# Patient Record
Sex: MALE | Race: WHITE | NOT HISPANIC OR LATINO | Employment: OTHER | URBAN - NONMETROPOLITAN AREA
[De-identification: names, ages, dates, MRNs, and addresses within clinical notes are randomized per-mention and may not be internally consistent; named-entity substitution may affect disease eponyms.]

---

## 2017-01-24 ENCOUNTER — OFFICE VISIT (OUTPATIENT)
Dept: FAMILY MEDICINE CLINIC | Facility: CLINIC | Age: 57
End: 2017-01-24

## 2017-01-24 VITALS
WEIGHT: 252 LBS | DIASTOLIC BLOOD PRESSURE: 88 MMHG | SYSTOLIC BLOOD PRESSURE: 122 MMHG | OXYGEN SATURATION: 97 % | BODY MASS INDEX: 35.28 KG/M2 | HEIGHT: 71 IN | HEART RATE: 74 BPM

## 2017-01-24 DIAGNOSIS — I10 ESSENTIAL HYPERTENSION: ICD-10-CM

## 2017-01-24 DIAGNOSIS — G89.4 CHRONIC PAIN SYNDROME: Primary | ICD-10-CM

## 2017-01-24 DIAGNOSIS — Z79.899 HIGH RISK MEDICATIONS (NOT ANTICOAGULANTS) LONG-TERM USE: ICD-10-CM

## 2017-01-24 DIAGNOSIS — J01.00 ACUTE NON-RECURRENT MAXILLARY SINUSITIS: ICD-10-CM

## 2017-01-24 PROCEDURE — 96372 THER/PROPH/DIAG INJ SC/IM: CPT | Performed by: FAMILY MEDICINE

## 2017-01-24 PROCEDURE — 99214 OFFICE O/P EST MOD 30 MIN: CPT | Performed by: FAMILY MEDICINE

## 2017-01-24 RX ORDER — CEFTRIAXONE 1 G/1
1 INJECTION, POWDER, FOR SOLUTION INTRAMUSCULAR; INTRAVENOUS ONCE
Status: COMPLETED | OUTPATIENT
Start: 2017-01-24 | End: 2017-01-24

## 2017-01-24 RX ORDER — FLUTICASONE PROPIONATE 50 MCG
SPRAY, SUSPENSION (ML) NASAL
Qty: 1 EACH | Refills: 0 | Status: SHIPPED | OUTPATIENT
Start: 2017-01-24 | End: 2017-03-28 | Stop reason: SDUPTHER

## 2017-01-24 RX ORDER — METHADONE HYDROCHLORIDE 5 MG/1
TABLET ORAL
Qty: 120 TABLET | Refills: 0 | Status: SHIPPED | OUTPATIENT
Start: 2017-01-24 | End: 2017-03-28 | Stop reason: SDUPTHER

## 2017-01-24 RX ORDER — METHYLPREDNISOLONE ACETATE 80 MG/ML
120 INJECTION, SUSPENSION INTRA-ARTICULAR; INTRALESIONAL; INTRAMUSCULAR; SOFT TISSUE ONCE
Status: COMPLETED | OUTPATIENT
Start: 2017-01-24 | End: 2017-01-24

## 2017-01-24 RX ADMIN — METHYLPREDNISOLONE ACETATE 120 MG: 80 INJECTION, SUSPENSION INTRA-ARTICULAR; INTRALESIONAL; INTRAMUSCULAR; SOFT TISSUE at 14:30

## 2017-01-24 RX ADMIN — CEFTRIAXONE 1 G: 1 INJECTION, POWDER, FOR SOLUTION INTRAMUSCULAR; INTRAVENOUS at 14:29

## 2017-01-24 NOTE — MR AVS SNAPSHOT
Sterling Whaley   1/24/2017 9:00 AM   Office Visit    Dept Phone:  [phone]   Encounter #:  [id number]    Provider:  Annel Armstrong MD   Department:  [facility]                Your Full Care Plan              Today's Medication Changes          These changes are accurate as of: 1/24/17  9:53 AM.  If you have any questions, ask your nurse or doctor.               New Medication(s)Ordered:     fluticasone 50 MCG/ACT nasal spray   Commonly known as:  FLONASE   2 sprays each nostril once daily   Started by:  Annel Armstrong MD         Medication(s)that have changed:     methadone 5 MG tablet   Commonly known as:  DOLOPHINE   1 tablet po up to qid.   What changed:  additional instructions   Changed by:  Annel Armstrong MD            Where to Get Your Medications      These medications were sent to [address] [phone]  - [phone] [address] [address]     Phone:  [phone]     fluticasone 50 MCG/ACT nasal spray         You can get these medications from any pharmacy     Bring a paper prescription for each of these medications     methadone 5 MG tablet                  Your Updated Medication List          This list is accurate as of: 1/24/17  9:53 AM.  Always use your most recent med list.                fluticasone 50 MCG/ACT nasal spray   Commonly known as:  FLONASE   2 sprays each nostril once daily       hydrOXYzine 50 MG capsule   Commonly known as:  VISTARIL   Take 1 capsule by mouth 3 (Three) Times a Day As Needed for itching or anxiety.       lisinopril 10 MG tablet   Commonly known as:  PRINIVIL,ZESTRIL   Take 1 tablet by mouth Daily.       methadone 5 MG tablet   Commonly known as:  DOLOPHINE   1 tablet po up to qid.       metoprolol tartrate 25 MG tablet   Commonly known as:  LOPRESSOR   Take 1 tablet by mouth 2 (Two) Times a Day.       omeprazole 40 MG capsule    "  Commonly known as:  priLOSEC   Take 1 capsule by mouth Daily.               You Were Diagnosed With        Codes Comments    Chronic pain syndrome    -  Primary ICD-10-CM: G89.4  ICD-9-CM: 338.4     Essential hypertension     ICD-10-CM: I10  ICD-9-CM: 401.9     Acute non-recurrent maxillary sinusitis     ICD-10-CM: J01.00  ICD-9-CM: 461.0     High risk medications (not anticoagulants) long-term use     ICD-10-CM: Z79.899  ICD-9-CM: V58.69       Instructions     None    Patient Instructions History      Upcoming Appointments     Visit Type Date Time Department    FOLLOW UP 1/24/2017  9:00 AM [facility]      Can'tWait Signup     Our records indicate that you have declined [facility] signup. If you would like to sign up for Can'tWait, please email [email or web address] or call [phone] to obtain an activation code.             Other Info from Your Visit           Allergies     Meperidine        Reason for Visit     Earache left ear pain, ears feel full.    Sinusitis sinus pressure, yellow drainage, dizziness,     Med Refill           Vital Signs     Blood Pressure Pulse Height Weight Oxygen Saturation Body Mass Index    122/88 74 71\" (180.3 cm) 252 lb (114 kg) 97% 35.15 kg/m2    Smoking Status                   Never Smoker           Problems and Diagnoses Noted     Chronic pain syndrome    High risk medications (not anticoagulants) long-term use    High blood pressure    Acute non-recurrent maxillary sinusitis            "

## 2017-01-24 NOTE — PROGRESS NOTES
Subjective   Sterling Whaley is a 56 y.o. male.     Sinus Problem   This is a new problem. The current episode started 1 to 4 weeks ago (over 1 week). The problem has been waxing and waning since onset. There has been no fever. The pain is moderate. Associated symptoms include congestion, coughing (dry), ear pain (left), headaches, a hoarse voice, neck pain and sinus pressure (with purulent nasal d/c). Pertinent negatives include no chills, shortness of breath, sneezing, sore throat or swollen glands. Past treatments include acetaminophen and spray decongestants. The treatment provided mild relief.     Chronic Pain (Brief): The patient is being seen for a routine clinic follow-up of chronic pain. Symptoms: headache, neck pain and back pain. Burning, aching, throbbing at times. Radiates down left leg to foot. Radiates from neck into upper back/shoulders/occipital area. Associated symptoms: anxiety, irritability, difficulty concentrating, sleep disturbance, decreased appetite and decreased libido, but no depression. c/o numbness/tingling/weakness left leg, arms. Current treatment includes activity modification, prescribed exercises, nonsteroidal anti-inflammatory drugs and opioid analgesics. By report, there is good compliance with treatment, good tolerance of treatment and good symptom control. The patient is currently able to do activities of daily living without limitations, unable to work, able to do housework with limitations and unable to participate in sports. Previous presentation included back pain, neck pain, headache, irritability, sleep disturbance and left sciatica. Past evaluation has included cervical and lumbar MRI, neurology evaluation, orthopedic evaluation, pain medicine evaluation and neurosurgery evaluation. Past treatment has included physical therapy, prescribed exercises, acetaminophen, nonsteroidal anti-inflammatory drugs, opioid analgesics, muscle relaxants, anticonvulsants,  "corticosteroids, epidural injection, nerve block and TENS unit.   He has been dx'd with diffuse severe spinal DDD/DJD. Feels spinal injections (unsure if MARCUS or facet) lasts approx 1-2 weeks and nerve ablation provides relief for approx 2 months. Has declined receiving further injections for that reason. In the past Lortab caused itching. Was only able to tolerate low dose Fentanyl patch which helped \"some\" but higher dose patches caused significant side effects. Previously under care of Dr. Duggan here in [place] and tx'd with Methadone for over year at total of 15 mg per day. He also often uses ice applications, Aleve for his neck pain which he feels is very helpful. He takes 1-2 methadone 5 mg tablets early in day. Generally takes 1 or 2, 5 mg tablet later in day. If taken too late in evening medication seems to be too activating and can impair sleep. He was considered a nonsurgical candidate by Dr. Gonzáles per pt report. Denies h/o heart disease, dysrhythmia. Has had w/u's for CP in past which were felt to be due to anxiety. He has a h/o of poor tolerance to medications. Is taking Methadone 2-4 times per day. Prescription generally lasting 45-60 days. PADT reviewed and on chart. He reports at least 60% of pain relieved over past week.      Hypertension: Patient here for follow-up of elevated blood pressure. He is not exercising other than household chores, minor yardwork and is fairly adherent to low salt diet. Blood pressure is well controlled at home. Cardiac symptoms fatigue. Patient denies chest pain, claudication, cough, dyspnea, irregular heart beat, near-syncope, orthopnea, palpitations, syncope, tachypnea and weight gain. Cardiovascular risk factors: advanced age (older than 55 for men, 65 for women), dyslipidemia, hypertension and obesity (BMI >= 30 kg/m2). Use of agents associated with hypertension: NSAIDS. History of target organ damage: none. Taking meds as rx'd.    Since his last visit his wife has " passed away from cancer. He is struggling with grief, poor sleep, eating more. Denies suicidal ideation. Does not wish to be referred to counseling.     The following portions of the patient's history were reviewed and updated as appropriate: allergies, current medications, past family history, past medical history, past social history, past surgical history and problem list.    Review of Systems   Constitutional: Positive for fatigue. Negative for chills.   HENT: Positive for congestion, ear pain (left), hoarse voice and sinus pressure (with purulent nasal d/c). Negative for sneezing and sore throat.    Eyes: Negative for pain, discharge and redness.   Respiratory: Positive for cough (dry). Negative for shortness of breath and wheezing.    Cardiovascular: Negative for chest pain, palpitations and leg swelling.   Gastrointestinal: Positive for nausea. Negative for diarrhea and vomiting.   Endocrine: Positive for heat intolerance.   Genitourinary: Negative for dysuria and hematuria.   Musculoskeletal: Positive for arthralgias, back pain, myalgias and neck pain.   Skin: Negative for rash.   Neurological: Positive for headaches. Negative for dizziness, syncope and weakness.   Hematological: Negative for adenopathy. Does not bruise/bleed easily.   Psychiatric/Behavioral: Positive for dysphoric mood and sleep disturbance. Negative for confusion, hallucinations and suicidal ideas. The patient is nervous/anxious.      Objective    Vitals:    01/24/17 0917   BP: 122/88   Pulse: 74   SpO2: 97%     Body mass index is 35.15 kg/(m^2).  Last 2 weights    01/24/17 0917   Weight: 252 lb (114 kg)     Physical Exam   Constitutional: He is oriented to person, place, and time. He appears well-developed and well-nourished. He is cooperative. He appears ill. No distress.   HENT:   Head: Normocephalic and atraumatic.   Right Ear: Tympanic membrane, external ear and ear canal normal.   Left Ear: External ear and ear canal normal.  Tympanic membrane is erythematous and retracted. A middle ear effusion (serous) is present.   Nose: Mucosal edema and rhinorrhea (thick, mucoid) present. Right sinus exhibits maxillary sinus tenderness. Left sinus exhibits maxillary sinus tenderness.   Mouth/Throat: Uvula is midline, oropharynx is clear and moist and mucous membranes are normal.   Eyes: Conjunctivae, EOM and lids are normal.   Neck: Normal carotid pulses present. No thyroid mass and no thyromegaly present.   Cardiovascular: Normal rate, regular rhythm, normal heart sounds and intact distal pulses.    Pulmonary/Chest: Effort normal and breath sounds normal.   Musculoskeletal:        Right shoulder: He exhibits tenderness (posterior soft tissues, subacromial area) and spasm. He exhibits normal range of motion, no bony tenderness, no swelling, no crepitus and no deformity.        Cervical back: He exhibits decreased range of motion (mildly limited all planes), tenderness (paraspinals), bony tenderness (C4-C7, mild) and deformity (increased lordosis). He exhibits no spasm.        Thoracic back: He exhibits deformity (kyphotic posture).       Vascular Status -  His exam exhibits no right foot edema. His exam exhibits no left foot edema.  Lymphadenopathy:     He has no cervical adenopathy.   Neurological: He is alert and oriented to person, place, and time. He has normal strength. He displays no tremor. No cranial nerve deficit or sensory deficit. Gait normal.   Skin: Skin is warm and dry. No bruising and no rash noted.   Psychiatric: His speech is normal and behavior is normal. Thought content normal. Cognition and memory are normal. He exhibits a depressed mood. He expresses no suicidal ideation.   Nursing note and vitals reviewed.    Assessment/Plan   Sterling was seen today for earache, sinusitis and med refill.    Diagnoses and all orders for this visit:    Chronic pain syndrome  -     methadone (DOLOPHINE) 5 MG tablet; 1 tablet po up to qid.  -      methylPREDNISolone acetate (DEPO-medrol) injection 120 mg; Inject 1.5 mL into the shoulder, thigh, or buttocks 1 (One) Time.    Essential hypertension    Acute non-recurrent maxillary sinusitis  -     fluticasone (FLONASE) 50 MCG/ACT nasal spray; 2 sprays each nostril once daily  -     methylPREDNISolone acetate (DEPO-medrol) injection 120 mg; Inject 1.5 mL into the shoulder, thigh, or buttocks 1 (One) Time.  -     cefTRIAXone (ROCEPHIN) injection 1 g; Inject 1 g into the shoulder, thigh, or buttocks 1 (One) Time.    High risk medications (not anticoagulants) long-term use    symptomatic tx of sinusitis reviewed.  Complicated grief. Pt declines referral for counseling.  BP at goal.  Stable chronic pain syndrome.  Good tolerance and efficacy of current meds; good use of adjunctive modalities, going to chiropractor as well. No aberrant behavior noted.  CLAUDIO report reviewed and scanned into chart. Last CLAUDIO date 10/28/16.  As part of patient's treatment plan I am prescribing a controlled substance. The patient has been made aware of the appropriate use of such medications, including potential risk of somnolence, limited ability to drive and/or work safely, and potential for dependence and/or overdose. It has also been made clear that these medications are for use by this patient only, without concomitant use of alcohol or other substances, unless prescribed.  History and physical exam exhibit continued safe and appropriate use of controlled substance.  F/U in 3 months, sooner as needed.  Patient was encouraged to keep me informed of any acute changes, lack of improvement, or any new concerning symptoms.  Patient voiced understanding of all instructions and denied further questions.

## 2017-01-28 PROBLEM — F32.A DEPRESSIVE DISORDER: Status: ACTIVE | Noted: 2017-01-28

## 2017-01-28 PROBLEM — F41.1 GAD (GENERALIZED ANXIETY DISORDER): Status: ACTIVE | Noted: 2017-01-28

## 2017-02-06 ENCOUNTER — TELEPHONE (OUTPATIENT)
Dept: FAMILY MEDICINE CLINIC | Facility: CLINIC | Age: 57
End: 2017-02-06

## 2017-02-06 RX ORDER — AMOXICILLIN AND CLAVULANATE POTASSIUM 500; 125 MG/1; MG/1
1 TABLET, FILM COATED ORAL 3 TIMES DAILY
Qty: 30 TABLET | Refills: 0 | Status: SHIPPED | OUTPATIENT
Start: 2017-02-06 | End: 2017-03-02

## 2017-02-17 ENCOUNTER — TELEPHONE (OUTPATIENT)
Dept: FAMILY MEDICINE CLINIC | Facility: CLINIC | Age: 57
End: 2017-02-17

## 2017-02-17 DIAGNOSIS — J06.9 UPPER RESPIRATORY TRACT INFECTION, UNSPECIFIED TYPE: Primary | ICD-10-CM

## 2017-02-17 RX ORDER — AZITHROMYCIN 250 MG/1
TABLET, FILM COATED ORAL
Qty: 6 TABLET | Refills: 0 | Status: SHIPPED | OUTPATIENT
Start: 2017-02-17 | End: 2017-03-02

## 2017-03-02 ENCOUNTER — TELEPHONE (OUTPATIENT)
Dept: FAMILY MEDICINE CLINIC | Facility: CLINIC | Age: 57
End: 2017-03-02

## 2017-03-02 RX ORDER — TEMAZEPAM 7.5 MG/1
CAPSULE ORAL
Qty: 30 CAPSULE | Refills: 0 | OUTPATIENT
Start: 2017-03-02 | End: 2017-04-21

## 2017-03-28 ENCOUNTER — TELEPHONE (OUTPATIENT)
Dept: FAMILY MEDICINE CLINIC | Facility: CLINIC | Age: 57
End: 2017-03-28

## 2017-03-28 DIAGNOSIS — G89.4 CHRONIC PAIN SYNDROME: ICD-10-CM

## 2017-03-28 DIAGNOSIS — J01.00 ACUTE NON-RECURRENT MAXILLARY SINUSITIS: ICD-10-CM

## 2017-03-28 RX ORDER — LISINOPRIL 10 MG/1
10 TABLET ORAL DAILY
Qty: 90 TABLET | Refills: 1 | Status: SHIPPED | OUTPATIENT
Start: 2017-03-28 | End: 2017-08-15 | Stop reason: SDUPTHER

## 2017-03-28 RX ORDER — HYDROXYZINE PAMOATE 50 MG/1
50 CAPSULE ORAL 3 TIMES DAILY PRN
Qty: 270 CAPSULE | Refills: 0 | Status: SHIPPED | OUTPATIENT
Start: 2017-03-28 | End: 2017-09-14 | Stop reason: SDUPTHER

## 2017-03-28 RX ORDER — FLUTICASONE PROPIONATE 50 MCG
SPRAY, SUSPENSION (ML) NASAL
Qty: 3 EACH | Refills: 0 | Status: SHIPPED | OUTPATIENT
Start: 2017-03-28 | End: 2018-02-20 | Stop reason: SDUPTHER

## 2017-03-28 RX ORDER — OMEPRAZOLE 40 MG/1
40 CAPSULE, DELAYED RELEASE ORAL DAILY
Qty: 90 CAPSULE | Refills: 1 | Status: SHIPPED | OUTPATIENT
Start: 2017-03-28 | End: 2017-08-15 | Stop reason: SDUPTHER

## 2017-03-28 RX ORDER — METHADONE HYDROCHLORIDE 5 MG/1
TABLET ORAL
Qty: 120 TABLET | Refills: 0 | Status: SHIPPED | OUTPATIENT
Start: 2017-03-28 | End: 2017-04-21 | Stop reason: SDUPTHER

## 2017-03-28 NOTE — TELEPHONE ENCOUNTER
CLAUDIO reviewed.  Rx refill authorized.  Pt to keep routine f/u apt.  UDS to be done at time of

## 2017-04-21 ENCOUNTER — OFFICE VISIT (OUTPATIENT)
Dept: FAMILY MEDICINE CLINIC | Facility: CLINIC | Age: 57
End: 2017-04-21

## 2017-04-21 ENCOUNTER — TELEPHONE (OUTPATIENT)
Dept: FAMILY MEDICINE CLINIC | Facility: CLINIC | Age: 57
End: 2017-04-21

## 2017-04-21 VITALS
BODY MASS INDEX: 34.72 KG/M2 | WEIGHT: 248 LBS | OXYGEN SATURATION: 97 % | SYSTOLIC BLOOD PRESSURE: 132 MMHG | HEART RATE: 76 BPM | HEIGHT: 71 IN | DIASTOLIC BLOOD PRESSURE: 86 MMHG

## 2017-04-21 DIAGNOSIS — M79.10 MYALGIA: ICD-10-CM

## 2017-04-21 DIAGNOSIS — R25.2 CRAMP OF BOTH LOWER EXTREMITIES: ICD-10-CM

## 2017-04-21 DIAGNOSIS — G89.4 CHRONIC PAIN SYNDROME: Primary | ICD-10-CM

## 2017-04-21 DIAGNOSIS — I10 ESSENTIAL HYPERTENSION: ICD-10-CM

## 2017-04-21 DIAGNOSIS — F41.1 GAD (GENERALIZED ANXIETY DISORDER): ICD-10-CM

## 2017-04-21 DIAGNOSIS — D69.6 THROMBOCYTOPENIA (HCC): ICD-10-CM

## 2017-04-21 LAB
ALBUMIN SERPL-MCNC: 4.3 G/DL (ref 3.5–5)
ALBUMIN/GLOB SERPL: 1.2 G/DL (ref 1–2)
ALP SERPL-CCNC: 95 U/L (ref 38–126)
ALT SERPL-CCNC: 50 U/L (ref 13–69)
AST SERPL-CCNC: 46 U/L (ref 15–46)
BILIRUB SERPL-MCNC: 2.5 MG/DL (ref 0.2–1.3)
BUN SERPL-MCNC: 13 MG/DL (ref 7–20)
BUN/CREAT SERPL: 14.4 (ref 6.3–21.9)
CALCIUM SERPL-MCNC: 9.5 MG/DL (ref 8.4–10.2)
CHLORIDE SERPL-SCNC: 107 MMOL/L (ref 98–107)
CK SERPL-CCNC: 122 U/L (ref 30–170)
CO2 SERPL-SCNC: 25 MMOL/L (ref 26–30)
CREAT SERPL-MCNC: 0.9 MG/DL (ref 0.6–1.3)
ERYTHROCYTE [DISTWIDTH] IN BLOOD BY AUTOMATED COUNT: 13 % (ref 11.5–14.5)
GLOBULIN SER CALC-MCNC: 3.7 GM/DL
GLUCOSE SERPL-MCNC: 125 MG/DL (ref 74–98)
HCT VFR BLD AUTO: 42.1 % (ref 42–52)
HGB BLD-MCNC: 14.6 G/DL (ref 14–18)
MCH RBC QN AUTO: 34 PG (ref 27–31)
MCHC RBC AUTO-ENTMCNC: 34.7 G/DL (ref 30–37)
MCV RBC AUTO: 97.9 FL (ref 80–94)
PLATELET # BLD AUTO: 80 10*3/MM3 (ref 130–400)
POTASSIUM SERPL-SCNC: 4.2 MMOL/L (ref 3.5–5.1)
PROT SERPL-MCNC: 8 G/DL (ref 6.3–8.2)
RBC # BLD AUTO: 4.3 10*6/MM3 (ref 4.7–6.1)
SODIUM SERPL-SCNC: 144 MMOL/L (ref 137–145)
TSH SERPL DL<=0.005 MIU/L-ACNC: 2.25 MIU/ML (ref 0.47–4.68)
WBC # BLD AUTO: 4.37 10*3/MM3 (ref 4.8–10.8)

## 2017-04-21 PROCEDURE — 99214 OFFICE O/P EST MOD 30 MIN: CPT | Performed by: FAMILY MEDICINE

## 2017-04-21 RX ORDER — METHADONE HYDROCHLORIDE 5 MG/1
TABLET ORAL
Qty: 120 TABLET | Refills: 0 | Status: SHIPPED | OUTPATIENT
Start: 2017-04-21 | End: 2017-07-05 | Stop reason: SDUPTHER

## 2017-04-21 RX ORDER — BUSPIRONE HYDROCHLORIDE 7.5 MG/1
TABLET ORAL
Qty: 120 TABLET | Refills: 2 | Status: SHIPPED | OUTPATIENT
Start: 2017-04-21 | End: 2017-07-21

## 2017-04-21 NOTE — TELEPHONE ENCOUNTER
----- Message from Danielle Jamison sent at 4/21/2017 10:47 AM EDT -----  Appt scheduled at pt OV today.  ----- Message -----     From: Jocelyn Thomas MA     Sent: 4/7/2017  10:50 AM       To: Danielle Jamison    Can you please schedule pt for medicare physical. I dont see that he has had one or has one scheduled

## 2017-04-21 NOTE — PROGRESS NOTES
"Subjective   Sterling Andrea Whaley is a 56 y.o. male.     History of Present Illness   Chronic Pain (Brief): The patient is being seen for a routine clinic follow-up of chronic pain. Symptoms: headache, neck pain and back pain. Burning, aching, throbbing at times. Radiates down left leg to foot. Radiates from neck into upper back/shoulders/occipital area. Associated symptoms: anxiety, irritability, difficulty concentrating, sleep disturbance, decreased appetite and decreased libido, but no depression. c/o numbness/tingling/weakness left leg, arms. Current treatment includes activity modification, prescribed exercises, nonsteroidal anti-inflammatory drugs and opioid analgesics. By report, there is good compliance with treatment, good tolerance of treatment and good symptom control. The patient is currently able to do activities of daily living without limitations, unable to work, able to do housework with limitations and unable to participate in sports. Previous presentation included back pain, neck pain, headache, irritability, sleep disturbance and left sciatica. Past evaluation has included cervical and lumbar MRI, neurology evaluation, orthopedic evaluation, pain medicine evaluation and neurosurgery evaluation. Past treatment has included physical therapy, prescribed exercises, acetaminophen, nonsteroidal anti-inflammatory drugs, opioid analgesics, muscle relaxants, anticonvulsants, corticosteroids, epidural injection, nerve block and TENS unit. He has been dx'd with diffuse severe spinal DDD/DJD. Feels spinal injections (unsure if MARCUS or facet) lasted approx 1-2 weeks and nerve ablation provided relief for approx 2 months. Has declined receiving further injections for that reason. In the past Lortab caused itching. Was only able to tolerate low dose Fentanyl patch which helped \"some\" but higher dose patches caused significant side effects. Previously under care of Dr. Duggan here in [place] and tx'd with Methadone " "for over year at total of 15 mg per day. He also often uses ice applications, Aleve for his neck pain which he feels is very helpful. He takes 1-2 methadone 5 mg tablets early in day. Generally takes 1 or 2, 5 mg tablet later in day. If taken too late in evening medication seems to be too activating and can impair sleep. He was considered a nonsurgical candidate by Dr. Gonzáles per pt report. Denies h/o heart disease, dysrhythmia. Has had w/u's for CP in past which were felt to be due to anxiety. He has a h/o of poor tolerance to medications. Prescription generally lasting 45-60 days.     He does notice increase muscle pain in lower legs, cramping, worst at night but occur during day as well. Assoc'd with mild swelling if he is \"up on them much\".    Hypertension: Patient here for follow-up of elevated blood pressure. He is not exercising other than household chores, minor yardwork and is fairly adherent to low salt diet. Blood pressure is well controlled at home. Cardiac symptoms fatigue. Patient denies chest pain, claudication, cough, dyspnea, irregular heart beat, near-syncope, orthopnea, palpitations, syncope, tachypnea and weight gain. Cardiovascular risk factors: advanced age (older than 55 for men, 65 for women), dyslipidemia, hypertension and obesity (BMI >= 30 kg/m2). Use of agents associated with hypertension: NSAIDS. History of target organ damage: none. Taking meds as rx'd.     Anxiety: Patient complains of anxiety disorder, panic attacks and sleep disturbance.  He has the following symptoms: difficulty concentrating, fatigue, feelings of losing control, insomnia, irritable, palpitations, paresthesias, psychomotor agitation, racing thoughts, shortness of breath. Onset of symptoms was approximately several years ago, gradually worsening since that time. Much worse after death of his wife in past year. He denies current suicidal and homicidal ideation. He has had difficulty tolerating multiple medications in " past. He is currently using vistaril for diffuse itching assoc'd with anxiety. He was rx'd restoril at last visit but did not get due to cost. He did not request replacement.     Has had persistently low platelets but no bleeding tendencies. No assoc'd anemia.    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    The following portions of the patient's history were reviewed and updated as appropriate: allergies, current medications, past family history, past medical history, past social history, past surgical history and problem list.    Review of Systems   Constitutional: Positive for fatigue. Negative for chills.   HENT: Positive for congestion, postnasal drip and sinus pressure (chronic). Negative for mouth sores, nosebleeds, rhinorrhea, sneezing and sore throat.    Eyes: Negative for pain, discharge, redness and visual disturbance.   Respiratory: Negative for cough, shortness of breath and wheezing.    Cardiovascular: Negative for chest pain, palpitations and leg swelling.   Gastrointestinal: Negative for abdominal pain, blood in stool, diarrhea, nausea and vomiting.   Endocrine: Positive for heat intolerance.   Genitourinary: Negative for dysuria and hematuria.   Musculoskeletal: Positive for arthralgias, back pain, myalgias, neck pain and neck stiffness.   Skin: Negative for color change and rash.   Neurological: Positive for headaches. Negative for dizziness, syncope and weakness.   Hematological: Negative for adenopathy. Does not bruise/bleed easily.   Psychiatric/Behavioral: Positive for dysphoric mood and sleep disturbance. Negative for confusion, hallucinations and suicidal ideas. The patient is nervous/anxious.        Objective    Vitals:    04/21/17 1046   BP: 132/86   Pulse: 76   SpO2: 97%     Body mass index is 34.59 kg/(m^2).  Last 2 weights    04/21/17  1046   Weight: 248 lb (112 kg)       Physical Exam   Constitutional: He is oriented to person,  place, and time. He appears well-developed and well-nourished. He is cooperative. He does not appear ill. No distress.   HENT:   Head: Normocephalic and atraumatic.   Mouth/Throat: Mucous membranes are normal. Mucous membranes are not dry.   Eyes: Conjunctivae, EOM and lids are normal.   Neck: Normal carotid pulses present. Carotid bruit is not present. No thyroid mass and no thyromegaly present.   Cardiovascular: Normal rate, regular rhythm, normal heart sounds and intact distal pulses.    Pulmonary/Chest: Effort normal and breath sounds normal.   Musculoskeletal:        Cervical back: He exhibits decreased range of motion, tenderness (diffuse soft tissue), bony tenderness (C4-C7), deformity (loss of normal lordosis) and spasm.        Thoracic back: He exhibits tenderness, bony tenderness, deformity (increase kyphosis) and spasm.        Lumbar back: He exhibits decreased range of motion, tenderness (diffuse soft tissue), bony tenderness (L3-S1), deformity (loss of normal lordosis) and spasm.       Vascular Status -  His exam exhibits no right foot edema. His exam exhibits no left foot edema.  Lymphadenopathy:     He has no cervical adenopathy.   Neurological: He is alert and oriented to person, place, and time. He has normal strength. He displays no tremor. No cranial nerve deficit or sensory deficit. Gait normal.   Negative SLR bilaterally   Skin: Skin is warm and dry. No bruising and no rash noted.   Psychiatric: His speech is normal and behavior is normal. Judgment and thought content normal. His mood appears anxious. Cognition and memory are normal.   Nursing note and vitals reviewed.      Assessment/Plan   Sterling was seen today for anxiety and pain.    Diagnoses and all orders for this visit:    Chronic pain syndrome  -     methadone (DOLOPHINE) 5 MG tablet; 1 tablet po up to qid.  -     Comprehensive Metabolic Panel    TYSON (generalized anxiety disorder)  -     busPIRone (BUSPAR) 7.5 MG tablet; 1 po bid x 1  week, then can increase to 2 po bid as needed and tolerated  -     TSH    Myalgia  -     CBC (No Diff)  -     Comprehensive Metabolic Panel  -     TSH  -     CK    Cramp of both lower extremities  -     CBC (No Diff)  -     Comprehensive Metabolic Panel    Essential hypertension    Thrombocytopenia    Worsening anxiety, irritability, mild depressive symptoms. He is very nervous about trying new medications. I have reviewed risks/benefits and potential side effects of various treatment options for anxiety. Pt voices understanding and wishes to proceed with trial of buspar.  BP at goal.  Pt advised to eat a heart healthy diet and get regular aerobic exercise as he is able.  Stable chronic pain syndrome.  Good tolerance and efficacy of current meds; good use of adjunctive modalities, going to chiropractor as well. No aberrant behavior noted.  CLAUDIO report reviewed and scanned into chart. Last CLAUDIO date 4/20/17.  As part of patient's treatment plan I am prescribing a controlled substance. The patient has been made aware of the appropriate use of such medications, including potential risk of somnolence, limited ability to drive and/or work safely, and potential for dependence and/or overdose. It has also been made clear that these medications are for use by this patient only, without concomitant use of alcohol or other substances, unless prescribed.  History and physical exam exhibit continued safe and appropriate use of controlled substance.  I will contact patient regarding test results and provide instructions regarding any necessary changes in plan of care.  F/U in 3 months, sooner as needed.  He is also encouraged to schedule a medicare wellness visit.  Patient was encouraged to keep me informed of any acute changes, lack of improvement, or any new concerning symptoms.  Patient voiced understanding of all instructions and denied further questions.

## 2017-04-22 DIAGNOSIS — D69.6 THROMBOCYTOPENIA (HCC): Primary | ICD-10-CM

## 2017-04-25 ENCOUNTER — TELEPHONE (OUTPATIENT)
Dept: FAMILY MEDICINE CLINIC | Facility: CLINIC | Age: 57
End: 2017-04-25

## 2017-05-09 ENCOUNTER — TELEPHONE (OUTPATIENT)
Dept: FAMILY MEDICINE CLINIC | Facility: CLINIC | Age: 57
End: 2017-05-09

## 2017-05-26 ENCOUNTER — TELEPHONE (OUTPATIENT)
Dept: ONCOLOGY | Facility: CLINIC | Age: 57
End: 2017-05-26

## 2017-06-23 ENCOUNTER — LAB (OUTPATIENT)
Dept: LAB | Facility: HOSPITAL | Age: 57
End: 2017-06-23

## 2017-06-23 ENCOUNTER — CONSULT (OUTPATIENT)
Dept: ONCOLOGY | Facility: CLINIC | Age: 57
End: 2017-06-23

## 2017-06-23 ENCOUNTER — APPOINTMENT (OUTPATIENT)
Dept: LAB | Facility: HOSPITAL | Age: 57
End: 2017-06-23

## 2017-06-23 VITALS
TEMPERATURE: 97.8 F | SYSTOLIC BLOOD PRESSURE: 151 MMHG | DIASTOLIC BLOOD PRESSURE: 88 MMHG | HEIGHT: 71 IN | WEIGHT: 243 LBS | RESPIRATION RATE: 15 BRPM | BODY MASS INDEX: 34.02 KG/M2 | HEART RATE: 62 BPM

## 2017-06-23 DIAGNOSIS — D69.6 THROMBOCYTOPENIA (HCC): ICD-10-CM

## 2017-06-23 DIAGNOSIS — D69.6 THROMBOCYTOPENIA (HCC): Primary | ICD-10-CM

## 2017-06-23 LAB
BASOPHILS # BLD AUTO: 0.03 10*3/MM3 (ref 0–0.2)
BASOPHILS NFR BLD AUTO: 0.3 % (ref 0–2.5)
DEPRECATED RDW RBC AUTO: 43.9 FL (ref 37–54)
EOSINOPHIL # BLD AUTO: 0.12 10*3/MM3 (ref 0–0.7)
EOSINOPHIL NFR BLD AUTO: 1.3 % (ref 0–7)
ERYTHROCYTE [DISTWIDTH] IN BLOOD BY AUTOMATED COUNT: 12.4 % (ref 11.5–14.5)
FOLATE SERPL-MCNC: 10.7 NG/ML
HCT VFR BLD AUTO: 41.6 % (ref 42–52)
HGB BLD-MCNC: 14.5 G/DL (ref 14–18)
IMM GRANULOCYTES # BLD: 0.03 10*3/MM3 (ref 0–0.06)
IMM GRANULOCYTES NFR BLD: 0.3 % (ref 0–0.6)
LYMPHOCYTES # BLD AUTO: 2.32 10*3/MM3 (ref 0.6–3.4)
LYMPHOCYTES NFR BLD AUTO: 25.7 % (ref 10–50)
MCH RBC QN AUTO: 33.6 PG (ref 27–31)
MCHC RBC AUTO-ENTMCNC: 34.9 G/DL (ref 30–37)
MCV RBC AUTO: 96.3 FL (ref 80–94)
MONOCYTES # BLD AUTO: 0.81 10*3/MM3 (ref 0–0.9)
MONOCYTES NFR BLD AUTO: 9 % (ref 0–12)
NEUTROPHILS # BLD AUTO: 5.72 10*3/MM3 (ref 2–6.9)
NEUTROPHILS NFR BLD AUTO: 63.4 % (ref 37–80)
NRBC BLD MANUAL-RTO: 0 /100 WBC (ref 0–0)
PLATELET # BLD AUTO: 114 10*3/MM3 (ref 130–400)
PMV BLD AUTO: 12.3 FL (ref 6–12)
RBC # BLD AUTO: 4.32 10*6/MM3 (ref 4.7–6.1)
VIT B12 BLD-MCNC: 285 PG/ML (ref 239–931)
WBC NRBC COR # BLD: 9.03 10*3/MM3 (ref 4.8–10.8)

## 2017-06-23 PROCEDURE — 86431 RHEUMATOID FACTOR QUANT: CPT | Performed by: NURSE PRACTITIONER

## 2017-06-23 PROCEDURE — 82746 ASSAY OF FOLIC ACID SERUM: CPT | Performed by: NURSE PRACTITIONER

## 2017-06-23 PROCEDURE — 36415 COLL VENOUS BLD VENIPUNCTURE: CPT | Performed by: INTERNAL MEDICINE

## 2017-06-23 PROCEDURE — 86038 ANTINUCLEAR ANTIBODIES: CPT | Performed by: NURSE PRACTITIONER

## 2017-06-23 PROCEDURE — 82607 VITAMIN B-12: CPT | Performed by: NURSE PRACTITIONER

## 2017-06-23 PROCEDURE — 85060 BLOOD SMEAR INTERPRETATION: CPT | Performed by: NURSE PRACTITIONER

## 2017-06-23 PROCEDURE — 85025 COMPLETE CBC W/AUTO DIFF WBC: CPT | Performed by: NURSE PRACTITIONER

## 2017-06-23 PROCEDURE — 99204 OFFICE O/P NEW MOD 45 MIN: CPT | Performed by: INTERNAL MEDICINE

## 2017-06-23 NOTE — PROGRESS NOTES
Subjective     PROBLEM LIST:  1. Thrombocytopenia  2. Fatty liver  3. Arthritis  4. Anxiety  5. Chronic pain  6. hypertension  7. GERD  8. hyperlipidemia  9. Gilbert's     CHIEF COMPLAINT: thrombocytopenia      HISTORY OF PRESENT ILLNESS:  The patient is a 57 y.o. year old male, referred for evaluation of a low platelet count.  He believes his platelets have been low for at least a few years.  He does not have any bruising or bleeding.  Denies night sweats, fevers, or weight loss.  He is in his usual state of health.  He suffers from severe arthritis.  He reports being diagnosed with fatty liver about 20 years ago.  He denies alcohol use and does not smoke.      REVIEW OF SYSTEMS:  A 14 point review of systems was performed and is negative except as noted above.    Past Medical History:   Diagnosis Date   • Arthritis    • Bilateral carpal tunnel syndrome    • Depression    • Esophageal reflux    • Gastritis    • H/O pulmonary function tests 09/13/2012    normal   • Hiatal hernia    • History of depression    • History of meniscal tear    • History of renal calculi    • Hyperbilirubinemia     Mineral syndrome   • Hypertension    • Obstructive sleep apnea    • Plantar fascia rupture    • Renal calculi    • RLS (restless legs syndrome)    • Schatzki's ring    • TMJ pain dysfunction syndrome          Current Outpatient Prescriptions on File Prior to Visit   Medication Sig Dispense Refill   • busPIRone (BUSPAR) 7.5 MG tablet 1 po bid x 1 week, then can increase to 2 po bid as needed and tolerated 120 tablet 2   • fluticasone (FLONASE) 50 MCG/ACT nasal spray 2 sprays each nostril once daily  Indications: Allergic Rhinitis 3 each 0   • hydrOXYzine (VISTARIL) 50 MG capsule Take 1 capsule by mouth 3 (Three) Times a Day As Needed for Itching or Anxiety. 270 capsule 0   • lisinopril (PRINIVIL,ZESTRIL) 10 MG tablet Take 1 tablet by mouth Daily. 90 tablet 1   • methadone (DOLOPHINE) 5 MG tablet 1 tablet po up to qid. 120  "tablet 0   • metoprolol tartrate (LOPRESSOR) 25 MG tablet Take 1 tablet by mouth 2 (Two) Times a Day. 180 tablet 1   • omeprazole (priLOSEC) 40 MG capsule Take 1 capsule by mouth Daily. 90 capsule 1     No current facility-administered medications on file prior to visit.        Allergies   Allergen Reactions   • Meperidine        Past Surgical History:   Procedure Laterality Date   • ESOPHAGOSCOPY / EGD      [place]   • KNEE ARTHROSCOPY      medial meniscus repair   • MEDIAL COLLATERAL LIGAMENT REPAIR, KNEE Left        Social History     Social History   • Marital status:      Spouse name: N/A   • Number of children: N/A   • Years of education: N/A     Social History Main Topics   • Smoking status: Never Smoker   • Smokeless tobacco: None   • Alcohol use No   • Drug use: No   • Sexual activity: Not Asked     Other Topics Concern   • None     Social History Narrative   , retired.  His wife  several months ago from cancer.     Family History   Problem Relation Age of Onset   • Arthritis Mother        Objective     /88  Pulse 62  Temp 97.8 °F (36.6 °C) (Temporal Artery )   Resp 15  Ht 71\" (180.3 cm)  Wt 243 lb (110 kg)  BMI 33.89 kg/m2  Performance Status: 0  General: well appearing male in no acute distress  Neuro: alert and oriented  HEENT: sclera anicteric, oropharynx clear  Lymphatics: no cervical, supraclavicular, or axillary adenopathy  Cardiovascular: regular rate and rhythm, no murmurs  Lungs: clear to auscultation bilaterally  Abdomen: soft, nontender, nondistended.  No palpable organomegaly  Extremeties: no lower extremity edema  Skin: no rashes, lesions, bruising, or petechiae  Psych: mood and affect appropriate    Labs: On 2017, bilirubin is 2.5, white count 4.37, hemoglobin 14.6, platelets 80.        Assessment/Plan     Sterling Whaley is a 57 y.o. year old male referred for evaluation of low platelets.   He has had low platelet for at least a few years, ranging " between 80-120K.       I reviewed with the patient the possible reasons for low platelet count including decreased bone marrow production, platelet destruction, or platelet sequestration.  Possible reasons for bone marrow decreased production include B12 or folic acid deficiency, chronic inflammatory conditions or infection, myelodysplastic syndrome, lymphoma or leukemia, or other metastatic malignancy involving the bone marrow.  Platelet destruction can be caused by ITP, medications, or damage to blood vessels.  Platelet sequestration can occur in the setting of an enlarged spleen which can be related to many things, most commonly chronic liver disease.    We will repeat labs today, check LFTs and liver/spleen ultrasound, B12/folic acid levels, and CESAR/RF.  We will plan to see him back in a few weeks to review the results of these tests.           Jayla Barker MD    6/23/2017

## 2017-06-26 LAB
ANA SER QL IA: NEGATIVE
RHEUMATOID FACT SERPL-ACNC: NEGATIVE [IU]/ML

## 2017-06-29 LAB
CYTOLOGIST CVX/VAG CYTO: NORMAL
PATH INTERP BLD-IMP: NORMAL

## 2017-06-30 ENCOUNTER — HOSPITAL ENCOUNTER (OUTPATIENT)
Dept: ULTRASOUND IMAGING | Facility: HOSPITAL | Age: 57
Discharge: HOME | End: 2017-06-30

## 2017-06-30 ENCOUNTER — HOSPITAL ENCOUNTER (OUTPATIENT)
Dept: ULTRASOUND IMAGING | Facility: HOSPITAL | Age: 57
Discharge: HOME | End: 2017-06-30
Admitting: NURSE PRACTITIONER

## 2017-06-30 DIAGNOSIS — D69.6 THROMBOCYTOPENIA (HCC): ICD-10-CM

## 2017-06-30 PROCEDURE — 76705 ECHO EXAM OF ABDOMEN: CPT

## 2017-07-03 ENCOUNTER — TELEPHONE (OUTPATIENT)
Dept: FAMILY MEDICINE CLINIC | Facility: CLINIC | Age: 57
End: 2017-07-03

## 2017-07-03 DIAGNOSIS — G89.4 CHRONIC PAIN SYNDROME: ICD-10-CM

## 2017-07-03 NOTE — TELEPHONE ENCOUNTER
Patient came by office today to get refill on his pain med,  He had a script, but it .  He dropped off that old script for disposal.

## 2017-07-05 RX ORDER — METHADONE HYDROCHLORIDE 5 MG/1
TABLET ORAL
Qty: 120 TABLET | Refills: 0 | Status: SHIPPED | OUTPATIENT
Start: 2017-07-05 | End: 2017-07-21 | Stop reason: SDUPTHER

## 2017-07-07 ENCOUNTER — OFFICE VISIT (OUTPATIENT)
Dept: ONCOLOGY | Facility: CLINIC | Age: 57
End: 2017-07-07

## 2017-07-07 VITALS
SYSTOLIC BLOOD PRESSURE: 179 MMHG | WEIGHT: 245 LBS | HEART RATE: 58 BPM | RESPIRATION RATE: 17 BRPM | TEMPERATURE: 97.5 F | DIASTOLIC BLOOD PRESSURE: 93 MMHG | BODY MASS INDEX: 34.17 KG/M2

## 2017-07-07 DIAGNOSIS — D69.6 THROMBOCYTOPENIA (HCC): Primary | ICD-10-CM

## 2017-07-07 PROCEDURE — 99214 OFFICE O/P EST MOD 30 MIN: CPT | Performed by: INTERNAL MEDICINE

## 2017-07-07 NOTE — PROGRESS NOTES
PROBLEM LIST:  1. Thrombocytopenia  A) initial evaluation showed normal b12/folate, fatty liver, splenomegaly at 14.5 cm  2. Fatty liver  3. Arthritis  4. Anxiety  5. Chronic pain  6. hypertension  7. GERD  8. hyperlipidemia  9. Gilbert's     Subjective     HISTORY OF PRESENT ILLNESS:   Sterlnig Whaley returns for follow-up.   He is here to review his lab results.  He has been feeling a little anxious but otherwise ok.    Past Medical History, Past Surgical History, Social History, Family History have been reviewed and are without significant changes except as mentioned.    Review of Systems   A comprehensive 14 point review of systems was performed and was negative except as mentioned.    Medications:  The current medication list was reviewed in the EMR    ALLERGIES:    Allergies   Allergen Reactions   • Meperidine        Objective      /93  Pulse 58  Temp 97.5 °F (36.4 °C) (Temporal Artery )   Resp 17  Wt 245 lb (111 kg)  BMI 34.17 kg/m2     Performance Status: 0    General: well appearing male in no acute distress  Neuro: alert and oriented    Skin: no rashes, lesions, bruising, or petechiae  Psych: mood and affect appropriate      RECENT LABS:  Blood work was reviewed and is notable for wbc 9.03, hgb 14.5, mcv 96, plt 114, folate 10.7, b12 285, CESAR negative, RF negative    US liver/spleen - fatty liver, splenomegaly with spleen measuring 14.5 cm.        Assessment/Plan   Sterling Whaley is a 57 y.o. year old male with mild chronic thrombocytopenia.  His evaluation shows no evidence of autoimmune disease.  He has a low-normal B12 and normal folic acid level.  His platelets when rechecked were 114.  His ultrasound shows fatty liver and splenomegaly.  I think it is likely that his splenomegaly is related to fatty liver disease, and is also a reason for his mild chronic thrombocytopenia.  I would not recommend any further evaluation or treatment at this point given the stability of his  platelet count.  We did discuss that fatty liver disease can be managed with weight loss, and dietary changes, and regular exercise.  He prefers to follow-up with his primary care doctor.  I would be happy to see him back if he has any significant change in his lab work.                  Visit time was 25 minutes, greater than 50% spent in counseling      Jayla Barker MD  [facility]    7/7/2017          CC:

## 2017-07-21 ENCOUNTER — OFFICE VISIT (OUTPATIENT)
Dept: FAMILY MEDICINE CLINIC | Facility: CLINIC | Age: 57
End: 2017-07-21

## 2017-07-21 VITALS
HEART RATE: 70 BPM | SYSTOLIC BLOOD PRESSURE: 122 MMHG | WEIGHT: 241 LBS | OXYGEN SATURATION: 98 % | DIASTOLIC BLOOD PRESSURE: 88 MMHG | HEIGHT: 71 IN | BODY MASS INDEX: 33.74 KG/M2

## 2017-07-21 DIAGNOSIS — E78.2 MIXED HYPERLIPIDEMIA: ICD-10-CM

## 2017-07-21 DIAGNOSIS — G89.4 CHRONIC PAIN SYNDROME: Primary | ICD-10-CM

## 2017-07-21 DIAGNOSIS — K76.0 FATTY LIVER: ICD-10-CM

## 2017-07-21 DIAGNOSIS — E53.8 VITAMIN B 12 DEFICIENCY: ICD-10-CM

## 2017-07-21 DIAGNOSIS — I10 ESSENTIAL HYPERTENSION: ICD-10-CM

## 2017-07-21 PROCEDURE — 96372 THER/PROPH/DIAG INJ SC/IM: CPT | Performed by: FAMILY MEDICINE

## 2017-07-21 PROCEDURE — 99214 OFFICE O/P EST MOD 30 MIN: CPT | Performed by: FAMILY MEDICINE

## 2017-07-21 RX ORDER — METHADONE HYDROCHLORIDE 5 MG/1
TABLET ORAL
Qty: 120 TABLET | Refills: 0 | Status: SHIPPED | OUTPATIENT
Start: 2017-07-21 | End: 2017-10-24 | Stop reason: SDUPTHER

## 2017-07-21 RX ORDER — CYANOCOBALAMIN 1000 UG/ML
1000 INJECTION, SOLUTION INTRAMUSCULAR; SUBCUTANEOUS
Status: DISCONTINUED | OUTPATIENT
Start: 2017-07-21 | End: 2021-07-13

## 2017-07-21 RX ADMIN — CYANOCOBALAMIN 1000 MCG: 1000 INJECTION, SOLUTION INTRAMUSCULAR; SUBCUTANEOUS at 11:37

## 2017-07-21 NOTE — PROGRESS NOTES
"Subjective   Sterling Andrea Whaley is a 57 y.o. male.     History of Present Illness   Mr. Whaley presents today for routine f/u on several chronic issues.  Chronic Pain (Brief): The patient is being seen for a routine clinic follow-up of chronic pain. Denies new problems. Symptoms: headache, neck pain and back pain. Burning, aching, throbbing at times. Radiates down left leg to foot. Radiates from neck into upper back/shoulders/occipital area. Associated symptoms: anxiety, irritability, difficulty concentrating, sleep disturbance, decreased appetite and decreased libido, but no depression. c/o numbness/tingling/weakness left leg, arms. Current treatment includes activity modification, prescribed exercises, nonsteroidal anti-inflammatory drugs and opioid analgesics. By report, there is good compliance with treatment, good tolerance of treatment and good symptom control. The patient is currently able to do activities of daily living without limitations, unable to work, able to do housework with limitations and unable to participate in sports. Previous presentation included back pain, neck pain, headache, irritability, sleep disturbance and left sciatica. Past evaluation has included cervical and lumbar MRI, neurology evaluation, orthopedic evaluation, pain medicine evaluation and neurosurgery evaluation. Past treatment has included physical therapy, prescribed exercises, acetaminophen, nonsteroidal anti-inflammatory drugs, opioid analgesics, muscle relaxants, anticonvulsants, corticosteroids, epidural injection, nerve block and TENS unit. He has been dx'd with diffuse severe spinal DDD/DJD. Feels spinal injections (unsure if MARCUS or facet) lasted approx 1-2 weeks and nerve ablation provided relief for approx 2 months. Has declined receiving further injections for that reason. In the past Lortab caused itching. Was only able to tolerate low dose Fentanyl patch which helped \"some\" but higher dose patches caused " significant side effects. Previously under care of Dr. Duggan here in [place] and tx'd with Methadone for over year at total of 15 mg per day. He also often uses ice applications, Aleve for his neck pain which he feels is very helpful. He takes 1-2 methadone 5 mg tablets early in day. Generally takes 1 or 2, 5 mg tablet later in day. If taken too late in evening medication seems to be too activating and can impair sleep. He was considered a nonsurgical candidate by Dr. Gonzáles per pt report. Denies h/o heart disease, dysrhythmia. Has had w/u's for CP in past which were felt to be due to anxiety. He has a h/o of poor tolerance to medications. Prescription generally lasting 45-60 days.      Hypertension: Patient here for follow-up of elevated blood pressure. He is not exercising other than household chores, minor yardwork and is fairly adherent to low salt diet. Blood pressure is well controlled at home. Cardiac symptoms fatigue. Patient denies chest pain, claudication, cough, dyspnea, irregular heart beat, near-syncope, orthopnea, palpitations, syncope, tachypnea and weight gain. Cardiovascular risk factors: advanced age (older than 55 for men, 65 for women), dyslipidemia, hypertension and obesity (BMI >= 30 kg/m2). Use of agents associated with hypertension: NSAIDS. History of target organ damage: none. Taking meds as rx'd.      Anxiety: Patient complains of anxiety disorder, panic attacks and sleep disturbance.  He has the following symptoms: difficulty concentrating, fatigue, feelings of losing control, insomnia, irritable, palpitations, paresthesias, psychomotor agitation, racing thoughts, shortness of breath. Onset of symptoms was approximately several years ago, waxing and waning since that time. He denies current suicidal and homicidal ideation. He has had difficulty tolerating multiple medications in past. He is currently using vistaril for diffuse itching assoc'd with anxiety.      Has had persistently low  platelets but no bleeding tendencies. No assoc'd anemia. He has had eval per hematology. Consults reviewed.     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 No abd pain, no jaundice.    The following portions of the patient's history were reviewed and updated as appropriate: allergies, current medications, past family history, past medical history, past social history, past surgical history and problem list.    Review of Systems   Constitutional: Positive for fatigue. Negative for chills.   HENT: Positive for congestion, postnasal drip and sinus pressure (chronic). Negative for mouth sores, nosebleeds, rhinorrhea, sneezing and sore throat.    Eyes: Negative for pain, discharge, redness and visual disturbance.   Respiratory: Negative for cough, shortness of breath and wheezing.    Cardiovascular: Negative for chest pain, palpitations and leg swelling.   Gastrointestinal: Negative for abdominal pain, blood in stool, diarrhea, nausea and vomiting.   Endocrine: Positive for heat intolerance.   Genitourinary: Negative for dysuria and hematuria.   Musculoskeletal: Positive for arthralgias, back pain, myalgias, neck pain and neck stiffness.   Skin: Negative for color change and rash.   Neurological: Positive for headaches. Negative for dizziness, syncope and weakness.   Hematological: Negative for adenopathy. Does not bruise/bleed easily.   Psychiatric/Behavioral: Positive for dysphoric mood and sleep disturbance. Negative for confusion, hallucinations and suicidal ideas. The patient is nervous/anxious.        Objective    Vitals:    07/21/17 1053   BP: 122/88   Pulse: 70   SpO2: 98%     Body mass index is 33.61 kg/(m^2).  Last 2 weights    07/21/17  1053   Weight: 241 lb (109 kg)       Physical Exam   Constitutional: He is oriented to person, place, and time. He appears well-developed and well-nourished. He is cooperative. He does not appear ill. No distress.    HENT:   Head: Normocephalic and atraumatic.   Mouth/Throat: Oropharynx is clear and moist and mucous membranes are normal. Mucous membranes are not dry.   Eyes: Conjunctivae, EOM and lids are normal.   Neck: Normal carotid pulses present. Carotid bruit is not present. No thyroid mass and no thyromegaly present.   Cardiovascular: Normal rate, regular rhythm, normal heart sounds and intact distal pulses.    Pulmonary/Chest: Effort normal and breath sounds normal.   Abdominal: Soft. He exhibits no distension and no mass. Bowel sounds are decreased. There is no hepatosplenomegaly. There is no tenderness.   Musculoskeletal:        Cervical back: He exhibits decreased range of motion, tenderness (diffuse soft tissue), bony tenderness (C4-C7), deformity (loss of normal lordosis) and spasm.        Thoracic back: He exhibits tenderness, bony tenderness, deformity (increase kyphosis) and spasm.        Lumbar back: He exhibits decreased range of motion, tenderness (diffuse soft tissue), bony tenderness (L3-S1), deformity (loss of normal lordosis) and spasm.       Vascular Status -  His exam exhibits no right foot edema. His exam exhibits no left foot edema.  Lymphadenopathy:     He has no cervical adenopathy.        Right: No supraclavicular adenopathy present.        Left: No supraclavicular adenopathy present.   Neurological: He is alert and oriented to person, place, and time. He has normal strength. He displays no tremor. No cranial nerve deficit or sensory deficit. Gait normal.   Negative SLR bilaterally   Skin: Skin is warm and dry. No bruising and no rash noted.   Psychiatric: He has a normal mood and affect. His behavior is normal. Cognition and memory are normal.   Nursing note and vitals reviewed.    Recent Results (from the past 2016 hour(s))   Vitamin B12    Collection Time: 06/23/17  2:56 PM   Result Value Ref Range    Vitamin B-12 285 239 - 931 pg/mL   Folate    Collection Time: 06/23/17  2:56 PM   Result Value  Ref Range    Folate 10.70 >2.76 ng/mL   Nuclear Antigen Antibody, IFA    Collection Time: 06/23/17  2:56 PM   Result Value Ref Range    CESAR Negative    Rheumatoid Factor    Collection Time: 06/23/17  2:56 PM   Result Value Ref Range    Rheumatoid Factor Qualitative Negative Negative   Peripheral Blood Smear    Collection Time: 06/23/17  2:56 PM   Result Value Ref Range    Performed by: Dr. Palacios     Pathologist Interpretation See Scanned Report      CBC Auto Differential    Collection Time: 06/23/17  2:56 PM   Result Value Ref Range    WBC 9.03 4.80 - 10.80 10*3/mm3    RBC 4.32 (L) 4.70 - 6.10 10*6/mm3    Hemoglobin 14.5 14.0 - 18.0 g/dL    Hematocrit 41.6 (L) 42.0 - 52.0 %    MCV 96.3 (H) 80.0 - 94.0 fL    MCH 33.6 (H) 27.0 - 31.0 pg    MCHC 34.9 30.0 - 37.0 g/dL    RDW 12.4 11.5 - 14.5 %    RDW-SD 43.9 37.0 - 54.0 fl    MPV 12.3 (H) 6.0 - 12.0 fL    Platelets 114 (L) 130 - 400 10*3/mm3    Neutrophil % 63.4 37.0 - 80.0 %    Lymphocyte % 25.7 10.0 - 50.0 %    Monocyte % 9.0 0.0 - 12.0 %    Eosinophil % 1.3 0.0 - 7.0 %    Basophil % 0.3 0.0 - 2.5 %    Immature Grans % 0.3 0.0 - 0.6 %    Neutrophils, Absolute 5.72 2.00 - 6.90 10*3/mm3    Lymphocytes, Absolute 2.32 0.60 - 3.40 10*3/mm3    Monocytes, Absolute 0.81 0.00 - 0.90 10*3/mm3    Eosinophils, Absolute 0.12 0.00 - 0.70 10*3/mm3    Basophils, Absolute 0.03 0.00 - 0.20 10*3/mm3    Immature Grans, Absolute 0.03 0.00 - 0.06 10*3/mm3    nRBC 0.0 0.0 - 0.0 /100 WBC     Assessment/Plan   Sterling was seen today for pain, anxiety, hypertension and hyperlipidemia.    Diagnoses and all orders for this visit:    Chronic pain syndrome  -     methadone (DOLOPHINE) 5 MG tablet; 1 tablet po up to qid.    Mixed hyperlipidemia  -     Lipid Panel; Future  -     Comprehensive Metabolic Panel; Future    Essential hypertension  -     Comprehensive Metabolic Panel; Future    Fatty liver  -     Comprehensive Metabolic Panel; Future    Vitamin B 12 deficiency  -     cyanocobalamin  injection 1,000 mcg; Inject 1 mL into the shoulder, thigh, or buttocks Every 28 (Twenty-Eight) Days.    Stable anxiety.  BP at goal.  HLP of unknown status. He will return fasting.  Pt advised to eat a heart healthy diet and get regular aerobic exercise as he is able.  Stable chronic pain syndrome.  Good tolerance and efficacy of current meds; good use of adjunctive modalities, going to chiropractor as well. No aberrant behavior noted.  CLAUDIO report reviewed and scanned into chart. Last CLAUDIO date 47/21/17.  As part of patient's treatment plan I am prescribing a controlled substance. The patient has been made aware of the appropriate use of such medications, including potential risk of somnolence, limited ability to drive and/or work safely, and potential for dependence and/or overdose. It has also been made clear that these medications are for use by this patient only, without concomitant use of alcohol or other substances, unless prescribed.  History and physical exam exhibit continued safe and appropriate use of controlled substance.  I will contact patient regarding test results and provide instructions regarding any necessary changes in plan of care.  F/U in 3 months, sooner as needed.  He is also encouraged to schedule a medicare wellness visit.  Patient was encouraged to keep me informed of any acute changes, or any new concerning symptoms.  Patient voiced understanding of all instructions and denied further questions.

## 2017-07-26 ENCOUNTER — RESULTS ENCOUNTER (OUTPATIENT)
Dept: FAMILY MEDICINE CLINIC | Facility: CLINIC | Age: 57
End: 2017-07-26

## 2017-07-26 DIAGNOSIS — K76.0 FATTY LIVER: ICD-10-CM

## 2017-07-26 DIAGNOSIS — E78.2 MIXED HYPERLIPIDEMIA: ICD-10-CM

## 2017-07-26 DIAGNOSIS — I10 ESSENTIAL HYPERTENSION: ICD-10-CM

## 2017-08-15 RX ORDER — OMEPRAZOLE 40 MG/1
CAPSULE, DELAYED RELEASE ORAL
Qty: 90 CAPSULE | Refills: 1 | Status: SHIPPED | OUTPATIENT
Start: 2017-08-15 | End: 2018-01-13 | Stop reason: SDUPTHER

## 2017-08-15 RX ORDER — LISINOPRIL 10 MG/1
TABLET ORAL
Qty: 90 TABLET | Refills: 1 | Status: SHIPPED | OUTPATIENT
Start: 2017-08-15 | End: 2018-01-13 | Stop reason: SDUPTHER

## 2017-09-14 RX ORDER — HYDROXYZINE PAMOATE 50 MG/1
CAPSULE ORAL
Qty: 270 CAPSULE | Refills: 0 | Status: SHIPPED | OUTPATIENT
Start: 2017-09-14 | End: 2017-12-01 | Stop reason: SDUPTHER

## 2017-10-24 ENCOUNTER — OFFICE VISIT (OUTPATIENT)
Dept: FAMILY MEDICINE CLINIC | Facility: CLINIC | Age: 57
End: 2017-10-24

## 2017-10-24 VITALS
HEART RATE: 60 BPM | SYSTOLIC BLOOD PRESSURE: 158 MMHG | DIASTOLIC BLOOD PRESSURE: 96 MMHG | OXYGEN SATURATION: 98 % | WEIGHT: 242 LBS | BODY MASS INDEX: 33.88 KG/M2 | HEIGHT: 71 IN

## 2017-10-24 DIAGNOSIS — R73.01 IFG (IMPAIRED FASTING GLUCOSE): ICD-10-CM

## 2017-10-24 DIAGNOSIS — M48.02 SPINAL STENOSIS OF CERVICAL REGION: ICD-10-CM

## 2017-10-24 DIAGNOSIS — M47.812 OSTEOARTHRITIS OF CERVICAL SPINE, UNSPECIFIED SPINAL OSTEOARTHRITIS COMPLICATION STATUS: ICD-10-CM

## 2017-10-24 DIAGNOSIS — I10 ESSENTIAL HYPERTENSION: ICD-10-CM

## 2017-10-24 DIAGNOSIS — H69.83 DYSFUNCTION OF BOTH EUSTACHIAN TUBES: ICD-10-CM

## 2017-10-24 DIAGNOSIS — K76.0 FATTY LIVER: ICD-10-CM

## 2017-10-24 DIAGNOSIS — Z79.899 HIGH RISK MEDICATIONS (NOT ANTICOAGULANTS) LONG-TERM USE: ICD-10-CM

## 2017-10-24 DIAGNOSIS — E78.2 MIXED HYPERLIPIDEMIA: ICD-10-CM

## 2017-10-24 DIAGNOSIS — Z28.21 REFUSED INFLUENZA VACCINE: ICD-10-CM

## 2017-10-24 DIAGNOSIS — E66.09 CLASS 1 OBESITY DUE TO EXCESS CALORIES WITH SERIOUS COMORBIDITY AND BODY MASS INDEX (BMI) OF 33.0 TO 33.9 IN ADULT: ICD-10-CM

## 2017-10-24 DIAGNOSIS — G89.4 CHRONIC PAIN SYNDROME: Primary | ICD-10-CM

## 2017-10-24 DIAGNOSIS — I49.9 IRREGULAR HEART RHYTHM: ICD-10-CM

## 2017-10-24 PROCEDURE — 99214 OFFICE O/P EST MOD 30 MIN: CPT | Performed by: FAMILY MEDICINE

## 2017-10-24 PROCEDURE — 96372 THER/PROPH/DIAG INJ SC/IM: CPT | Performed by: FAMILY MEDICINE

## 2017-10-24 PROCEDURE — 93000 ELECTROCARDIOGRAM COMPLETE: CPT | Performed by: FAMILY MEDICINE

## 2017-10-24 RX ORDER — METHYLPREDNISOLONE ACETATE 80 MG/ML
120 INJECTION, SUSPENSION INTRA-ARTICULAR; INTRALESIONAL; INTRAMUSCULAR; SOFT TISSUE ONCE
Status: COMPLETED | OUTPATIENT
Start: 2017-10-24 | End: 2017-10-24

## 2017-10-24 RX ORDER — METHADONE HYDROCHLORIDE 5 MG/1
TABLET ORAL
Qty: 120 TABLET | Refills: 0 | Status: SHIPPED | OUTPATIENT
Start: 2017-10-24 | End: 2017-11-20 | Stop reason: SDUPTHER

## 2017-10-24 RX ORDER — METOPROLOL SUCCINATE 50 MG/1
50 TABLET, EXTENDED RELEASE ORAL DAILY
Qty: 90 TABLET | Refills: 3 | Status: SHIPPED | OUTPATIENT
Start: 2017-10-24 | End: 2018-10-01 | Stop reason: SDUPTHER

## 2017-10-24 RX ADMIN — METHYLPREDNISOLONE ACETATE 120 MG: 80 INJECTION, SUSPENSION INTRA-ARTICULAR; INTRALESIONAL; INTRAMUSCULAR; SOFT TISSUE at 14:39

## 2017-10-24 NOTE — PROGRESS NOTES
"Subjective   Sterling Andrea Whaley is a 57 y.o. male.     History of Present Illness   Mr. Whaley presents today for routine f/u on several chronic issues.  Chronic Pain (Brief): The patient is being seen for a routine clinic follow-up of chronic pain. Denies new problems. Symptoms: headache, neck pain and back pain. Burning, aching, throbbing at times. Radiates down left leg to foot. Radiates from neck into upper back/shoulders/occipital area. Associated symptoms: anxiety, irritability, difficulty concentrating, sleep disturbance, decreased appetite and decreased libido, but no depression. c/o numbness/tingling/weakness left leg, arms. Current treatment includes activity modification, prescribed exercises, nonsteroidal anti-inflammatory drugs and opioid analgesics. By report, there is good compliance with treatment, good tolerance of treatment and good symptom control. The patient is currently able to do activities of daily living without limitations, unable to work, able to do housework with limitations and unable to participate in sports. Previous presentation included back pain, neck pain, headache, irritability, sleep disturbance and left sciatica. Past evaluation has included cervical and lumbar MRI, neurology evaluation, orthopedic evaluation, pain medicine evaluation and neurosurgery evaluation. Past treatment has included physical therapy, prescribed exercises, acetaminophen, nonsteroidal anti-inflammatory drugs, opioid analgesics, muscle relaxants, anticonvulsants, corticosteroids, epidural injection, nerve block and TENS unit. He has been dx'd with diffuse severe spinal DDD/DJD. Feels spinal injections (unsure if MARCUS or facet) lasted approx 1-2 weeks and nerve ablation provided relief for approx 2 months. Has declined receiving further injections for that reason. In the past Lortab caused itching. Was only able to tolerate low dose Fentanyl patch which helped \"some\" but higher dose patches caused " significant side effects. Previously under care of Dr. Duggan here in [place] and tx'd with Methadone for over year at total of 15 mg per day. He also often uses ice applications, Aleve for his neck pain which he feels is very helpful. He takes 1-2 methadone 5 mg tablets early in day. Generally takes 1 or 2, 5 mg tablet later in day. If taken too late in evening medication seems to be too activating and can impair sleep. He was considered a nonsurgical candidate by Dr. Gonzáles per pt report. Denies h/o heart disease, dysrhythmia. Has had w/u's for CP in past which were felt to be due to anxiety. He has a h/o of poor tolerance to medications. Prescription generally lasting 45-60 days. PADT reviewed and on chart. He does feel neck pain in particular is worsening.       Hypertension: Patient here for follow-up of elevated blood pressure. He is not exercising other than household chores, minor yardwork and is fairly adherent to low salt diet. Blood pressure not checked at home. Cardiac symptoms fatigue. Patient denies chest pain, claudication, cough, dyspnea, irregular heart beat, near-syncope, orthopnea, palpitations, syncope, tachypnea and weight gain. Cardiovascular risk factors: advanced age (older than 55 for men, 65 for women), dyslipidemia, hypertension and obesity (BMI >= 30 kg/m2). Use of agents associated with hypertension: NSAIDS. History of target organ damage: none. States he often forgets to take his BP med qhs. Has comorbid HLP. Not currently on statin as he has had previous intolerance with them as well as with fibrate.      Anxiety: Patient complains of anxiety disorder, panic attacks and sleep disturbance.  He has the following symptoms: difficulty concentrating, fatigue, feelings of losing control, insomnia, irritable, palpitations, paresthesias, psychomotor agitation, racing thoughts, shortness of breath. Onset of symptoms was approximately several years ago, waxing and waning since that time. He denies  current suicidal and homicidal ideation. He has had difficulty tolerating multiple medications in past. He is currently using vistaril for diffuse itching assoc'd with anxiety.       Has had persistently low platelets but no bleeding tendencies. No assoc'd anemia. He has had eval per hematology who rec'd close monitoring only for now.       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 No abd pain, no jaundice.    Today he also c/o increased bilatearl ear fullness and congestion. Tends to happen each fall. OTC allergy meds not helping as much as previously.    The following portions of the patient's history were reviewed and updated as appropriate: allergies, current medications, past family history, past medical history, past social history, past surgical history and problem list.    Review of Systems   Constitutional: Positive for fatigue. Negative for chills.   HENT: Positive for congestion, postnasal drip and sinus pressure (chronic). Negative for mouth sores, nosebleeds, rhinorrhea, sneezing and sore throat.    Eyes: Negative for pain, discharge, redness and visual disturbance.   Respiratory: Negative for cough, shortness of breath and wheezing.    Cardiovascular: Negative for chest pain, palpitations and leg swelling.   Gastrointestinal: Negative for abdominal pain, blood in stool, diarrhea, nausea and vomiting.   Endocrine: Positive for heat intolerance.   Genitourinary: Negative for dysuria and hematuria.   Musculoskeletal: Positive for arthralgias, back pain, myalgias, neck pain and neck stiffness.   Skin: Negative for color change and rash.   Neurological: Positive for headaches. Negative for dizziness, syncope and weakness.   Hematological: Negative for adenopathy. Does not bruise/bleed easily.   Psychiatric/Behavioral: Positive for dysphoric mood and sleep disturbance. Negative for confusion, hallucinations and suicidal ideas. The  patient is nervous/anxious.        Objective    Vitals:    10/24/17 1130   BP: 158/96   Pulse:    SpO2:      Body mass index is 33.75 kg/(m^2).  Last 2 weights    10/24/17  1101   Weight: 242 lb (110 kg)       Physical Exam   Constitutional: He is oriented to person, place, and time. He appears well-developed and well-nourished. He is cooperative. He does not appear ill. No distress.   HENT:   Head: Normocephalic and atraumatic.   Right Ear: External ear and ear canal normal. Tympanic membrane is retracted. A middle ear effusion (mild serous) is present.   Left Ear: External ear and ear canal normal. Tympanic membrane is retracted. A middle ear effusion (mild serous) is present.   Nose: No rhinorrhea.   Mouth/Throat: Oropharynx is clear and moist and mucous membranes are normal. Mucous membranes are not dry. No oral lesions.   Eyes: Conjunctivae, EOM and lids are normal.   Neck: Phonation normal. Neck supple. Normal carotid pulses present. Carotid bruit is not present. No thyroid mass and no thyromegaly present.   Cardiovascular: Normal rate, normal heart sounds and intact distal pulses.  A regularly irregular rhythm present.   Pulmonary/Chest: Effort normal and breath sounds normal.   Musculoskeletal:        Cervical back: He exhibits decreased range of motion, tenderness (diffuse soft tissue), bony tenderness (C4-C7), deformity (loss of normal lordosis) and spasm.        Thoracic back: He exhibits tenderness, bony tenderness, deformity (increase kyphosis) and spasm.        Lumbar back: He exhibits decreased range of motion, tenderness (diffuse soft tissue), bony tenderness (L3-S1), deformity (loss of normal lordosis) and spasm.       Vascular Status -  His exam exhibits no right foot edema. His exam exhibits no left foot edema.  Lymphadenopathy:     He has no cervical adenopathy.        Right: No supraclavicular adenopathy present.        Left: No supraclavicular adenopathy present.   Neurological: He is alert and  oriented to person, place, and time. He has normal strength. He displays no tremor. Gait normal.   Negative SLR bilaterally   Skin: Skin is warm and dry. No bruising and no rash noted.   Psychiatric: He has a normal mood and affect. His behavior is normal. Cognition and memory are normal.   Nursing note and vitals reviewed.        ECG 12 Lead  Date/Time: 10/24/2017 11:28 AM  Performed by: MITCHEL IVEY  Authorized by: MITCHEL IVEY   Comparison: compared with previous ECG from 1/4/2016  Similar to previous ECG  Comparison to previous ECG: Previous QTc = [id number]  Rhythm: sinus rhythm  Ectopy comments: none  Rate: normal  BPM: 61  Conduction: conduction normal  ST Segments: ST segments normal  T Waves: T waves normal  T flattening: III and V1  QRS axis: normal  Other findings: LVH and prolonged QTc interval  Q waves: I and aVL (non-diagnostic)  Clinical impression: non-specific ECG  Comments: ID int: 197 ms  QRS dur: 90 ms  QTc: 437 ms          Assessment/Plan   Sterling was seen today for follow-up, pain and hypertension.    Diagnoses and all orders for this visit:    Chronic pain syndrome  -     methadone (DOLOPHINE) 5 MG tablet; 1 tablet po up to qid.  -     methylPREDNISolone acetate (DEPO-medrol) injection 120 mg; Inject 1.5 mL into the shoulder, thigh, or buttocks 1 (One) Time.    Mixed hyperlipidemia    Essential hypertension  -     metoprolol succinate XL (TOPROL-XL) 50 MG 24 hr tablet; Take 1 tablet by mouth Daily.  -     ECG 12 Lead    Fatty liver    Class 1 obesity due to excess calories with serious comorbidity and body mass index (BMI) of 33.0 to 33.9 in adult    IFG (impaired fasting glucose)    High risk medications (not anticoagulants) long-term use  -     ECG 12 Lead    Irregular heart rhythm  -     ECG 12 Lead    Dysfunction of both eustachian tubes  -     methylPREDNISolone acetate (DEPO-medrol) injection 120 mg; Inject 1.5 mL into the shoulder, thigh, or buttocks 1 (One) Time.    Osteoarthritis  of cervical spine, unspecified spinal osteoarthritis complication status    Spinal stenosis of cervical region    Refused influenza vaccine    Stable anxiety.  BP not at goal. Will switch to once daily formulation of beta blocker as he is having trouble remembering qhs dose. Low salt diet advised.   HLP of unknown status. FLP in future when fasting.  Pt advised to eat a heart healthy diet and get regular aerobic exercise as he is able.  Stable chronic pain syndrome.  Good tolerance and efficacy of current meds; good use of adjunctive modalities, going to chiropractor as well. No aberrant behavior noted.  CLAUDIO report reviewed and scanned into chart. Last CLAUDIO date 10/24/17.  As part of patient's treatment plan I am prescribing a controlled substance. The patient has been made aware of the appropriate use of such medications, including potential risk of somnolence, limited ability to drive and/or work safely, and potential for dependence and/or overdose. It has also been made clear that these medications are for use by this patient only, without concomitant use of alcohol or other substances, unless prescribed.  History and physical exam exhibit continued safe and appropriate use of controlled substance.  I will contact patient regarding test results and provide instructions regarding any necessary changes in plan of care.  F/U in 1 month, sooner as needed.  He is also encouraged to schedule a medicare wellness visit.  Patient was encouraged to keep me informed of any acute changes, or any new concerning symptoms.  Patient voiced understanding of all instructions and denied further questions.

## 2017-11-20 ENCOUNTER — OFFICE VISIT (OUTPATIENT)
Dept: FAMILY MEDICINE CLINIC | Facility: CLINIC | Age: 57
End: 2017-11-20

## 2017-11-20 VITALS
DIASTOLIC BLOOD PRESSURE: 86 MMHG | HEART RATE: 60 BPM | BODY MASS INDEX: 33.32 KG/M2 | SYSTOLIC BLOOD PRESSURE: 128 MMHG | OXYGEN SATURATION: 98 % | HEIGHT: 71 IN | WEIGHT: 238 LBS

## 2017-11-20 DIAGNOSIS — E78.2 MIXED HYPERLIPIDEMIA: ICD-10-CM

## 2017-11-20 DIAGNOSIS — H69.83 ETD (EUSTACHIAN TUBE DYSFUNCTION), BILATERAL: ICD-10-CM

## 2017-11-20 DIAGNOSIS — D69.6 THROMBOCYTOPENIA (HCC): ICD-10-CM

## 2017-11-20 DIAGNOSIS — E80.6 HYPERBILIRUBINEMIA: ICD-10-CM

## 2017-11-20 DIAGNOSIS — Z28.21 INFLUENZA VACCINATION DECLINED: ICD-10-CM

## 2017-11-20 DIAGNOSIS — E53.8 VITAMIN B 12 DEFICIENCY: ICD-10-CM

## 2017-11-20 DIAGNOSIS — G89.4 CHRONIC PAIN SYNDROME: ICD-10-CM

## 2017-11-20 DIAGNOSIS — I10 ESSENTIAL HYPERTENSION: Primary | ICD-10-CM

## 2017-11-20 PROBLEM — H69.93 ETD (EUSTACHIAN TUBE DYSFUNCTION), BILATERAL: Status: ACTIVE | Noted: 2017-11-20

## 2017-11-20 PROCEDURE — 96372 THER/PROPH/DIAG INJ SC/IM: CPT | Performed by: FAMILY MEDICINE

## 2017-11-20 PROCEDURE — 99214 OFFICE O/P EST MOD 30 MIN: CPT | Performed by: FAMILY MEDICINE

## 2017-11-20 RX ORDER — CYANOCOBALAMIN 1000 UG/ML
1000 INJECTION, SOLUTION INTRAMUSCULAR; SUBCUTANEOUS
Status: DISCONTINUED | OUTPATIENT
Start: 2017-11-20 | End: 2021-07-13

## 2017-11-20 RX ORDER — METHADONE HYDROCHLORIDE 5 MG/1
TABLET ORAL
Qty: 120 TABLET | Refills: 0 | Status: SHIPPED | OUTPATIENT
Start: 2017-11-20 | End: 2018-02-20 | Stop reason: SDUPTHER

## 2017-11-20 RX ADMIN — CYANOCOBALAMIN 1000 MCG: 1000 INJECTION, SOLUTION INTRAMUSCULAR; SUBCUTANEOUS at 12:16

## 2017-11-20 NOTE — PROGRESS NOTES
"Subjective   Sterling Andrea Whaley is a 57 y.o. male.     History of Present Illness  Mr. Whaley presents today for recheck of his BP. He was seen for routine f/u, pain mgnt last month. BP noted to be uncontrolled. He was switched to once daily beta blocker to improve compliance. He is doing well with taking medication; denies side effects. BP has improved. Denies CP, cough, palpitations, SOA.    He is due for routine refill of methadone for chronic pain mgnt. No changes since routine visit last month. No injuries/falls.     He has h/o B12 def. Is getting monthly to q 3 month injections. Not currently on oral replacement.    He has h/o HLP. Not fasting today for recheck. Currently not on medication. Historically elevated TGs. He does not follow a heart healthy diet. Minimal regular exercise.    He c/o perssitent, chronic \"head congestion\". Bilateral ears feel congested, \"Full of pressure\". No rhinorrhea, some post nasal drip. No fever or facial pain.    He has h/o elevated liver enzymes due to fatty liver as well as increased BR due to suspected Perry sx. Due for recheck. Denies abd pain or jaundice.    The following portions of the patient's history were reviewed and updated as appropriate: allergies, current medications, past family history, past medical history, past social history, past surgical history and problem list.    Review of Systems   Constitutional: Positive for fatigue. Negative for appetite change and unexpected weight change.   HENT: Positive for congestion, postnasal drip and sinus pressure (chronic). Negative for mouth sores, nosebleeds, rhinorrhea, sneezing, sore throat and trouble swallowing.    Eyes: Negative for pain, discharge, redness and visual disturbance.   Respiratory: Negative for cough, shortness of breath and wheezing.    Cardiovascular: Negative for chest pain, palpitations and leg swelling.   Gastrointestinal: Negative for abdominal pain, blood in stool, diarrhea, nausea and " vomiting.   Endocrine: Positive for heat intolerance.   Genitourinary: Negative for dysuria and hematuria.   Musculoskeletal: Positive for arthralgias, back pain, myalgias, neck pain and neck stiffness.   Skin: Negative for color change and rash.   Neurological: Positive for headaches. Negative for dizziness, syncope and weakness.   Hematological: Negative for adenopathy. Does not bruise/bleed easily.   Psychiatric/Behavioral: Positive for dysphoric mood (mild, stable) and sleep disturbance. Negative for confusion, hallucinations and suicidal ideas. The patient is nervous/anxious.        Objective    Vitals:    11/20/17 0958   BP: 128/86   Pulse: 60   SpO2: 98%     Body mass index is 33.19 kg/(m^2).  Last 2 weights    11/20/17 0958   Weight: 238 lb (108 kg)       Physical Exam   Constitutional: He is oriented to person, place, and time. He appears well-developed and well-nourished. He is cooperative. He does not appear ill. No distress.   HENT:   Head: Normocephalic and atraumatic.   Right Ear: External ear and ear canal normal. Tympanic membrane is retracted. A middle ear effusion (mild serous) is present.   Left Ear: External ear and ear canal normal. Tympanic membrane is retracted. A middle ear effusion (mild serous) is present.   Nose: Mucosal edema present. No rhinorrhea.   Mouth/Throat: Oropharynx is clear and moist and mucous membranes are normal. Mucous membranes are not dry. No oral lesions.   Eyes: Conjunctivae and lids are normal.   Neck: Phonation normal. Neck supple. Normal carotid pulses present. No thyroid mass and no thyromegaly present.   Cardiovascular: Normal rate, normal heart sounds and intact distal pulses.  A regularly irregular rhythm present.   Pulmonary/Chest: Effort normal and breath sounds normal.       Vascular Status -  His exam exhibits no right foot edema. His exam exhibits no left foot edema.  Lymphadenopathy:     He has no cervical adenopathy.        Right: No supraclavicular  adenopathy present.        Left: No supraclavicular adenopathy present.   Neurological: He is alert and oriented to person, place, and time. He has normal strength. He displays no tremor. Gait normal.   Negative SLR bilaterally   Skin: Skin is warm and dry. No bruising and no rash noted.   Psychiatric: He has a normal mood and affect. His behavior is normal. Cognition and memory are normal.   Nursing note and vitals reviewed.    Assessment/Plan   Sterling was seen today for hypertension, hyperlipidemia and pain.    Diagnoses and all orders for this visit:    Essential hypertension  -     CBC (No Diff); Future  -     Comprehensive Metabolic Panel; Future    Mixed hyperlipidemia  -     Comprehensive Metabolic Panel; Future  -     Lipid Panel; Future    Vitamin B 12 deficiency  -     cyanocobalamin injection 1,000 mcg; Inject 1 mL into the shoulder, thigh, or buttocks Every 30 (Thirty) Days.  -     CBC (No Diff); Future  -     Vitamin B12; Future    Chronic pain syndrome  -     methadone (DOLOPHINE) 5 MG tablet; 1 tablet po up to qid.    ETD (Eustachian tube dysfunction), bilateral    Thrombocytopenia  -     CBC (No Diff); Future    Hyperbilirubinemia  -     Comprehensive Metabolic Panel; Future  -     Bilirubin, Direct; Future    Influenza vaccination declined    HTN improved; continue current regimen. Pt advised to eat a heart healthy diet and get regular aerobic exercise.    HLP of unknown status. Return fasting for FLP. Tx as indicated.    Stable chronic pain. Routine refill provided as due.  Good tolerance and efficacy of current meds; good use of adjunctive modalities, going to chiropractor as well. No aberrant behavior noted.  CLAUDIO report reviewed and scanned into chart. Last CLAUDIO date 10/24/17.  Last UDS appropriate.  No aberrant behavior.  As part of patient's treatment plan I am prescribing a controlled substance. The patient has been made aware of the appropriate use of such medications, including  potential risk of somnolence, limited ability to drive and/or work safely, and potential for dependence and/or overdose. It has also been made clear that these medications are for use by this patient only, without concomitant use of alcohol or other substances, unless prescribed.  History and physical exam exhibit continued safe and appropriate use of controlled substance.    ETD, chronic. He is encouraged to restart his flonase, take regularly, add mucinex.    I will contact patient regarding test results and provide instructions regarding any necessary changes in plan of care.  Patient was encouraged to keep me informed of any acute changes, lack of improvement, or any new concerning symptoms.  Pt is aware of reasons to seek emergent care.  Routine f/u in 3 months, sooner as needed/instructed.  Patient voiced understanding of all instructions and denied further questions.

## 2017-11-25 ENCOUNTER — RESULTS ENCOUNTER (OUTPATIENT)
Dept: FAMILY MEDICINE CLINIC | Facility: CLINIC | Age: 57
End: 2017-11-25

## 2017-11-25 DIAGNOSIS — E53.8 VITAMIN B 12 DEFICIENCY: ICD-10-CM

## 2017-11-25 DIAGNOSIS — I10 ESSENTIAL HYPERTENSION: ICD-10-CM

## 2017-11-25 DIAGNOSIS — E80.6 HYPERBILIRUBINEMIA: ICD-10-CM

## 2017-11-25 DIAGNOSIS — E78.2 MIXED HYPERLIPIDEMIA: ICD-10-CM

## 2017-11-25 DIAGNOSIS — D69.6 THROMBOCYTOPENIA (HCC): ICD-10-CM

## 2017-11-27 LAB
ALBUMIN SERPL-MCNC: 4.4 G/DL (ref 3.5–5)
ALBUMIN/GLOB SERPL: 1.3 G/DL (ref 1–2)
ALP SERPL-CCNC: 101 U/L (ref 38–126)
ALT SERPL-CCNC: 57 U/L (ref 13–69)
AST SERPL-CCNC: 42 U/L (ref 15–46)
BILIRUB DIRECT SERPL-MCNC: 0.6 MG/DL (ref 0–0.4)
BILIRUB SERPL-MCNC: 2.4 MG/DL (ref 0.2–1.3)
BUN SERPL-MCNC: 16 MG/DL (ref 7–20)
BUN/CREAT SERPL: 14.5 (ref 6.3–21.9)
CALCIUM SERPL-MCNC: 9.7 MG/DL (ref 8.4–10.2)
CHLORIDE SERPL-SCNC: 102 MMOL/L (ref 98–107)
CHOLEST SERPL-MCNC: 188 MG/DL (ref 0–199)
CO2 SERPL-SCNC: 29 MMOL/L (ref 26–30)
CREAT SERPL-MCNC: 1.1 MG/DL (ref 0.6–1.3)
ERYTHROCYTE [DISTWIDTH] IN BLOOD BY AUTOMATED COUNT: 12.9 % (ref 11.5–14.5)
GFR SERPLBLD CREATININE-BSD FMLA CKD-EPI: 69 ML/MIN/1.73
GFR SERPLBLD CREATININE-BSD FMLA CKD-EPI: 84 ML/MIN/1.73
GLOBULIN SER CALC-MCNC: 3.3 GM/DL
GLUCOSE SERPL-MCNC: 99 MG/DL (ref 74–98)
HCT VFR BLD AUTO: 41.2 % (ref 42–52)
HDLC SERPL-MCNC: 46 MG/DL (ref 40–60)
HGB BLD-MCNC: 14 G/DL (ref 14–18)
LDLC SERPL CALC-MCNC: 86 MG/DL (ref 0–99)
MCH RBC QN AUTO: 33.5 PG (ref 27–31)
MCHC RBC AUTO-ENTMCNC: 34 G/DL (ref 30–37)
MCV RBC AUTO: 98.6 FL (ref 80–94)
PLATELET # BLD AUTO: 74 10*3/MM3 (ref 130–400)
POTASSIUM SERPL-SCNC: 4.5 MMOL/L (ref 3.5–5.1)
PROT SERPL-MCNC: 7.7 G/DL (ref 6.3–8.2)
RBC # BLD AUTO: 4.18 10*6/MM3 (ref 4.7–6.1)
SODIUM SERPL-SCNC: 142 MMOL/L (ref 137–145)
TRIGL SERPL-MCNC: 280 MG/DL
VIT B12 SERPL-MCNC: 473 PG/ML (ref 239–931)
VLDLC SERPL CALC-MCNC: 56 MG/DL
WBC # BLD AUTO: 4.27 10*3/MM3 (ref 4.8–10.8)

## 2017-12-01 ENCOUNTER — TELEPHONE (OUTPATIENT)
Dept: FAMILY MEDICINE CLINIC | Facility: CLINIC | Age: 57
End: 2017-12-01

## 2017-12-01 RX ORDER — HYDROXYZINE PAMOATE 50 MG/1
CAPSULE ORAL
Qty: 270 CAPSULE | Refills: 0 | Status: SHIPPED | OUTPATIENT
Start: 2017-12-01 | End: 2018-02-05 | Stop reason: SDUPTHER

## 2017-12-01 NOTE — TELEPHONE ENCOUNTER
----- Message from Annel Armstrong MD sent at 11/28/2017  7:48 AM EST -----  Please inform pt his recent labs show:  1) lowest platelet count yet; report any bleeding problems. We will reassess at next visit.  2) liver and kidney function stable  3) lipid panel looks good, triglycerides improved  4) B12 has improved; he can continue oral B12 daily

## 2018-01-15 RX ORDER — LISINOPRIL 10 MG/1
TABLET ORAL
Qty: 90 TABLET | Refills: 1 | Status: SHIPPED | OUTPATIENT
Start: 2018-01-15 | End: 2018-06-15 | Stop reason: SDUPTHER

## 2018-01-15 RX ORDER — OMEPRAZOLE 40 MG/1
CAPSULE, DELAYED RELEASE ORAL
Qty: 90 CAPSULE | Refills: 1 | Status: SHIPPED | OUTPATIENT
Start: 2018-01-15 | End: 2018-06-15 | Stop reason: SDUPTHER

## 2018-02-06 RX ORDER — HYDROXYZINE PAMOATE 50 MG/1
CAPSULE ORAL
Qty: 270 CAPSULE | Refills: 0 | Status: SHIPPED | OUTPATIENT
Start: 2018-02-06 | End: 2018-04-12 | Stop reason: SDUPTHER

## 2018-02-20 ENCOUNTER — OFFICE VISIT (OUTPATIENT)
Dept: FAMILY MEDICINE CLINIC | Facility: CLINIC | Age: 58
End: 2018-02-20

## 2018-02-20 VITALS
HEIGHT: 71 IN | DIASTOLIC BLOOD PRESSURE: 84 MMHG | SYSTOLIC BLOOD PRESSURE: 148 MMHG | TEMPERATURE: 98 F | HEART RATE: 72 BPM | OXYGEN SATURATION: 98 % | WEIGHT: 252 LBS | BODY MASS INDEX: 35.28 KG/M2

## 2018-02-20 DIAGNOSIS — F41.1 GAD (GENERALIZED ANXIETY DISORDER): ICD-10-CM

## 2018-02-20 DIAGNOSIS — E78.2 MIXED HYPERLIPIDEMIA: ICD-10-CM

## 2018-02-20 DIAGNOSIS — I10 ESSENTIAL HYPERTENSION: Primary | ICD-10-CM

## 2018-02-20 DIAGNOSIS — Z79.899 HIGH RISK MEDICATIONS (NOT ANTICOAGULANTS) LONG-TERM USE: ICD-10-CM

## 2018-02-20 DIAGNOSIS — G89.4 CHRONIC PAIN SYNDROME: ICD-10-CM

## 2018-02-20 DIAGNOSIS — D69.6 THROMBOCYTOPENIA (HCC): ICD-10-CM

## 2018-02-20 PROCEDURE — 99214 OFFICE O/P EST MOD 30 MIN: CPT | Performed by: FAMILY MEDICINE

## 2018-02-20 RX ORDER — METHADONE HYDROCHLORIDE 5 MG/1
TABLET ORAL
Qty: 120 TABLET | Refills: 0 | Status: SHIPPED | OUTPATIENT
Start: 2018-02-20 | End: 2018-04-24 | Stop reason: SDUPTHER

## 2018-02-20 RX ORDER — PREDNISONE 20 MG/1
TABLET ORAL
Qty: 30 TABLET | Refills: 0 | Status: SHIPPED | OUTPATIENT
Start: 2018-02-20 | End: 2018-05-21

## 2018-02-20 RX ORDER — GABAPENTIN 300 MG/1
CAPSULE ORAL
Qty: 90 CAPSULE | Refills: 0 | Status: SHIPPED | OUTPATIENT
Start: 2018-02-20 | End: 2018-08-30 | Stop reason: SDUPTHER

## 2018-02-20 RX ORDER — FLUTICASONE PROPIONATE 50 MCG
SPRAY, SUSPENSION (ML) NASAL
Qty: 3 BOTTLE | Refills: 1 | Status: SHIPPED | OUTPATIENT
Start: 2018-02-20 | End: 2018-08-02 | Stop reason: SDUPTHER

## 2018-02-20 NOTE — PROGRESS NOTES
"Subjective   Sterling Andrea Whaley is a 57 y.o. male.     History of Present Illness   Mr. Whaley presents today for routine f/u on several chronic issues.  Chronic Pain (Brief): The patient is being seen for a routine clinic follow-up of chronic pain. Denies new problems. Symptoms: headache, neck pain and back pain. Burning, aching, throbbing at times. Radiates down left leg to foot. Radiates from neck into upper back/shoulders/occipital area. Associated symptoms: anxiety, irritability, difficulty concentrating, sleep disturbance, decreased appetite and decreased libido, but no depression. c/o numbness/tingling/weakness left leg, arms. Current treatment includes activity modification, prescribed exercises, nonsteroidal anti-inflammatory drugs and opioid analgesics. By report, there is good compliance with treatment, good tolerance of treatment and good symptom control. The patient is currently able to do activities of daily living without limitations, unable to work, able to do housework with limitations and unable to participate in sports. Previous presentation included back pain, neck pain, headache, irritability, sleep disturbance and left sciatica. Past evaluation has included cervical and lumbar MRI, neurology evaluation, orthopedic evaluation, pain medicine evaluation and neurosurgery evaluation. Past treatment has included physical therapy, prescribed exercises, acetaminophen, nonsteroidal anti-inflammatory drugs, opioid analgesics, muscle relaxants, anticonvulsants, corticosteroids, epidural injection, nerve block and TENS unit. He has been dx'd with diffuse severe spinal DDD/DJD. Feels spinal injections (unsure if MARCUS or facet) lasted approx 1-2 weeks and nerve ablation provided relief for approx 2 months. Has declined receiving further injections for that reason. In the past Lortab caused itching. Was only able to tolerate low dose Fentanyl patch which helped \"some\" but higher dose patches caused " significant side effects. Previously under care of Dr. Duggan here in [place] and tx'd with Methadone for over year at total of 15 mg per day. He also often uses ice applications, Aleve for his neck pain which he feels is very helpful. He takes 1-2 methadone 5 mg tablets early in day. Generally takes 1 or 2, 5 mg tablet later in day. If taken too late in evening medication seems to be too activating and can impair sleep. He was considered a nonsurgical candidate by Dr. Gonzáles per pt report. Denies h/o heart disease, dysrhythmia. Has had w/u's for CP in past which were felt to be due to anxiety. He has a h/o of poor tolerance to medications. Prescription generally lasting 45-60 days. PADT reviewed and on chart. He does feel neck pain in particular is worsening.       Hypertension: Patient here for follow-up of elevated blood pressure. He is not exercising other than household chores, minor yardwork and is fairly adherent to low salt diet. Blood pressure checked at home; averaging 130s/80s.  Cardiac symptoms fatigue. Patient denies chest pain, claudication, cough, dyspnea, irregular heart beat, near-syncope, orthopnea, palpitations, syncope, tachypnea. Having some weight gain. Cardiovascular risk factors: advanced age (older than 55 for men, 65 for women), dyslipidemia, hypertension and obesity (BMI >= 30 kg/m2). Use of agents associated with hypertension: NSAIDS. History of target organ damage: none. States he often forgets to take his BP med qhs. Has comorbid HLP. Not currently on statin as he has had previous intolerance with them as well as with fibrate.      Anxiety: Patient complains of anxiety disorder, panic attacks and sleep disturbance.  He has the following symptoms: difficulty concentrating, fatigue, feelings of losing control, insomnia, irritable, palpitations, paresthesias, psychomotor agitation, racing thoughts, shortness of breath. Onset of symptoms was approximately several years ago, waxing and waning  since that time. He denies current suicidal and homicidal ideation. He has had difficulty tolerating multiple medications in past. He is currently using vistaril for diffuse itching assoc'd with anxiety.       Has had persistently low platelets but no bleeding tendencies. No assoc'd anemia. He has had eval per hematology who rec'd close monitoring only.       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 No abd pain, no jaundice.    The following portions of the patient's history were reviewed and updated as appropriate: allergies, current medications, past family history, past medical history, past social history, past surgical history and problem list.    Review of Systems   Constitutional: Positive for fatigue. Negative for appetite change and unexpected weight change.   HENT: Positive for congestion, postnasal drip and sinus pressure (chronic). Negative for mouth sores, nosebleeds, rhinorrhea, sneezing, sore throat and trouble swallowing.    Eyes: Negative for pain, discharge, redness and visual disturbance.   Respiratory: Negative for cough, shortness of breath and wheezing.    Cardiovascular: Negative for chest pain, palpitations and leg swelling.   Gastrointestinal: Negative for abdominal pain, blood in stool, diarrhea, nausea and vomiting.   Endocrine: Positive for heat intolerance.   Genitourinary: Negative for dysuria and hematuria.   Musculoskeletal: Positive for arthralgias, back pain, myalgias, neck pain and neck stiffness.   Skin: Negative for rash and wound.   Neurological: Positive for headaches. Negative for dizziness, syncope and weakness.   Hematological: Negative for adenopathy. Does not bruise/bleed easily.   Psychiatric/Behavioral: Positive for dysphoric mood (mild, stable) and sleep disturbance. Negative for confusion, hallucinations and suicidal ideas. The patient is nervous/anxious.        Objective    Vitals:     02/20/18 1036   BP: 148/84   Pulse: 72   Temp: 98 °F (36.7 °C)   SpO2: 98%     Body mass index is 35.15 kg/(m^2).  Last 2 weights    02/20/18  1036   Weight: 114 kg (252 lb)       Physical Exam   Constitutional: He is oriented to person, place, and time. He appears well-developed and well-nourished. He is cooperative. He does not appear ill. No distress.   obese   HENT:   Head: Normocephalic and atraumatic.   Right Ear: External ear and ear canal normal. Tympanic membrane is retracted.   Left Ear: External ear and ear canal normal. Tympanic membrane is retracted.   Nose: Mucosal edema present. No rhinorrhea.   Mouth/Throat: Oropharynx is clear and moist and mucous membranes are normal. Mucous membranes are not dry. No oral lesions.   Eyes: Conjunctivae and lids are normal.   Neck: Phonation normal. Neck supple. Normal carotid pulses present. Carotid bruit is not present. No thyroid mass and no thyromegaly present.   Cardiovascular: Normal rate, regular rhythm, normal heart sounds and intact distal pulses.  Exam reveals no gallop.    No murmur heard.  Pulmonary/Chest: Effort normal and breath sounds normal.   Musculoskeletal:        Cervical back: He exhibits decreased range of motion (in all planes), tenderness (bilateral soft tissues), bony tenderness (C5-C7), deformity (loss of normal lordosis) and spasm.        Thoracic back: He exhibits tenderness (diffuse soft tissue), deformity (increased kyphosis) and spasm. He exhibits no bony tenderness.        Lumbar back: He exhibits decreased range of motion (mildly ), tenderness (diffuse soft tissue), bony tenderness (L3-S1), deformity (loss of normal lordosis) and spasm.       Vascular Status -  His exam exhibits no right foot edema. His exam exhibits no left foot edema.  Lymphadenopathy:     He has no cervical adenopathy.   Neurological: He is alert and oriented to person, place, and time. He has normal strength. He displays no tremor. Gait normal.   Negative SLR  "bilaterally   Skin: Skin is warm and dry. No bruising and no rash noted.   Psychiatric: He has a normal mood and affect. His speech is normal and behavior is normal. Judgment and thought content normal. Cognition and memory are normal.   Nursing note and vitals reviewed.      Assessment/Plan   Sterling was seen today for follow-up and hypertension.    Diagnoses and all orders for this visit:    Essential hypertension    Chronic pain syndrome  -     methadone (DOLOPHINE) 5 MG tablet; 1 tablet po up to qid.    Mixed hyperlipidemia    Thrombocytopenia    High risk medications (not anticoagulants) long-term use    TYSON (generalized anxiety disorder)    Other orders  -     fluticasone (FLONASE) 50 MCG/ACT nasal spray; 2 sprays each nostril once daily  Indications: Allergic Rhinitis  -     gabapentin (NEURONTIN) 300 MG capsule; 1 po up to tid as needed for pain  -     predniSONE (DELTASONE) 20 MG tablet; 1 po qd x 3 days as needed for joint pain flare ups    Stable anxiety.  BP at goal based on home readings. Anxious in doctor's office.    HLP currently on statin. FLP in future when fasting.  Pt advised to eat a heart healthy diet and get regular aerobic exercise as he is able.  Stable chronic pain syndrome although slow progression particularly of neck pain. Good tolerance and efficacy of current meds; good use of adjunctive modalities, going to chiropractor as well. No aberrant behavior noted. He will continue methadone up to 10 mg earlier in day (he avoids at night as it tends to keep him awake\". Will provide prednisone trial for acute flare of pain. Also trial of gabapentin as needed in evening.  CLAUDIO report reviewed and scanned into chart. Last CLAUDIO date 2/20/18.  As part of patient's treatment plan I am prescribing a controlled substance. The patient has been made aware of the appropriate use of such medications, including potential risk of somnolence, limited ability to drive and/or work safely, and potential " for dependence and/or overdose. It has also been made clear that these medications are for use by this patient only, without concomitant use of alcohol or other substances, unless prescribed.  History and physical exam exhibit continued safe and appropriate use of controlled substance.  F/U in 3 months, sooner as needed. Labs at that time.  Patient was encouraged to keep me informed of any acute changes, lack of improvement of pain flares, or any new concerning symptoms.  Patient voiced understanding of all instructions and denied further questions.

## 2018-04-13 RX ORDER — HYDROXYZINE PAMOATE 50 MG/1
CAPSULE ORAL
Qty: 270 CAPSULE | Refills: 0 | Status: SHIPPED | OUTPATIENT
Start: 2018-04-13 | End: 2018-06-19 | Stop reason: SDUPTHER

## 2018-04-23 ENCOUNTER — TELEPHONE (OUTPATIENT)
Dept: FAMILY MEDICINE CLINIC | Facility: CLINIC | Age: 58
End: 2018-04-23

## 2018-04-23 DIAGNOSIS — G89.4 CHRONIC PAIN SYNDROME: ICD-10-CM

## 2018-04-24 RX ORDER — METHADONE HYDROCHLORIDE 5 MG/1
TABLET ORAL
Qty: 120 TABLET | Refills: 0 | Status: SHIPPED | OUTPATIENT
Start: 2018-04-24 | End: 2018-05-21 | Stop reason: SDUPTHER

## 2018-04-24 NOTE — TELEPHONE ENCOUNTER
CLAUDIO reviewed.  Rx refill authorized.  Pt to keep routine f/u apt.    UDS to be done at time of .

## 2018-04-25 NOTE — TELEPHONE ENCOUNTER
Pt called checking status, informed pt it was ready. Informed pt he had to be the one to  this rx.

## 2018-05-02 ENCOUNTER — TELEPHONE (OUTPATIENT)
Dept: FAMILY MEDICINE CLINIC | Facility: CLINIC | Age: 58
End: 2018-05-02

## 2018-05-02 NOTE — TELEPHONE ENCOUNTER
Patient called stating he is having trouble with his elbows again and wants to know if you could give him shots or dose he need to be seen.

## 2018-05-02 NOTE — TELEPHONE ENCOUNTER
If he wanting specific elbow injections he will need to see ortho. If she is wanting a short course of steroids or a Depo Medrol injection we can do that. Let me know.

## 2018-05-07 NOTE — TELEPHONE ENCOUNTER
Patient would like to get a steroid shot this week to see if it will help and discuss further at his next visit if it doesnt help.

## 2018-05-14 ENCOUNTER — CLINICAL SUPPORT (OUTPATIENT)
Dept: FAMILY MEDICINE CLINIC | Facility: CLINIC | Age: 58
End: 2018-05-14

## 2018-05-14 DIAGNOSIS — M25.522 BILATERAL ELBOW JOINT PAIN: Primary | ICD-10-CM

## 2018-05-14 DIAGNOSIS — M25.521 BILATERAL ELBOW JOINT PAIN: Primary | ICD-10-CM

## 2018-05-14 PROCEDURE — 96372 THER/PROPH/DIAG INJ SC/IM: CPT | Performed by: FAMILY MEDICINE

## 2018-05-14 RX ORDER — METHYLPREDNISOLONE ACETATE 80 MG/ML
120 INJECTION, SUSPENSION INTRA-ARTICULAR; INTRALESIONAL; INTRAMUSCULAR; SOFT TISSUE ONCE
Status: COMPLETED | OUTPATIENT
Start: 2018-05-14 | End: 2018-05-14

## 2018-05-14 RX ADMIN — METHYLPREDNISOLONE ACETATE 120 MG: 80 INJECTION, SUSPENSION INTRA-ARTICULAR; INTRALESIONAL; INTRAMUSCULAR; SOFT TISSUE at 09:45

## 2018-05-21 ENCOUNTER — OFFICE VISIT (OUTPATIENT)
Dept: FAMILY MEDICINE CLINIC | Facility: CLINIC | Age: 58
End: 2018-05-21

## 2018-05-21 VITALS
HEART RATE: 70 BPM | HEIGHT: 71 IN | DIASTOLIC BLOOD PRESSURE: 84 MMHG | SYSTOLIC BLOOD PRESSURE: 128 MMHG | WEIGHT: 249 LBS | OXYGEN SATURATION: 100 % | BODY MASS INDEX: 34.86 KG/M2

## 2018-05-21 DIAGNOSIS — M48.02 SPINAL STENOSIS OF CERVICAL REGION: ICD-10-CM

## 2018-05-21 DIAGNOSIS — Z51.81 MEDICATION MONITORING ENCOUNTER: ICD-10-CM

## 2018-05-21 DIAGNOSIS — Z79.899 HIGH RISK MEDICATIONS (NOT ANTICOAGULANTS) LONG-TERM USE: ICD-10-CM

## 2018-05-21 DIAGNOSIS — E53.8 VITAMIN B 12 DEFICIENCY: ICD-10-CM

## 2018-05-21 DIAGNOSIS — I10 ESSENTIAL HYPERTENSION: ICD-10-CM

## 2018-05-21 DIAGNOSIS — E80.6 HYPERBILIRUBINEMIA: ICD-10-CM

## 2018-05-21 DIAGNOSIS — M77.11 LATERAL EPICONDYLITIS OF BOTH ELBOWS: ICD-10-CM

## 2018-05-21 DIAGNOSIS — M48.061 SPINAL STENOSIS OF LUMBAR REGION, UNSPECIFIED WHETHER NEUROGENIC CLAUDICATION PRESENT: ICD-10-CM

## 2018-05-21 DIAGNOSIS — M77.12 LATERAL EPICONDYLITIS OF BOTH ELBOWS: ICD-10-CM

## 2018-05-21 DIAGNOSIS — E78.2 MIXED HYPERLIPIDEMIA: ICD-10-CM

## 2018-05-21 DIAGNOSIS — F41.1 GAD (GENERALIZED ANXIETY DISORDER): ICD-10-CM

## 2018-05-21 DIAGNOSIS — R73.01 IFG (IMPAIRED FASTING GLUCOSE): ICD-10-CM

## 2018-05-21 DIAGNOSIS — G89.4 CHRONIC PAIN SYNDROME: Primary | ICD-10-CM

## 2018-05-21 DIAGNOSIS — D69.6 THROMBOCYTOPENIA (HCC): ICD-10-CM

## 2018-05-21 DIAGNOSIS — K76.0 FATTY LIVER: ICD-10-CM

## 2018-05-21 PROCEDURE — 99214 OFFICE O/P EST MOD 30 MIN: CPT | Performed by: FAMILY MEDICINE

## 2018-05-21 RX ORDER — METHADONE HYDROCHLORIDE 5 MG/1
TABLET ORAL
Qty: 120 TABLET | Refills: 0 | Status: SHIPPED | OUTPATIENT
Start: 2018-05-21 | End: 2018-08-30 | Stop reason: SDUPTHER

## 2018-05-21 NOTE — PROGRESS NOTES
"Subjective   Sterling Andrea Whaley is a 58 y.o. male.     History of Present Illness  Mr. Whaley presents today for routine f/u on several chronic issues.  Chronic Pain (Brief): The patient is being seen for a routine clinic follow-up of chronic pain. Denies new problems. Symptoms: headache, neck pain and back pain. Burning, aching, throbbing at times. Radiates down left leg to foot. Radiates from neck into upper back/shoulders/occipital area. Associated symptoms: anxiety, irritability, difficulty concentrating, sleep disturbance, decreased appetite and decreased libido, but no depression. c/o numbness/tingling/weakness left leg, arms. Current treatment includes activity modification, prescribed exercises, nonsteroidal anti-inflammatory drugs and opioid analgesics. By report, there is good compliance with treatment, good tolerance of treatment and good symptom control. The patient is currently able to do activities of daily living without limitations, unable to work, able to do housework with limitations and unable to participate in sports. Previous presentation included back pain, neck pain, headache, irritability, sleep disturbance and left sciatica. Past evaluation has included cervical and lumbar MRI, neurology evaluation, orthopedic evaluation, pain medicine evaluation and neurosurgery evaluation. Past treatment has included physical therapy, prescribed exercises, acetaminophen, nonsteroidal anti-inflammatory drugs, opioid analgesics, muscle relaxants, anticonvulsants, corticosteroids, epidural injection, nerve block and TENS unit. He has been dx'd with diffuse severe spinal DDD/DJD. Feels spinal injections (unsure if MARCUS or facet) lasted approx 1-2 weeks and nerve ablation provided relief for approx 2 months. Has declined receiving further injections for that reason. In the past Lortab caused itching. Was only able to tolerate low dose Fentanyl patch which helped \"some\" but higher dose patches caused " significant side effects. Previously under care of Dr. Duggan here in [place] and tx'd with Methadone for over year at total of 15 mg per day. He also often uses ice applications, Aleve for his neck pain which he feels is very helpful. He takes 1-2 methadone 5 mg tablets early in day. Generally takes 1 or 2, 5 mg tablet later in day. If taken too late in evening medication seems to be too activating and can impair sleep. He was considered a nonsurgical candidate by Dr. Gonzáles per pt report. Denies h/o heart disease, dysrhythmia. Has had w/u's for CP in past which were felt to be due to anxiety. He has a h/o of poor tolerance to medications. Prescription generally lasting 45-60 days. PADT reviewed and on chart.    He does report worsening bilateral elbow pain assoc'd with painful paresthesias in arms/hands worst on right. Using tennis elbow straps have helped some. Corticosteroids have also helped.       Hypertension: Patient here for follow-up of elevated blood pressure. He is not exercising other than household chores, minor yardwork and is fairly adherent to low salt diet. Blood pressure checked at home; averaging 130s/80s.  Cardiac symptoms fatigue. Patient denies chest pain, claudication, cough, dyspnea, irregular heart beat, near-syncope, orthopnea, palpitations, syncope, tachypnea. Having some weight gain. Cardiovascular risk factors: advanced age (older than 55 for men, 65 for women), dyslipidemia, hypertension and obesity (BMI >= 30 kg/m2). Use of agents associated with hypertension: NSAIDS. History of target organ damage: none. States he often forgets to take his BP med qhs. Has comorbid HLP. Not currently on statin as he has had previous intolerance with them as well as with fibrate.      Anxiety: Patient complains of anxiety disorder, panic attacks and sleep disturbance.  He has the following symptoms: difficulty concentrating, fatigue, feelings of losing control, insomnia, irritable, palpitations,  paresthesias, psychomotor agitation, racing thoughts, shortness of breath. Onset of symptoms was approximately several years ago, waxing and waning since that time. He denies current suicidal and homicidal ideation. He has had difficulty tolerating multiple medications in past. He is currently using vistaril for diffuse itching assoc'd with anxiety.       Has had persistently low platelets but no bleeding tendencies. No assoc'd anemia. He has had eval per hematology who rec'd close monitoring only.       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 Also with fatty liver. No abd pain, no jaundice. Due for LFTs.    Has IFG; due for f/u A1c. Not following diabetic appropriate diet.    Has vit D and B12 def's. Not currently on oral replacement.    The following portions of the patient's history were reviewed and updated as appropriate: allergies, current medications, past family history, past medical history, past social history, past surgical history and problem list.    Review of Systems   Constitutional: Positive for fatigue. Negative for appetite change and unexpected weight change.   HENT: Positive for congestion, postnasal drip and sinus pressure (chronic). Negative for mouth sores, nosebleeds, rhinorrhea, sneezing, sore throat and trouble swallowing.    Eyes: Negative for pain, discharge, redness and visual disturbance.   Respiratory: Negative for cough, shortness of breath and wheezing.    Cardiovascular: Negative for chest pain, palpitations and leg swelling.   Gastrointestinal: Negative for abdominal pain, blood in stool, diarrhea, nausea and vomiting.   Endocrine: Positive for heat intolerance.   Genitourinary: Negative for dysuria and hematuria.   Musculoskeletal: Positive for arthralgias, back pain, myalgias, neck pain and neck stiffness.   Skin: Negative for rash and wound.   Neurological: Positive for headaches. Negative for dizziness, syncope and  weakness.   Hematological: Negative for adenopathy. Does not bruise/bleed easily.   Psychiatric/Behavioral: Positive for dysphoric mood (mild, stable) and sleep disturbance. Negative for confusion, hallucinations and suicidal ideas. The patient is nervous/anxious.        Objective    Vitals:    05/21/18 1046   BP: 128/84   Pulse: 70   SpO2: 100%     Body mass index is 34.73 kg/m².  1    05/21/18  1046   Weight: 113 kg (249 lb)       Physical Exam   Constitutional: He is oriented to person, place, and time. He appears well-developed and well-nourished. He is cooperative. He does not appear ill. No distress.   obese   HENT:   Head: Normocephalic and atraumatic.   Mouth/Throat: Oropharynx is clear and moist and mucous membranes are normal. Mucous membranes are not dry. No oral lesions.   Eyes: Conjunctivae and lids are normal.   Neck: Phonation normal. Neck supple. Normal carotid pulses present. Carotid bruit is not present. No thyroid mass and no thyromegaly present.   Cardiovascular: Normal rate, regular rhythm, normal heart sounds and intact distal pulses.  Exam reveals no gallop.    No murmur heard.  Pulmonary/Chest: Effort normal and breath sounds normal.   Musculoskeletal:        Right elbow: He exhibits normal range of motion. Tenderness found. Lateral epicondyle tenderness noted.        Left elbow: He exhibits normal range of motion. Tenderness found. Lateral epicondyle tenderness noted.        Cervical back: He exhibits decreased range of motion (in all planes), tenderness (bilateral soft tissues), bony tenderness (C5-C7), deformity (loss of normal lordosis) and spasm.        Thoracic back: He exhibits tenderness (diffuse soft tissue), deformity (increased kyphosis) and spasm. He exhibits no bony tenderness.        Lumbar back: He exhibits decreased range of motion (mildly ), tenderness (diffuse soft tissue), bony tenderness (L3-S1), deformity (loss of normal lordosis) and spasm.     Vascular Status -  His  right foot exhibits no edema. His left foot exhibits no edema.  Lymphadenopathy:     He has no cervical adenopathy.   Neurological: He is alert and oriented to person, place, and time. He has normal strength. He displays no tremor. Gait normal.   Reflex Scores:       Bicep reflexes are 2+ on the right side and 2+ on the left side.       Brachioradialis reflexes are 2+ on the right side and 2+ on the left side.       Patellar reflexes are 1+ on the right side and 1+ on the left side.  Negative SLR bilaterally   Skin: Skin is warm and dry. No bruising and no rash noted.   Psychiatric: His speech is normal and behavior is normal. Judgment and thought content normal. His affect is blunt. Cognition and memory are normal.   Nursing note and vitals reviewed.    Lab Results   Component Value Date    WBC 4.27 (L) 11/27/2017    HGB 14.0 11/27/2017    HCT 41.2 (L) 11/27/2017    MCV 98.6 (H) 11/27/2017    PLT 74 (L) 11/27/2017     Lab Results   Component Value Date    GLUCOSE 133 (H) 09/14/2016    BUN 16 11/27/2017    CREATININE 1.10 11/27/2017    EGFRIFNONA 69 11/27/2017    EGFRIFAFRI 84 11/27/2017    BCR 14.5 11/27/2017    K 4.5 11/27/2017    CO2 29.0 11/27/2017    CALCIUM 9.7 11/27/2017    PROTENTOTREF 7.7 11/27/2017    ALBUMIN 4.40 11/27/2017    LABIL2 1.3 11/27/2017    AST 42 11/27/2017    ALT 57 11/27/2017     Lab Results   Component Value Date    CHLPL 188 11/27/2017    TRIG 280 (H) 11/27/2017    HDL 46 11/27/2017    LDL 86 11/27/2017     No results found for: HGBA1C  Lab Results   Component Value Date    TSH 2.250 04/21/2017     Last UDS reviewed and appropriate.    Assessment/Plan   Sterling was seen today for hyperlipidemia, hypertension and pain.    Diagnoses and all orders for this visit:    Chronic pain syndrome  -     methadone (DOLOPHINE) 5 MG tablet; 1 tablet po up to qid.  -     CBC & Differential  -     Comprehensive Metabolic Panel    Mixed hyperlipidemia    Essential hypertension  -     CBC & Differential  -  "    TSH Rfx On Abnormal To Free T4  -     Comprehensive Metabolic Panel    Obstructive sleep apnea syndrome  -     CBC & Differential    Fatty liver  -     Comprehensive Metabolic Panel    IFG (impaired fasting glucose)  -     Hemoglobin A1c    Lateral epicondylitis of both elbows  -     CBC & Differential  -     Vitamin B12  -     Ambulatory Referral to Orthopedic Surgery    Hyperbilirubinemia  -     Bilirubin, Direct    Medication monitoring encounter  -     CBC & Differential  -     Comprehensive Metabolic Panel    Vitamin B 12 deficiency  -     Vitamin B12    Spinal stenosis of cervical region    High risk medications (not anticoagulants) long-term use    TYSON (generalized anxiety disorder)    Spinal stenosis of lumbar region, unspecified whether neurogenic claudication present    Thrombocytopenia    Stable anxiety.  HTN controlled.  HLP with statin intolerance. No fasting today.  Assess liver function as above.  Assess status of plts as above. Denies bleeding problems.  Check status of pre-DM/IFG.  Assess status of vit/min deficiencies and replace as indicated.  Worsening bilateral elbow pain most likely due to bilateral epicondylitis and/or ulnar entrapment. He also has h/o C-DDD/stenosis which could be playing a role as well. Refer to ortho for further eval.  Pt advised to eat a heart healthy diet and get regular aerobic exercise as he is able.  Stable chronic pain syndrome although slow progression particularly of neck pain. Good tolerance and efficacy of current meds; good use of adjunctive modalities, going to chiropractor as well. No aberrant behavior noted. He will continue methadone up to 10 mg earlier in day (he avoids at night as it tends to keep him awake\".   CLAUDIO report reviewed and scanned into chart. Last CLAUDIO date 521/18.  As part of patient's treatment plan I am prescribing a controlled substance. The patient has been made aware of the appropriate use of such medications, including potential " risk of somnolence, limited ability to drive and/or work safely, and potential for dependence and/or overdose. It has also been made clear that these medications are for use by this patient only, without concomitant use of alcohol or other substances, unless prescribed.  History and physical exam exhibit continued safe and appropriate use of controlled substance.  F/U in 3 months, sooner as needed/instructed.  He is encouraged to schedule medicare wellness visit at his earliest convenience.  I will contact patient regarding test results and provide instructions regarding any necessary changes in plan of care.  Patient was encouraged to keep me informed of any acute changes, lack of improvement of pain flares, or any new concerning symptoms.  Patient voiced understanding of all instructions and denied further questions.

## 2018-05-22 LAB
ALBUMIN SERPL-MCNC: 4.4 G/DL (ref 3.5–5.5)
ALBUMIN/GLOB SERPL: 1.5 {RATIO} (ref 1.2–2.2)
ALP SERPL-CCNC: 108 IU/L (ref 39–117)
ALT SERPL-CCNC: 33 IU/L (ref 0–44)
AST SERPL-CCNC: 28 IU/L (ref 0–40)
BASOPHILS # BLD AUTO: 0 X10E3/UL (ref 0–0.2)
BASOPHILS NFR BLD AUTO: 0 %
BILIRUB DIRECT SERPL-MCNC: 0.44 MG/DL (ref 0–0.4)
BILIRUB SERPL-MCNC: 3 MG/DL (ref 0–1.2)
BUN SERPL-MCNC: 18 MG/DL (ref 6–24)
BUN/CREAT SERPL: 18 (ref 9–20)
CALCIUM SERPL-MCNC: 9.4 MG/DL (ref 8.7–10.2)
CHLORIDE SERPL-SCNC: 103 MMOL/L (ref 96–106)
CO2 SERPL-SCNC: 26 MMOL/L (ref 18–29)
CREAT SERPL-MCNC: 0.99 MG/DL (ref 0.76–1.27)
EOSINOPHIL # BLD AUTO: 0.1 X10E3/UL (ref 0–0.4)
EOSINOPHIL NFR BLD AUTO: 2 %
ERYTHROCYTE [DISTWIDTH] IN BLOOD BY AUTOMATED COUNT: 13.8 % (ref 12.3–15.4)
GFR SERPLBLD CREATININE-BSD FMLA CKD-EPI: 84 ML/MIN/1.73
GFR SERPLBLD CREATININE-BSD FMLA CKD-EPI: 97 ML/MIN/1.73
GLOBULIN SER CALC-MCNC: 3 G/DL (ref 1.5–4.5)
GLUCOSE SERPL-MCNC: 143 MG/DL (ref 65–99)
HBA1C MFR BLD: 5.7 % (ref 4.8–5.6)
HCT VFR BLD AUTO: 42.5 % (ref 37.5–51)
HGB BLD-MCNC: 14.7 G/DL (ref 13–17.7)
IMM GRANULOCYTES # BLD: 0 X10E3/UL (ref 0–0.1)
IMM GRANULOCYTES NFR BLD: 0 %
LYMPHOCYTES # BLD AUTO: 1.7 X10E3/UL (ref 0.7–3.1)
LYMPHOCYTES NFR BLD AUTO: 32 %
MCH RBC QN AUTO: 33.7 PG (ref 26.6–33)
MCHC RBC AUTO-ENTMCNC: 34.6 G/DL (ref 31.5–35.7)
MCV RBC AUTO: 98 FL (ref 79–97)
MONOCYTES # BLD AUTO: 0.2 X10E3/UL (ref 0.1–0.9)
MONOCYTES NFR BLD AUTO: 4 %
MORPHOLOGY BLD-IMP: (no result)
NEUTROPHILS # BLD AUTO: 3.3 X10E3/UL (ref 1.4–7)
NEUTROPHILS NFR BLD AUTO: 62 %
PLATELET # BLD AUTO: 86 X10E3/UL (ref 150–379)
POTASSIUM SERPL-SCNC: 4.1 MMOL/L (ref 3.5–5.2)
PROT SERPL-MCNC: 7.4 G/DL (ref 6–8.5)
RBC # BLD AUTO: 4.36 X10E6/UL (ref 4.14–5.8)
SODIUM SERPL-SCNC: 143 MMOL/L (ref 134–144)
TSH SERPL DL<=0.005 MIU/L-ACNC: 1.15 UIU/ML (ref 0.45–4.5)
VIT B12 SERPL-MCNC: 312 PG/ML (ref 232–1245)
WBC # BLD AUTO: 5.3 X10E3/UL (ref 3.4–10.8)

## 2018-06-08 ENCOUNTER — TELEPHONE (OUTPATIENT)
Dept: FAMILY MEDICINE CLINIC | Facility: CLINIC | Age: 58
End: 2018-06-08

## 2018-06-08 NOTE — TELEPHONE ENCOUNTER
Please inform patient his disability forms completed and ready for pickup.  Form placed in front office.

## 2018-06-18 RX ORDER — LISINOPRIL 10 MG/1
TABLET ORAL
Qty: 90 TABLET | Refills: 1 | Status: SHIPPED | OUTPATIENT
Start: 2018-06-18 | End: 2018-11-05 | Stop reason: SDUPTHER

## 2018-06-18 RX ORDER — OMEPRAZOLE 40 MG/1
CAPSULE, DELAYED RELEASE ORAL
Qty: 90 CAPSULE | Refills: 1 | Status: SHIPPED | OUTPATIENT
Start: 2018-06-18 | End: 2018-11-05 | Stop reason: SDUPTHER

## 2018-06-19 RX ORDER — HYDROXYZINE PAMOATE 50 MG/1
CAPSULE ORAL
Qty: 270 CAPSULE | Refills: 0 | Status: SHIPPED | OUTPATIENT
Start: 2018-06-19 | End: 2020-09-16 | Stop reason: SDUPTHER

## 2018-06-21 ENCOUNTER — OFFICE VISIT (OUTPATIENT)
Dept: ORTHOPEDIC SURGERY | Facility: CLINIC | Age: 58
End: 2018-06-21

## 2018-06-21 VITALS — HEIGHT: 71 IN | RESPIRATION RATE: 18 BRPM | BODY MASS INDEX: 34.44 KG/M2 | WEIGHT: 246 LBS

## 2018-06-21 DIAGNOSIS — M77.11 LATERAL EPICONDYLITIS OF BOTH ELBOWS: Primary | ICD-10-CM

## 2018-06-21 DIAGNOSIS — M77.12 LATERAL EPICONDYLITIS OF BOTH ELBOWS: Primary | ICD-10-CM

## 2018-06-21 PROCEDURE — 99203 OFFICE O/P NEW LOW 30 MIN: CPT | Performed by: ORTHOPAEDIC SURGERY

## 2018-06-21 PROCEDURE — 20550 NJX 1 TENDON SHEATH/LIGAMENT: CPT | Performed by: ORTHOPAEDIC SURGERY

## 2018-06-21 RX ORDER — LIDOCAINE HYDROCHLORIDE 10 MG/ML
1 INJECTION, SOLUTION INFILTRATION; PERINEURAL
Status: COMPLETED | OUTPATIENT
Start: 2018-06-21 | End: 2018-06-21

## 2018-06-21 RX ORDER — TRIAMCINOLONE ACETONIDE 40 MG/ML
40 INJECTION, SUSPENSION INTRA-ARTICULAR; INTRAMUSCULAR
Status: COMPLETED | OUTPATIENT
Start: 2018-06-21 | End: 2018-06-21

## 2018-06-21 RX ADMIN — LIDOCAINE HYDROCHLORIDE 1 ML: 10 INJECTION, SOLUTION INFILTRATION; PERINEURAL at 09:26

## 2018-06-21 RX ADMIN — TRIAMCINOLONE ACETONIDE 40 MG: 40 INJECTION, SUSPENSION INTRA-ARTICULAR; INTRAMUSCULAR at 09:26

## 2018-06-21 NOTE — PROGRESS NOTES
Subjective   Patient ID: Sterling Whaley is a 58 y.o. male  Pain of the Left Elbow (Patient states he was told he had tennis elbow from lifting weights when he was younger. He says Dr. Armstrong gave him a steroid shot about 2 months ago and it seemed to help but the right one still hurts more. He states he does have tennis elbow bands butthey don't seem to help.) and Pain of the Right Elbow             History of Present Illness  Right-hand-dominant male with exercise-related lateral right elbow pain more severe than the left, no fall or injury, had injection intramuscular buttock area but Dr. Armstrong 2 months ago which helped both elbows now presents with increasing pain denies paresthesias loss of motion or significant shoulder neck pain.      Review of Systems   Constitutional: Negative for fever.   HENT: Negative for voice change.    Eyes: Negative for visual disturbance.   Respiratory: Negative for shortness of breath.    Cardiovascular: Negative for chest pain.   Gastrointestinal: Negative for abdominal distention and abdominal pain.   Genitourinary: Negative for dysuria.   Musculoskeletal: Positive for arthralgias. Negative for gait problem and joint swelling.   Skin: Negative for rash.   Neurological: Negative for speech difficulty.   Hematological: Does not bruise/bleed easily.   Psychiatric/Behavioral: Negative for confusion.       Past Medical History:   Diagnosis Date   • Arthritis    • Bilateral carpal tunnel syndrome    • Depression    • Esophageal reflux    • Gastritis    • H/O pulmonary function tests 09/13/2012    normal   • Hiatal hernia    • History of depression    • History of meniscal tear    • History of renal calculi    • Hyperbilirubinemia     Conover syndrome   • Hypertension    • Obstructive sleep apnea    • Plantar fascia rupture    • Renal calculi    • RLS (restless legs syndrome)    • Schatzki's ring    • TMJ pain dysfunction syndrome         Past Surgical History:   Procedure  Laterality Date   • ESOPHAGOSCOPY / EGD  2003    [place]   • KNEE ARTHROSCOPY      medial meniscus repair   • MEDIAL COLLATERAL LIGAMENT REPAIR, KNEE Left        Family History   Problem Relation Age of Onset   • Arthritis Mother        Social History     Social History   • Marital status:      Spouse name: N/A   • Number of children: N/A   • Years of education: N/A     Occupational History   • Not on file.     Social History Main Topics   • Smoking status: Never Smoker   • Smokeless tobacco: Never Used   • Alcohol use No   • Drug use: No   • Sexual activity: Defer     Other Topics Concern   • Not on file     Social History Narrative   • No narrative on file       I have reviewed all of the above social hx, family hx, surgical hx, medications, allergies & ROS and confirm that it is accurate.    Allergies   Allergen Reactions   • Meperidine          Current Outpatient Prescriptions:   •  fluticasone (FLONASE) 50 MCG/ACT nasal spray, 2 sprays each nostril once daily  Indications: Allergic Rhinitis, Disp: 3 bottle, Rfl: 1  •  gabapentin (NEURONTIN) 300 MG capsule, 1 po up to tid as needed for pain, Disp: 90 capsule, Rfl: 0  •  hydrOXYzine (VISTARIL) 50 MG capsule, TAKE 1 CAPSULE THREE TIMES DAILY AS NEEDED FOR ANXIETY  OR  ITCHING, Disp: 270 capsule, Rfl: 0  •  lisinopril (PRINIVIL,ZESTRIL) 10 MG tablet, TAKE 1 TABLET EVERY DAY, Disp: 90 tablet, Rfl: 1  •  methadone (DOLOPHINE) 5 MG tablet, 1 tablet po up to qid., Disp: 120 tablet, Rfl: 0  •  metoprolol succinate XL (TOPROL-XL) 50 MG 24 hr tablet, Take 1 tablet by mouth Daily., Disp: 90 tablet, Rfl: 3  •  omeprazole (priLOSEC) 40 MG capsule, TAKE 1 CAPSULE EVERY DAY, Disp: 90 capsule, Rfl: 1    Current Facility-Administered Medications:   •  cyanocobalamin injection 1,000 mcg, 1,000 mcg, Intramuscular, Q28 Days, Annel Armstrong MD, 1,000 mcg at 07/21/17 1137  •  cyanocobalamin injection 1,000 mcg, 1,000 mcg, Intramuscular, Q30 Days, Annel Armstrong MD, 1,000 mcg  "at 11/20/17 1216    Objective   Resp 18   Ht 180.3 cm (71\")   Wt 112 kg (246 lb)   BMI 34.31 kg/m²    Physical Exam  Constitutional: Patient is oriented to person, place, and time. Patient appears well-developed and well-nourished.   HENT:Head: Normocephalic and atraumatic.   Eyes: EOM are normal. Pupils are equal, round, and reactive to light.   Neck: Normal range of motion. Neck supple.   Cardiovascular: Normal rate.    Pulmonary/Chest: Effort normal and breath sounds normal.   Abdominal: Soft.   Neurological: Patient is alert and oriented to person, place, and time.   Skin: Skin is warm and dry.   Psychiatric: Patient has a normal mood and affect.   Nursing note and vitals reviewed.       Ortho Exam   Right elbow: Tenderness lateral epicondyleregion, pain reproduced with resisted wrist dorsiflexion, no supinator tenderness atrophy full range of motion no instability neurovascularly intact  Left elbow: no Tenderness lateral epicondyle, slight loss of extension some crepitus at the olecranon but no pain, left arm neurovascularly intact no atrophy no instability    Assessment/Plan   Review of Radiographic Studies:    Radiographic images today of affected area I personally viewed and showed no sign of acute fracture or dislocation.      Injection Tendon or Ligament- Right lateral epicondyle  Date/Time: 6/21/2018 9:26 AM  Performed by: ELIZA BARR  Authorized by: ELIZA BARR   Consent: Verbal consent obtained. Written consent obtained.  Risks and benefits: risks, benefits and alternatives were discussed  Consent given by: patient  Patient understanding: patient states understanding of the procedure being performed  Patient consent: the patient's understanding of the procedure matches consent given  Procedure consent: procedure consent matches procedure scheduled  Relevant documents: relevant documents present and verified  Test results: test results available and properly labeled  Site marked: the operative " "site was marked  Imaging studies: imaging studies available  Patient identity confirmed: verbally with patient  Time out: Immediately prior to procedure a \"time out\" was called to verify the correct patient, procedure, equipment, support staff and site/side marked as required.  Preparation: Patient was prepped and draped in the usual sterile fashion.  Patient tolerance: Patient tolerated the procedure well with no immediate complications  Medications administered: 1 mL lidocaine 1 %; 40 mg triamcinolone acetonide 40 MG/ML           Sterling was seen today for pain and pain.    Diagnoses and all orders for this visit:    Lateral epicondylitis of both elbows  -     XR Elbow 3+ View Right       Orthopedic activities reviewed and patient expressed appreciation, Risk, benefits, and merits of treatment alternatives reviewed with the patient and questions answered and Physical therapy referral given      Recommendations/Plan:   Work/Activity Status: May perform usual activities as tolerated    Patient agreeable to call or return sooner for any concerns.             Impression:  Lateral epicondylitis right worse than left  Plan:  Therapy referral recheck 2 months if not improved repeat injection  "

## 2018-08-03 RX ORDER — FLUTICASONE PROPIONATE 50 MCG
SPRAY, SUSPENSION (ML) NASAL
Qty: 48 G | Refills: 1 | Status: SHIPPED | OUTPATIENT
Start: 2018-08-03 | End: 2018-12-26 | Stop reason: SDUPTHER

## 2018-08-30 ENCOUNTER — OFFICE VISIT (OUTPATIENT)
Dept: FAMILY MEDICINE CLINIC | Facility: CLINIC | Age: 58
End: 2018-08-30

## 2018-08-30 VITALS
BODY MASS INDEX: 33.88 KG/M2 | WEIGHT: 242 LBS | HEIGHT: 71 IN | DIASTOLIC BLOOD PRESSURE: 88 MMHG | SYSTOLIC BLOOD PRESSURE: 124 MMHG | HEART RATE: 57 BPM | OXYGEN SATURATION: 97 %

## 2018-08-30 DIAGNOSIS — R73.01 IFG (IMPAIRED FASTING GLUCOSE): ICD-10-CM

## 2018-08-30 DIAGNOSIS — I10 ESSENTIAL HYPERTENSION: ICD-10-CM

## 2018-08-30 DIAGNOSIS — Z79.899 HIGH RISK MEDICATIONS (NOT ANTICOAGULANTS) LONG-TERM USE: ICD-10-CM

## 2018-08-30 DIAGNOSIS — G89.4 CHRONIC PAIN SYNDROME: Primary | ICD-10-CM

## 2018-08-30 DIAGNOSIS — E78.2 MIXED HYPERLIPIDEMIA: ICD-10-CM

## 2018-08-30 DIAGNOSIS — E53.8 VITAMIN B 12 DEFICIENCY: ICD-10-CM

## 2018-08-30 PROCEDURE — 99214 OFFICE O/P EST MOD 30 MIN: CPT | Performed by: FAMILY MEDICINE

## 2018-08-30 PROCEDURE — 96372 THER/PROPH/DIAG INJ SC/IM: CPT | Performed by: FAMILY MEDICINE

## 2018-08-30 PROCEDURE — 83036 HEMOGLOBIN GLYCOSYLATED A1C: CPT | Performed by: FAMILY MEDICINE

## 2018-08-30 RX ORDER — METHADONE HYDROCHLORIDE 5 MG/1
TABLET ORAL
Qty: 120 TABLET | Refills: 0 | Status: SHIPPED | OUTPATIENT
Start: 2018-08-30 | End: 2018-11-19 | Stop reason: SDUPTHER

## 2018-08-30 RX ORDER — GABAPENTIN 300 MG/1
CAPSULE ORAL
Qty: 90 CAPSULE | Refills: 2 | Status: SHIPPED | OUTPATIENT
Start: 2018-08-30 | End: 2019-09-05 | Stop reason: SDDI

## 2018-08-30 RX ADMIN — CYANOCOBALAMIN 1000 MCG: 1000 INJECTION, SOLUTION INTRAMUSCULAR; SUBCUTANEOUS at 10:14

## 2018-10-01 DIAGNOSIS — I10 ESSENTIAL HYPERTENSION: ICD-10-CM

## 2018-10-01 RX ORDER — METOPROLOL SUCCINATE 50 MG/1
TABLET, EXTENDED RELEASE ORAL
Qty: 90 TABLET | Refills: 3 | Status: SHIPPED | OUTPATIENT
Start: 2018-10-01 | End: 2019-07-04 | Stop reason: SDUPTHER

## 2018-10-19 ENCOUNTER — OFFICE VISIT (OUTPATIENT)
Dept: FAMILY MEDICINE CLINIC | Facility: CLINIC | Age: 58
End: 2018-10-19

## 2018-10-19 VITALS
OXYGEN SATURATION: 96 % | WEIGHT: 240 LBS | BODY MASS INDEX: 33.6 KG/M2 | SYSTOLIC BLOOD PRESSURE: 128 MMHG | DIASTOLIC BLOOD PRESSURE: 84 MMHG | TEMPERATURE: 97.9 F | HEART RATE: 60 BPM | HEIGHT: 71 IN | RESPIRATION RATE: 14 BRPM

## 2018-10-19 DIAGNOSIS — J20.9 ACUTE BRONCHITIS, UNSPECIFIED ORGANISM: Primary | ICD-10-CM

## 2018-10-19 DIAGNOSIS — J01.00 ACUTE NON-RECURRENT MAXILLARY SINUSITIS: ICD-10-CM

## 2018-10-19 DIAGNOSIS — J98.8 WHEEZING-ASSOCIATED RESPIRATORY INFECTION: ICD-10-CM

## 2018-10-19 DIAGNOSIS — H65.03 BILATERAL ACUTE SEROUS OTITIS MEDIA, RECURRENCE NOT SPECIFIED: ICD-10-CM

## 2018-10-19 PROCEDURE — 99214 OFFICE O/P EST MOD 30 MIN: CPT | Performed by: FAMILY MEDICINE

## 2018-10-19 PROCEDURE — 96372 THER/PROPH/DIAG INJ SC/IM: CPT | Performed by: FAMILY MEDICINE

## 2018-10-19 RX ORDER — DEXAMETHASONE 0.5 MG/1
TABLET ORAL
Qty: 21 TABLET | Refills: 0 | Status: SHIPPED | OUTPATIENT
Start: 2018-10-19 | End: 2018-12-03

## 2018-10-19 RX ORDER — CEFDINIR 300 MG/1
300 CAPSULE ORAL DAILY
Qty: 10 CAPSULE | Refills: 0 | Status: SHIPPED | OUTPATIENT
Start: 2018-10-19 | End: 2018-12-03

## 2018-10-19 RX ORDER — METHYLPREDNISOLONE ACETATE 80 MG/ML
80 INJECTION, SUSPENSION INTRA-ARTICULAR; INTRALESIONAL; INTRAMUSCULAR; SOFT TISSUE ONCE
Status: COMPLETED | OUTPATIENT
Start: 2018-10-19 | End: 2018-10-19

## 2018-10-19 RX ADMIN — METHYLPREDNISOLONE ACETATE 80 MG: 80 INJECTION, SUSPENSION INTRA-ARTICULAR; INTRALESIONAL; INTRAMUSCULAR; SOFT TISSUE at 10:24

## 2018-10-19 NOTE — PROGRESS NOTES
Established Patient        Chief Complaint:   Chief Complaint   Patient presents with   • Cough     cough with chest congestion        Sterling Whaley is a 58 y.o. male    History of Present Illness:     Patient is a 58-year-old male who presents with complaints of cough and chest congestion for approximately 7 days duration.  He states he does routinely get the same symptoms and exacerbation the same time yearly.  He denies any known sick contacts.  He describes frontal facial pressure with intense nasal congestion and heavy nasal discharge.  Denies any epistaxis.  He has coughing episodes, worse at night, associated with wheezing at times.  Denies any hemoptysis.  Denies any chest pain or any palpitations.  No dysphagia or history of reflux that has resulted in aspiration.  Describes frontal face headache at times well.    Subjective     The following portions of the patient's history were reviewed and updated as appropriate: allergies, current medications, past family history, past medical history, past social history, past surgical history and problem list.    Allergies   Allergen Reactions   • Meperidine        Review of Systems  1. Constitutional: Negative for fever. Negative for chills, diaphoresis, fatigue and unexpected weight change.   2. HENT: No dysphagia; no changes to vision/hearing/smell/taste; no epistaxis.  Heavy nasal congestion with green/yellow discharge.  3. Eyes: Negative for redness and visual disturbance.   4. Respiratory: Cough without hemoptysis, associated wheezing additionally.   5. Cardiovascular: Negative for chest pain and palpitations.  Denies orthopnea.  6. Gastrointestinal: Negative for abdominal distention, abdominal pain and blood in stool.   7. Endocrine: Negative for cold intolerance and heat intolerance.   8. Genitourinary: Negative for difficulty urinating, dysuria and frequency.   9. Musculoskeletal: Negative for arthralgias, back pain and myalgias.   10. Skin:  "Negative for color change, rash and wound.   11. Neurological: Negative for syncope, weakness.  Frontal headache, pressure continuously.   12. Hematological: Negative for adenopathy. Does not bruise/bleed easily.   13. Psychiatric/Behavioral: Negative for confusion. The patient is not nervous/anxious.    Objective     Physical Exam   Vital Signs: /84   Pulse 60   Temp 97.9 °F (36.6 °C)   Resp 14   Ht 180.3 cm (71\")   Wt 109 kg (240 lb)   SpO2 96%   BMI 33.47 kg/m²     General Appearance: alert, oriented x 3, no acute distress.  Skin: warm and dry.   HEENT: Atraumatic.  pupils round and reactive to light and accommodation, oral mucosa pink and moist.  Nares patent without epistaxis.  External auditory canals are patent tympanic membranes intact.  Serous effusion noted to bilateral tympanic membranes, decreased mobility on Valsalva and insufflation.  Boggy/inflamed nasal turbinates bilaterally, with mucus at entrance to bilateral maxillary sinuses.  Tenderness on palpation of bilateral maxillary sinuses additionally.  Neck: supple, no JVD, trachea midline.  No thyromegaly  Lungs: Rhonchus referred congestive changes throughout all air fields, audible air exchange noted all lung fields.  Trace end expiratory wheezes bilaterally.  Heart: RRR, normal S1 and S2, no S3, no rub.  Abdomen: soft, non-tender, no palpable bladder, present bowel sounds to auscultation ×4.  No guarding or rigidity.  Extremities: no clubbing, cyanosis or edema.  Good range of motion actively and passively.  Symmetric muscle strength and development  Neuro: normal speech and mental status.  Cranial nerves II through XII intact.  No anosmia. DTR 2+; proprioception intact.  No focal motor/sensory deficits.    Assessment and Plan      Assessment:   Sterling was seen today for cough.    Diagnoses and all orders for this visit:    Acute bronchitis, unspecified organism  -     methylPREDNISolone acetate (DEPO-medrol) injection 80 mg; Inject " 1 mL into the appropriate muscle as directed by prescriber 1 (One) Time.  -     dexamethasone (DECADRON) 0.5 MG tablet; 6 po x1d; then 5 po x 1d; then 4 po x 1d; then 3 po x 1d; then 2 po x 1d; then 1 po x1d; then STOP  -     cefdinir (OMNICEF) 300 MG capsule; Take 1 capsule by mouth Daily.    Bilateral acute serous otitis media, recurrence not specified  -     dexamethasone (DECADRON) 0.5 MG tablet; 6 po x1d; then 5 po x 1d; then 4 po x 1d; then 3 po x 1d; then 2 po x 1d; then 1 po x1d; then STOP    Acute non-recurrent maxillary sinusitis  -     methylPREDNISolone acetate (DEPO-medrol) injection 80 mg; Inject 1 mL into the appropriate muscle as directed by prescriber 1 (One) Time.  -     dexamethasone (DECADRON) 0.5 MG tablet; 6 po x1d; then 5 po x 1d; then 4 po x 1d; then 3 po x 1d; then 2 po x 1d; then 1 po x1d; then STOP  -     cefdinir (OMNICEF) 300 MG capsule; Take 1 capsule by mouth Daily.    Wheezing-associated respiratory infection  -     methylPREDNISolone acetate (DEPO-medrol) injection 80 mg; Inject 1 mL into the appropriate muscle as directed by prescriber 1 (One) Time.  -     dexamethasone (DECADRON) 0.5 MG tablet; 6 po x1d; then 5 po x 1d; then 4 po x 1d; then 3 po x 1d; then 2 po x 1d; then 1 po x1d; then STOP  -     cefdinir (OMNICEF) 300 MG capsule; Take 1 capsule by mouth Daily.        Plan:  I've stressed the importance of finishing course of both steroid and antibiotic therapy.  I've recommended hypertonic nasal saline spray several times daily to aid with nasal congestion.  I've recommended Valsalva maneuver numerous times throughout the day to aid in eustachian dysfunction as well.  I've recommended a cool mist humidifier at home additionally.  I've asked the patient maintain a good hydration status additionally.  Discussion Summary:    Discussed plan of care in detail with pt today; pt verb understanding and agrees; counseled for approx 15 min of total 25 min exam time.  Follow up:  Return if  symptoms worsen or fail to improve.     Patient Instructions   Sinusitis, Adult  Sinusitis is soreness and inflammation of your sinuses. Sinuses are hollow spaces in the bones around your face. They are located:  · Around your eyes.  · In the middle of your forehead.  · Behind your nose.  · In your cheekbones.    Your sinuses and nasal passages are lined with a stringy fluid (mucus). Mucus normally drains out of your sinuses. When your nasal tissues get inflamed or swollen, the mucus can get trapped or blocked so air cannot flow through your sinuses. This lets bacteria, viruses, and funguses grow, and that leads to infection.  Follow these instructions at home:  Medicines  · Take, use, or apply over-the-counter and prescription medicines only as told by your doctor. These may include nasal sprays.  · If you were prescribed an antibiotic medicine, take it as told by your doctor. Do not stop taking the antibiotic even if you start to feel better.  Hydrate and Humidify  · Drink enough water to keep your pee (urine) clear or pale yellow.  · Use a cool mist humidifier to keep the humidity level in your home above 50%.  · Breathe in steam for 10-15 minutes, 3-4 times a day or as told by your doctor. You can do this in the bathroom while a hot shower is running.  · Try not to spend time in cool or dry air.  Rest  · Rest as much as possible.  · Sleep with your head raised (elevated).  · Make sure to get enough sleep each night.  General instructions  · Put a warm, moist washcloth on your face 3-4 times a day or as told by your doctor. This will help with discomfort.  · Wash your hands often with soap and water. If there is no soap and water, use hand .  · Do not smoke. Avoid being around people who are smoking (secondhand smoke).  · Keep all follow-up visits as told by your doctor. This is important.  Contact a doctor if:  · You have a fever.  · Your symptoms get worse.  · Your symptoms do not get better within 10  days.  Get help right away if:  · You have a very bad headache.  · You cannot stop throwing up (vomiting).  · You have pain or swelling around your face or eyes.  · You have trouble seeing.  · You feel confused.  · Your neck is stiff.  · You have trouble breathing.  This information is not intended to replace advice given to you by your health care provider. Make sure you discuss any questions you have with your health care provider.  Document Released: 06/05/2009 Document Revised: 08/13/2017 Document Reviewed: 10/12/2016  [employer] Interactive Patient Education © 2018 [employer]  Acute Bronchitis, Adult  Acute bronchitis is sudden (acute) swelling of the air tubes (bronchi) in the lungs. Acute bronchitis causes these tubes to fill with mucus, which can make it hard to breathe. It can also cause coughing or wheezing.  In adults, acute bronchitis usually goes away within 2 weeks. A cough caused by bronchitis may last up to 3 weeks. Smoking, allergies, and asthma can make the condition worse. Repeated episodes of bronchitis may cause further lung problems, such as chronic obstructive pulmonary disease (COPD).  What are the causes?  This condition can be caused by germs and by substances that irritate the lungs, including:  · Cold and flu viruses. This condition is most often caused by the same virus that causes a cold.  · Bacteria.  · Exposure to tobacco smoke, dust, fumes, and air pollution.    What increases the risk?  This condition is more likely to develop in people who:  · Have close contact with someone with acute bronchitis.  · Are exposed to lung irritants, such as tobacco smoke, dust, fumes, and vapors.  · Have a weak immune system.  · Have a respiratory condition such as asthma.    What are the signs or symptoms?  Symptoms of this condition include:  · A cough.  · Coughing up clear, yellow, or green mucus.  · Wheezing.  · Chest congestion.  · Shortness of breath.  · A fever.  · Body aches.  · Chills.  · A  sore throat.    How is this diagnosed?  This condition is usually diagnosed with a physical exam. During the exam, your health care provider may order tests, such as chest X-rays, to rule out other conditions. He or she may also:  · Test a sample of your mucus for bacterial infection.  · Check the level of oxygen in your blood. This is done to check for pneumonia.  · Do a chest X-ray or lung function testing to rule out pneumonia and other conditions.  · Perform blood tests.    Your health care provider will also ask about your symptoms and medical history.  How is this treated?  Most cases of acute bronchitis clear up over time without treatment. Your health care provider may recommend:  · Drinking more fluids. Drinking more makes your mucus thinner, which may make it easier to breathe.  · Taking a medicine for a fever or cough.  · Taking an antibiotic medicine.  · Using an inhaler to help improve shortness of breath and to control a cough.  · Using a cool mist vaporizer or humidifier to make it easier to breathe.    Follow these instructions at home:  Medicines  · Take over-the-counter and prescription medicines only as told by your health care provider.  · If you were prescribed an antibiotic, take it as told by your health care provider. Do not stop taking the antibiotic even if you start to feel better.  General instructions  · Get plenty of rest.  · Drink enough fluids to keep your urine clear or pale yellow.  · Avoid smoking and secondhand smoke. Exposure to cigarette smoke or irritating chemicals will make bronchitis worse. If you smoke and you need help quitting, ask your health care provider. Quitting smoking will help your lungs heal faster.  · Use an inhaler, cool mist vaporizer, or humidifier as told by your health care provider.  · Keep all follow-up visits as told by your health care provider. This is important.  How is this prevented?  To lower your risk of getting this condition again:  · Wash your  hands often with soap and water. If soap and water are not available, use hand .  · Avoid contact with people who have cold symptoms.  · Try not to touch your hands to your mouth, nose, or eyes.  · Make sure to get the flu shot every year.    Contact a health care provider if:  · Your symptoms do not improve in 2 weeks of treatment.  Get help right away if:  · You cough up blood.  · You have chest pain.  · You have severe shortness of breath.  · You become dehydrated.  · You faint or keep feeling like you are going to faint.  · You keep vomiting.  · You have a severe headache.  · Your fever or chills gets worse.  This information is not intended to replace advice given to you by your health care provider. Make sure you discuss any questions you have with your health care provider.  Document Released: 01/25/2006 Document Revised: 07/12/2017 Document Reviewed: 06/07/2017  [employer] Interactive Patient Education © 2018 [employer]        Edison Coyne DO  10/25/18  1:53 PM    Please note that portions of this note may have been completed with a voice recognition program. Efforts were made to edit the dictations, but occasionally words are mistranscribed.

## 2018-10-25 NOTE — PATIENT INSTRUCTIONS
Sinusitis, Adult  Sinusitis is soreness and inflammation of your sinuses. Sinuses are hollow spaces in the bones around your face. They are located:  · Around your eyes.  · In the middle of your forehead.  · Behind your nose.  · In your cheekbones.    Your sinuses and nasal passages are lined with a stringy fluid (mucus). Mucus normally drains out of your sinuses. When your nasal tissues get inflamed or swollen, the mucus can get trapped or blocked so air cannot flow through your sinuses. This lets bacteria, viruses, and funguses grow, and that leads to infection.  Follow these instructions at home:  Medicines  · Take, use, or apply over-the-counter and prescription medicines only as told by your doctor. These may include nasal sprays.  · If you were prescribed an antibiotic medicine, take it as told by your doctor. Do not stop taking the antibiotic even if you start to feel better.  Hydrate and Humidify  · Drink enough water to keep your pee (urine) clear or pale yellow.  · Use a cool mist humidifier to keep the humidity level in your home above 50%.  · Breathe in steam for 10-15 minutes, 3-4 times a day or as told by your doctor. You can do this in the bathroom while a hot shower is running.  · Try not to spend time in cool or dry air.  Rest  · Rest as much as possible.  · Sleep with your head raised (elevated).  · Make sure to get enough sleep each night.  General instructions  · Put a warm, moist washcloth on your face 3-4 times a day or as told by your doctor. This will help with discomfort.  · Wash your hands often with soap and water. If there is no soap and water, use hand .  · Do not smoke. Avoid being around people who are smoking (secondhand smoke).  · Keep all follow-up visits as told by your doctor. This is important.  Contact a doctor if:  · You have a fever.  · Your symptoms get worse.  · Your symptoms do not get better within 10 days.  Get help right away if:  · You have a very bad  headache.  · You cannot stop throwing up (vomiting).  · You have pain or swelling around your face or eyes.  · You have trouble seeing.  · You feel confused.  · Your neck is stiff.  · You have trouble breathing.  This information is not intended to replace advice given to you by your health care provider. Make sure you discuss any questions you have with your health care provider.  Document Released: 06/05/2009 Document Revised: 08/13/2017 Document Reviewed: 10/12/2016  [employer] Interactive Patient Education © 2018 [employer]  Acute Bronchitis, Adult  Acute bronchitis is sudden (acute) swelling of the air tubes (bronchi) in the lungs. Acute bronchitis causes these tubes to fill with mucus, which can make it hard to breathe. It can also cause coughing or wheezing.  In adults, acute bronchitis usually goes away within 2 weeks. A cough caused by bronchitis may last up to 3 weeks. Smoking, allergies, and asthma can make the condition worse. Repeated episodes of bronchitis may cause further lung problems, such as chronic obstructive pulmonary disease (COPD).  What are the causes?  This condition can be caused by germs and by substances that irritate the lungs, including:  · Cold and flu viruses. This condition is most often caused by the same virus that causes a cold.  · Bacteria.  · Exposure to tobacco smoke, dust, fumes, and air pollution.    What increases the risk?  This condition is more likely to develop in people who:  · Have close contact with someone with acute bronchitis.  · Are exposed to lung irritants, such as tobacco smoke, dust, fumes, and vapors.  · Have a weak immune system.  · Have a respiratory condition such as asthma.    What are the signs or symptoms?  Symptoms of this condition include:  · A cough.  · Coughing up clear, yellow, or green mucus.  · Wheezing.  · Chest congestion.  · Shortness of breath.  · A fever.  · Body aches.  · Chills.  · A sore throat.    How is this diagnosed?  This  condition is usually diagnosed with a physical exam. During the exam, your health care provider may order tests, such as chest X-rays, to rule out other conditions. He or she may also:  · Test a sample of your mucus for bacterial infection.  · Check the level of oxygen in your blood. This is done to check for pneumonia.  · Do a chest X-ray or lung function testing to rule out pneumonia and other conditions.  · Perform blood tests.    Your health care provider will also ask about your symptoms and medical history.  How is this treated?  Most cases of acute bronchitis clear up over time without treatment. Your health care provider may recommend:  · Drinking more fluids. Drinking more makes your mucus thinner, which may make it easier to breathe.  · Taking a medicine for a fever or cough.  · Taking an antibiotic medicine.  · Using an inhaler to help improve shortness of breath and to control a cough.  · Using a cool mist vaporizer or humidifier to make it easier to breathe.    Follow these instructions at home:  Medicines  · Take over-the-counter and prescription medicines only as told by your health care provider.  · If you were prescribed an antibiotic, take it as told by your health care provider. Do not stop taking the antibiotic even if you start to feel better.  General instructions  · Get plenty of rest.  · Drink enough fluids to keep your urine clear or pale yellow.  · Avoid smoking and secondhand smoke. Exposure to cigarette smoke or irritating chemicals will make bronchitis worse. If you smoke and you need help quitting, ask your health care provider. Quitting smoking will help your lungs heal faster.  · Use an inhaler, cool mist vaporizer, or humidifier as told by your health care provider.  · Keep all follow-up visits as told by your health care provider. This is important.  How is this prevented?  To lower your risk of getting this condition again:  · Wash your hands often with soap and water. If soap and  water are not available, use hand .  · Avoid contact with people who have cold symptoms.  · Try not to touch your hands to your mouth, nose, or eyes.  · Make sure to get the flu shot every year.    Contact a health care provider if:  · Your symptoms do not improve in 2 weeks of treatment.  Get help right away if:  · You cough up blood.  · You have chest pain.  · You have severe shortness of breath.  · You become dehydrated.  · You faint or keep feeling like you are going to faint.  · You keep vomiting.  · You have a severe headache.  · Your fever or chills gets worse.  This information is not intended to replace advice given to you by your health care provider. Make sure you discuss any questions you have with your health care provider.  Document Released: 01/25/2006 Document Revised: 07/12/2017 Document Reviewed: 06/07/2017  [employer] Interactive Patient Education © 2018 [employer]

## 2018-11-06 RX ORDER — LISINOPRIL 10 MG/1
TABLET ORAL
Qty: 90 TABLET | Refills: 1 | Status: SHIPPED | OUTPATIENT
Start: 2018-11-06 | End: 2019-01-25 | Stop reason: ALTCHOICE

## 2018-11-06 RX ORDER — OMEPRAZOLE 40 MG/1
CAPSULE, DELAYED RELEASE ORAL
Qty: 90 CAPSULE | Refills: 1 | Status: SHIPPED | OUTPATIENT
Start: 2018-11-06 | End: 2019-03-28 | Stop reason: SDUPTHER

## 2018-11-19 ENCOUNTER — TELEPHONE (OUTPATIENT)
Dept: FAMILY MEDICINE CLINIC | Facility: CLINIC | Age: 58
End: 2018-11-19

## 2018-11-19 DIAGNOSIS — G89.4 CHRONIC PAIN SYNDROME: ICD-10-CM

## 2018-11-20 RX ORDER — METHADONE HYDROCHLORIDE 5 MG/1
TABLET ORAL
Qty: 120 TABLET | Refills: 0 | Status: SHIPPED | OUTPATIENT
Start: 2018-11-20 | End: 2018-12-03 | Stop reason: SDUPTHER

## 2018-11-20 NOTE — TELEPHONE ENCOUNTER
CLAUDIO reviewed. Refill approved. Medication E rx'd to pharmacy as requested. Pt to keep f/u apt as scheduled.

## 2018-12-03 ENCOUNTER — OFFICE VISIT (OUTPATIENT)
Dept: FAMILY MEDICINE CLINIC | Facility: CLINIC | Age: 58
End: 2018-12-03

## 2018-12-03 VITALS
HEART RATE: 74 BPM | BODY MASS INDEX: 32.62 KG/M2 | WEIGHT: 233 LBS | OXYGEN SATURATION: 98 % | SYSTOLIC BLOOD PRESSURE: 110 MMHG | DIASTOLIC BLOOD PRESSURE: 82 MMHG | HEIGHT: 71 IN

## 2018-12-03 DIAGNOSIS — K76.0 FATTY LIVER: ICD-10-CM

## 2018-12-03 DIAGNOSIS — H69.83 ETD (EUSTACHIAN TUBE DYSFUNCTION), BILATERAL: Primary | ICD-10-CM

## 2018-12-03 DIAGNOSIS — R17 ELEVATED BILIRUBIN: ICD-10-CM

## 2018-12-03 DIAGNOSIS — G89.4 CHRONIC PAIN SYNDROME: ICD-10-CM

## 2018-12-03 DIAGNOSIS — E53.8 VITAMIN B 12 DEFICIENCY: ICD-10-CM

## 2018-12-03 DIAGNOSIS — F41.1 GAD (GENERALIZED ANXIETY DISORDER): ICD-10-CM

## 2018-12-03 DIAGNOSIS — I10 ESSENTIAL HYPERTENSION: ICD-10-CM

## 2018-12-03 DIAGNOSIS — Z11.59 NEED FOR HEPATITIS C SCREENING TEST: ICD-10-CM

## 2018-12-03 DIAGNOSIS — E78.2 MIXED HYPERLIPIDEMIA: ICD-10-CM

## 2018-12-03 DIAGNOSIS — D69.6 THROMBOCYTOPENIA (HCC): ICD-10-CM

## 2018-12-03 DIAGNOSIS — R73.01 IFG (IMPAIRED FASTING GLUCOSE): ICD-10-CM

## 2018-12-03 DIAGNOSIS — E55.9 VITAMIN D DEFICIENCY: ICD-10-CM

## 2018-12-03 PROCEDURE — 99214 OFFICE O/P EST MOD 30 MIN: CPT | Performed by: FAMILY MEDICINE

## 2018-12-03 RX ORDER — METHADONE HYDROCHLORIDE 5 MG/1
TABLET ORAL
Qty: 120 TABLET | Refills: 0 | Status: SHIPPED | OUTPATIENT
Start: 2018-12-03 | End: 2019-03-05 | Stop reason: SDUPTHER

## 2018-12-03 NOTE — PROGRESS NOTES
"Subjective   Sterling Andrea Whaley is a 58 y.o. male.     History of Present Illness  Mr. Whaley presents today for routine f/u on several chronic issues.  Chronic Pain (Brief): The patient is being seen for a routine clinic follow-up of chronic pain. Denies new problems. Symptoms: headache, neck pain and back pain. Burning, aching, throbbing at times. Radiates down left leg to foot. Radiates from neck into upper back/shoulders/occipital area. Associated symptoms: anxiety, irritability, difficulty concentrating, sleep disturbance, decreased appetite and decreased libido, but no depression. c/o numbness/tingling/weakness left leg, arms. Current treatment includes activity modification, prescribed exercises, nonsteroidal anti-inflammatory drugs and opioid analgesics. By report, there is good compliance with treatment, good tolerance of treatment and good symptom control. The patient is currently able to do activities of daily living without limitations, unable to work, able to do housework with limitations and unable to participate in sports. Previous presentation included back pain, neck pain, headache, irritability, sleep disturbance and left sciatica. Past evaluation has included cervical and lumbar MRI, neurology evaluation, orthopedic evaluation, pain medicine evaluation and neurosurgery evaluation. Past treatment has included physical therapy, prescribed exercises, acetaminophen, nonsteroidal anti-inflammatory drugs, opioid analgesics, muscle relaxants, anticonvulsants, corticosteroids, epidural injection, nerve block and TENS unit. He has been dx'd with diffuse severe spinal DDD/DJD. Feels spinal injections (unsure if MARCUS or facet) lasted approx 1-2 weeks and nerve ablation provided relief for approx 2 months. Has declined receiving further injections for that reason. In the past Lortab caused itching. Was only able to tolerate low dose Fentanyl patch which helped \"some\" but higher dose patches caused " significant side effects. Previously under care of Dr. Duggan here in [place] and tx'd with Methadone for over year at total of 15 mg per day. He also often uses ice applications, Aleve for his neck pain which he feels is very helpful. He takes 1-2 methadone 5 mg tablets early in day. Generally takes 1 or 2, 5 mg tablet later in day. If taken too late in evening medication seems to be too activating and can impair sleep. On few days per week he takes only 1 pill per day. He was considered a nonsurgical candidate by Dr. Gonzáles per pt report. Denies h/o heart disease, dysrhythmia. Has had w/u's for CP in past which were felt to be due to anxiety. He has a h/o of generally poor tolerance to medications. Prescription generally lasting 45-60 days. PADT reviewed and on chart. No aberrant behavior.     He also c/o chronic bilateral elbow pain assoc'd with painful paresthesias in arms/hands worst on right. Using tennis elbow straps have helped some. Corticosteroids have also helped in past.      Hypertension: Patient here for follow-up of elevated blood pressure. He is not exercising other than household chores, minor yardwork and is fairly adherent to low salt diet. Blood pressure checked at home; averaging 130s/80s.  Cardiac symptoms fatigue. Patient denies chest pain, claudication, cough, dyspnea, irregular heart beat, near-syncope, orthopnea, palpitations, syncope, tachypnea. Having some weight gain. Cardiovascular risk factors: advanced age (older than 55 for men, 65 for women), dyslipidemia, hypertension and obesity (BMI >= 30 kg/m2). Use of agents associated with hypertension: NSAIDS. History of target organ damage: none. States he often forgets to take his BP med qhs. Has comorbid HLP. Not currently on statin as he has had previous intolerance with them as well as with fibrate.      Has had persistently low platelets but no bleeding tendencies. No assoc'd anemia. He has had eval per hematology who rec'd close monitoring  only. He once again denies epistaxis, bleeding gums, hematuria, easy bruising, melena or hematochezia.    Has Wolsey syndrome. LFTs stable. Bilirubin levels previously stable. No abd pain, or jaundice.       Has IFG. Not following diabetic appropriate diet.     Has vit D and B12 def's. Taking oral replacement. Does get occ IM injection.    Seen by  Dr. Coyne approx 1 month ago for acute otitis right ear, chronic ETD. Doing better after CS and abx but now at baseline ear congestion, pressure. Performing self insuflation which helps.    Has chronic anxiety with intermittent mild dysphoric mood. No recent changes. Using vistaril as needed.     The following portions of the patient's history were reviewed and updated as appropriate: allergies, current medications, past family history, past medical history, past social history, past surgical history and problem list.    Review of Systems   Constitutional: Positive for fatigue. Negative for appetite change and unexpected weight change.   HENT: Positive for congestion, postnasal drip and sinus pressure (chronic). Negative for mouth sores, nosebleeds, rhinorrhea, sneezing, sore throat and trouble swallowing.    Eyes: Negative for pain, discharge, redness and visual disturbance.   Respiratory: Negative for cough, shortness of breath and wheezing.    Cardiovascular: Negative for chest pain, palpitations and leg swelling.   Gastrointestinal: Negative for abdominal pain, blood in stool, diarrhea, nausea and vomiting.   Endocrine: Positive for heat intolerance.   Genitourinary: Negative for dysuria and hematuria.   Musculoskeletal: Positive for arthralgias, back pain, myalgias, neck pain and neck stiffness.   Skin: Negative for rash and wound.   Neurological: Positive for headaches. Negative for dizziness, syncope and weakness.   Hematological: Negative for adenopathy. Does not bruise/bleed easily.   Psychiatric/Behavioral: Positive for dysphoric mood (mild, stable) and sleep  disturbance. Negative for confusion, hallucinations and suicidal ideas. The patient is nervous/anxious.        Objective    Vitals:    12/03/18 0811   BP: 110/82   Pulse: 74   SpO2: 98%     Body mass index is 32.5 kg/m².      12/03/18 0811   Weight: 106 kg (233 lb)       Physical Exam   Constitutional: He is oriented to person, place, and time. He appears well-developed and well-nourished. He is cooperative. He does not appear ill. No distress.   obese   HENT:   Head: Normocephalic and atraumatic.   Right Ear: External ear and ear canal normal. Tympanic membrane is retracted. Tympanic membrane is not erythematous. A middle ear effusion (mild serous effusion) is present.   Left Ear: External ear and ear canal normal. Tympanic membrane is retracted. Tympanic membrane is not erythematous.   Nose: Mucosal edema present. No rhinorrhea.   Mouth/Throat: Oropharynx is clear and moist and mucous membranes are normal. Mucous membranes are not dry. No oral lesions.   Eyes: Conjunctivae and lids are normal.   Neck: Phonation normal. Neck supple. Normal carotid pulses present. Carotid bruit is not present. No thyroid mass and no thyromegaly present.   Cardiovascular: Normal rate, regular rhythm, normal heart sounds and intact distal pulses. Exam reveals no gallop.   No murmur heard.  Pulmonary/Chest: Effort normal and breath sounds normal.   Abdominal: Soft. Bowel sounds are normal. He exhibits no distension and no mass. There is no hepatosplenomegaly. There is no tenderness.   Musculoskeletal:        Right elbow: He exhibits normal range of motion. Tenderness found. Lateral epicondyle tenderness noted.        Left elbow: He exhibits normal range of motion. Tenderness found. Lateral epicondyle tenderness noted.        Cervical back: He exhibits decreased range of motion (in all planes), tenderness (bilateral soft tissues), bony tenderness (C5-C7), deformity (loss of normal lordosis) and spasm.        Thoracic back: He exhibits  tenderness (diffuse soft tissue), deformity (increased kyphosis) and spasm. He exhibits no bony tenderness.        Lumbar back: He exhibits decreased range of motion (mildly ), tenderness (diffuse soft tissue), bony tenderness (L3-S1), deformity (loss of normal lordosis) and spasm.     Vascular Status -  His right foot exhibits no edema. His left foot exhibits no edema.  Lymphadenopathy:     He has no cervical adenopathy.   Neurological: He is alert and oriented to person, place, and time. He has normal strength. He displays no tremor. Gait normal.   Skin: Skin is warm and dry. No bruising and no rash noted.   Psychiatric: He has a normal mood and affect. His speech is normal and behavior is normal. Judgment and thought content normal. Cognition and memory are normal.   Good eye contact. Answers questions appropriately. Good personal hygiene and grooming.   Nursing note and vitals reviewed.      Assessment/Plan   Sterling was seen today for depression, hyperlipidemia and hypertension.    Diagnoses and all orders for this visit:    ETD (Eustachian tube dysfunction), bilateral    Mixed hyperlipidemia  -     Comprehensive Metabolic Panel; Future  -     Lipid Panel; Future    Essential hypertension  -     CBC (No Diff); Future  -     Comprehensive Metabolic Panel; Future    Obstructive sleep apnea syndrome    TYSON (generalized anxiety disorder)    Thrombocytopenia (CMS/HCC)  -     CBC (No Diff); Future    Chronic pain syndrome  -     methadone (DOLOPHINE) 5 MG tablet; 1 tablet po up to qid.    IFG (impaired fasting glucose)  -     Comprehensive Metabolic Panel; Future  -     Hemoglobin A1c; Future    Vitamin D deficiency    Vitamin B 12 deficiency  -     Vitamin B12; Future    Fatty liver    Elevated bilirubin  -     Bilirubin, Direct; Future    Need for hepatitis C screening test  -     Hepatitis C Antibody; Future       HTN controlled.  HLP with statin intolerance.  Pre-DM/IFG stable at last eval.  Moods  stable.  Preventive measures for mngt of ETD reviewed.  Encouraged continued vitamin D and vitamin B12 replacement.  Pt advised to eat a heart healthy diet and get regular aerobic exercise as he is able.  Stable chronic pain syndrome although slow progression particularly of neck pain. Good tolerance and efficacy of current meds; good use of adjunctive modalities, going to chiropractor as well. No aberrant behavior noted. He will continue methadone up to 10 mg earlier in day.  CLAUDIO report reviewed and scanned into chart. Last CLAUDIO date 12/3/18.  As part of patient's treatment plan I am prescribing a controlled substance. The patient has been made aware of the appropriate use of such medications, including potential risk of somnolence, limited ability to drive and/or work safely, and potential for dependence and/or overdose. It has also been made clear that these medications are for use by this patient only, without concomitant use of alcohol or other substances, unless prescribed.  History and physical exam exhibit continued safe and appropriate use of controlled substance.  F/U in 3 months, sooner as needed/instructed.  I will contact patient regarding test results and provide instructions regarding any necessary changes in plan of care.  Patient was encouraged to keep me informed of any acute changes, lack of improvement of pain flares, or any new concerning symptoms.  Patient voiced understanding of all instructions and denied further questions.

## 2018-12-04 ENCOUNTER — RESULTS ENCOUNTER (OUTPATIENT)
Dept: FAMILY MEDICINE CLINIC | Facility: CLINIC | Age: 58
End: 2018-12-04

## 2018-12-04 DIAGNOSIS — I10 ESSENTIAL HYPERTENSION: ICD-10-CM

## 2018-12-04 DIAGNOSIS — Z11.59 NEED FOR HEPATITIS C SCREENING TEST: ICD-10-CM

## 2018-12-04 DIAGNOSIS — E53.8 VITAMIN B 12 DEFICIENCY: ICD-10-CM

## 2018-12-04 DIAGNOSIS — D69.6 THROMBOCYTOPENIA (HCC): ICD-10-CM

## 2018-12-04 DIAGNOSIS — R17 ELEVATED BILIRUBIN: ICD-10-CM

## 2018-12-04 DIAGNOSIS — E78.2 MIXED HYPERLIPIDEMIA: ICD-10-CM

## 2018-12-04 DIAGNOSIS — R73.01 IFG (IMPAIRED FASTING GLUCOSE): ICD-10-CM

## 2018-12-27 RX ORDER — FLUTICASONE PROPIONATE 50 MCG
SPRAY, SUSPENSION (ML) NASAL
Qty: 48 G | Refills: 1 | Status: SHIPPED | OUTPATIENT
Start: 2018-12-27 | End: 2019-05-21 | Stop reason: SDUPTHER

## 2019-01-25 ENCOUNTER — OFFICE VISIT (OUTPATIENT)
Dept: FAMILY MEDICINE CLINIC | Facility: CLINIC | Age: 59
End: 2019-01-25

## 2019-01-25 VITALS
OXYGEN SATURATION: 96 % | HEIGHT: 71 IN | RESPIRATION RATE: 14 BRPM | HEART RATE: 69 BPM | SYSTOLIC BLOOD PRESSURE: 128 MMHG | BODY MASS INDEX: 33.6 KG/M2 | DIASTOLIC BLOOD PRESSURE: 84 MMHG | WEIGHT: 240 LBS

## 2019-01-25 DIAGNOSIS — H91.91 HEARING LOSS ASSOCIATED WITH SYNDROME OF RIGHT EAR: ICD-10-CM

## 2019-01-25 DIAGNOSIS — N23: ICD-10-CM

## 2019-01-25 DIAGNOSIS — R10.9 ACUTE ABDOMINAL PAIN IN LEFT FLANK: Primary | ICD-10-CM

## 2019-01-25 DIAGNOSIS — H65.21 CHRONIC SEROUS OTITIS MEDIA, RIGHT EAR: ICD-10-CM

## 2019-01-25 DIAGNOSIS — N20.0 RECURRENT KIDNEY STONES: ICD-10-CM

## 2019-01-25 LAB
BILIRUB BLD-MCNC: ABNORMAL MG/DL
CLARITY, POC: CLEAR
COLOR UR: YELLOW
GLUCOSE UR STRIP-MCNC: NEGATIVE MG/DL
KETONES UR QL: NEGATIVE
LEUKOCYTE EST, POC: NEGATIVE
NITRITE UR-MCNC: NEGATIVE MG/ML
PH UR: 5 [PH] (ref 5–8)
PROT UR STRIP-MCNC: NEGATIVE MG/DL
RBC # UR STRIP: ABNORMAL /UL
SP GR UR: 1.02 (ref 1–1.03)
UROBILINOGEN UR QL: ABNORMAL

## 2019-01-25 PROCEDURE — 96372 THER/PROPH/DIAG INJ SC/IM: CPT | Performed by: FAMILY MEDICINE

## 2019-01-25 PROCEDURE — 99214 OFFICE O/P EST MOD 30 MIN: CPT | Performed by: FAMILY MEDICINE

## 2019-01-25 PROCEDURE — 81003 URINALYSIS AUTO W/O SCOPE: CPT | Performed by: FAMILY MEDICINE

## 2019-01-25 RX ORDER — KETOROLAC TROMETHAMINE 30 MG/ML
30 INJECTION, SOLUTION INTRAMUSCULAR; INTRAVENOUS EVERY 6 HOURS PRN
Status: SHIPPED | OUTPATIENT
Start: 2019-01-25 | End: 2019-01-30

## 2019-01-25 RX ORDER — LISINOPRIL AND HYDROCHLOROTHIAZIDE 12.5; 1 MG/1; MG/1
1 TABLET ORAL DAILY
Qty: 90 TABLET | Refills: 3 | Status: SHIPPED | OUTPATIENT
Start: 2019-01-25 | End: 2019-03-29 | Stop reason: SDUPTHER

## 2019-01-25 RX ADMIN — KETOROLAC TROMETHAMINE 30 MG: 30 INJECTION, SOLUTION INTRAMUSCULAR; INTRAVENOUS at 10:45

## 2019-03-05 ENCOUNTER — OFFICE VISIT (OUTPATIENT)
Dept: FAMILY MEDICINE CLINIC | Facility: CLINIC | Age: 59
End: 2019-03-05

## 2019-03-05 VITALS
HEIGHT: 71 IN | OXYGEN SATURATION: 97 % | BODY MASS INDEX: 33.32 KG/M2 | HEART RATE: 64 BPM | SYSTOLIC BLOOD PRESSURE: 142 MMHG | WEIGHT: 238 LBS | DIASTOLIC BLOOD PRESSURE: 90 MMHG

## 2019-03-05 DIAGNOSIS — R73.01 IFG (IMPAIRED FASTING GLUCOSE): ICD-10-CM

## 2019-03-05 DIAGNOSIS — J34.0 NASAL ABSCESS: Primary | ICD-10-CM

## 2019-03-05 DIAGNOSIS — K76.0 FATTY LIVER: ICD-10-CM

## 2019-03-05 DIAGNOSIS — G89.4 CHRONIC PAIN SYNDROME: ICD-10-CM

## 2019-03-05 DIAGNOSIS — E78.2 MIXED HYPERLIPIDEMIA: ICD-10-CM

## 2019-03-05 DIAGNOSIS — J34.0 CELLULITIS OF NOSE: ICD-10-CM

## 2019-03-05 DIAGNOSIS — I10 ESSENTIAL HYPERTENSION: ICD-10-CM

## 2019-03-05 PROCEDURE — 10060 I&D ABSCESS SIMPLE/SINGLE: CPT | Performed by: FAMILY MEDICINE

## 2019-03-05 PROCEDURE — 99214 OFFICE O/P EST MOD 30 MIN: CPT | Performed by: FAMILY MEDICINE

## 2019-03-05 RX ORDER — AMOXICILLIN AND CLAVULANATE POTASSIUM 875; 125 MG/1; MG/1
1 TABLET, FILM COATED ORAL 2 TIMES DAILY
Qty: 20 TABLET | Refills: 0 | Status: SHIPPED | OUTPATIENT
Start: 2019-03-05 | End: 2019-03-15

## 2019-03-05 RX ORDER — METHADONE HYDROCHLORIDE 5 MG/1
TABLET ORAL
Qty: 120 TABLET | Refills: 0 | Status: SHIPPED | OUTPATIENT
Start: 2019-03-05 | End: 2019-03-06 | Stop reason: SDUPTHER

## 2019-03-05 NOTE — PROGRESS NOTES
"Subjective   Sterling Andrea Whaley is a 58 y.o. male.     History of Present Illness   Mr. Whaley presents today for routine f/u on several chronic issues.    He also has acute complaint of severe pain in left nares for several days.  Associated with \"enlarging pimple\" that he is \"not been able to  drain\".  Now having swelling of the left side of his nose externally as well.    Chronic Pain (Brief): The patient is being seen for a routine clinic follow-up of chronic pain. Denies new problems. Symptoms: headache, neck pain and back pain. Burning, aching, throbbing at times. Radiates down left leg to foot. Radiates from neck into upper back/shoulders/occipital area. Associated symptoms: anxiety, irritability, difficulty concentrating, sleep disturbance, decreased appetite and decreased libido, but no depression. c/o numbness/tingling/weakness left leg, arms. Current treatment includes activity modification, prescribed exercises, nonsteroidal anti-inflammatory drugs and opioid analgesics. By report, there is good compliance with treatment, good tolerance of treatment and fair symptom control. The patient is currently able to do activities of daily living without limitations, unable to work, able to do housework with limitations and unable to participate in sports. Previous presentation included back pain, neck pain, headache, irritability, sleep disturbance and left sciatica. Past evaluation has included cervical and lumbar MRI, neurology evaluation, orthopedic evaluation, pain medicine evaluation and neurosurgery evaluation. Past treatment has included physical therapy, prescribed exercises, acetaminophen, nonsteroidal anti-inflammatory drugs, opioid analgesics, muscle relaxants, anticonvulsants, corticosteroids, epidural injection, nerve block and TENS unit. He has been dx'd with diffuse severe spinal DDD/DJD. Feels spinal injections (unsure if MARCUS or facet) lasted approx 1-2 weeks and nerve ablation provided relief " for approx 2 months. He declined receiving further injections for that reason. In the past Lortab caused itching. Was only able to tolerate low dose Fentanyl patch which helped minimally but higher dose patches caused significant side effects. Previously under care of Dr. Duggan here in [place] and tx'd with Methadone for over year at total of 15 mg per day. He also often uses ice applications, Aleve for his neck pain which he feels is very helpful. He takes 1-2 methadone 5 mg tablets early in day. Generally takes 1 or 2, 5 mg tablet later in day. If taken too late in evening medication seems to be too activating and can impair sleep. On few days per week he takes only 1 pill per day. He was considered a nonsurgical candidate by Dr. Gonzáles per pt report. Denies h/o heart disease, dysrhythmia. Has had w/u's for CP in past which were felt to be due to anxiety. He has a h/o of generally poor tolerance to medications. Prescription generally lasting 45-60 days. PADT reviewed and on chart. No aberrant behavior in past.     Hypertension: Patient here for follow-up of elevated blood pressure. He is not exercising other than household chores and is fairly adherent to low salt diet. Blood pressure checked at home; averaging 130s/80s.  Cardiac symptoms fatigue. Patient denies chest pain, claudication, cough, dyspnea, irregular heart beat, near-syncope, orthopnea, palpitations, syncope, tachypnea. No weight gain. Cardiovascular risk factors: advanced age (older than 55 for men, 65 for women), dyslipidemia, hypertension and obesity (BMI >= 30 kg/m2). Use of agents associated with hypertension: NSAIDS. History of target organ damage: none. Has comorbid HLP. Not currently on statin as he has had previous intolerance with them as well as with fibrate.      Has chronic thrombocytopenia but no bleeding tendencies. No assoc'd anemia. He has had eval per hematology in past who rec'd close monitoring only. He once again denies epistaxis,  bleeding gums, hematuria, easy bruising, melena or hematochezia.     Has Shelby syndrome. LFTs stable. Bilirubin levels previously stable. No abd pain, or jaundice. Has assoc'd fatty liver and chronic obesity.       Has had IFG in past. Not following diabetic appropriate diet.     Has vit D and B12 def's. Taking oral replacement. Does get occ IM b12 injection.     Has upcoming appointment next week with ENT for evaluation of chronic serous otitis in the right side and potential for tympanotomy tube placement.    Has chronic anxiety with intermittent mild dysphoric mood. No recent changes. Using vistaril as needed.     The following portions of the patient's history were reviewed and updated as appropriate: allergies, current medications, past family history, past medical history, past social history, past surgical history and problem list.    Review of Systems   Constitutional: Positive for fatigue. Negative for appetite change and unexpected weight change.   HENT: Positive for congestion, postnasal drip and sinus pressure (chronic). Negative for mouth sores, nosebleeds, rhinorrhea, sneezing, sore throat and trouble swallowing.         As per HPI   Eyes: Negative for pain, discharge, redness and visual disturbance.   Respiratory: Negative for cough, shortness of breath and wheezing.    Cardiovascular: Negative for chest pain, palpitations and leg swelling.   Gastrointestinal: Negative for abdominal pain, blood in stool, diarrhea, nausea and vomiting.   Endocrine: Positive for heat intolerance.   Genitourinary: Negative for dysuria and hematuria.   Musculoskeletal: Positive for arthralgias, back pain, myalgias, neck pain and neck stiffness.   Skin: Negative for rash and wound.   Neurological: Positive for headaches. Negative for dizziness, syncope and weakness.   Hematological: Negative for adenopathy. Does not bruise/bleed easily.   Psychiatric/Behavioral: Positive for dysphoric mood (mild, stable) and sleep  disturbance. Negative for confusion, hallucinations and suicidal ideas. The patient is nervous/anxious.        Objective    Vitals:    03/05/19 0911   BP: 142/90   Pulse: 64   SpO2: 97%     Body mass index is 33.19 kg/m².      03/05/19 0911   Weight: 108 kg (238 lb)       Physical Exam   Constitutional: He is oriented to person, place, and time. He appears well-developed and well-nourished. He is cooperative. He does not appear ill. No distress.   obese   HENT:   Head: Normocephalic and atraumatic.   Nose: Mucosal edema (abscess kathy-lateral wall left nares, very TTP, pusutular center noted) and sinus tenderness (as demarcated) present.       Mouth/Throat: Mucous membranes are normal. Mucous membranes are not dry.   Cardiovascular: Normal rate, regular rhythm, normal heart sounds and intact distal pulses. Exam reveals no gallop.   No murmur heard.  Pulmonary/Chest: Effort normal and breath sounds normal.   Musculoskeletal:        Cervical back: He exhibits decreased range of motion (in all planes), tenderness (bilateral soft tissues), bony tenderness (C5-C7), deformity (loss of normal lordosis) and spasm.        Thoracic back: He exhibits tenderness (diffuse soft tissue), deformity (increased kyphosis) and spasm. He exhibits no bony tenderness.        Lumbar back: He exhibits decreased range of motion (mildly ), tenderness (diffuse soft tissue), bony tenderness (L3-S1), deformity (loss of normal lordosis) and spasm.     Vascular Status -  His right foot exhibits no edema. His left foot exhibits no edema.  Lymphadenopathy:        Head (left side): Submandibular (mild) adenopathy present.     He has no cervical adenopathy.   Neurological: He is alert and oriented to person, place, and time. He has normal strength. He displays no tremor. Gait normal.   Skin: Skin is warm and dry. No bruising and no rash noted.   Psychiatric: He has a normal mood and affect. His speech is normal and behavior is normal. Judgment and  thought content normal. Cognition and memory are normal.   Good eye contact. Answers questions appropriately. Good personal hygiene and grooming.   Nursing note and vitals reviewed.      Assessment/Plan   Sterling was seen today for anxiety, depression, hyperlipidemia, hypertension, pain and abscess.    Diagnoses and all orders for this visit:    Nasal abscess  -     Incise & Drain Abcess  -     Anaerobic & Aerobic Culture ([facility] Only) - Swab, Nares  -     amoxicillin-clavulanate (AUGMENTIN) 875-125 MG per tablet; Take 1 tablet by mouth 2 (Two) Times a Day for 10 days.    Chronic pain syndrome  -     methadone (DOLOPHINE) 5 MG tablet; 1 tablet po up to qid.    Essential hypertension    Mixed hyperlipidemia    IFG (impaired fasting glucose)    Fatty liver    Cellulitis of nose    I have reviewed risks/benefits and potential side effects of various treatment options for nasal abscess. Pt voices understanding and wishes to proceed with simple I and D. See procedure note below. augmentin for tx of assoc'd cellulitis. Pt instructed in performance of warm compress.    HTN not at goal today likely secondary to pain. He is encouraged to continue monitoring at home and report persistent elevations.  He is encouraged to follow heart healthy diet and exercise daily.    Mixed hyperlipidemia.  Recheck status today.  Has not been able to tolerate vibrates or statins in the past.    Impaired fasting glucose of unknown status.  Recheck A1c today as previously ordered.  Encouraged to follow diabetic appropriate diet.    Fatty liver with associated obesity.  Recheck LFTs as previously ordered.    Encouraged continued vitamin D and vitamin B12 replacement.    Stable chronic pain syndrome although slow progression particularly of neck pain. Good tolerance and efficacy of current meds; good use of adjunctive modalities, going to chiropractor as well. No aberrant behavior noted. He will continue methadone.   CLAUDIO report reviewed and  scanned into chart. Last CLAUDIO dated 3/5/19.  As part of patient's treatment plan I am prescribing a controlled substance. The patient has been made aware of the appropriate use of such medications, including potential risk of somnolence, limited ability to drive and/or work safely, and potential for dependence and/or overdose. It has also been made clear that these medications are for use by this patient only, without concomitant use of alcohol or other substances, unless prescribed.  History and physical exam exhibit continued safe and appropriate use of controlled substance.  Last UDS appropriate.  F/U in 3 months, sooner as needed/instructed.  He is encouraged to keep appointment as scheduled with ENT next week for evaluation of ear pain.  Also encouraged to have them recheck for appropriate healing of nasal abscess.  I will contact patient regarding test results and provide instructions regarding any necessary changes in plan of care.  Patient was encouraged to keep me informed of any acute changes, lack of improvement of pain flares, or any new concerning symptoms.  Patient voiced understanding of all instructions and denied further questions.               PROCEDURE NOTE    PROCEDURE: Simple I and D of nasal abscess  Incision & Drainage of Abscess  Date/Time: 3/5/19, 9:30 AM  Performed by: Annel Armstrong MD    Consent obtained:  yes  Time out protocol performed.      Risks discussed:  Incomplete drainage, pain and infection, poor healing, bleeding  Location/size: kathy-lateral left nares, size not fully appreciated due to lack of complete visualization  Pre-procedure details:     Skin preparation:  betadine  Anesthesia: none    Procedure type:    Complexity:  simple  Procedure details:     Needle aspiration: no     Incision types:  Stab incision    Incision depth:  Subcutaneous    Scalpel blade: # 11    Wound management:      Drainage:  purulent    Drainage amount:  moderate    Wound treatment:   antibacterial ointment    Packing materials:  none  Post-procedure details:     Patient tolerance of procedure:  Tolerated well, no immediate complications  Post procedure wound care reviewed.  Culture obtained and pending.

## 2019-03-06 ENCOUNTER — TELEPHONE (OUTPATIENT)
Dept: FAMILY MEDICINE CLINIC | Facility: CLINIC | Age: 59
End: 2019-03-06

## 2019-03-06 DIAGNOSIS — G89.4 CHRONIC PAIN SYNDROME: ICD-10-CM

## 2019-03-06 LAB
ALBUMIN SERPL-MCNC: 4.4 G/DL (ref 3.5–5.5)
ALBUMIN/GLOB SERPL: 1.4 {RATIO} (ref 1.2–2.2)
ALP SERPL-CCNC: 104 IU/L (ref 39–117)
ALT SERPL-CCNC: 30 IU/L (ref 0–44)
AST SERPL-CCNC: 25 IU/L (ref 0–40)
BILIRUB DIRECT SERPL-MCNC: 0.4 MG/DL (ref 0–0.4)
BILIRUB SERPL-MCNC: 1.9 MG/DL (ref 0–1.2)
BUN SERPL-MCNC: 16 MG/DL (ref 6–24)
BUN/CREAT SERPL: 19 (ref 9–20)
CALCIUM SERPL-MCNC: 9.3 MG/DL (ref 8.7–10.2)
CHLORIDE SERPL-SCNC: 103 MMOL/L (ref 96–106)
CHOLEST SERPL-MCNC: 156 MG/DL (ref 100–199)
CO2 SERPL-SCNC: 26 MMOL/L (ref 20–29)
CREAT SERPL-MCNC: 0.86 MG/DL (ref 0.76–1.27)
ERYTHROCYTE [DISTWIDTH] IN BLOOD BY AUTOMATED COUNT: 13.4 % (ref 12.3–15.4)
GLOBULIN SER CALC-MCNC: 3.1 G/DL (ref 1.5–4.5)
GLUCOSE SERPL-MCNC: 115 MG/DL (ref 65–99)
HBA1C MFR BLD: 5.6 % (ref 4.8–5.6)
HCT VFR BLD AUTO: 41.2 % (ref 37.5–51)
HCV AB S/CO SERPL IA: <0.1 S/CO RATIO (ref 0–0.9)
HDLC SERPL-MCNC: 33 MG/DL
HGB BLD-MCNC: 14.5 G/DL (ref 13–17.7)
LDLC SERPL CALC-MCNC: 60 MG/DL (ref 0–99)
MCH RBC QN AUTO: 34.2 PG (ref 26.6–33)
MCHC RBC AUTO-ENTMCNC: 35.2 G/DL (ref 31.5–35.7)
MCV RBC AUTO: 97 FL (ref 79–97)
PLATELET # BLD AUTO: 100 X10E3/UL (ref 150–379)
POTASSIUM SERPL-SCNC: 4.5 MMOL/L (ref 3.5–5.2)
PROT SERPL-MCNC: 7.5 G/DL (ref 6–8.5)
RBC # BLD AUTO: 4.24 X10E6/UL (ref 4.14–5.8)
SODIUM SERPL-SCNC: 142 MMOL/L (ref 134–144)
TRIGL SERPL-MCNC: 314 MG/DL (ref 0–149)
VIT B12 SERPL-MCNC: 591 PG/ML (ref 232–1245)
VLDLC SERPL CALC-MCNC: 63 MG/DL (ref 5–40)
WBC # BLD AUTO: 7.4 X10E3/UL (ref 3.4–10.8)

## 2019-03-06 RX ORDER — METHADONE HYDROCHLORIDE 5 MG/1
TABLET ORAL
Qty: 120 TABLET | Refills: 0 | Status: SHIPPED | OUTPATIENT
Start: 2019-03-06 | End: 2019-03-07 | Stop reason: SDUPTHER

## 2019-03-06 NOTE — TELEPHONE ENCOUNTER
----- Message from Tash Dickson sent at 3/5/2019  5:35 PM EST -----  Patient left a voice message for Brandy on 3/5 @ 2:53pm regarding Methadone RX.  Current pharmacy no longer carries it so patient is requesting to try [facility]. Please notify patient.

## 2019-03-07 ENCOUNTER — TELEPHONE (OUTPATIENT)
Dept: FAMILY MEDICINE CLINIC | Facility: CLINIC | Age: 59
End: 2019-03-07

## 2019-03-07 DIAGNOSIS — G89.4 CHRONIC PAIN SYNDROME: ICD-10-CM

## 2019-03-07 RX ORDER — METHADONE HYDROCHLORIDE 5 MG/1
TABLET ORAL
Qty: 120 TABLET | Refills: 0 | Status: SHIPPED | OUTPATIENT
Start: 2019-03-07 | End: 2019-06-05 | Stop reason: SDUPTHER

## 2019-03-07 NOTE — TELEPHONE ENCOUNTER
[facility] L/M stating the script you sent in yesterday didn't come through with the electronic signature.  They need you to resend it.

## 2019-03-11 LAB
BACTERIA SPEC AEROBE CULT: ABNORMAL
BACTERIA SPEC ANAEROBE CULT: ABNORMAL
BACTERIA SPEC CULT: ABNORMAL
BACTERIA SPEC CULT: ABNORMAL
OTHER ANTIBIOTIC SUSC ISLT: ABNORMAL

## 2019-03-29 RX ORDER — OMEPRAZOLE 40 MG/1
CAPSULE, DELAYED RELEASE ORAL
Qty: 90 CAPSULE | Refills: 1 | Status: SHIPPED | OUTPATIENT
Start: 2019-03-29 | End: 2019-08-29 | Stop reason: SDUPTHER

## 2019-03-29 RX ORDER — LISINOPRIL 10 MG/1
TABLET ORAL
Qty: 90 TABLET | Refills: 1 | Status: SHIPPED | OUTPATIENT
Start: 2019-03-29 | End: 2019-06-05

## 2019-05-21 RX ORDER — FLUTICASONE PROPIONATE 50 MCG
SPRAY, SUSPENSION (ML) NASAL
Qty: 48 G | Refills: 1 | Status: SHIPPED | OUTPATIENT
Start: 2019-05-21 | End: 2019-08-13 | Stop reason: SDUPTHER

## 2019-06-05 ENCOUNTER — OFFICE VISIT (OUTPATIENT)
Dept: FAMILY MEDICINE CLINIC | Facility: CLINIC | Age: 59
End: 2019-06-05

## 2019-06-05 VITALS
BODY MASS INDEX: 33.88 KG/M2 | HEIGHT: 71 IN | SYSTOLIC BLOOD PRESSURE: 138 MMHG | DIASTOLIC BLOOD PRESSURE: 90 MMHG | HEART RATE: 68 BPM | WEIGHT: 242 LBS | OXYGEN SATURATION: 97 %

## 2019-06-05 DIAGNOSIS — M48.02 SPINAL STENOSIS OF CERVICAL REGION: ICD-10-CM

## 2019-06-05 DIAGNOSIS — M48.061 SPINAL STENOSIS OF LUMBAR REGION, UNSPECIFIED WHETHER NEUROGENIC CLAUDICATION PRESENT: ICD-10-CM

## 2019-06-05 DIAGNOSIS — I10 ESSENTIAL HYPERTENSION: Primary | ICD-10-CM

## 2019-06-05 DIAGNOSIS — M77.11 LATERAL EPICONDYLITIS OF BOTH ELBOWS: ICD-10-CM

## 2019-06-05 DIAGNOSIS — M47.812 OSTEOARTHRITIS OF CERVICAL SPINE, UNSPECIFIED SPINAL OSTEOARTHRITIS COMPLICATION STATUS: ICD-10-CM

## 2019-06-05 DIAGNOSIS — M72.2 PLANTAR FASCIITIS, BILATERAL: ICD-10-CM

## 2019-06-05 DIAGNOSIS — M77.12 LATERAL EPICONDYLITIS OF BOTH ELBOWS: ICD-10-CM

## 2019-06-05 DIAGNOSIS — M51.36 DEGENERATION OF INTERVERTEBRAL DISC OF LUMBAR REGION: ICD-10-CM

## 2019-06-05 DIAGNOSIS — H65.21 CHRONIC SEROUS OTITIS MEDIA, RIGHT EAR: ICD-10-CM

## 2019-06-05 DIAGNOSIS — G89.4 CHRONIC PAIN SYNDROME: ICD-10-CM

## 2019-06-05 DIAGNOSIS — Z79.899 HIGH RISK MEDICATIONS (NOT ANTICOAGULANTS) LONG-TERM USE: ICD-10-CM

## 2019-06-05 DIAGNOSIS — M51.34 DEGENERATION OF INTERVERTEBRAL DISC OF THORACIC REGION: ICD-10-CM

## 2019-06-05 PROCEDURE — 99214 OFFICE O/P EST MOD 30 MIN: CPT | Performed by: FAMILY MEDICINE

## 2019-06-05 RX ORDER — LISINOPRIL 20 MG/1
20 TABLET ORAL DAILY
Qty: 90 TABLET | Refills: 3 | Status: SHIPPED | OUTPATIENT
Start: 2019-06-05 | End: 2020-05-19

## 2019-06-05 RX ORDER — LISINOPRIL 20 MG/1
20 TABLET ORAL DAILY
Qty: 90 TABLET | Refills: 3 | Status: SHIPPED | OUTPATIENT
Start: 2019-06-05 | End: 2019-06-05 | Stop reason: SDUPTHER

## 2019-06-05 RX ORDER — METHADONE HYDROCHLORIDE 5 MG/1
TABLET ORAL
Qty: 120 TABLET | Refills: 0 | Status: SHIPPED | OUTPATIENT
Start: 2019-06-05 | End: 2019-08-01 | Stop reason: SDUPTHER

## 2019-06-05 NOTE — PROGRESS NOTES
"Subjective   Sterling Andrea Whaley is a 59 y.o. male.     History of Present Illness  Mr. Whaley presents today for routine f/u on several chronic issues.  Chronic Pain (Brief): The patient is being seen for a routine clinic follow-up of chronic pain. Denies new problems. Symptoms: headache, neck pain and back pain. Burning, aching, throbbing at times. Radiates down left leg to foot. Radiates from neck into upper back/shoulders/occipital area. Associated symptoms: anxiety, irritability, difficulty concentrating, sleep disturbance, decreased appetite and decreased libido, but no depression. Also c/o  Numbness, tingling, weakness left leg, arms. Current treatment includes activity modification, prescribed exercises, nonsteroidal anti-inflammatory drugs and opioid analgesics. By report, there is good compliance with treatment, good tolerance of treatment and good symptom control. The patient is currently able to do activities of daily living without limitations, unable to work, able to do housework with limitations and unable to participate in sports. Previous presentation included back pain, neck pain, headache, irritability, sleep disturbance and left sciatica. Past evaluation has included cervical and lumbar MRI, neurology evaluation, orthopedic evaluation, pain medicine evaluation and neurosurgery evaluation. Past treatment has included physical therapy, prescribed exercises, acetaminophen, nonsteroidal anti-inflammatory drugs, opioid analgesics, muscle relaxants, anticonvulsants, corticosteroids, epidural injection, nerve block and TENS unit. He has been dx'd with diffuse severe spinal DDD/DJD. Feels spinal injections (unsure if MARCUS or facet) lasted approx 1-2 weeks and nerve ablation provided relief for approx 2 months. Has declined receiving further injections for that reason. In the past Lortab caused itching. Was only able to tolerate low dose Fentanyl patch which helped \"some\" but higher dose patches caused " significant side effects. Previously under pain mgnt care with Dr. Duggan in [place] and tx'd with Methadone for well over year at total of 15 mg per day. He also often uses ice applications, Aleve for his neck pain. He takes 1-2 methadone 5 mg tablets early in day. Generally takes 1 or 2, 5 mg tablet later in day. If taken too late in evening medication seems to be too activating and can impair sleep. On few days per week he takes only 1 pill per day. He was considered a nonsurgical candidate by Dr. Gonzáles per pt report. Denies h/o heart disease, dysrhythmia. Has had w/u's for CP in past which were felt to be due to anxiety. He has a h/o of generally poor tolerance to medications. Prescription generally lasting 45-60 days. No aberrant behaviora.     He also c/o chronic bilateral elbow pain assoc'd with painful paresthesias in arms/hands worst on right. Using tennis elbow straps have helped some. Corticosteroids have also helped in past. Recently stable. No decreasing  strength at this time.      Hypertension: Patient here for follow-up of elevated blood pressure. He is not exercising other than household chores, minor yardwork and is fairly adherent to low salt diet. Blood pressure checked at home; averaging 140s-150s/90s. Cardiac symptoms fatigue. Patient denies chest pain, claudication, cough, dyspnea, irregular heart beat, near-syncope, orthopnea, palpitations, syncope, tachypnea. Having some weight gain. Cardiovascular risk factors: advanced age (older than 55 for men, 65 for women), dyslipidemia, hypertension and obesity (BMI >= 30 kg/m2). Use of agents associated with hypertension: NSAIDS. History of target organ damage: none.      Has had eval per ENT for chronic serous otitis right ear, ETD. No better with nasal CS, mucinex D, etc. Having decreased hearing.    The following portions of the patient's history were reviewed and updated as appropriate: allergies, current medications, past family history, past  medical history, past social history, past surgical history and problem list.    Review of Systems   Constitutional: Positive for fatigue. Negative for fever and unexpected weight change.   HENT: Positive for congestion, ear pain, hearing loss, postnasal drip and sinus pressure (chronic). Negative for mouth sores, nosebleeds, rhinorrhea, sore throat and trouble swallowing.    Eyes: Negative for pain, discharge, redness and visual disturbance.   Respiratory: Negative for cough, shortness of breath and wheezing.    Cardiovascular: Negative for chest pain, palpitations and leg swelling.   Gastrointestinal: Negative for abdominal pain, blood in stool, diarrhea, nausea and vomiting.   Endocrine: Positive for heat intolerance. Negative for polydipsia and polyuria.   Genitourinary: Negative for dysuria and hematuria.   Musculoskeletal: Positive for arthralgias, back pain, myalgias, neck pain and neck stiffness.   Skin: Negative for rash and wound.   Neurological: Positive for dizziness (occ mild) and headaches. Negative for tremors, syncope and weakness.   Hematological: Negative for adenopathy. Does not bruise/bleed easily.   Psychiatric/Behavioral: Positive for dysphoric mood (mild, stable) and sleep disturbance. Negative for confusion, hallucinations and suicidal ideas. The patient is nervous/anxious.        Objective    Vitals:    06/05/19 0856   BP: 138/90   Pulse: 68   SpO2: 97%     Body mass index is 33.75 kg/m².      06/05/19  0856   Weight: 110 kg (242 lb)       Physical Exam   Constitutional: He is oriented to person, place, and time. He appears well-developed and well-nourished. He is cooperative. He does not appear ill. No distress.   obese   HENT:   Head: Normocephalic and atraumatic.   Right Ear: External ear and ear canal normal. Tympanic membrane is injected and retracted. A middle ear effusion (serous effusion) is present. Decreased hearing is noted.   Left Ear: External ear and ear canal normal. Tympanic  membrane is retracted. Tympanic membrane is not erythematous.   Nose: Mucosal edema present. No rhinorrhea.   Mouth/Throat: Oropharynx is clear and moist and mucous membranes are normal. Mucous membranes are not dry. No oral lesions.   Eyes: Conjunctivae, EOM and lids are normal.   Neck: Phonation normal. Neck supple. Normal carotid pulses present. Carotid bruit is not present. No thyromegaly present.   Cardiovascular: Normal rate, regular rhythm, normal heart sounds and intact distal pulses. Exam reveals no gallop.   No murmur heard.  Pulmonary/Chest: Effort normal and breath sounds normal.   Musculoskeletal:        Right elbow: He exhibits normal range of motion. Tenderness found. Lateral epicondyle tenderness noted.        Left elbow: He exhibits normal range of motion. Tenderness found. Lateral epicondyle tenderness noted.        Cervical back: He exhibits decreased range of motion (in all planes), tenderness (bilateral soft tissues), bony tenderness (C5-C7), deformity (loss of normal lordosis) and spasm.        Thoracic back: He exhibits tenderness (diffuse soft tissue), deformity (increased kyphosis) and spasm. He exhibits no bony tenderness.        Lumbar back: He exhibits decreased range of motion (mildly ), tenderness (diffuse soft tissue), bony tenderness (L3-S1), deformity (loss of normal lordosis) and spasm.     Vascular Status -  His right foot exhibits no edema. His left foot exhibits no edema.  Lymphadenopathy:     He has no cervical adenopathy.   Neurological: He is alert and oriented to person, place, and time. He displays no tremor. Gait normal.   Skin: Skin is warm and dry. No bruising and no rash noted.   Psychiatric: His behavior is normal. His affect is blunt. Cognition and memory are normal.   Good eye contact. Answers questions appropriately. Good personal hygiene and grooming.   Nursing note and vitals reviewed.    Lab Results   Component Value Date    WBC 7.4 03/05/2019    HGB 14.5  03/05/2019    HCT 41.2 03/05/2019    MCV 97 03/05/2019     (L) 03/05/2019       Lab Results   Component Value Date    GLUCOSE 133 (H) 09/14/2016    BUN 16 03/05/2019    CREATININE 0.86 03/05/2019    EGFRIFNONA 96 03/05/2019    EGFRIFAFRI 110 03/05/2019    BCR 19 03/05/2019    K 4.5 03/05/2019    CO2 26 03/05/2019    CALCIUM 9.3 03/05/2019    PROTENTOTREF 7.5 03/05/2019    ALBUMIN 4.4 03/05/2019    LABIL2 1.4 03/05/2019    AST 25 03/05/2019    ALT 30 03/05/2019       Lab Results   Component Value Date    CHLPL 156 03/05/2019    TRIG 314 (H) 03/05/2019    HDL 33 (L) 03/05/2019    LDL 60 03/05/2019       Lab Results   Component Value Date    HGBA1C 5.6 03/05/2019       Lab Results   Component Value Date    TSH 1.150 05/21/2018     Last UDS appropriate.    Assessment/Plan   Sterling was seen today for diabetes, hyperlipidemia and hypertension.    Diagnoses and all orders for this visit:    Essential hypertension  -     Discontinue: lisinopril (PRINIVIL,ZESTRIL) 20 MG tablet; Take 1 tablet by mouth Daily.  -     lisinopril (PRINIVIL,ZESTRIL) 20 MG tablet; Take 1 tablet by mouth Daily.    Chronic pain syndrome  -     methadone (DOLOPHINE) 5 MG tablet; 1 tablet po up to qid.    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  Increase lisinopril to 20 mg once daily.  He is encouraged to follow heart healthy diet and walk daily.  Check BP at least 3 times weekly, record and report lack of improvement prior to his follow-up  appointment.    Multifactorial chronic pain syndrome due to cervical and lumbar DDD/DJD with spinal stenosis, bilateral lateral epicondylitis, bilateral plantar fasciitis.  Generally stable.  Doing well on low-dose methadone.  As part of patient's treatment plan I am prescribing a controlled substance.  The patient has been made aware of the appropriate use of such medications, including potential risk of somnolence, limited ability to drive and/or work safely, and potential for dependence and/or overdose.  It has also been made clear that these medications are for use by this patient only, without concomitant use of alcohol or other substances, unless prescribed.  CLAUDIO report reviewed and scanned into chart.  Last CLAUDIO date 6/5/19.  History and physical exam exhibit continued safe and appropriate use of controlled substance.  Patient has completed a prescribing agreement detailing terms of continued prescribing of controlled substances, including monitoring CLAUDIO reports, urine drug screening, and pill counts if necessary.  Patient is aware that inappropriate use will result in cessation of prescribing such medications.  Last UDS appropriate.    He is encouraged to follow-up with ENT for possible t-tube placement right ear as he is having persistent discomfort, decreased hearing and not responding to conservative management.    Otherwise continue current medications.  Routine follow-up in 3 months, sooner as needed.  Patient was encouraged to keep me informed of any acute changes, lack of improvement, or any new concerning symptoms.  Pt is aware of reasons to seek emergent care.  Patient voiced understanding of all instructions and denied further questions.          Please note that portions of this note may have been completed with a voice recognition program. Efforts were made to edit the dictations, but occasionally words are mistranscribed.

## 2019-07-04 DIAGNOSIS — I10 ESSENTIAL HYPERTENSION: ICD-10-CM

## 2019-07-05 RX ORDER — METOPROLOL SUCCINATE 50 MG/1
TABLET, EXTENDED RELEASE ORAL
Qty: 90 TABLET | Refills: 3 | Status: SHIPPED | OUTPATIENT
Start: 2019-07-05 | End: 2020-06-09

## 2019-08-01 DIAGNOSIS — G89.4 CHRONIC PAIN SYNDROME: ICD-10-CM

## 2019-08-01 RX ORDER — METHADONE HYDROCHLORIDE 5 MG/1
TABLET ORAL
Qty: 120 TABLET | Refills: 0 | Status: SHIPPED | OUTPATIENT
Start: 2019-08-01 | End: 2019-09-05 | Stop reason: SDUPTHER

## 2019-08-01 NOTE — TELEPHONE ENCOUNTER
CLAUDIO reviewed. Refill approved. Medication printed for . Pt to have UDS at time of . Pt to keep f/u apt as scheduled.  Med agreement UTD.    Patient called and says that her heart rate is down to the 40's on the Diltiazem and she feels weak and dizzy.    Says that her heart rate was in the 50's yesterday.  Advised to hold until she hears from us.    She is on diltiazem 240 mg capsule.

## 2019-08-05 ENCOUNTER — CLINICAL SUPPORT (OUTPATIENT)
Dept: FAMILY MEDICINE CLINIC | Facility: CLINIC | Age: 59
End: 2019-08-05

## 2019-08-13 RX ORDER — FLUTICASONE PROPIONATE 50 MCG
SPRAY, SUSPENSION (ML) NASAL
Qty: 48 G | Refills: 1 | Status: SHIPPED | OUTPATIENT
Start: 2019-08-13 | End: 2020-02-24

## 2019-08-30 RX ORDER — OMEPRAZOLE 40 MG/1
CAPSULE, DELAYED RELEASE ORAL
Qty: 90 CAPSULE | Refills: 1 | Status: SHIPPED | OUTPATIENT
Start: 2019-08-30 | End: 2020-01-22

## 2019-09-05 ENCOUNTER — OFFICE VISIT (OUTPATIENT)
Dept: FAMILY MEDICINE CLINIC | Facility: CLINIC | Age: 59
End: 2019-09-05

## 2019-09-05 VITALS
WEIGHT: 243 LBS | HEART RATE: 60 BPM | OXYGEN SATURATION: 97 % | BODY MASS INDEX: 33.89 KG/M2 | DIASTOLIC BLOOD PRESSURE: 78 MMHG | SYSTOLIC BLOOD PRESSURE: 128 MMHG

## 2019-09-05 DIAGNOSIS — M25.40 JOINT SWELLING: ICD-10-CM

## 2019-09-05 DIAGNOSIS — E78.2 MIXED HYPERLIPIDEMIA: ICD-10-CM

## 2019-09-05 DIAGNOSIS — M79.642 BILATERAL HAND PAIN: Primary | ICD-10-CM

## 2019-09-05 DIAGNOSIS — M79.641 BILATERAL HAND PAIN: Primary | ICD-10-CM

## 2019-09-05 DIAGNOSIS — M25.60 JOINT STIFFNESS: ICD-10-CM

## 2019-09-05 DIAGNOSIS — E53.8 VITAMIN B 12 DEFICIENCY: ICD-10-CM

## 2019-09-05 DIAGNOSIS — I10 ESSENTIAL HYPERTENSION: ICD-10-CM

## 2019-09-05 DIAGNOSIS — G89.4 CHRONIC PAIN SYNDROME: ICD-10-CM

## 2019-09-05 PROCEDURE — 99214 OFFICE O/P EST MOD 30 MIN: CPT | Performed by: FAMILY MEDICINE

## 2019-09-05 RX ORDER — PREDNISONE 10 MG/1
TABLET ORAL
Qty: 15 TABLET | Refills: 0 | Status: SHIPPED | OUTPATIENT
Start: 2019-09-05 | End: 2019-09-16

## 2019-09-05 RX ORDER — NAPROXEN SODIUM 220 MG
220 TABLET ORAL 2 TIMES DAILY PRN
COMMUNITY
End: 2019-09-16

## 2019-09-05 RX ORDER — METHADONE HYDROCHLORIDE 5 MG/1
TABLET ORAL
Qty: 120 TABLET | Refills: 0 | Status: SHIPPED | OUTPATIENT
Start: 2019-09-05 | End: 2019-12-05 | Stop reason: SDUPTHER

## 2019-09-05 NOTE — PROGRESS NOTES
"Subjective   Sterling Andrea Whaley is a 59 y.o. male.     History of Present Illness  Mr. Whaley presents today for routine f/u on several chronic issues.  Chronic Pain (Brief): The patient is being seen for a routine clinic follow-up of chronic pain. Denies new problems. Symptoms: headache, neck pain and back pain. Burning, aching, throbbing at times. Radiates down left leg to foot. Radiates from neck into upper back/shoulders/occipital area. Associated symptoms: anxiety, irritability, difficulty concentrating, sleep disturbance, decreased appetite and decreased libido, but no depression. Also c/o  Numbness, tingling, weakness left leg, arms. Current treatment includes activity modification, prescribed exercises, nonsteroidal anti-inflammatory drugs and opioid analgesics. By report, there is good compliance with treatment, good tolerance of treatment and good symptom control. The patient is currently able to do activities of daily living without limitations, unable to work, able to do housework with limitations and unable to participate in sports. Previous presentation included back pain, neck pain, headache, irritability, sleep disturbance and left sciatica. Past evaluation has included cervical and lumbar MRI, neurology evaluation, orthopedic evaluation, pain medicine evaluation and neurosurgery evaluation. Past treatment has included physical therapy, prescribed exercises, acetaminophen, nonsteroidal anti-inflammatory drugs, opioid analgesics, muscle relaxants, anticonvulsants, corticosteroids, epidural injection, nerve block and TENS unit. He has been dx'd with diffuse severe spinal DDD/DJD. Feels spinal injections (unsure if MARCUS or facet) lasted approx 1-2 weeks and nerve ablation provided relief for approx 2 months. Has declined receiving further injections for that reason. In the past Lortab caused itching. Was only able to tolerate low dose Fentanyl patch which helped \"some\" but higher dose patches caused " significant side effects. Previously under pain mgnt care with Dr. Duggan in [place] and tx'd with Methadone for well over 2 years at total of 15 mg per day. He also often uses ice applications, Aleve for his neck pain. He takes 1-2 methadone 5 mg tablets early in day. Generally takes 1 or 2, 5 mg tablet later in day. If taken too late in evening medication seems to be too activating and can impair sleep. On few days per week he takes only 1 pill per day. He was considered a nonsurgical candidate by Dr. Gonzáles per pt report. Denies h/o heart disease, dysrhythmia. Has had w/u's for CP in past which were felt to be due to anxiety. He has a h/o of generally poor tolerance to medications. Prescription generally lasting 45-60 days. No aberrant behavior.      He also c/o chronic bilateral elbow pain assoc'd with painful paresthesias in arms/hands worst on right. Using tennis elbow straps have helped some. Corticosteroids have also helped in past. Recently stable. No decreasing  strength at this time. Does c/o increasing bilateral hand pain, stiffness, swelling of joints. Also in feet but less severe in feet.    Taking oral B12 for deficiency. Has helped fatigue.      Hypertension: Patient here for follow-up of elevated blood pressure. He is not exercising other than household chores, minor yardwork and is fairly adherent to low salt diet. Blood pressure checked at home; averaging 140s-150s/90s. Cardiac symptoms fatigue. Patient denies chest pain, claudication, cough, dyspnea, irregular heart beat, near-syncope, orthopnea, palpitations, syncope, tachypnea. Having some weight gain. Cardiovascular risk factors: advanced age (older than 55 for men, 65 for women), dyslipidemia, hypertension and obesity (BMI >= 30 kg/m2). Use of agents associated with hypertension: NSAIDS. History of target organ damage: none. Has comorbid HLP for which he has declined statin.    Pt's previous HPI reviewed and updated as indicated.       The  following portions of the patient's history were reviewed and updated as appropriate: allergies, current medications, past family history, past medical history, past social history, past surgical history and problem list.    Review of Systems   Constitutional: Positive for fatigue. Negative for fever and unexpected weight change.   HENT: Positive for congestion, ear pain, hearing loss, postnasal drip and sinus pressure (chronic). Negative for mouth sores, nosebleeds, rhinorrhea, sore throat and trouble swallowing.    Eyes: Negative for pain, discharge, redness and visual disturbance.   Respiratory: Negative for cough, shortness of breath and wheezing.    Cardiovascular: Negative for chest pain, palpitations and leg swelling.   Gastrointestinal: Negative for abdominal pain, blood in stool, diarrhea, nausea and vomiting.   Endocrine: Positive for heat intolerance. Negative for polydipsia and polyuria.   Genitourinary: Negative for dysuria and hematuria.   Musculoskeletal: Positive for arthralgias, back pain, joint swelling, myalgias, neck pain and neck stiffness.   Skin: Negative for rash and wound.   Neurological: Positive for dizziness (occ mild) and headaches. Negative for tremors, syncope and weakness.   Hematological: Negative for adenopathy. Does not bruise/bleed easily.   Psychiatric/Behavioral: Positive for dysphoric mood (mild, stable) and sleep disturbance. Negative for confusion, hallucinations and suicidal ideas. The patient is nervous/anxious.    Pt's previous ROS reviewed and updated as indicated.       Objective    Vitals:    09/05/19 0937   BP: 128/78   Pulse: 60   SpO2: 97%     Body mass index is 33.89 kg/m².      09/05/19  0937   Weight: 110 kg (243 lb)       Physical Exam   Constitutional: He is oriented to person, place, and time. He appears well-developed and well-nourished. He is cooperative. He does not appear ill. No distress.   obese   HENT:   Head: Normocephalic and atraumatic.    Mouth/Throat: Mucous membranes are normal. Mucous membranes are not dry.   Eyes: Conjunctivae, EOM and lids are normal.   Neck: Neck supple. Normal carotid pulses present. Carotid bruit is not present.   Cardiovascular: Normal rate, regular rhythm, normal heart sounds and intact distal pulses. Exam reveals no gallop.   No murmur heard.  Pulmonary/Chest: Effort normal and breath sounds normal.   Musculoskeletal:        Right elbow: He exhibits normal range of motion. Tenderness found. Lateral epicondyle tenderness noted.        Left elbow: He exhibits normal range of motion. Tenderness found. Lateral epicondyle tenderness noted.        Cervical back: He exhibits decreased range of motion (in all planes), tenderness (bilateral soft tissues), bony tenderness (C5-C7), deformity (loss of normal lordosis) and spasm.        Thoracic back: He exhibits tenderness (diffuse soft tissue), deformity (increased kyphosis) and spasm. He exhibits no bony tenderness.        Lumbar back: He exhibits decreased range of motion (mildly ), tenderness (diffuse soft tissue), bony tenderness (L3-S1), deformity (loss of normal lordosis) and spasm.   Bilateral hands with boggy MCPs, PIPSs, DIPs, moderate TTP     Vascular Status -  His right foot exhibits no edema. His left foot exhibits no edema.  Neurological: He is alert and oriented to person, place, and time. He displays no tremor. Gait normal.   Skin: Skin is warm and dry. No bruising and no rash noted.   Psychiatric: He has a normal mood and affect. His speech is normal and behavior is normal. Judgment and thought content normal. Cognition and memory are normal.   Good eye contact. Answers questions appropriately. Good personal hygiene and grooming.   Nursing note and vitals reviewed.  Pt's previous physical exam reviewed and updated as indicated.      Lab Results   Component Value Date    WBC 7.4 03/05/2019    HGB 14.5 03/05/2019    HCT 41.2 03/05/2019    MCV 97 03/05/2019      (L) 03/05/2019       Lab Results   Component Value Date    GLUCOSE 133 (H) 09/14/2016    BUN 16 03/05/2019    CREATININE 0.86 03/05/2019    EGFRIFNONA 96 03/05/2019    EGFRIFAFRI 110 03/05/2019    BCR 19 03/05/2019    K 4.5 03/05/2019    CO2 26 03/05/2019    CALCIUM 9.3 03/05/2019    PROTENTOTREF 7.5 03/05/2019    ALBUMIN 4.4 03/05/2019    LABIL2 1.4 03/05/2019    AST 25 03/05/2019    ALT 30 03/05/2019       Lab Results   Component Value Date    CHLPL 156 03/05/2019    TRIG 314 (H) 03/05/2019    HDL 33 (L) 03/05/2019    LDL 60 03/05/2019       Lab Results   Component Value Date    HGBA1C 5.6 03/05/2019       Lab Results   Component Value Date    TSH 1.150 05/21/2018       Assessment/Plan   Sterling was seen today for hypertension and hand pain.    Diagnoses and all orders for this visit:    Bilateral hand pain  -     Uric Acid  -     Cyclic Citrul Peptide Antibody, IgG / IgA  -     C-reactive Protein  -     Rheumatoid Factor    Chronic pain syndrome  -     methadone (DOLOPHINE) 5 MG tablet; 1 tablet po up to qid.    Joint stiffness  -     Uric Acid  -     Cyclic Citrul Peptide Antibody, IgG / IgA  -     C-reactive Protein  -     Rheumatoid Factor    Mixed hyperlipidemia    Essential hypertension    Joint swelling  -     Uric Acid  -     Cyclic Citrul Peptide Antibody, IgG / IgA  -     C-reactive Protein  -     Rheumatoid Factor    Other orders  -     predniSONE (DELTASONE) 10 MG tablet; 5 po day 1, then decrease by 1 tablet each day until gone (5,4,3,2,1)       HTN with improved control. Continue lisinopril and metoprolol. HLP not currently on statin. Pt advised to eat a heart healthy diet and get regular aerobic exercise.    Multifactorial chronic pain syndrome due to cervical and lumbar DDD/DJD with spinal stenosis, bilateral lateral epicondylitis, bilateral plantar fasciitis.  Generally stable.  Doing well on low-dose methadone.  As part of patient's treatment plan I am prescribing a controlled substance.  The  patient has been made aware of the appropriate use of such medications, including potential risk of somnolence, limited ability to drive and/or work safely, and potential for dependence and/or overdose.  It has also been made clear that these medications are for use by this patient only, without concomitant use of alcohol or other substances, unless prescribed.  CLAUDIO report reviewed and scanned into chart.  Last CLAUDIO date 9/5//19.  History and physical exam exhibit continued safe and appropriate use of controlled substance.  Patient has completed a prescribing agreement detailing terms of continued prescribing of controlled substances, including monitoring CLAUDIO reports, urine drug screening, and pill counts if necessary.  Patient is aware that inappropriate use will result in cessation of prescribing such medications.  Last UDS appropriate.    Trial of prednisone for hand pain, stiffness. Inflammatory lab eval as above. Tx/refer as indicated.    Continue oral B12 replacement.    Routine f/u in 3 months, sooner as needed/instructed.  I will contact patient regarding test results and provide instructions regarding any necessary changes in plan of care.  Patient was encouraged to keep me informed of any acute changes, lack of improvement, or any new concerning symptoms.  Pt is aware of reasons to seek emergent care.  Patient voiced understanding of all instructions and denied further questions.

## 2019-09-07 LAB
CCP IGA+IGG SERPL IA-ACNC: 6 UNITS (ref 0–19)
CRP SERPL-MCNC: 0.04 MG/DL (ref 0–0.5)
RHEUMATOID FACT SERPL-ACNC: <10 IU/ML (ref 0–13.9)
URATE SERPL-MCNC: 6.3 MG/DL (ref 3.4–7)

## 2019-09-10 ENCOUNTER — TELEPHONE (OUTPATIENT)
Dept: FAMILY MEDICINE CLINIC | Facility: CLINIC | Age: 59
End: 2019-09-10

## 2019-09-10 NOTE — TELEPHONE ENCOUNTER
Pt called and stated that he wanted Dr Armstrong to know that the medication she gave him for his hands really helped a lot - so he wanted to know if she is going to give him an rx for this medication to take regularly or wants to know what she thinks should be done from now on.  Please advise.

## 2019-09-10 NOTE — TELEPHONE ENCOUNTER
The prednisone he took has significant side effects if taken long term. I recommend he use it only for severe exacerbations of stiffness/pain.

## 2019-09-11 NOTE — TELEPHONE ENCOUNTER
Spoke with patient. Pt state the prednisone helped and would like to continue to get this medication if possible. Pt was informed that rx was for PRN.     [facility]

## 2019-09-16 RX ORDER — DICLOFENAC SODIUM 75 MG/1
TABLET, DELAYED RELEASE ORAL
Qty: 60 TABLET | Refills: 2 | Status: SHIPPED | OUTPATIENT
Start: 2019-09-16 | End: 2019-12-05

## 2019-09-16 NOTE — TELEPHONE ENCOUNTER
Patient informed and states that he will  Tylenol Arthritis and would like NSAID to be sent to try, as he does not want to see a specialist.

## 2019-09-16 NOTE — TELEPHONE ENCOUNTER
Generally we tx chronic joint pain like this with tylenol arthritis on a scheduled basis (rather than a needed) as well as anti-inflammatories. I can send in rx NSAID for him but he would take this in place of naprosyn. Let me know how he wishes to proceed.    In addition we can refer him to rheumatology if he wants to see if something else can be done.

## 2019-10-23 ENCOUNTER — OFFICE VISIT (OUTPATIENT)
Dept: FAMILY MEDICINE CLINIC | Facility: CLINIC | Age: 59
End: 2019-10-23

## 2019-10-23 VITALS
DIASTOLIC BLOOD PRESSURE: 100 MMHG | HEIGHT: 71 IN | SYSTOLIC BLOOD PRESSURE: 158 MMHG | BODY MASS INDEX: 33.74 KG/M2 | OXYGEN SATURATION: 98 % | HEART RATE: 64 BPM | TEMPERATURE: 99 F | WEIGHT: 241 LBS

## 2019-10-23 DIAGNOSIS — G47.19 EXCESSIVE DAYTIME SLEEPINESS: ICD-10-CM

## 2019-10-23 DIAGNOSIS — R06.81 WITNESSED APNEIC SPELLS: ICD-10-CM

## 2019-10-23 DIAGNOSIS — I10 ESSENTIAL HYPERTENSION: Primary | ICD-10-CM

## 2019-10-23 DIAGNOSIS — G47.33 OBSTRUCTIVE SLEEP APNEA SYNDROME: ICD-10-CM

## 2019-10-23 DIAGNOSIS — R06.83 SNORING: ICD-10-CM

## 2019-10-23 PROCEDURE — 99213 OFFICE O/P EST LOW 20 MIN: CPT | Performed by: FAMILY MEDICINE

## 2019-10-23 NOTE — PROGRESS NOTES
"Subjective   Sterling Andrea Whaley is a 59 y.o. male.     History of Present Illness  Mr. Whaley presents today with complaint of worsening fatigue, daytime sleepiness.  He was diagnosed with TRUDI in the distant past.  Not currently on CPAP.  Feels symptoms are worsening.  Partner noted he \"stops breathing\" several times in the night, snores loudly.  Has comorbid hypertension which has generally been well controlled.  States he forgot his blood pressure medication today.  No chest pain, no focal neurological symptoms, no worsening headaches.    [place] Sleepiness Scale of 15    The following portions of the patient's history were reviewed and updated as appropriate: allergies, current medications, past family history, past medical history, past social history, past surgical history and problem list.    Review of Systems   Constitutional: Positive for fatigue. Negative for fever and unexpected weight change.   HENT: Positive for congestion, ear pain, hearing loss, postnasal drip and sinus pressure (chronic). Negative for mouth sores, nosebleeds, rhinorrhea, sore throat and trouble swallowing.    Eyes: Negative for pain, discharge, redness and visual disturbance.   Respiratory: Negative for cough, shortness of breath and wheezing.    Cardiovascular: Negative for chest pain, palpitations and leg swelling.   Gastrointestinal: Negative for abdominal pain, blood in stool, diarrhea, nausea and vomiting.   Endocrine: Positive for heat intolerance. Negative for polydipsia and polyuria.   Genitourinary: Negative for dysuria and hematuria.   Musculoskeletal: Positive for arthralgias, back pain, joint swelling, myalgias, neck pain and neck stiffness.   Skin: Negative for rash and wound.   Neurological: Positive for dizziness (occ mild) and headaches. Negative for tremors, syncope and weakness.   Hematological: Negative for adenopathy. Does not bruise/bleed easily.   Psychiatric/Behavioral: Positive for dysphoric mood (mild, " stable) and sleep disturbance. Negative for confusion, hallucinations and suicidal ideas. The patient is nervous/anxious.    Pt's previous ROS reviewed and updated as indicated.       Objective    Vitals:    10/23/19 1431   BP: 158/100   Pulse: 64   Temp: 99 °F (37.2 °C)   SpO2: 98%     Body mass index is 33.61 kg/m².      10/23/19  1431   Weight: 109 kg (241 lb)       Physical Exam   Constitutional: He is oriented to person, place, and time. He appears well-developed and well-nourished. He is cooperative. He does not appear ill. No distress.   overweight   HENT:   Head: Normocephalic and atraumatic.   Mouth/Throat: Mucous membranes are normal. Mucous membranes are not dry.   Mallampati class 3   Neck:   Thick/broad neck   Cardiovascular: Normal rate, regular rhythm, normal heart sounds and intact distal pulses.     Vascular Status -  His right foot exhibits no edema. His left foot exhibits no edema.  Neurological: He is alert and oriented to person, place, and time.   Psychiatric: He has a normal mood and affect. His behavior is normal. Cognition and memory are normal.   Nursing note and vitals reviewed.      Assessment/Plan   Sterling was seen today for sleep apnea, fatigue, weight gain and irritable.    Diagnoses and all orders for this visit:    Essential hypertension    Witnessed apneic spells  -     Home Sleep Study; Future    Excessive daytime sleepiness  -     Home Sleep Study; Future    Obstructive sleep apnea syndrome  -     Home Sleep Study; Future    Snoring       Suspect worsening TRUDI. He is amenable to home sleep study with tx as indicated. He will keep routine f/u as scheduled, f/u sooner as needed/instructed.  I will contact patient regarding test results and provide instructions regarding any necessary changes in plan of care.  Patient was encouraged to keep me informed of any acute changes, lack of improvement, or any new concerning symptoms.  Pt is aware of reasons to seek emergent care.  Patient  voiced understanding of all instructions and denied further questions.

## 2019-12-04 ENCOUNTER — TELEPHONE (OUTPATIENT)
Dept: FAMILY MEDICINE CLINIC | Facility: CLINIC | Age: 59
End: 2019-12-04

## 2019-12-04 NOTE — TELEPHONE ENCOUNTER
SHENA Wang @ [facility] today.  Pt has completed his HST, but final report has not yet been signed by physician.  He wanted to give prelim report since pt has fu appt with you tomorrow, 12/5/2019.  Sts that pt has prelim AHI between 24-30, which puts him with moderate to severe TRUDI.  He sts that all of his readings were good with minimal artifact.  He sts he will need some sort of treatment for TRUDI. Recommendations will come with final report, which should be sent to us in a few days.

## 2019-12-05 ENCOUNTER — OFFICE VISIT (OUTPATIENT)
Dept: FAMILY MEDICINE CLINIC | Facility: CLINIC | Age: 59
End: 2019-12-05

## 2019-12-05 VITALS
BODY MASS INDEX: 35 KG/M2 | OXYGEN SATURATION: 99 % | HEART RATE: 65 BPM | SYSTOLIC BLOOD PRESSURE: 140 MMHG | HEIGHT: 71 IN | WEIGHT: 250 LBS | DIASTOLIC BLOOD PRESSURE: 86 MMHG | TEMPERATURE: 98.2 F

## 2019-12-05 DIAGNOSIS — F41.1 GAD (GENERALIZED ANXIETY DISORDER): ICD-10-CM

## 2019-12-05 DIAGNOSIS — G89.4 CHRONIC PAIN SYNDROME: ICD-10-CM

## 2019-12-05 DIAGNOSIS — G47.33 OBSTRUCTIVE SLEEP APNEA SYNDROME: ICD-10-CM

## 2019-12-05 DIAGNOSIS — M25.50 ARTHRALGIA OF MULTIPLE JOINTS: ICD-10-CM

## 2019-12-05 DIAGNOSIS — I10 ESSENTIAL HYPERTENSION: Primary | ICD-10-CM

## 2019-12-05 PROCEDURE — 96372 THER/PROPH/DIAG INJ SC/IM: CPT | Performed by: FAMILY MEDICINE

## 2019-12-05 PROCEDURE — 99214 OFFICE O/P EST MOD 30 MIN: CPT | Performed by: FAMILY MEDICINE

## 2019-12-05 RX ORDER — PREDNISONE 20 MG/1
TABLET ORAL
Qty: 30 TABLET | Refills: 0 | Status: SHIPPED | OUTPATIENT
Start: 2019-12-05 | End: 2020-06-09 | Stop reason: SDUPTHER

## 2019-12-05 RX ORDER — METHADONE HYDROCHLORIDE 5 MG/1
TABLET ORAL
Qty: 120 TABLET | Refills: 0 | Status: SHIPPED | OUTPATIENT
Start: 2019-12-05 | End: 2020-02-10 | Stop reason: SDUPTHER

## 2019-12-05 RX ORDER — METHYLPREDNISOLONE ACETATE 80 MG/ML
120 INJECTION, SUSPENSION INTRA-ARTICULAR; INTRALESIONAL; INTRAMUSCULAR; SOFT TISSUE ONCE
Status: DISCONTINUED | OUTPATIENT
Start: 2019-12-05 | End: 2019-12-05

## 2019-12-05 RX ORDER — BUSPIRONE HYDROCHLORIDE 7.5 MG/1
TABLET ORAL
Qty: 120 TABLET | Refills: 2 | Status: SHIPPED | OUTPATIENT
Start: 2019-12-05 | End: 2020-03-05 | Stop reason: SINTOL

## 2019-12-05 RX ORDER — METHYLPREDNISOLONE ACETATE 40 MG/ML
80 INJECTION, SUSPENSION INTRA-ARTICULAR; INTRALESIONAL; INTRAMUSCULAR; SOFT TISSUE ONCE
Status: COMPLETED | OUTPATIENT
Start: 2019-12-05 | End: 2019-12-05

## 2019-12-05 RX ADMIN — METHYLPREDNISOLONE ACETATE 80 MG: 40 INJECTION, SUSPENSION INTRA-ARTICULAR; INTRALESIONAL; INTRAMUSCULAR; SOFT TISSUE at 10:28

## 2019-12-05 NOTE — PROGRESS NOTES
"Subjective   Sterling Andrea Whaley is a 59 y.o. male.     History of Present Illness  Mr. Whaley presents today for routine f/u on several chronic issues.  Chronic Pain (Brief): The patient is being seen for a routine clinic follow-up of chronic pain. Denies new problems. Symptoms: headache, neck pain and back pain. Burning, aching, throbbing at times. Radiates down left leg to foot. Radiates from neck into upper back/shoulders/occipital area. Associated symptoms: anxiety, irritability, difficulty concentrating, sleep disturbance, decreased appetite and decreased libido, but no depression. Also c/o  Numbness, tingling, weakness left leg, arms. Current treatment includes activity modification, prescribed exercises, nonsteroidal anti-inflammatory drugs and opioid analgesics. By report, there is good compliance with treatment, good tolerance of treatment and good symptom control. The patient is currently able to do activities of daily living without limitations, unable to work, able to do housework with limitations and unable to participate in sports. Previous presentation included back pain, neck pain, headache, irritability, sleep disturbance and left sciatica. Past evaluation has included cervical and lumbar MRI, neurology evaluation, orthopedic evaluation, pain medicine evaluation and neurosurgery evaluation. Past treatment has included physical therapy, prescribed exercises, acetaminophen, nonsteroidal anti-inflammatory drugs, opioid analgesics, muscle relaxants, anticonvulsants, corticosteroids, epidural injection, nerve block and TENS unit. He has been dx'd with diffuse severe spinal DDD/DJD. Feels spinal injections (unsure if MARCUS or facet) lasted approx 1-2 weeks and nerve ablation provided relief for approx 2 months. Has declined receiving further injections for that reason. In the past Lortab caused itching. Was only able to tolerate low dose Fentanyl patch which helped \"some\" but higher dose patches caused " significant side effects. Previously under pain mgnt care with Dr. Duggan in [place] and tx'd with Methadone for well over 2 years at total of 15 mg per day. He also often uses ice applications, Aleve for his neck pain. He takes 1-2 methadone 5 mg tablets early in day. Generally takes 1 or 2, 5 mg tablet later in day. If taken too late in evening medication seems to be too activating and can impair sleep. On few days per week he takes only 1 pill per day. He was considered a nonsurgical candidate by Dr. Gonzáles per pt report. Denies h/o heart disease, dysrhythmia. Has had w/u's for CP in past which were felt to be due to anxiety. He has a h/o of generally poor tolerance to medications. Prescription generally lasting 45-60 days. No aberrant behavior.      He also c/o chronic bilateral elbow pain assoc'd with painful paresthesias in arms/hands worst on right. Using tennis elbow straps have helped some. Corticosteroids have also helped in past. Recently stable. No decreasing  strength at this time. Does c/o increasing bilateral hand pain, stiffness, swelling of joints. Also in feet but less severe in feet.      Hypertension: Patient here for follow-up of elevated blood pressure. He is not exercising other than household chores, minor yardwork and is fairly adherent to low salt diet. Blood pressure checked at home; averaging 140s/80s. Cardiac symptoms fatigue. Patient denies chest pain, claudication, cough, dyspnea, irregular heart beat, near-syncope, orthopnea, palpitations, syncope, tachypnea. Having some weight gain. Cardiovascular risk factors: advanced age (older than 55 for men, 65 for women), dyslipidemia, hypertension and obesity (BMI >= 30 kg/m2). Use of agents associated with hypertension: NSAIDS. History of target organ damage: none. Has comorbid HLP for which he has declined statin on multiple occasions/    Since his last visit he has had home sleep study. Found to have moderate to severe TRUDI. Has not yet had  trial of CPAP. Is willing to move forward with tx. Continues to have severe daytime sleepiness and fatigue.    He would like to try something else for his anxiety. They vistaril is giving him severe dry mouth.    .  He does not feel NSAID helping his chronic joint pain. He takes tylenol occ but has never tried on scheduled basis. Prednisone helps flares of his hand pain.      The following portions of the patient's history were reviewed and updated as appropriate: allergies, current medications, past family history, past medical history, past social history, past surgical history and problem list.    Review of Systems   Constitutional: Positive for fatigue. Negative for fever and unexpected weight change.   HENT: Positive for congestion, hearing loss (chronic), postnasal drip and sinus pressure (chronic). Negative for mouth sores, nosebleeds, rhinorrhea, sore throat and trouble swallowing.    Eyes: Negative for pain, discharge, redness and visual disturbance.   Respiratory: Negative for cough, shortness of breath and wheezing.    Cardiovascular: Negative for chest pain, palpitations and leg swelling.   Gastrointestinal: Negative for abdominal pain, blood in stool, diarrhea, nausea and vomiting.   Endocrine: Positive for heat intolerance. Negative for polydipsia and polyuria.   Genitourinary: Negative for dysuria and hematuria.   Musculoskeletal: Positive for arthralgias, back pain, joint swelling, myalgias, neck pain and neck stiffness.   Skin: Negative for rash and wound.   Neurological: Positive for dizziness (occ mild) and headaches. Negative for tremors, syncope and weakness.   Hematological: Negative for adenopathy. Does not bruise/bleed easily.   Psychiatric/Behavioral: Positive for dysphoric mood (mild, stable) and sleep disturbance. Negative for confusion, hallucinations and suicidal ideas. The patient is nervous/anxious.    Pt's previous ROS reviewed and updated as indicated.       Objective    Vitals:     12/05/19 0939   BP: 140/86   Pulse: 65   Temp: 98.2 °F (36.8 °C)   SpO2: 99%     Body mass index is 34.87 kg/m².      12/05/19 0939   Weight: 113 kg (250 lb)       Physical Exam   Constitutional: He is oriented to person, place, and time. He appears well-developed and well-nourished. He is cooperative. He does not appear ill. No distress.   overweight   HENT:   Head: Normocephalic and atraumatic.   Mouth/Throat: Mucous membranes are normal. Mucous membranes are not dry.   Mallampati class 3   Eyes: No scleral icterus. Right eye exhibits no nystagmus. Left eye exhibits no nystagmus.   Neck: Neck supple. Normal carotid pulses present. Carotid bruit is not present. No thyromegaly present.   Thick/broad neck   Cardiovascular: Normal rate, regular rhythm, normal heart sounds and intact distal pulses. Exam reveals no gallop.   No murmur heard.  Pulmonary/Chest: Effort normal and breath sounds normal.   Musculoskeletal:        Cervical back: He exhibits decreased range of motion (diffuse soft tissue), tenderness, bony tenderness (C4-C7), deformity (loss of normal lordosis) and spasm.        Thoracic back: He exhibits deformity (kpyhotic posture).        Lumbar back: He exhibits decreased range of motion, tenderness (diffuse soft tissue), bony tenderness (L3-S1) and spasm.     Vascular Status -  His right foot exhibits no edema. His left foot exhibits no edema.  Neurological: He is alert and oriented to person, place, and time. He has normal strength. He displays no tremor. Gait (mildly antalgic) abnormal.   Skin: Skin is warm and dry. No bruising and no rash noted.   Psychiatric: He has a normal mood and affect. His speech is normal and behavior is normal. Judgment and thought content normal. Cognition and memory are normal.   Good eye contact. Answers questions appropriately. Good personal hygiene and grooming.   Nursing note and vitals reviewed.  Pt's previous physical exam reviewed and updated as indicated.    Lab  Results   Component Value Date    WBC 7.4 03/05/2019    HGB 14.5 03/05/2019    HCT 41.2 03/05/2019    MCV 97 03/05/2019     (L) 03/05/2019       Lab Results   Component Value Date    GLUCOSE 133 (H) 09/14/2016    BUN 16 03/05/2019    CREATININE 0.86 03/05/2019    EGFRIFNONA 96 03/05/2019    EGFRIFAFRI 110 03/05/2019    BCR 19 03/05/2019    K 4.5 03/05/2019    CO2 26 03/05/2019    CALCIUM 9.3 03/05/2019    PROTENTOTREF 7.5 03/05/2019    ALBUMIN 4.4 03/05/2019    LABIL2 1.4 03/05/2019    AST 25 03/05/2019    ALT 30 03/05/2019       Lab Results   Component Value Date    CHLPL 156 03/05/2019    TRIG 314 (H) 03/05/2019    HDL 33 (L) 03/05/2019    LDL 60 03/05/2019       Lab Results   Component Value Date    HGBA1C 5.6 03/05/2019       Lab Results   Component Value Date    TSH 1.150 05/21/2018       Assessment/Plan   Sterling was seen today for hypertension, fatigue, sleep apnea and depression.    Diagnoses and all orders for this visit:    Essential hypertension    Chronic pain syndrome  -     methadone (DOLOPHINE) 5 MG tablet; 1 tablet po up to qid.    Obstructive sleep apnea syndrome    TYSON (generalized anxiety disorder)    Arthralgia of multiple joints  -     Discontinue: methylPREDNISolone acetate (DEPO-medrol) injection 120 mg  -     methylPREDNISolone acetate (DEPO-medrol) injection 80 mg    Other orders  -     busPIRone (BUSPAR) 7.5 MG tablet; 1 po bid x 1 week, then can increase to 2 po bid as needed and tolerated  -     predniSONE (DELTASONE) 20 MG tablet; 1 po daily x 3 days for joint pain flares       HTN improved from previous visit. Continue metoprolol and lisinopril. Closely monitor BP at home daily, record. Report persist elevation. Increase meds as indicated. Pt advised to eat a heart healthy diet and get regular aerobic exercise.    Stable multifactorial chronic pain syndrome. Minimal side effects reported from methadone. Good functional improvement. No aberrant behavior. D/c NSAID as she does not  feel this has significantly improved pain. Encouraged to try tylenol scheduled. Provided with prednisone to be used only as needed for severe flares in joint pain.    I have reviewed risks/benefits and potential side effects of various treatment options for TYSON. Pt voices understanding and wishes to proceed with trial of buspar.    Will arrange for CPAP titration.    Routine f/u in 3 months, sooner as needed.  Patient was encouraged to keep me informed of any acute changes, lack of improvement, or any new concerning symptoms.  Pt is aware of reasons to seek emergent care.  Patient voiced understanding of all instructions and denied further questions.  As part of patient's treatment plan I am prescribing a controlled substance.  The patient has been made aware of the appropriate use of such medications, including potential risk of somnolence, limited ability to drive and/or work safely, and potential for dependence and/or overdose.  It has also been made clear that these medications are for use by this patient only, without concomitant use of alcohol or other substances, unless prescribed.  CLAUDIO report reviewed and scanned into chart.  Last CLAUDIO date 9/3/19; update requested.  History and physical exam exhibit continued safe and appropriate use of controlled substance.  Patient has completed a prescribing agreement detailing terms of continued prescribing of controlled substances, including monitoring CLAUDIO reports, urine drug screening, and pill counts if necessary.  Patient is aware that inappropriate use will result in cessation of prescribing such medications.

## 2019-12-12 ENCOUNTER — TELEPHONE (OUTPATIENT)
Dept: FAMILY MEDICINE CLINIC | Facility: CLINIC | Age: 59
End: 2019-12-12

## 2019-12-12 NOTE — TELEPHONE ENCOUNTER
Pt called to report the buspar is causing severe headaches and nausea. Does not want to continue med. Wants to see if Dr Armstrong can provide an alternative.?    Dino

## 2019-12-16 NOTE — TELEPHONE ENCOUNTER
He was on vistaril, felt it made him too sleepy. He has not tolerated Buspar. He did not want to be on controlled med. There really isn't a lot else he can take as needed for anxiety.

## 2019-12-17 NOTE — TELEPHONE ENCOUNTER
Spoke to patient, he states that he is willing to try a controlled medication if this is an option so that he can get relief of symptoms. Informed patient that I would speak to Dr. Armstrong and let him know her recommendations.

## 2019-12-19 NOTE — TELEPHONE ENCOUNTER
Controlled medication use for chronic generalized anxiety is not recommended. Please confirm if he has been on any SSRI or SNRI medications: zoloft, paxil, lexapro, effexor, etc

## 2019-12-20 NOTE — TELEPHONE ENCOUNTER
Patient states that he has never taken any of the medications listed and would like for something to be sent in for him to try. Informed patient I would forward message to Dr. Armstrong.

## 2019-12-31 RX ORDER — PAROXETINE 10 MG/1
TABLET, FILM COATED ORAL
Qty: 30 TABLET | Refills: 2 | Status: SHIPPED | OUTPATIENT
Start: 2019-12-31 | End: 2020-03-05 | Stop reason: SINTOL

## 2019-12-31 NOTE — TELEPHONE ENCOUNTER
New prescription sent in for low dose paxil. He needs to take this about the same time every morning not just as needed. He will have to be patient as it takes some time to work and he should not skip doses. We will increase dose as needed.

## 2020-01-07 ENCOUNTER — OFFICE VISIT (OUTPATIENT)
Dept: FAMILY MEDICINE CLINIC | Facility: CLINIC | Age: 60
End: 2020-01-07

## 2020-01-07 VITALS
DIASTOLIC BLOOD PRESSURE: 80 MMHG | SYSTOLIC BLOOD PRESSURE: 120 MMHG | TEMPERATURE: 98 F | WEIGHT: 241 LBS | HEART RATE: 71 BPM | BODY MASS INDEX: 33.74 KG/M2 | HEIGHT: 71 IN | OXYGEN SATURATION: 98 %

## 2020-01-07 DIAGNOSIS — R53.81 MALAISE AND FATIGUE: ICD-10-CM

## 2020-01-07 DIAGNOSIS — J06.9 ACUTE URI: Primary | ICD-10-CM

## 2020-01-07 DIAGNOSIS — B34.9 VIRAL ILLNESS: ICD-10-CM

## 2020-01-07 DIAGNOSIS — R53.83 MALAISE AND FATIGUE: ICD-10-CM

## 2020-01-07 DIAGNOSIS — J98.01 BRONCHOSPASM: ICD-10-CM

## 2020-01-07 DIAGNOSIS — R05.9 COUGH: ICD-10-CM

## 2020-01-07 LAB
EXPIRATION DATE: NORMAL
FLUAV AG NPH QL: NEGATIVE
FLUBV AG NPH QL: NEGATIVE
INTERNAL CONTROL: NORMAL
Lab: NORMAL

## 2020-01-07 PROCEDURE — 99213 OFFICE O/P EST LOW 20 MIN: CPT | Performed by: FAMILY MEDICINE

## 2020-01-07 PROCEDURE — 96372 THER/PROPH/DIAG INJ SC/IM: CPT | Performed by: FAMILY MEDICINE

## 2020-01-07 PROCEDURE — 87804 INFLUENZA ASSAY W/OPTIC: CPT | Performed by: FAMILY MEDICINE

## 2020-01-07 RX ORDER — METHYLPREDNISOLONE ACETATE 40 MG/ML
80 INJECTION, SUSPENSION INTRA-ARTICULAR; INTRALESIONAL; INTRAMUSCULAR; SOFT TISSUE ONCE
Status: COMPLETED | OUTPATIENT
Start: 2020-01-07 | End: 2020-01-07

## 2020-01-07 RX ADMIN — METHYLPREDNISOLONE ACETATE 80 MG: 40 INJECTION, SUSPENSION INTRA-ARTICULAR; INTRALESIONAL; INTRAMUSCULAR; SOFT TISSUE at 11:46

## 2020-01-07 NOTE — PROGRESS NOTES
Subjective   Sterling Whaley is a 59 y.o. male.     He c/o cough    Cough   This is a new problem. The current episode started yesterday. The problem has been rapidly worsening. The problem occurs every few minutes. The cough is non-productive. Associated symptoms include chills, ear congestion, ear pain, a fever (subjective), headaches, nasal congestion, postnasal drip, rhinorrhea (mild) and wheezing. Pertinent negatives include no chest pain, eye redness, hemoptysis, rash, sore throat or shortness of breath. The symptoms are aggravated by lying down, exercise and cold air. Treatments tried: mucinex. The treatment provided no relief.     The following portions of the patient's history were reviewed and updated as appropriate: allergies, current medications, past family history, past medical history, past social history, past surgical history and problem list.    Review of Systems   Constitutional: Positive for chills, fatigue and fever (subjective).   HENT: Positive for congestion, ear pain, postnasal drip and rhinorrhea (mild). Negative for mouth sores, nosebleeds, sinus pain and sore throat.    Eyes: Negative for pain, discharge and redness.   Respiratory: Positive for cough and wheezing. Negative for hemoptysis and shortness of breath.    Cardiovascular: Negative for chest pain.   Gastrointestinal: Negative for diarrhea, nausea and vomiting.   Genitourinary: Negative for dysuria and hematuria.   Musculoskeletal: Positive for arthralgias.   Skin: Negative for rash.   Neurological: Positive for headaches. Negative for dizziness.   Hematological: Negative for adenopathy.   Psychiatric/Behavioral: Positive for sleep disturbance.       Objective    Vitals:    01/07/20 1054   BP: 120/80   Pulse: 71   Temp: 98 °F (36.7 °C)   SpO2: 98%     Body mass index is 33.61 kg/m².      01/07/20  1054   Weight: 109 kg (241 lb)       Physical Exam   Constitutional: He is oriented to person, place, and time. He appears  well-developed and well-nourished. He is cooperative. He appears ill (mildly). No distress.   HENT:   Head: Normocephalic and atraumatic.   Right Ear: External ear and ear canal normal. Tympanic membrane is retracted.   Left Ear: External ear and ear canal normal. Tympanic membrane is retracted.   Nose: Mucosal edema and rhinorrhea (clear) present.   Mouth/Throat: Oropharynx is clear and moist and mucous membranes are normal. Mucous membranes are not dry. No oral lesions.   Eyes: Conjunctivae and lids are normal.   Neck: Neck supple.   Cardiovascular: Normal rate, regular rhythm and intact distal pulses.   Pulmonary/Chest: Effort normal. No respiratory distress (deep breath triggers cough). He has decreased breath sounds (mildly). He has wheezes (end exp scattered). He has no rhonchi. He has no rales.   Lymphadenopathy:     He has no cervical adenopathy.   Neurological: He is alert and oriented to person, place, and time.   Skin: Skin is warm and dry. No rash noted.   Psychiatric: He has a normal mood and affect. His behavior is normal.   Nursing note and vitals reviewed.    Flu negative    Assessment/Plan   Sterling was seen today for cough, fatigue, fever and sinus problem.    Diagnoses and all orders for this visit:    Acute URI    Malaise and fatigue  -     POC Influenza A / B    Viral illness    Bronchospasm  -     methylPREDNISolone acetate (DEPO-medrol) injection 80 mg    Cough  -     methylPREDNISolone acetate (DEPO-medrol) injection 80 mg       Suspected viral URI with associated cough, mild bronchospasm.  Symptomatic treatment reviewed including use of OTC cough/cold products.    He will keep his routine follow-up as scheduled in 2 months, follow-up sooner as needed.    Patient was encouraged to keep me informed of any acute changes, lack of improvement, or any new concerning symptoms.  Pt is aware of reasons to seek emergent care.  Patient voiced understanding of all instructions and denied further  questions.            Please note that portions of this note may have been completed with a voice recognition program. Efforts were made to edit the dictations, but occasionally words are mistranscribed.

## 2020-01-22 RX ORDER — OMEPRAZOLE 40 MG/1
CAPSULE, DELAYED RELEASE ORAL
Qty: 90 CAPSULE | Refills: 1 | Status: SHIPPED | OUTPATIENT
Start: 2020-01-22 | End: 2020-06-05

## 2020-02-06 ENCOUNTER — TELEPHONE (OUTPATIENT)
Dept: FAMILY MEDICINE CLINIC | Facility: CLINIC | Age: 60
End: 2020-02-06

## 2020-02-06 DIAGNOSIS — G89.4 CHRONIC PAIN SYNDROME: ICD-10-CM

## 2020-02-10 RX ORDER — METHADONE HYDROCHLORIDE 5 MG/1
TABLET ORAL
Qty: 120 TABLET | Refills: 0 | Status: SHIPPED | OUTPATIENT
Start: 2020-02-10 | End: 2020-04-13 | Stop reason: SDUPTHER

## 2020-02-24 RX ORDER — FLUTICASONE PROPIONATE 50 MCG
SPRAY, SUSPENSION (ML) NASAL
Qty: 48 G | Refills: 1 | Status: SHIPPED | OUTPATIENT
Start: 2020-02-24 | End: 2020-06-30

## 2020-03-05 ENCOUNTER — OFFICE VISIT (OUTPATIENT)
Dept: FAMILY MEDICINE CLINIC | Facility: CLINIC | Age: 60
End: 2020-03-05

## 2020-03-05 VITALS
HEIGHT: 71 IN | HEART RATE: 62 BPM | TEMPERATURE: 98 F | OXYGEN SATURATION: 100 % | DIASTOLIC BLOOD PRESSURE: 82 MMHG | BODY MASS INDEX: 34.72 KG/M2 | WEIGHT: 248 LBS | SYSTOLIC BLOOD PRESSURE: 156 MMHG

## 2020-03-05 DIAGNOSIS — D69.6 THROMBOCYTOPENIA (HCC): ICD-10-CM

## 2020-03-05 DIAGNOSIS — G89.4 CHRONIC PAIN SYNDROME: ICD-10-CM

## 2020-03-05 DIAGNOSIS — F41.1 GAD (GENERALIZED ANXIETY DISORDER): Primary | ICD-10-CM

## 2020-03-05 DIAGNOSIS — M48.02 SPINAL STENOSIS OF CERVICAL REGION: ICD-10-CM

## 2020-03-05 DIAGNOSIS — Z28.21 INFLUENZA VACCINE REFUSED: ICD-10-CM

## 2020-03-05 DIAGNOSIS — Z53.20 REFUSAL OF STATIN MEDICATION BY PATIENT: ICD-10-CM

## 2020-03-05 DIAGNOSIS — M47.812 OSTEOARTHRITIS OF CERVICAL SPINE, UNSPECIFIED SPINAL OSTEOARTHRITIS COMPLICATION STATUS: ICD-10-CM

## 2020-03-05 DIAGNOSIS — M48.061 SPINAL STENOSIS OF LUMBAR REGION, UNSPECIFIED WHETHER NEUROGENIC CLAUDICATION PRESENT: ICD-10-CM

## 2020-03-05 DIAGNOSIS — I10 ESSENTIAL HYPERTENSION: ICD-10-CM

## 2020-03-05 DIAGNOSIS — M51.34 DEGENERATION OF INTERVERTEBRAL DISC OF THORACIC REGION: ICD-10-CM

## 2020-03-05 DIAGNOSIS — K76.0 FATTY LIVER: ICD-10-CM

## 2020-03-05 DIAGNOSIS — E80.6 HYPERBILIRUBINEMIA: ICD-10-CM

## 2020-03-05 DIAGNOSIS — M51.36 DEGENERATION OF INTERVERTEBRAL DISC OF LUMBAR REGION: ICD-10-CM

## 2020-03-05 DIAGNOSIS — Z28.21 PNEUMOCOCCAL VACCINE REFUSED: ICD-10-CM

## 2020-03-05 DIAGNOSIS — R45.851 PASSIVE SUICIDAL IDEATIONS: ICD-10-CM

## 2020-03-05 DIAGNOSIS — F32.A DEPRESSIVE DISORDER: ICD-10-CM

## 2020-03-05 DIAGNOSIS — Z79.899 HIGH RISK MEDICATIONS (NOT ANTICOAGULANTS) LONG-TERM USE: ICD-10-CM

## 2020-03-05 DIAGNOSIS — E78.2 MIXED HYPERLIPIDEMIA: ICD-10-CM

## 2020-03-05 PROCEDURE — 99214 OFFICE O/P EST MOD 30 MIN: CPT | Performed by: FAMILY MEDICINE

## 2020-03-05 NOTE — PROGRESS NOTES
"Subjective   Sterling Andrea Whaley is a 59 y.o. male.     History of Present Illness   Mr. Whaley presents today for routine f/u on several chronic medical problems.    Chronic Pain (Brief): The patient is being seen for a routine clinic follow-up of chronic pain. Denies new problems. Symptoms: headache, neck pain and back pain. Burning, aching, throbbing at times. Radiates down left leg to foot. Radiates from neck into upper back/shoulders/occipital area. Associated symptoms: anxiety, irritability, difficulty concentrating, sleep disturbance, decreased appetite and decreased libido, but no depression. Also he c/o numbness, tingling, weakness left leg, arms. Current treatment includes activity modification, prescribed exercises, nonsteroidal anti-inflammatory drugs and opioid analgesics. By report, there is good compliance with treatment, good tolerance of treatment and good symptom control. The patient is currently able to do activities of daily living without limitations, unable to work, able to do housework with limitations and unable to participate in sports. Previous presentation included back pain, neck pain, headache, irritability, sleep disturbance and left sciatica. Past evaluation has included cervical and lumbar MRI, neurology evaluation, orthopedic evaluation, pain medicine evaluation and neurosurgery evaluation. Past treatment has included physical therapy, prescribed exercises, acetaminophen, nonsteroidal anti-inflammatory drugs, opioid analgesics, muscle relaxants, anticonvulsants, corticosteroids, epidural injection, nerve block and TENS unit. He has been dx'd with diffuse severe spinal DDD/DJD. Feels spinal injections (unsure if MARCUS or facet) lasted approx 1-2 weeks and nerve ablation provided relief for approx 2 months. Has declined receiving further injections for that reason. In the past Lortab caused itching. Was only able to tolerate low dose Fentanyl patch which helped \"some\" but higher dose " "patches caused significant side effects. Previously under pain mgnt care with Dr. Duggan in [place] and tx'd with Methadone for well over 2 years at total of 15 mg per day. He also often uses ice applications, Aleve for his neck pain. He takes 1-2 methadone 5 mg tablets early in day. Generally takes 1 or 2, 5 mg tablet later in day. If taken too late in evening medication seems to be too activating and can impair sleep. On few days per week he takes only 1 pill per day. He was considered a nonsurgical candidate by Dr. Gonzáles per pt report. Denies h/o heart disease, dysrhythmia. Has had w/u's for CP in past which were felt to be due to anxiety. He has a h/o of generally poor tolerance to medications. Prescription generally lasting 45-60 days. No aberrant behavior.        Hypertension: Patient here for follow-up of elevated blood pressure. He is not exercising other than household chores, minor yardwork and is fairly adherent to low salt diet. Blood pressure not being checked at home. Cardiac symptoms fatigue. Patient denies chest pain, claudication, cough, dyspnea, irregular heart beat, near-syncope, orthopnea, palpitations, syncope, tachypnea. Having some weight gain. Cardiovascular risk factors: advanced age (older than 55 for men, 65 for women), dyslipidemia, hypertension and obesity (BMI >= 30 kg/m2). Use of agents associated with hypertension: NSAIDS. History of target organ damage: none. Has comorbid HLP for which he has declined statin on multiple occasions/     He has chronic anxiety for which he has used vistaril as needed. At last visit, c/o worsening symptom and alternative tx requested. At that time he was rx'd buspar and paxil. He dc'd both as they made him feel sluggish. He has h/o poor medication toelrance. Today he c/o increasing depressive symptoms. Recent breakup with girlfriend of over 1 year. Reports feeling isolated and alone. Having passive suicidal ideation. No active ideation. Reports \"trouble " "trusting people\". He does not feel the methadone has affected his mood.    Chronic mild thrombocytopenia. No bleeding tendencies. Due for f/u labs.    Has fatty liver with hyperbilirubinemia. LFTs have been stable. No jaundice or abd pain.     Pt's previous HPI reviewed and updated as indicated.     The following portions of the patient's history were reviewed and updated as appropriate: allergies, current medications, past family history, past medical history, past social history, past surgical history and problem list.    Review of Systems   Constitutional: Positive for fatigue. Negative for fever and unexpected weight change.   HENT: Positive for congestion, hearing loss (chronic), postnasal drip and sinus pressure (chronic). Negative for mouth sores, nosebleeds, rhinorrhea, sore throat and trouble swallowing.    Eyes: Negative for pain, discharge, redness and visual disturbance.   Respiratory: Negative for cough, shortness of breath and wheezing.    Cardiovascular: Negative for chest pain, palpitations and leg swelling.   Gastrointestinal: Negative for abdominal pain, blood in stool, diarrhea, nausea and vomiting.   Endocrine: Positive for heat intolerance. Negative for polydipsia and polyuria.   Genitourinary: Negative for dysuria and hematuria.   Musculoskeletal: Positive for arthralgias, back pain, joint swelling, myalgias, neck pain and neck stiffness.   Skin: Negative for rash and wound.   Neurological: Positive for dizziness (occ mild) and headaches. Negative for tremors, syncope and weakness.   Hematological: Negative for adenopathy. Does not bruise/bleed easily.   Psychiatric/Behavioral: Positive for dysphoric mood, sleep disturbance and suicidal ideas. Negative for confusion and hallucinations. The patient is nervous/anxious.         As per HPI   Pt's previous ROS reviewed and updated as indicated.       Objective    Vitals:    03/05/20 0943   BP: 156/82   Pulse: 62   Temp: 98 °F (36.7 °C)   SpO2: 100% "     Body mass index is 34.59 kg/m².      03/05/20  0943   Weight: 112 kg (248 lb)       Physical Exam   Constitutional: He is oriented to person, place, and time. He appears well-developed and well-nourished. He is cooperative. He does not appear ill. He appears distressed (emotionally).   overweight   HENT:   Head: Normocephalic and atraumatic.   Mouth/Throat: Mucous membranes are normal. Mucous membranes are not dry.   Mallampati class 3   Eyes: Lids are normal. No scleral icterus. Right eye exhibits no nystagmus. Left eye exhibits no nystagmus.   Neck: Normal carotid pulses present. Carotid bruit is not present.   Thick/broad neck   Cardiovascular: Normal rate, regular rhythm, normal heart sounds and intact distal pulses. Exam reveals no gallop.   No murmur heard.  Pulmonary/Chest: Effort normal and breath sounds normal.   Musculoskeletal:        Cervical back: He exhibits decreased range of motion (diffuse soft tissue), tenderness, bony tenderness (C4-C7), deformity (loss of normal lordosis) and spasm.        Thoracic back: He exhibits deformity (kpyhotic posture).        Lumbar back: He exhibits decreased range of motion, tenderness (diffuse soft tissue), bony tenderness (L3-S1) and spasm.     Vascular Status -  His right foot exhibits no edema. His left foot exhibits no edema.  Neurological: He is alert and oriented to person, place, and time. He has normal strength. He displays no tremor. Gait (mildly antalgic) abnormal.   Skin: Skin is warm and dry. No bruising and no rash noted.   Psychiatric: His speech is normal and behavior is normal. Judgment and thought content normal. He is not actively hallucinating. Thought content is not paranoid and not delusional. Cognition and memory are normal. He exhibits a depressed mood. He expresses no homicidal and no suicidal ideation.   Good eye contact. Answers questions appropriately. Good personal hygiene and grooming. He is attentive.   Nursing note and vitals  reviewed.  Pt's previous physical exam reviewed and updated as indicated.        Assessment/Plan   Sterling was seen today for anxiety, depression, hypertension, pain and sleep apnea.    Diagnoses and all orders for this visit:    TYSON (generalized anxiety disorder)  -     Ambulatory Referral to Behavioral Health  -     TSH Rfx On Abnormal To Free T4    Depressive disorder  -     Ambulatory Referral to Behavioral Health  -     TSH Rfx On Abnormal To Free T4    Passive suicidal ideations    Mixed hyperlipidemia  -     Lipid Panel  -     Comprehensive Metabolic Panel    Essential hypertension  -     Comprehensive Metabolic Panel    Hyperbilirubinemia  -     Comprehensive Metabolic Panel  -     Bilirubin, Direct    Thrombocytopenia (CMS/HCC)  -     CBC (No Diff)    Spinal stenosis of lumbar region, unspecified whether neurogenic claudication present    Spinal stenosis of cervical region    High risk medications (not anticoagulants) long-term use    Osteoarthritis of cervical spine, unspecified spinal osteoarthritis complication status    Degeneration of intervertebral disc of thoracic region    Degeneration of intervertebral disc of lumbar region    Chronic pain syndrome    Fatty liver    Refusal of statin medication by patient    Influenza vaccine refused    Pneumococcal vaccine refused       HTN with fluctuating control likely related to his emotional state, chronic pain etc. Continue metoprolol, lisinopril. Pt advised to eat a heart healthy diet and get regular aerobic exercise. Continue close monitoring. Report persistent elevations. BP generally under better control per home readings.    Stable multifactorial chronic pain. No new neurological deficits. Minimal side effects reported on methadone. No escalation of dosing. Good functional improvement. No aberrant behavior. Chronic pain does exacerbate his anxiety, depressed mood.    TYSON with recently worsening depression, now having intermittent suicidal ideation.  Denies ideation at this time. Does not wish to try new medicaiton at this time. Is amenable to seeing behavorial health. Referral as above. Verbally contracts he will not harm himself. We have discussed emergency plan for acutely worsening symptoms, active suicidal ideation, etc.    Nutrition and activity goals reviewed including: mainly water to drink, limit white flour/processed sugar, higher lean protein, high fiber carbs, regular meals, working toward 150 mins cardio per week.    Reassess status of hyperBR and LFTs.    Routine f/u in 3 months, sooner as needed/instructed.  I will contact patient regarding test results and provide instructions regarding any necessary changes in plan of care.  Patient was encouraged to keep me informed of any acute changes, lack of improvement, or any new concerning symptoms.  Pt is aware of reasons to seek emergent care.  Patient voiced understanding of all instructions and denied further questions.  As part of patient's treatment plan I am prescribing a controlled substance (no refill needed today).  The patient has been made aware of the appropriate use of such medications, including potential risk of somnolence, limited ability to drive and/or work safely, and potential for dependence and/or overdose.  It has also been made clear that these medications are for use by this patient only, without concomitant use of alcohol or other substances, unless prescribed.  CLAUDIO report reviewed; update reqeusted.  History and physical exam exhibit continued safe and appropriate use of controlled substance.  Patient has completed a prescribing agreement detailing terms of continued prescribing of controlled substances, including monitoring CLAUDIO reports, urine drug screening, and pill counts if necessary.  Patient is aware that inappropriate use will result in cessation of prescribing such medications.

## 2020-03-06 LAB
ALBUMIN SERPL-MCNC: 4.3 G/DL (ref 3.5–5.2)
ALBUMIN/GLOB SERPL: 1.5 G/DL
ALP SERPL-CCNC: 90 U/L (ref 39–117)
ALT SERPL-CCNC: 30 U/L (ref 1–41)
AST SERPL-CCNC: 25 U/L (ref 1–40)
BILIRUB DIRECT SERPL-MCNC: 0.5 MG/DL (ref 0.2–0.3)
BILIRUB SERPL-MCNC: 3 MG/DL (ref 0.2–1.2)
BUN SERPL-MCNC: 11 MG/DL (ref 6–20)
BUN/CREAT SERPL: 11.6 (ref 7–25)
CALCIUM SERPL-MCNC: 9.1 MG/DL (ref 8.6–10.5)
CHLORIDE SERPL-SCNC: 103 MMOL/L (ref 98–107)
CHOLEST SERPL-MCNC: 164 MG/DL (ref 0–200)
CO2 SERPL-SCNC: 25 MMOL/L (ref 22–29)
CREAT SERPL-MCNC: 0.95 MG/DL (ref 0.76–1.27)
ERYTHROCYTE [DISTWIDTH] IN BLOOD BY AUTOMATED COUNT: 13 % (ref 12.3–15.4)
GLOBULIN SER CALC-MCNC: 2.8 GM/DL
GLUCOSE SERPL-MCNC: 138 MG/DL (ref 65–99)
HCT VFR BLD AUTO: 38.5 % (ref 37.5–51)
HDLC SERPL-MCNC: 43 MG/DL (ref 40–60)
HGB BLD-MCNC: 13.7 G/DL (ref 13–17.7)
LDLC SERPL CALC-MCNC: 82 MG/DL (ref 0–100)
MCH RBC QN AUTO: 34.5 PG (ref 26.6–33)
MCHC RBC AUTO-ENTMCNC: 35.6 G/DL (ref 31.5–35.7)
MCV RBC AUTO: 97 FL (ref 79–97)
PLATELET # BLD AUTO: 64 10*3/MM3 (ref 140–450)
POTASSIUM SERPL-SCNC: 4.5 MMOL/L (ref 3.5–5.2)
PROT SERPL-MCNC: 7.1 G/DL (ref 6–8.5)
RBC # BLD AUTO: 3.97 10*6/MM3 (ref 4.14–5.8)
SODIUM SERPL-SCNC: 142 MMOL/L (ref 136–145)
TRIGL SERPL-MCNC: 197 MG/DL (ref 0–150)
TSH SERPL DL<=0.005 MIU/L-ACNC: 2.61 UIU/ML (ref 0.27–4.2)
VLDLC SERPL CALC-MCNC: 39.4 MG/DL
WBC # BLD AUTO: 4.42 10*3/MM3 (ref 3.4–10.8)

## 2020-03-07 PROBLEM — Z53.20 REFUSAL OF STATIN MEDICATION BY PATIENT: Status: ACTIVE | Noted: 2020-03-07

## 2020-03-07 PROBLEM — Z28.21 INFLUENZA VACCINE REFUSED: Status: ACTIVE | Noted: 2020-03-07

## 2020-03-07 PROBLEM — Z28.21 PNEUMOCOCCAL VACCINE REFUSED: Status: ACTIVE | Noted: 2020-03-07

## 2020-03-09 NOTE — PROGRESS NOTES
Please inform pt his recent labs are stable/fine other than low platelets. This has been a chronic issues but at 64 this is the lowest they have been. Please confirm if he is having any bleeding problems, easy bruising, nosebleeds, bleeding gums etc. We will recheck at his follow up visit in June unless he has problems prior to that time.

## 2020-04-13 DIAGNOSIS — G89.4 CHRONIC PAIN SYNDROME: ICD-10-CM

## 2020-04-13 RX ORDER — METHADONE HYDROCHLORIDE 5 MG/1
5 TABLET ORAL 2 TIMES DAILY PRN
Qty: 60 TABLET | Refills: 0 | Status: SHIPPED | OUTPATIENT
Start: 2020-04-13 | End: 2020-04-13 | Stop reason: SDUPTHER

## 2020-04-13 RX ORDER — METHADONE HYDROCHLORIDE 5 MG/1
5 TABLET ORAL 2 TIMES DAILY PRN
Qty: 60 TABLET | Refills: 0 | Status: SHIPPED | OUTPATIENT
Start: 2020-04-13 | End: 2020-05-29 | Stop reason: SDUPTHER

## 2020-04-13 NOTE — TELEPHONE ENCOUNTER
Ame from [facility] states that the medicine has two different sets of directions.  She can be reached at [phone]

## 2020-05-18 DIAGNOSIS — I10 ESSENTIAL HYPERTENSION: ICD-10-CM

## 2020-05-19 RX ORDER — LISINOPRIL 20 MG/1
TABLET ORAL
Qty: 90 TABLET | Refills: 3 | Status: SHIPPED | OUTPATIENT
Start: 2020-05-19 | End: 2020-08-10 | Stop reason: SINTOL

## 2020-05-29 ENCOUNTER — TELEPHONE (OUTPATIENT)
Dept: FAMILY MEDICINE CLINIC | Facility: CLINIC | Age: 60
End: 2020-05-29

## 2020-05-29 DIAGNOSIS — G89.4 CHRONIC PAIN SYNDROME: ICD-10-CM

## 2020-06-01 NOTE — TELEPHONE ENCOUNTER
I previously wrote it for 4 times daily but he stated he generally took twice daily. Has he increased his dosage?

## 2020-06-03 RX ORDER — METHADONE HYDROCHLORIDE 5 MG/1
TABLET ORAL
Qty: 120 TABLET | Refills: 0 | Status: SHIPPED | OUTPATIENT
Start: 2020-06-03 | End: 2020-07-22 | Stop reason: SDUPTHER

## 2020-06-03 NOTE — TELEPHONE ENCOUNTER
I spoke with patient. He states that his normal script is for a qty of 120 and it last him 3 months.    He states that it has always been wrote for 4 times a day, but he normally only takes 2 a day.

## 2020-06-05 RX ORDER — OMEPRAZOLE 40 MG/1
CAPSULE, DELAYED RELEASE ORAL
Qty: 90 CAPSULE | Refills: 1 | Status: SHIPPED | OUTPATIENT
Start: 2020-06-05 | End: 2020-11-16

## 2020-06-09 ENCOUNTER — OFFICE VISIT (OUTPATIENT)
Dept: FAMILY MEDICINE CLINIC | Facility: CLINIC | Age: 60
End: 2020-06-09

## 2020-06-09 VITALS
WEIGHT: 247 LBS | BODY MASS INDEX: 34.58 KG/M2 | HEART RATE: 66 BPM | SYSTOLIC BLOOD PRESSURE: 150 MMHG | OXYGEN SATURATION: 97 % | DIASTOLIC BLOOD PRESSURE: 82 MMHG | TEMPERATURE: 98 F | HEIGHT: 71 IN

## 2020-06-09 DIAGNOSIS — E53.8 VITAMIN B 12 DEFICIENCY: ICD-10-CM

## 2020-06-09 DIAGNOSIS — R45.89 DYSPHORIC MOOD: ICD-10-CM

## 2020-06-09 DIAGNOSIS — I10 ESSENTIAL HYPERTENSION: Primary | ICD-10-CM

## 2020-06-09 DIAGNOSIS — Z11.3 SCREENING FOR STD (SEXUALLY TRANSMITTED DISEASE): ICD-10-CM

## 2020-06-09 DIAGNOSIS — E78.2 MIXED HYPERLIPIDEMIA: ICD-10-CM

## 2020-06-09 DIAGNOSIS — Z20.2 POSSIBLE EXPOSURE TO STD: ICD-10-CM

## 2020-06-09 DIAGNOSIS — G89.4 CHRONIC PAIN SYNDROME: ICD-10-CM

## 2020-06-09 DIAGNOSIS — R53.82 CHRONIC FATIGUE: ICD-10-CM

## 2020-06-09 DIAGNOSIS — D69.6 THROMBOCYTOPENIA (HCC): ICD-10-CM

## 2020-06-09 DIAGNOSIS — Z11.4 SCREENING FOR HIV (HUMAN IMMUNODEFICIENCY VIRUS): ICD-10-CM

## 2020-06-09 DIAGNOSIS — Z11.59 NEED FOR HEPATITIS B SCREENING TEST: ICD-10-CM

## 2020-06-09 DIAGNOSIS — K76.0 FATTY LIVER: ICD-10-CM

## 2020-06-09 DIAGNOSIS — Z11.59 NEED FOR HEPATITIS C SCREENING TEST: ICD-10-CM

## 2020-06-09 DIAGNOSIS — R73.01 IFG (IMPAIRED FASTING GLUCOSE): ICD-10-CM

## 2020-06-09 PROCEDURE — 99214 OFFICE O/P EST MOD 30 MIN: CPT | Performed by: FAMILY MEDICINE

## 2020-06-09 RX ORDER — PREDNISONE 20 MG/1
TABLET ORAL
Qty: 30 TABLET | Refills: 0 | Status: SHIPPED | OUTPATIENT
Start: 2020-06-09 | End: 2021-06-08 | Stop reason: SDDI

## 2020-06-09 NOTE — PATIENT INSTRUCTIONS
WEAN OFF METOPROLOL WITH NEW PRESCRIPTION    DOUBLE UP ON LISINOPRIL    RECHECK IN OFFICE IN 6 WEEKS

## 2020-06-09 NOTE — PROGRESS NOTES
"Subjective   Sterling Andrea Whaley is a 60 y.o. male.     History of Present Illness  Mr. Whaley presents today for routine f/u on several chronic medical problems.     Chronic Pain (Brief): The patient is being seen for a routine clinic follow-up of chronic pain. Denies new problems. Symptoms: headache, neck pain and back pain. Burning, aching, throbbing at times. Radiates down left leg to foot. Radiates from neck into upper back/shoulders/occipital area. Associated symptoms: anxiety, irritability, difficulty concentrating, sleep disturbance, decreased appetite and decreased libido, but no depression. Also he c/o numbness, tingling, weakness left leg, arms. Current treatment includes activity modification, prescribed exercises, nonsteroidal anti-inflammatory drugs and opioid analgesics. By report, there is good compliance with treatment, good tolerance of treatment and good symptom control. The patient is currently able to do activities of daily living without limitations, unable to work, able to do housework with limitations and unable to participate in sports. Previous presentation included back pain, neck pain, headache, irritability, sleep disturbance and left sciatica. Past evaluation has included cervical and lumbar MRI, neurology evaluation, orthopedic evaluation, pain medicine evaluation and neurosurgery evaluation. Past treatment has included physical therapy, prescribed exercises, acetaminophen, nonsteroidal anti-inflammatory drugs, opioid analgesics, muscle relaxants, anticonvulsants, corticosteroids, epidural injection, nerve block and TENS unit. He has been dx'd with diffuse severe spinal DDD/DJD. Feels spinal injections (unsure if MARCUS or facet) lasted approx 1-2 weeks and nerve ablation provided relief for approx 2 months. Has declined receiving further injections for that reason. In the past Lortab caused itching. Was only able to tolerate low dose Fentanyl patch which helped \"some\" but higher dose " patches caused significant side effects. Previously under pain mgnt care with Dr. Duggan in [place] and tx'd with Methadone for well over 2 years at total of 15 mg per day. He also often uses ice applications, Aleve for his neck pain. He takes 1-2 methadone 5 mg tablets early in day. Generally takes 1 or 2, 5 mg tablet later in day. If taken too late in evening medication seems to be too activating and can impair sleep. On few days per week he takes only 1 pill per day. He was considered a nonsurgical candidate by Dr. Goználes per pt report. Denies h/o heart disease, dysrhythmia. Has had w/u's for CP in past which were felt to be due to anxiety. He has a h/o of generally poor tolerance to medications. Prescription generally lasting 45-60 days at least No aberrant behavior.      Hypertension: Patient here for follow-up of elevated blood pressure. He is not exercising other than household chores, minor yardwork and is fairly adherent to low salt diet. Blood pressure not being checked at home. Cardiac symptoms fatigue. Patient denies chest pain, claudication, cough, dyspnea, irregular heart beat, near-syncope, orthopnea, palpitations, syncope, tachypnea. Having some weight gain. Cardiovascular risk factors: advanced age (older than 55 for men, 65 for women), dyslipidemia, hypertension and obesity (BMI >= 30 kg/m2). Use of agents associated with hypertension: NSAIDS. History of target organ damage: none. Has comorbid HLP for which he has declined statin on multiple occasions. Currently on lisinopril and metorpolol. He is concerned beta blocker is causing side effects such as fatigue, sexual dysfunction, hair loss. Would like to d/c if possible.     He has chronic anxiety for which he has used vistaril as needed. Tried paxil, buspar but he dc'd both as they made him feel sluggish. He has h/o poor medication toelrance. Depression improved from last visit.       Chronic mild thrombocytopenia. No bleeding tendencies. Due for f/u  labs.     Has fatty liver with hyperbilirubinemia. LFTs have been stable. No jaundice or abd pain.     Has new sexual partner. Does not believe he has ever been screened for STDs.    Gets B12 injections intermittently for def. Cont's to have chronic fatigue.    Pt's previous HPI reviewed and updated as indicated.     The following portions of the patient's history were reviewed and updated as appropriate: allergies, current medications, past family history, past medical history, past social history, past surgical history and problem list.    Review of Systems   Constitutional: Positive for fatigue. Negative for fever and unexpected weight change.   HENT: Positive for congestion, hearing loss (chronic), postnasal drip and sinus pressure (chronic). Negative for mouth sores, nosebleeds, rhinorrhea, sore throat and trouble swallowing.    Eyes: Negative for pain, discharge, redness and visual disturbance.   Respiratory: Negative for cough, shortness of breath and wheezing.    Cardiovascular: Negative for chest pain, palpitations and leg swelling.   Gastrointestinal: Negative for abdominal pain, blood in stool, diarrhea, nausea and vomiting.   Endocrine: Positive for heat intolerance. Negative for polydipsia and polyuria.   Genitourinary: Negative for dysuria and hematuria.   Musculoskeletal: Positive for arthralgias, back pain, joint swelling, myalgias, neck pain and neck stiffness.   Skin: Negative for rash and wound.   Neurological: Positive for dizziness (occ mild) and headaches. Negative for tremors, syncope and weakness.   Hematological: Negative for adenopathy. Does not bruise/bleed easily.   Psychiatric/Behavioral: Positive for dysphoric mood, sleep disturbance and suicidal ideas. Negative for confusion and hallucinations. The patient is nervous/anxious.         As per HPI   Pt's previous ROS reviewed and updated as indicated.         Objective    Vitals:    06/09/20 0854   BP: 150/82   Pulse: 66   Temp: 98 °F  (36.7 °C)   SpO2: 97%     Body mass index is 34.45 kg/m².      06/09/20  0854   Weight: 112 kg (247 lb)       Physical Exam   Constitutional: He is oriented to person, place, and time. He appears well-developed and well-nourished. He is cooperative. He does not appear ill. No distress.   overweight   HENT:   Head: Normocephalic and atraumatic.   Mouth/Throat: Mucous membranes are normal. Mucous membranes are not dry.   Mallampati class 3   Eyes: Lids are normal. No scleral icterus. Right eye exhibits no nystagmus. Left eye exhibits no nystagmus.   Neck: Normal carotid pulses present. Carotid bruit is not present.   Thick/broad neck   Cardiovascular: Normal rate, regular rhythm, normal heart sounds and intact distal pulses. Exam reveals no gallop.   No murmur heard.  Pulmonary/Chest: Effort normal and breath sounds normal.   Musculoskeletal:        Cervical back: He exhibits decreased range of motion (diffuse soft tissue), tenderness, bony tenderness (C4-C7), deformity (loss of normal lordosis) and spasm.        Thoracic back: He exhibits deformity (kpyhotic posture).        Lumbar back: He exhibits decreased range of motion, tenderness (diffuse soft tissue), bony tenderness (L3-S1) and spasm.     Vascular Status -  His right foot exhibits no edema. His left foot exhibits no edema.  Neurological: He is alert and oriented to person, place, and time. He has normal strength. He displays no tremor. Gait (mildly antalgic) abnormal.   Skin: Skin is warm and dry. No bruising and no rash noted.   Psychiatric: His speech is normal and behavior is normal. Judgment and thought content normal. He is not actively hallucinating. Thought content is not paranoid and not delusional. Cognition and memory are normal. He exhibits a depressed mood. He expresses no homicidal and no suicidal ideation.   Good eye contact. Answers questions appropriately. Good personal hygiene and grooming. He is attentive.   Nursing note and vitals  reviewed.  Pt's previous physical exam reviewed and updated as indicated.    Assessment/Plan   Sterling was seen today for hyperlipidemia, hypertension, anxiety, depression, medication problem and hand pain.    Diagnoses and all orders for this visit:    Essential hypertension    Mixed hyperlipidemia    Fatty liver    Vitamin B 12 deficiency  -     Vitamin B12    IFG (impaired fasting glucose)  -     Hemoglobin A1c    Thrombocytopenia (CMS/HCC)  -     CBC (No Diff)    Screening for HIV (human immunodeficiency virus)  -     HIV-1 / O / 2 Ag / Antibody 4th Generation    Need for hepatitis C screening test  -     Hepatitis C Antibody    Need for hepatitis B screening test  -     Hepatitis B Virus Profile    Screening for STD (sexually transmitted disease)  -     RPR  -     HSV 1 & 2 - Specific Antibody, IgG    Possible exposure to STD  -     Hepatitis C Antibody  -     HIV-1 / O / 2 Ag / Antibody 4th Generation  -     RPR  -     HSV 1 & 2 - Specific Antibody, IgG  -     Hepatitis B Virus Profile    Chronic fatigue  -     Testosterone, Free, Total    Dysphoric mood  -     Testosterone, Free, Total    Chronic pain syndrome    Other orders  -     predniSONE (DELTASONE) 20 MG tablet; USE AS DIRECTED FOR FLARES IN JOINT PAIN  -     metoprolol tartrate (LOPRESSOR) 25 MG tablet; 1/2 po bid x 1 week, then 1/2 po daily x 1 week then d/c      HTN not at goal and undesirable side effects from metoprolol. No comorbid h/o CHF, CAD. D/c metoprolol, increase lisinopril to 40 mg daily. Weaning protocol for metroprolol reviewed with pt.    Stable multifactorial chronic pain. Minimal side effects from methadone. Minimal overall usage. Good functional improvement. No aberrant behavior.    TYSON stable.    Assess status of vit/min deficiencies and replace as indicated.    Declines statin.    Most recent LFTs stable. Nutrition and activity goals reviewed including: mainly water to drink, limit white flour/processed sugar, higher lean  protein, high fiber carbs, regular meals, working toward 150 mins cardio per week,.    Routine f/u in 3 months, sooner as needed/instructed.  I will contact patient regarding test results and provide instructions regarding any necessary changes in plan of care.  Patient was encouraged to keep me informed of any acute changes, lack of improvement, or any new concerning symptoms.  Pt is aware of reasons to seek emergent care.  Patient voiced understanding of all instructions and denied further questions.

## 2020-06-10 LAB
ERYTHROCYTE [DISTWIDTH] IN BLOOD BY AUTOMATED COUNT: 12.8 % (ref 12.3–15.4)
HBA1C MFR BLD: 5.8 % (ref 4.8–5.6)
HBV CORE AB SERPL QL IA: NEGATIVE
HBV CORE IGM SERPL QL IA: NEGATIVE
HBV E AB SERPL QL IA: NEGATIVE
HBV E AG SERPL QL IA: NEGATIVE
HBV SURFACE AB SER QL: REACTIVE
HBV SURFACE AG SERPL QL IA: NEGATIVE
HCT VFR BLD AUTO: 40.9 % (ref 37.5–51)
HCV AB S/CO SERPL IA: <0.1 S/CO RATIO (ref 0–0.9)
HGB BLD-MCNC: 14.5 G/DL (ref 13–17.7)
HIV 1+2 AB+HIV1 P24 AG SERPL QL IA: NON REACTIVE
HSV1 IGG SER IA-ACNC: <0.91 INDEX (ref 0–0.9)
HSV2 IGG SER IA-ACNC: <0.91 INDEX (ref 0–0.9)
MCH RBC QN AUTO: 34.8 PG (ref 26.6–33)
MCHC RBC AUTO-ENTMCNC: 35.5 G/DL (ref 31.5–35.7)
MCV RBC AUTO: 98.1 FL (ref 79–97)
PLATELET # BLD AUTO: 63 10*3/MM3 (ref 140–450)
RBC # BLD AUTO: 4.17 10*6/MM3 (ref 4.14–5.8)
RPR SER QL: NON REACTIVE
VIT B12 SERPL-MCNC: 646 PG/ML (ref 211–946)
WBC # BLD AUTO: 4.23 10*3/MM3 (ref 3.4–10.8)

## 2020-06-15 LAB
Lab: NORMAL
REQUEST PROBLEM: NORMAL
TESTOST FREE SERPL-MCNC: NORMAL PG/ML
TESTOST SERPL-MCNC: 474 NG/DL (ref 264–916)
WRITTEN AUTHORIZATION: NORMAL

## 2020-06-15 NOTE — PROGRESS NOTES
"Please inform pt his total testosterone level was normal. The free, \"active\" form of testosterone could not be measured due to his high triglycerides (fats in blood)."

## 2020-06-16 ENCOUNTER — TELEPHONE (OUTPATIENT)
Dept: FAMILY MEDICINE CLINIC | Facility: CLINIC | Age: 60
End: 2020-06-16

## 2020-06-16 NOTE — TELEPHONE ENCOUNTER
"Please inform pt his disability form has been completed. He wanted copy faxed and stated he would come by to pay fee and  hard copy. Please make sure copy is scanned to chart.    Completed form placed in \"done\" folder on my desk.  "

## 2020-06-30 RX ORDER — FLUTICASONE PROPIONATE 50 MCG
SPRAY, SUSPENSION (ML) NASAL
Qty: 48 G | Refills: 1 | Status: SHIPPED | OUTPATIENT
Start: 2020-06-30 | End: 2020-11-20

## 2020-07-22 ENCOUNTER — OFFICE VISIT (OUTPATIENT)
Dept: FAMILY MEDICINE CLINIC | Facility: CLINIC | Age: 60
End: 2020-07-22

## 2020-07-22 VITALS
OXYGEN SATURATION: 100 % | TEMPERATURE: 98.4 F | DIASTOLIC BLOOD PRESSURE: 84 MMHG | BODY MASS INDEX: 34.86 KG/M2 | SYSTOLIC BLOOD PRESSURE: 138 MMHG | RESPIRATION RATE: 14 BRPM | HEART RATE: 68 BPM | WEIGHT: 249 LBS | HEIGHT: 71 IN

## 2020-07-22 DIAGNOSIS — I10 ESSENTIAL HYPERTENSION: Primary | ICD-10-CM

## 2020-07-22 DIAGNOSIS — F41.1 GAD (GENERALIZED ANXIETY DISORDER): ICD-10-CM

## 2020-07-22 DIAGNOSIS — G89.4 CHRONIC PAIN SYNDROME: ICD-10-CM

## 2020-07-22 DIAGNOSIS — F32.A DEPRESSIVE DISORDER: ICD-10-CM

## 2020-07-22 DIAGNOSIS — M79.641 BILATERAL HAND PAIN: ICD-10-CM

## 2020-07-22 DIAGNOSIS — M79.642 BILATERAL HAND PAIN: ICD-10-CM

## 2020-07-22 PROCEDURE — 99214 OFFICE O/P EST MOD 30 MIN: CPT | Performed by: FAMILY MEDICINE

## 2020-07-22 RX ORDER — LISINOPRIL 10 MG/1
10 TABLET ORAL DAILY
Qty: 30 TABLET | Refills: 0 | Status: SHIPPED | OUTPATIENT
Start: 2020-07-22 | End: 2020-07-22 | Stop reason: SDUPTHER

## 2020-07-22 RX ORDER — LISINOPRIL 10 MG/1
10 TABLET ORAL DAILY
Qty: 90 TABLET | Refills: 3 | Status: SHIPPED | OUTPATIENT
Start: 2020-07-22 | End: 2020-08-10 | Stop reason: SINTOL

## 2020-07-22 RX ORDER — METHADONE HYDROCHLORIDE 5 MG/1
TABLET ORAL
Qty: 120 TABLET | Refills: 0 | Status: SHIPPED | OUTPATIENT
Start: 2020-07-22 | End: 2020-09-15 | Stop reason: SDUPTHER

## 2020-07-22 RX ORDER — LISINOPRIL 10 MG/1
10 TABLET ORAL DAILY
Qty: 90 TABLET | Refills: 3 | Status: SHIPPED | OUTPATIENT
Start: 2020-07-22 | End: 2020-07-22

## 2020-07-22 NOTE — PROGRESS NOTES
"Subjective   Sterling Andrea Whaley is a 60 y.o. male.     History of Present Illness   Mr. Whaley presents today for routine f/u on several chronic medical problems.     Chronic Pain (Brief): The patient is being seen for a routine clinic follow-up of chronic pain. Denies new problems. Symptoms: headache, neck pain and back pain. Burning, aching, throbbing at times. Radiates down left leg to foot. Radiates from neck into upper back/shoulders/occipital area. Associated symptoms: anxiety, irritability, difficulty concentrating, sleep disturbance, decreased appetite and decreased libido, but no depression. Also he c/o numbness, tingling, weakness left leg, arms. Current treatment includes activity modification, prescribed exercises, nonsteroidal anti-inflammatory drugs and opioid analgesics. By report, there is good compliance with treatment, good tolerance of treatment and good symptom control. The patient is currently able to do activities of daily living without limitations, unable to work, able to do housework with limitations and unable to participate in sports. Previous presentation included back pain, neck pain, headache, irritability, sleep disturbance and left sciatica. Past evaluation has included cervical and lumbar MRI, neurology evaluation, orthopedic evaluation, pain medicine evaluation and neurosurgery evaluation. Past treatment has included physical therapy, prescribed exercises, acetaminophen, nonsteroidal anti-inflammatory drugs, opioid analgesics, muscle relaxants, anticonvulsants, corticosteroids, epidural injection, nerve block and TENS unit. He has been dx'd with diffuse severe spinal DDD/DJD. Feels spinal injections (unsure if MARCUS or facet) lasted approx 1-2 weeks and nerve ablation provided relief for approx 2 months. Has declined receiving further injections for that reason. In the past Lortab caused itching. Was only able to tolerate low dose Fentanyl patch which helped \"some\" but higher dose " "patches caused significant side effects. Previously under pain mgnt care with Dr. Duggan in [place] and tx'd with Methadone for well over 2 years at total of 15 mg per day. He also often uses ice applications, Aleve for his neck pain. He takes 1-2 methadone 5 mg tablets early in day. Generally takes 1 or 2, 5 mg tablet later in day. If taken too late in evening medication seems to be too activating and can impair sleep. On few days per week he takes only 1 pill per day. He was considered a nonsurgical candidate by Dr. Gonzáles per pt report. Denies h/o heart disease, dysrhythmia. Has had w/u's for CP in past which were felt to be due to anxiety. He has a h/o of generally poor tolerance to medications. Prescription generally lasting 45-60 days at least No aberrant behavior.     Mentioned persistent bilateral hand pain, severe stiffness in AM x 1 hour. Swelling pain \"mid knuckles\".     Hypertension: Patient here for follow-up of elevated blood pressure. He is not exercising other than household chores, minor yardwork and is fairly adherent to low salt diet. Blood pressure not being checked at home. Cardiac symptoms fatigue. Patient denies chest pain, claudication, cough, dyspnea, irregular heart beat, near-syncope, orthopnea, palpitations, syncope, tachypnea. Having some weight gain. Cardiovascular risk factors: advanced age (older than 55 for men, 65 for women), dyslipidemia, hypertension and obesity (BMI >= 30 kg/m2). Use of agents associated with hypertension: NSAIDS. History of target organ damage: none. Has comorbid HLP for which he has declined statin on multiple occasions. Currently on lisinopril and metorpolol. At least visit metoprolol dc'd due to side effects. He took 40 of lisinopril as directed but felt it dropped his BP \"too low\". Currently taking 30 mg total per day.      He has chronic anxiety for which he has used vistaril as needed. Tried paxil, buspar but he dc'd both as they made him feel sluggish. He has " h/o poor medication toelrance. Depression much improved from earlier this year. .       Pt's previous HPI reviewed and updated as indicated.     The following portions of the patient's history were reviewed and updated as appropriate: allergies, current medications, past family history, past medical history, past social history, past surgical history and problem list.    Review of Systems   Constitutional: Positive for fatigue. Negative for fever and unexpected weight change.   HENT: Positive for congestion, hearing loss (chronic), postnasal drip and sinus pressure (chronic). Negative for mouth sores, nosebleeds, rhinorrhea, sore throat and trouble swallowing.    Eyes: Negative for pain, discharge, redness and visual disturbance.   Respiratory: Negative for cough, shortness of breath and wheezing.    Cardiovascular: Negative for chest pain, palpitations and leg swelling.   Gastrointestinal: Negative for abdominal pain, blood in stool, diarrhea, nausea and vomiting.   Endocrine: Positive for heat intolerance. Negative for polydipsia and polyuria.   Genitourinary: Negative for dysuria and hematuria.   Musculoskeletal: Positive for arthralgias, back pain, joint swelling, myalgias, neck pain and neck stiffness.   Skin: Negative for rash and wound.   Neurological: Positive for dizziness (occ mild) and headaches. Negative for tremors, syncope and weakness.   Hematological: Negative for adenopathy. Does not bruise/bleed easily.   Psychiatric/Behavioral: Positive for dysphoric mood (mild). Negative for confusion, hallucinations, sleep disturbance and suicidal ideas. The patient is nervous/anxious.    Pt's previous ROS reviewed and updated as indicated.       Objective    Vitals:    07/22/20 0908   BP: 138/84   Pulse: 68   Resp: 14   Temp: 98.4 °F (36.9 °C)   SpO2: 100%     Body mass index is 34.73 kg/m².      07/22/20  0908   Weight: 113 kg (249 lb)       Physical Exam   Constitutional: He is oriented to person, place, and  time. He appears well-developed and well-nourished. He is cooperative. He does not appear ill. No distress.   overweight   HENT:   Head: Normocephalic and atraumatic.   Eyes: Lids are normal. No scleral icterus. Right eye exhibits no nystagmus. Left eye exhibits no nystagmus.   Neck: Normal carotid pulses present. Carotid bruit is not present. No thyroid mass present.   Thick/broad neck   Cardiovascular: Normal rate, regular rhythm, normal heart sounds and intact distal pulses. Exam reveals no gallop.   No murmur heard.  Pulmonary/Chest: Effort normal and breath sounds normal.   Musculoskeletal:        Cervical back: He exhibits decreased range of motion (diffuse soft tissue), tenderness, bony tenderness (C4-C7), deformity (loss of normal lordosis) and spasm.        Thoracic back: He exhibits deformity (kpyhotic posture).        Lumbar back: He exhibits decreased range of motion, tenderness (diffuse soft tissue), bony tenderness (L3-S1) and spasm.   TTP PIPs bilateral hands with swelling, stiffness     Vascular Status -  His right foot exhibits no edema. His left foot exhibits no edema.  Lymphadenopathy:     He has no cervical adenopathy.   Neurological: He is alert and oriented to person, place, and time. He has normal strength. He displays no tremor. Gait (mildly antalgic) abnormal.   Skin: Skin is warm and dry. No bruising and no rash noted.   Psychiatric: He has a normal mood and affect. His speech is normal and behavior is normal. Judgment and thought content normal. He is not actively hallucinating. Cognition and memory are normal.   Good eye contact. Answers questions appropriately. Good personal hygiene and grooming. He is attentive.   Nursing note and vitals reviewed.  Pt's previous physical exam reviewed and updated as indicated.        Assessment/Plan   Sterling was seen today for anxiety, arthritis, hyperlipidemia and hypertension.    Diagnoses and all orders for this visit:    Essential  hypertension    Chronic pain syndrome  -     methadone (DOLOPHINE) 5 MG tablet; 1 tablet po up to qid.    Bilateral hand pain  -     XR hand 2 vw bilateral; Future    Depressive disorder    TYSON (generalized anxiety disorder)    Other orders  -     Discontinue: lisinopril (PRINIVIL,ZESTRIL) 10 MG tablet; Take 1 tablet by mouth Daily.  -     Discontinue: lisinopril (PRINIVIL,ZESTRIL) 10 MG tablet; Take 1 tablet by mouth Daily.  -     diclofenac (VOLTAREN) 1 % gel gel; Apply 4 g topically to the appropriate area as directed 4 (Four) Times a Day As Needed (acute joint pain).       HTN much improved. Continue lisinopril 30 mg daily.    Multifactorial chronic pain syndrome without acute changes since last visit. Good tolerance of medication. Minimal side effects reported. No aberrant behavior. Good use of adjunctive tx. Good functional improvement, quality of life improvement.   As part of patient's treatment plan I am prescribing a controlled substance.  The patient has been made aware of the appropriate use of such medications, including potential risk of somnolence, limited ability to drive and/or work safely, and potential for dependence and/or overdose.  It has also been made clear that these medications are for use by this patient only, without concomitant use of alcohol or other substances, unless prescribed.  CLAUDIO report reviewed and scanned into chart.  Last CLAUDIO date 7/22/2020.  History and physical exam exhibit continued safe and appropriate use of controlled substance.  Patient has completed a prescribing agreement detailing terms of continued prescribing of controlled substances, including monitoring CLAUIDO reports, urine drug screening, and pill counts if necessary.  Patient is aware that inappropriate use will result in cessation of prescribing such medications.    Anxiety with depression stable/improved. Continue vistaril as needed, stress mgnt.    Increasing bilateral hand pain and stiffness. Notably  c/o worst pain in PIP joints, MCPs. Xray as above. Refer/tx as indicated. Generally has marked improvement with oral CS.    Routine f/u in 3 months, sooner as needed/instructed.  I will contact patient regarding test results and provide instructions regarding any necessary changes in plan of care.  Patient was encouraged to keep me informed of any acute changes, lack of improvement, or any new concerning symptoms.  Pt is aware of reasons to seek emergent care.  Patient voiced understanding of all instructions and denied further questions.

## 2020-08-10 ENCOUNTER — TELEPHONE (OUTPATIENT)
Dept: FAMILY MEDICINE CLINIC | Facility: CLINIC | Age: 60
End: 2020-08-10

## 2020-08-10 RX ORDER — VALSARTAN 80 MG/1
80 TABLET ORAL DAILY
Qty: 30 TABLET | Refills: 5 | Status: SHIPPED | OUTPATIENT
Start: 2020-08-10 | End: 2020-09-21 | Stop reason: SINTOL

## 2020-08-11 ENCOUNTER — OFFICE VISIT (OUTPATIENT)
Dept: FAMILY MEDICINE CLINIC | Facility: CLINIC | Age: 60
End: 2020-08-11

## 2020-08-11 VITALS
OXYGEN SATURATION: 98 % | SYSTOLIC BLOOD PRESSURE: 120 MMHG | WEIGHT: 248 LBS | BODY MASS INDEX: 34.72 KG/M2 | HEART RATE: 77 BPM | HEIGHT: 71 IN | TEMPERATURE: 99.3 F | DIASTOLIC BLOOD PRESSURE: 83 MMHG

## 2020-08-11 DIAGNOSIS — R22.0 TONGUE SWELLING: Primary | ICD-10-CM

## 2020-08-11 DIAGNOSIS — I10 ESSENTIAL HYPERTENSION: ICD-10-CM

## 2020-08-11 PROCEDURE — 96372 THER/PROPH/DIAG INJ SC/IM: CPT | Performed by: FAMILY MEDICINE

## 2020-08-11 PROCEDURE — 99214 OFFICE O/P EST MOD 30 MIN: CPT | Performed by: FAMILY MEDICINE

## 2020-08-11 RX ORDER — DEXAMETHASONE 4 MG/1
4 TABLET ORAL 2 TIMES DAILY WITH MEALS
Qty: 10 TABLET | Refills: 0 | Status: SHIPPED | OUTPATIENT
Start: 2020-08-11 | End: 2020-08-16

## 2020-08-11 RX ORDER — METHYLPREDNISOLONE ACETATE 80 MG/ML
120 INJECTION, SUSPENSION INTRA-ARTICULAR; INTRALESIONAL; INTRAMUSCULAR; SOFT TISSUE ONCE
Status: COMPLETED | OUTPATIENT
Start: 2020-08-11 | End: 2020-08-11

## 2020-08-11 RX ADMIN — METHYLPREDNISOLONE ACETATE 120 MG: 80 INJECTION, SUSPENSION INTRA-ARTICULAR; INTRALESIONAL; INTRAMUSCULAR; SOFT TISSUE at 11:55

## 2020-08-11 NOTE — PROGRESS NOTES
Subjective   Sterling Whaley is a 60 y.o. male.     History of Present Illness  Mr. Whaley presents today with complaint of mild to moderate tongue swelling.  Began Saturday, 2 days ago.  Described as a sense of fullness in the back of his throat.  Denies difficulty swallowing, no lip swelling, no cough, shortness of breath or wheeze.  He has been on lisinopril for several years.  Recently had increase in dose.  No other known exposures, new medications etc.  He has been checking his blood pressure regularly at home and is remained well controlled.    The following portions of the patient's history were reviewed and updated as appropriate: allergies, current medications, past family history, past medical history, past social history, past surgical history and problem list.    Review of Systems   Constitutional: Positive for fatigue. Negative for fever and unexpected weight change.   HENT: Positive for congestion, hearing loss (chronic), postnasal drip and sinus pressure (chronic). Negative for drooling, facial swelling, mouth sores, nosebleeds, rhinorrhea, sore throat and trouble swallowing.    Eyes: Negative for pain, discharge, redness and visual disturbance.   Respiratory: Negative for cough, shortness of breath and wheezing.    Cardiovascular: Negative for chest pain, palpitations and leg swelling.   Gastrointestinal: Negative for abdominal pain, blood in stool, diarrhea, nausea and vomiting.   Endocrine: Positive for heat intolerance. Negative for polydipsia and polyuria.   Genitourinary: Negative for dysuria and hematuria.   Musculoskeletal: Positive for arthralgias, back pain, joint swelling, myalgias, neck pain and neck stiffness.   Skin: Negative for rash and wound.   Neurological: Positive for dizziness (occ mild) and headaches. Negative for tremors, syncope and weakness.   Hematological: Negative for adenopathy. Does not bruise/bleed easily.   Psychiatric/Behavioral: Positive for dysphoric mood  (mild). Negative for confusion, hallucinations, sleep disturbance and suicidal ideas. The patient is nervous/anxious.    Pt's previous ROS reviewed and updated as indicated.       Objective    Vitals:    08/11/20 1125   BP: 120/83   Pulse: 77   Temp: 99.3 °F (37.4 °C)   SpO2: 98%     Body mass index is 34.6 kg/m².      08/11/20  1125   Weight: 112 kg (248 lb)       Physical Exam   Constitutional: He is oriented to person, place, and time. He appears well-developed and well-nourished. He is cooperative. He does not appear ill. No distress.   HENT:   Head: Normocephalic and atraumatic.   Mouth/Throat: Oropharynx is clear and moist. Mucous membranes are not dry. No oral lesions. Uvula swelling (mild, along with mild edema of tongue) present. No oropharyngeal exudate or posterior oropharyngeal erythema.   Eyes: Conjunctivae and lids are normal.   Cardiovascular: Normal rate, regular rhythm, normal heart sounds and intact distal pulses.   Pulmonary/Chest: Effort normal and breath sounds normal.   Lymphadenopathy:     He has no cervical adenopathy.   Neurological: He is alert and oriented to person, place, and time. No cranial nerve deficit.   Skin: Skin is warm and dry. No rash noted. No erythema.   Psychiatric: He has a normal mood and affect. His behavior is normal. Cognition and memory are normal.   Nursing note and vitals reviewed.    Lab Results   Component Value Date    WBC 4.23 06/09/2020    HGB 14.5 06/09/2020    HCT 40.9 06/09/2020    MCV 98.1 (H) 06/09/2020    PLT 63 (L) 06/09/2020     Lab Results   Component Value Date    GLUCOSE 133 (H) 09/14/2016    BUN 11 03/05/2020    CREATININE 0.95 03/05/2020    EGFRIFNONA 81 03/05/2020    EGFRIFAFRI 98 03/05/2020    BCR 11.6 03/05/2020    K 4.5 03/05/2020    CO2 25.0 03/05/2020    CALCIUM 9.1 03/05/2020    PROTENTOTREF 7.1 03/05/2020    ALBUMIN 4.30 03/05/2020    LABIL2 1.5 03/05/2020    AST 25 03/05/2020    ALT 30 03/05/2020     Lab Results   Component Value Date     SEDRATE 15 09/14/2016       Assessment/Plan   Sterling was seen today for oral swelling.    Diagnoses and all orders for this visit:    Tongue swelling  -     methylPREDNISolone acetate (DEPO-medrol) injection 120 mg    Essential hypertension    Other orders  -     dexamethasone (DECADRON) 4 MG tablet; Take 1 tablet by mouth 2 (Two) Times a Day With Meals for 5 days.       Possible mild angioedema related to Ace-inh use. Corticosteroids IM in office and oral taper as well. Zyrtec OTC in AM, benadryl at night, pepcid OTC 20 mg bid x 5 days.    Alternative BP med already sent in. HTN well controlled.    Keep routine f/u apt.  F/u sooner as needed.  Patient was encouraged to keep me informed of any acute changes, lack of improvement, or any new concerning symptoms.  Pt is aware of reasons to seek emergent care.  Patient voiced understanding of all instructions and denied further questions.          Please note that portions of this note may have been completed with a voice recognition program. Efforts were made to edit the dictations, but occasionally words are mistranscribed.

## 2020-08-13 ENCOUNTER — TELEPHONE (OUTPATIENT)
Dept: FAMILY MEDICINE CLINIC | Facility: CLINIC | Age: 60
End: 2020-08-13

## 2020-08-13 NOTE — TELEPHONE ENCOUNTER
Patient states that the medication was upsetting his stomach. Patient states that he did not take the medication this morning, and seems to be doing better.    He would like to know if the medication could be changed.

## 2020-08-13 NOTE — TELEPHONE ENCOUNTER
Pt said he was put on Dexamethasone two days ago.  Pt said it is making his stomach very upset, causing him to feel nervous and giving him a headache.  Pt requesting call back to discuss options.

## 2020-08-14 ENCOUNTER — TELEPHONE (OUTPATIENT)
Dept: FAMILY MEDICINE CLINIC | Facility: CLINIC | Age: 60
End: 2020-08-14

## 2020-08-17 NOTE — TELEPHONE ENCOUNTER
These are common side effects of steroids. If he is feeling better, tongue no longer swollen, he does not need replacement.

## 2020-08-27 ENCOUNTER — TELEPHONE (OUTPATIENT)
Dept: FAMILY MEDICINE CLINIC | Facility: CLINIC | Age: 60
End: 2020-08-27

## 2020-08-27 DIAGNOSIS — R25.2 LEG CRAMPS: ICD-10-CM

## 2020-08-27 DIAGNOSIS — I10 ESSENTIAL HYPERTENSION: Primary | ICD-10-CM

## 2020-08-27 NOTE — TELEPHONE ENCOUNTER
PATIENT CALLED AND STATED THAT DR. IVEY PUT HIM ON A NEW BLOOD PRESSURE MEDICATION (VALSARTAN). SINCE HE HAS STATED THIS MEDICATION HE HAS HAD A LOT OF LEG CRAMPING.     PLEASE CALL AND ADVISE [phone]

## 2020-08-27 NOTE — TELEPHONE ENCOUNTER
Spoke with patient.  He is having BLE cramping since starting Valsartan.  He is staying hydrated and has started trying to eat a banana daily.  He is aware the Dr Armstrong is out of the office until Monday, but would like to see if there were any suggestions in the mean time.

## 2020-08-28 ENCOUNTER — LAB (OUTPATIENT)
Dept: FAMILY MEDICINE CLINIC | Facility: CLINIC | Age: 60
End: 2020-08-28

## 2020-08-30 LAB
TESTOST FREE SERPL-MCNC: 2.6 PG/ML (ref 6.6–18.1)
TESTOST SERPL-MCNC: 657 NG/DL (ref 264–916)

## 2020-09-01 ENCOUNTER — RESULTS ENCOUNTER (OUTPATIENT)
Dept: FAMILY MEDICINE CLINIC | Facility: CLINIC | Age: 60
End: 2020-09-01

## 2020-09-01 DIAGNOSIS — R25.2 LEG CRAMPS: ICD-10-CM

## 2020-09-01 DIAGNOSIS — I10 ESSENTIAL HYPERTENSION: ICD-10-CM

## 2020-09-15 DIAGNOSIS — G89.4 CHRONIC PAIN SYNDROME: ICD-10-CM

## 2020-09-15 RX ORDER — METHADONE HYDROCHLORIDE 5 MG/1
TABLET ORAL
Qty: 120 TABLET | Refills: 0 | Status: SHIPPED | OUTPATIENT
Start: 2020-09-15 | End: 2020-11-16 | Stop reason: SDUPTHER

## 2020-09-15 NOTE — TELEPHONE ENCOUNTER
Caller: Sterling Whaley    Relationship: Self    Best call back number: [phone]  Medication needed:   Requested Prescriptions     Pending Prescriptions Disp Refills   • methadone (DOLOPHINE) 5 MG tablet 120 tablet 0     Si tablet po up to qid.       When do you need the refill by:ASAP    What details did the patient provide when requesting the medication:    Does the patient have less than a 3 day supply:  [] Yes  [x] No    What is the patient's preferred pharmacy: [id number] - [place], [place] - [address] OF [address] - [phone] [facility] [phone] FX

## 2020-09-15 NOTE — TELEPHONE ENCOUNTER
CLAUDIO reviewed. CSA UTD. Refill approved and printed for . Pt to have UDS at time of . Please document timing of last dose of each controlled medication. Pt to keep f/u apt as scheduled.

## 2020-09-16 ENCOUNTER — CLINICAL SUPPORT (OUTPATIENT)
Dept: FAMILY MEDICINE CLINIC | Facility: CLINIC | Age: 60
End: 2020-09-16

## 2020-09-16 ENCOUNTER — TELEPHONE (OUTPATIENT)
Dept: FAMILY MEDICINE CLINIC | Facility: CLINIC | Age: 60
End: 2020-09-16

## 2020-09-16 DIAGNOSIS — Z79.899 ENCOUNTER FOR LONG-TERM (CURRENT) USE OF HIGH-RISK MEDICATION: Primary | ICD-10-CM

## 2020-09-16 RX ORDER — HYDROXYZINE PAMOATE 50 MG/1
50 CAPSULE ORAL 3 TIMES DAILY PRN
Qty: 270 CAPSULE | Refills: 1 | Status: SHIPPED | OUTPATIENT
Start: 2020-09-16 | End: 2021-01-26

## 2020-09-16 RX ORDER — HYDROXYZINE PAMOATE 50 MG/1
50 CAPSULE ORAL 3 TIMES DAILY PRN
Qty: 270 CAPSULE | Refills: 1 | Status: SHIPPED | OUTPATIENT
Start: 2020-09-16 | End: 2020-09-16 | Stop reason: SDUPTHER

## 2020-09-17 ENCOUNTER — RESULTS ENCOUNTER (OUTPATIENT)
Dept: FAMILY MEDICINE CLINIC | Facility: CLINIC | Age: 60
End: 2020-09-17

## 2020-09-17 DIAGNOSIS — Z79.899 ENCOUNTER FOR LONG-TERM (CURRENT) USE OF HIGH-RISK MEDICATION: ICD-10-CM

## 2020-09-21 ENCOUNTER — OFFICE VISIT (OUTPATIENT)
Dept: FAMILY MEDICINE CLINIC | Facility: CLINIC | Age: 60
End: 2020-09-21

## 2020-09-21 VITALS
TEMPERATURE: 97.3 F | HEIGHT: 71 IN | RESPIRATION RATE: 14 BRPM | BODY MASS INDEX: 34.02 KG/M2 | WEIGHT: 243 LBS | OXYGEN SATURATION: 97 % | SYSTOLIC BLOOD PRESSURE: 152 MMHG | DIASTOLIC BLOOD PRESSURE: 92 MMHG | HEART RATE: 68 BPM

## 2020-09-21 DIAGNOSIS — R25.2 MUSCLE CRAMPS: ICD-10-CM

## 2020-09-21 DIAGNOSIS — D69.6 THROMBOCYTOPENIA (HCC): ICD-10-CM

## 2020-09-21 DIAGNOSIS — I10 ESSENTIAL HYPERTENSION: ICD-10-CM

## 2020-09-21 DIAGNOSIS — D69.2 PURPURA (HCC): Primary | ICD-10-CM

## 2020-09-21 DIAGNOSIS — R79.89 LOW TESTOSTERONE: ICD-10-CM

## 2020-09-21 PROCEDURE — 99214 OFFICE O/P EST MOD 30 MIN: CPT | Performed by: FAMILY MEDICINE

## 2020-09-21 RX ORDER — LISINOPRIL 10 MG/1
30 TABLET ORAL DAILY
Qty: 90 TABLET | Refills: 5 | Status: SHIPPED | OUTPATIENT
Start: 2020-09-21 | End: 2021-04-13

## 2020-09-21 NOTE — PROGRESS NOTES
"Subjective   Sterling Andrea Whaley is a 60 y.o. male.     History of Present Illness   Mr. Whaley was today with concern regarding possible side effect with his blood pressure medication.  He was previously on lisinopril on when she was doing quite well in regard to his blood pressure control.  This was discontinued after he had a suspected case of mild angioedema.  He was then transitioned to valsartan.  Since starting valsartan he has had headaches, developed severe muscle cramps.  He now thinks his previous reaction may not of been due to ACE inhibitor rather \"eating large amounts of peanuts and fish oils\".    He also noticed deep bruising on his upper right arm, right leg.  He has known thrombocytopenia which has been stable.    Testosterone levels recently found to be low.  Would like to recheck those.  Continues to have chronic fatigue, dysphoric mood, sexual dysfunction.    The following portions of the patient's history were reviewed and updated as appropriate: allergies, current medications, past family history, past medical history, past social history, past surgical history and problem list.    Review of Systems   Constitutional: Positive for fatigue. Negative for fever and unexpected weight change.   HENT: Positive for congestion, hearing loss (chronic), postnasal drip and sinus pressure (chronic). Negative for drooling, facial swelling, mouth sores, nosebleeds, rhinorrhea, sore throat and trouble swallowing.    Eyes: Negative for pain, discharge, redness and visual disturbance.   Respiratory: Negative for cough, shortness of breath and wheezing.    Cardiovascular: Negative for chest pain, palpitations and leg swelling.   Gastrointestinal: Negative for abdominal pain, blood in stool, diarrhea, nausea and vomiting.   Endocrine: Positive for heat intolerance. Negative for polydipsia and polyuria.   Genitourinary: Negative for dysuria and hematuria.   Musculoskeletal: Positive for arthralgias, back pain, " joint swelling, myalgias, neck pain and neck stiffness.   Skin: Negative for rash and wound.   Neurological: Positive for dizziness (occ mild) and headaches. Negative for tremors, syncope and weakness.   Hematological: Negative for adenopathy. Does not bruise/bleed easily.   Psychiatric/Behavioral: Positive for dysphoric mood (mild). Negative for confusion, hallucinations, sleep disturbance and suicidal ideas. The patient is nervous/anxious.    Pt's previous ROS reviewed and updated as indicated.       Objective    Vitals:    09/21/20 0909   BP: 152/92   Pulse: 68   Resp: 14   Temp: 97.3 °F (36.3 °C)   SpO2: 97%     Body mass index is 33.89 kg/m².      09/21/20 0909   Weight: 110 kg (243 lb)       Physical Exam  Vitals signs and nursing note reviewed.   Constitutional:       General: He is not in acute distress.     Appearance: He is well-developed. He is obese. He is not ill-appearing.   HENT:      Head: Normocephalic.   Eyes:      General: No scleral icterus.  Cardiovascular:      Rate and Rhythm: Normal rate and regular rhythm.      Pulses: Normal pulses.      Heart sounds: Normal heart sounds. No murmur.   Pulmonary:      Effort: Pulmonary effort is normal.      Breath sounds: Normal breath sounds and air entry.   Musculoskeletal:      Right lower leg: No edema.      Left lower leg: No edema.   Skin:     General: Skin is warm and dry.      Findings: Bruising (purpura noted on upper right arm, right leg) present.   Neurological:      Mental Status: He is alert and oriented to person, place, and time.      Gait: Gait is intact.   Psychiatric:         Mood and Affect: Mood and affect normal.         Speech: Speech normal.         Behavior: Behavior is cooperative.         Thought Content: Thought content normal.         Cognition and Memory: Cognition and memory normal.         Judgment: Judgment normal.         Assessment/Plan   Sterling was seen today for hypertension.    Diagnoses and all orders for this  visit:    Purpura (CMS/HCC)  -     CBC (No Diff)    Thrombocytopenia (CMS/HCC)  -     CBC (No Diff)    Muscle cramps  -     Comprehensive Metabolic Panel  -     Magnesium    Essential hypertension  -     Comprehensive Metabolic Panel  -     Magnesium  -     lisinopril (PRINIVIL,ZESTRIL) 10 MG tablet; Take 3 tablets by mouth Daily.    Low testosterone  -     Testosterone, Free, Total       Previous diagnosis of thrombocytopenia and now with presence of purpura.  CBC as above.    Muscle cramps possibly as a side effect to his ARB.  He would like to retry lisinopril in place of valsartan.  I reviewed the risk of doing so and that recurrent angioedema may be more severe.  He is aware of reasons to seek emergent care.    Recheck testosterone levels, refers indicated.    He will follow-up as scheduled in 1 month, sooner as needed/instructed.  I will contact patient regarding test results and provide instructions regarding any necessary changes in plan of care.  Patient was encouraged to keep me informed of any acute changes, lack of improvement, or any new concerning symptoms.  Pt is aware of reasons to seek emergent care.  Patient voiced understanding of all instructions and denied further questions.              Please note that portions of this note may have been completed with a voice recognition program. Efforts were made to edit the dictations, but occasionally words are mistranscribed.

## 2020-09-24 LAB
ALBUMIN SERPL-MCNC: 4.6 G/DL (ref 3.5–5.2)
ALBUMIN/GLOB SERPL: 2 G/DL
ALP SERPL-CCNC: 96 U/L (ref 39–117)
ALT SERPL-CCNC: 46 U/L (ref 1–41)
AST SERPL-CCNC: 32 U/L (ref 1–40)
BILIRUB SERPL-MCNC: 2.4 MG/DL (ref 0–1.2)
BUN SERPL-MCNC: 12 MG/DL (ref 8–23)
BUN/CREAT SERPL: 14 (ref 7–25)
CALCIUM SERPL-MCNC: 9 MG/DL (ref 8.6–10.5)
CHLORIDE SERPL-SCNC: 101 MMOL/L (ref 98–107)
CO2 SERPL-SCNC: 25.1 MMOL/L (ref 22–29)
CREAT SERPL-MCNC: 0.86 MG/DL (ref 0.76–1.27)
ERYTHROCYTE [DISTWIDTH] IN BLOOD BY AUTOMATED COUNT: 13.1 % (ref 12.3–15.4)
GLOBULIN SER CALC-MCNC: 2.3 GM/DL
GLUCOSE SERPL-MCNC: 122 MG/DL (ref 65–99)
HCT VFR BLD AUTO: 43.1 % (ref 37.5–51)
HGB BLD-MCNC: 14.8 G/DL (ref 13–17.7)
MAGNESIUM SERPL-MCNC: 2 MG/DL (ref 1.6–2.4)
MCH RBC QN AUTO: 33.9 PG (ref 26.6–33)
MCHC RBC AUTO-ENTMCNC: 34.3 G/DL (ref 31.5–35.7)
MCV RBC AUTO: 98.6 FL (ref 79–97)
PLATELET # BLD AUTO: 68 10*3/MM3 (ref 140–450)
POTASSIUM SERPL-SCNC: 4.3 MMOL/L (ref 3.5–5.2)
PROT SERPL-MCNC: 6.9 G/DL (ref 6–8.5)
RBC # BLD AUTO: 4.37 10*6/MM3 (ref 4.14–5.8)
SODIUM SERPL-SCNC: 140 MMOL/L (ref 136–145)
TESTOST FREE SERPL-MCNC: 2.2 PG/ML (ref 6.6–18.1)
TESTOST SERPL-MCNC: 406 NG/DL (ref 264–916)
WBC # BLD AUTO: 5.2 10*3/MM3 (ref 3.4–10.8)

## 2020-10-29 ENCOUNTER — OFFICE VISIT (OUTPATIENT)
Dept: FAMILY MEDICINE CLINIC | Facility: CLINIC | Age: 60
End: 2020-10-29

## 2020-10-29 VITALS
HEART RATE: 74 BPM | BODY MASS INDEX: 34.02 KG/M2 | SYSTOLIC BLOOD PRESSURE: 128 MMHG | WEIGHT: 243 LBS | HEIGHT: 71 IN | TEMPERATURE: 97.5 F | DIASTOLIC BLOOD PRESSURE: 70 MMHG | OXYGEN SATURATION: 97 %

## 2020-10-29 DIAGNOSIS — H65.23 BILATERAL CHRONIC SEROUS OTITIS MEDIA: Primary | ICD-10-CM

## 2020-10-29 PROCEDURE — 99213 OFFICE O/P EST LOW 20 MIN: CPT | Performed by: NURSE PRACTITIONER

## 2020-10-29 PROCEDURE — 96372 THER/PROPH/DIAG INJ SC/IM: CPT | Performed by: NURSE PRACTITIONER

## 2020-10-29 RX ORDER — METHYLPREDNISOLONE ACETATE 80 MG/ML
80 INJECTION, SUSPENSION INTRA-ARTICULAR; INTRALESIONAL; INTRAMUSCULAR; SOFT TISSUE ONCE
Status: COMPLETED | OUTPATIENT
Start: 2020-10-29 | End: 2020-10-29

## 2020-10-29 RX ADMIN — METHYLPREDNISOLONE ACETATE 80 MG: 80 INJECTION, SUSPENSION INTRA-ARTICULAR; INTRALESIONAL; INTRAMUSCULAR; SOFT TISSUE at 17:21

## 2020-10-29 NOTE — PROGRESS NOTES
"      Subjective     Chief Complaint:    Chief Complaint   Patient presents with   • Ear Fullness     left ear drainage and dizziness       History of Present Illness:   Notes \"swimmy head\" for the past few days. Notes trouble with his ears in the past. Feels full. Hard to pop. Some decrease in hearing. No concurrent URI symptoms. Notes sore stomach in the morning. Started zyrtec a few days ago without improvement. Does feel dizziness when he stands up quick or moves head quickly.   Does have headache. Has chronic neck pain as well. Has had problems for years. Follows with Dr Lopez in 2009.     Review of Systems  Gen- No fevers, chills  CV- No chest pain, palpitations  Resp- No cough, dyspnea  GI- No N/V/D, abd pain    I have reviewed and/or updated the patient's past medical, surgical, family, social history and problem list as appropriate.     Medications:    Current Outpatient Medications:   •  diclofenac (VOLTAREN) 1 % gel gel, Apply 4 g topically to the appropriate area as directed 4 (Four) Times a Day As Needed (acute joint pain)., Disp: 350 g, Rfl: 0  •  fluticasone (FLONASE) 50 MCG/ACT nasal spray, USE 2 SPRAYS IN EACH NOSTRIL ONCE DAILY FOR ALLERGIC RHINITIS, Disp: 48 g, Rfl: 1  •  hydrOXYzine pamoate (VISTARIL) 50 MG capsule, Take 1 capsule by mouth 3 (Three) Times a Day As Needed for Itching or Anxiety., Disp: 270 capsule, Rfl: 1  •  lisinopril (PRINIVIL,ZESTRIL) 10 MG tablet, Take 3 tablets by mouth Daily., Disp: 90 tablet, Rfl: 5  •  methadone (DOLOPHINE) 5 MG tablet, 1 tablet po up to qid., Disp: 120 tablet, Rfl: 0  •  omeprazole (priLOSEC) 40 MG capsule, TAKE 1 CAPSULE EVERY DAY, Disp: 90 capsule, Rfl: 1  •  predniSONE (DELTASONE) 20 MG tablet, USE AS DIRECTED FOR FLARES IN JOINT PAIN, Disp: 30 tablet, Rfl: 0    Current Facility-Administered Medications:   •  cyanocobalamin injection 1,000 mcg, 1,000 mcg, Intramuscular, Q28 Days, Annel Armstrong MD, 1,000 mcg at 07/21/17 1137  •  cyanocobalamin " "injection 1,000 mcg, 1,000 mcg, Intramuscular, Q30 Days, Annel Armstrong MD, 1,000 mcg at 08/30/18 1014    Allergies:  Allergies   Allergen Reactions   • Meperidine        Objective     Vital Signs:   Vitals:    10/29/20 1648   BP: 128/70   Pulse: 74   Temp: 97.5 °F (36.4 °C)   SpO2: 97%   Weight: 110 kg (243 lb)   Height: 180.3 cm (71\")       Physical Exam:    Physical Exam  Vitals signs and nursing note reviewed.   Constitutional:       Appearance: He is well-developed.   HENT:      Head: Normocephalic and atraumatic.      Right Ear: A middle ear effusion is present. Tympanic membrane is retracted.      Left Ear: A middle ear effusion is present.      Mouth/Throat:      Pharynx: Posterior oropharyngeal erythema present.      Tonsils: No tonsillar exudate.   Eyes:      Pupils: Pupils are equal, round, and reactive to light.   Neck:      Musculoskeletal: Neck supple.   Cardiovascular:      Rate and Rhythm: Normal rate and regular rhythm.      Heart sounds: Normal heart sounds.   Pulmonary:      Effort: Pulmonary effort is normal.      Breath sounds: Normal breath sounds.   Abdominal:      General: Bowel sounds are normal. There is no distension.      Palpations: Abdomen is soft.      Tenderness: There is no abdominal tenderness.   Skin:     General: Skin is warm and dry.   Neurological:      Mental Status: He is alert and oriented to person, place, and time.   Psychiatric:         Behavior: Behavior normal.         Assessment / Plan     Assessment/Plan:   Problem List Items Addressed This Visit     None      Visit Diagnoses     Bilateral chronic serous otitis media    -  Primary    Relevant Medications    methylPREDNISolone acetate (DEPO-medrol) injection 80 mg (Completed)        -- has fluid on TM. Depo medrol IM, encouraged daily use of zyrtec and flonase.     Follow up:  Return if symptoms worsen or fail to improve.    Electronically signed by BARBARA Cabral   10/29/2020 17:07 EDT      Please note that " portions of this note may have been completed with a voice recognition program. Efforts were made to edit the dictations, but occasionally words are mistranscribed.

## 2020-11-16 DIAGNOSIS — G89.4 CHRONIC PAIN SYNDROME: ICD-10-CM

## 2020-11-16 RX ORDER — OMEPRAZOLE 40 MG/1
CAPSULE, DELAYED RELEASE ORAL
Qty: 90 CAPSULE | Refills: 1 | Status: SHIPPED | OUTPATIENT
Start: 2020-11-16 | End: 2021-04-12

## 2020-11-16 RX ORDER — METHADONE HYDROCHLORIDE 5 MG/1
TABLET ORAL
Qty: 120 TABLET | Refills: 0 | Status: SHIPPED | OUTPATIENT
Start: 2020-11-16 | End: 2021-01-13 | Stop reason: SDUPTHER

## 2020-11-16 RX ORDER — METHADONE HYDROCHLORIDE 5 MG/1
TABLET ORAL
Qty: 120 TABLET | Refills: 0 | Status: SHIPPED | OUTPATIENT
Start: 2020-11-16 | End: 2020-11-16 | Stop reason: SDUPTHER

## 2020-11-16 NOTE — TELEPHONE ENCOUNTER
Caller: Judd Sterling Mendez    Relationship: Self    Best call back number: [phone]    Medication needed:   Requested Prescriptions     Pending Prescriptions Disp Refills   • methadone (DOLOPHINE) 5 MG tablet 120 tablet 0     Si tablet po up to qid.       When do you need the refill by: 20    What details did the patient provide when requesting the medication:    Does the patient have less than a 3 day supply:  [x] Yes  [] No    What is the patient's preferred pharmacy: [id number] - [place], [place] - [address] - [phone] [facility] [phone] FX

## 2020-11-20 RX ORDER — FLUTICASONE PROPIONATE 50 MCG
SPRAY, SUSPENSION (ML) NASAL
Qty: 48 G | Refills: 1 | Status: SHIPPED | OUTPATIENT
Start: 2020-11-20 | End: 2021-04-12

## 2020-12-15 ENCOUNTER — OFFICE VISIT (OUTPATIENT)
Dept: FAMILY MEDICINE CLINIC | Facility: CLINIC | Age: 60
End: 2020-12-15

## 2020-12-15 VITALS
SYSTOLIC BLOOD PRESSURE: 140 MMHG | RESPIRATION RATE: 14 BRPM | TEMPERATURE: 97.8 F | BODY MASS INDEX: 34.72 KG/M2 | HEART RATE: 63 BPM | WEIGHT: 248 LBS | DIASTOLIC BLOOD PRESSURE: 84 MMHG | OXYGEN SATURATION: 97 % | HEIGHT: 71 IN

## 2020-12-15 DIAGNOSIS — M79.601 BILATERAL ARM PAIN: ICD-10-CM

## 2020-12-15 DIAGNOSIS — M48.02 SPINAL STENOSIS OF CERVICAL REGION: Primary | ICD-10-CM

## 2020-12-15 DIAGNOSIS — M79.602 BILATERAL ARM PAIN: ICD-10-CM

## 2020-12-15 DIAGNOSIS — M47.812 OSTEOARTHRITIS OF CERVICAL SPINE, UNSPECIFIED SPINAL OSTEOARTHRITIS COMPLICATION STATUS: ICD-10-CM

## 2020-12-15 DIAGNOSIS — R29.898 BILATERAL ARM WEAKNESS: ICD-10-CM

## 2020-12-15 DIAGNOSIS — E53.8 VITAMIN B 12 DEFICIENCY: ICD-10-CM

## 2020-12-15 PROCEDURE — 99213 OFFICE O/P EST LOW 20 MIN: CPT | Performed by: FAMILY MEDICINE

## 2020-12-15 PROCEDURE — 96372 THER/PROPH/DIAG INJ SC/IM: CPT | Performed by: FAMILY MEDICINE

## 2020-12-15 RX ADMIN — CYANOCOBALAMIN 1000 MCG: 1000 INJECTION, SOLUTION INTRAMUSCULAR; SUBCUTANEOUS at 09:48

## 2020-12-15 NOTE — PROGRESS NOTES
"Subjective   Sterling Andrea Whaley is a 60 y.o. male.     History of Present Illness   Mr. Whaley is a 60-year-old  male with diffuse spinal osteoarthritis, degenerative disc disease with stenosis.  He complains of burning/aching, severe pain radiating from neck into bilateral shoulders, upper back, down both arms.  Steadily worsening over the last several months.  Associated with difficulty sleeping, loss of range of motion in the neck.  He has particularly noticed difficulty \"looking up for too long\" which has significantly limited his activity.  Has noted increased weakness and painful paresthesia in the upper extremities.  Taking his routine medications as prescribed.  Does not appear to be particularly helpful for his neck pain.  He has had extensive evaluation for his diffuse DDD/DJD in past including consultation with neurosurgery, pain management etc.  Several years since his last C-spine imaging. No recent fall or trauma.    The following portions of the patient's history were reviewed and updated as appropriate: allergies, current medications, past family history, past medical history, past social history, past surgical history and problem list.    Review of Systems   Constitutional: Positive for fatigue. Negative for fever and unexpected weight change.   HENT: Positive for congestion, hearing loss (chronic), postnasal drip and sinus pressure (chronic). Negative for drooling, facial swelling, mouth sores, nosebleeds, rhinorrhea, sore throat and trouble swallowing.    Eyes: Negative for pain, discharge, redness and visual disturbance.   Respiratory: Negative for cough, shortness of breath and wheezing.    Cardiovascular: Negative for chest pain, palpitations and leg swelling.   Gastrointestinal: Negative for abdominal pain, blood in stool, diarrhea, nausea and vomiting.   Endocrine: Positive for heat intolerance. Negative for polydipsia and polyuria.   Genitourinary: Negative for dysuria and " hematuria.   Musculoskeletal: Positive for arthralgias, back pain, joint swelling, myalgias, neck pain and neck stiffness.   Skin: Negative for rash and wound.   Neurological: Positive for dizziness (occ mild), weakness, numbness and headaches. Negative for tremors and syncope.   Hematological: Negative for adenopathy. Does not bruise/bleed easily.   Psychiatric/Behavioral: Positive for dysphoric mood (mild). Negative for confusion, hallucinations, sleep disturbance and suicidal ideas. The patient is nervous/anxious.    Pt's previous ROS reviewed and updated as indicated.       Objective    Vitals:    12/15/20 0914   BP: 140/84   Pulse: 63   Resp: 14   Temp: 97.8 °F (36.6 °C)   SpO2: 97%     Body mass index is 34.59 kg/m².      12/15/20  0914   Weight: 112 kg (248 lb)       Physical Exam  Vitals signs and nursing note reviewed.   Constitutional:       General: He is not in acute distress.     Appearance: He is well-developed. He is obese. He is not ill-appearing.   HENT:      Head: Normocephalic and atraumatic.   Eyes:      General: No scleral icterus.  Cardiovascular:      Rate and Rhythm: Normal rate and regular rhythm.      Pulses: Normal pulses.      Heart sounds: Normal heart sounds. No murmur.   Pulmonary:      Effort: Pulmonary effort is normal.      Breath sounds: Normal breath sounds and air entry.   Musculoskeletal:      Cervical back: He exhibits decreased range of motion (most notable with extension and right rotation, lateral flexion both sides), tenderness (diffuse soft tissue), bony tenderness (C4-C7), deformity (loss of normal lordosis) and spasm.      Right lower leg: No edema.      Left lower leg: No edema.   Skin:     General: Skin is warm and dry.      Coloration: Skin is not jaundiced or pale.      Findings: No bruising or rash.   Neurological:      Mental Status: He is alert and oriented to person, place, and time.      Sensory: Sensation is intact.      Gait: Gait is intact.      Deep Tendon  Reflexes:      Reflex Scores:       Tricep reflexes are 1+ on the right side and 1+ on the left side.       Bicep reflexes are 1+ on the right side and 1+ on the left side.       Brachioradialis reflexes are 1+ on the right side and 1+ on the left side.     Comments: No michael weakness, generally weak during motor exam BUE due to pain   Psychiatric:         Mood and Affect: Mood and affect normal.         Speech: Speech normal.         Behavior: Behavior is cooperative.         Thought Content: Thought content normal.         Cognition and Memory: Cognition and memory normal.         Judgment: Judgment normal.       Lab Results   Component Value Date    [id number] 06/09/2020       Assessment/Plan   Diagnoses and all orders for this visit:    1. Spinal stenosis of cervical region (Primary)  -     MRI Cervical Spine Without Contrast; Future    2. Vitamin B 12 deficiency    3. Osteoarthritis of cervical spine, unspecified spinal osteoarthritis complication status  -     MRI Cervical Spine Without Contrast; Future    4. Bilateral arm pain  -     MRI Cervical Spine Without Contrast; Future    5. Bilateral arm weakness  -     MRI Cervical Spine Without Contrast; Future       Progressive neck pain with radiation to bilateral upper extremities associated with weakness, painful paresthesia.  Has known underlying cervical DDD/DJD with stenosis.  For that reason updated imaging warranted.  Refer to neurosurgery for reeval per his request.    Continue IM B12 replacement.  Subsequent injection provided today.    Otherwise continue his current medical management, TENS unit, topical analgesics, ice/heat etc., activity modification.    Routine follow-up as scheduled, follow-up sooner as needed/instructed.  I will contact patient regarding test results and provide instructions regarding any necessary changes in plan of care.  Patient was encouraged to keep me informed of any acute changes, lack of improvement, or any new  concerning symptoms.  Pt is aware of reasons to seek emergent care.  Patient voiced understanding of all instructions and denied further questions.              Please note that portions of this note may have been completed with a voice recognition program. Efforts were made to edit the dictations, but occasionally words are mistranscribed.

## 2020-12-28 DIAGNOSIS — M47.812 OSTEOARTHRITIS OF CERVICAL SPINE, UNSPECIFIED SPINAL OSTEOARTHRITIS COMPLICATION STATUS: ICD-10-CM

## 2020-12-28 DIAGNOSIS — M48.02 SPINAL STENOSIS OF CERVICAL REGION: Primary | ICD-10-CM

## 2020-12-28 NOTE — PROGRESS NOTES
Please inform pt his C spine MRI does confirm severe osteoarthritis and narrowing around nerves. Referral to neurosurgery placed.

## 2021-01-13 DIAGNOSIS — G89.4 CHRONIC PAIN SYNDROME: ICD-10-CM

## 2021-01-13 RX ORDER — METHADONE HYDROCHLORIDE 5 MG/1
TABLET ORAL
Qty: 120 TABLET | Refills: 0 | Status: SHIPPED | OUTPATIENT
Start: 2021-01-13 | End: 2021-03-08 | Stop reason: SDUPTHER

## 2021-01-13 NOTE — TELEPHONE ENCOUNTER
Caller: Sterling Whaley    Relationship: Self    Best call back number: [phone]    Medication needed:   Requested Prescriptions     Pending Prescriptions Disp Refills   • methadone (DOLOPHINE) 5 MG tablet 120 tablet 0     Si tablet po up to qid.       When do you need the refill by: TODAY    What details did the patient provide when requesting the medication: PATIENT ONLY HAS 2-3 DAYS OF MEDICATION REMAINING.    Does the patient have less than a 3 day supply:  [x] Yes  [] No    What is the patient's preferred pharmacy: [facility] #[id number] [place], [place] - [facility] [address] - [phone]  - [phone] FX

## 2021-01-26 RX ORDER — HYDROXYZINE PAMOATE 50 MG/1
CAPSULE ORAL
Qty: 270 CAPSULE | Refills: 1 | Status: SHIPPED | OUTPATIENT
Start: 2021-01-26 | End: 2021-06-08 | Stop reason: SDUPTHER

## 2021-02-19 ENCOUNTER — OFFICE VISIT (OUTPATIENT)
Dept: FAMILY MEDICINE CLINIC | Facility: CLINIC | Age: 61
End: 2021-02-19

## 2021-02-19 VITALS
HEIGHT: 71 IN | TEMPERATURE: 97.8 F | HEART RATE: 78 BPM | SYSTOLIC BLOOD PRESSURE: 140 MMHG | WEIGHT: 246 LBS | DIASTOLIC BLOOD PRESSURE: 80 MMHG | BODY MASS INDEX: 34.44 KG/M2 | OXYGEN SATURATION: 98 %

## 2021-02-19 DIAGNOSIS — J30.9 ALLERGIC RHINITIS, UNSPECIFIED SEASONALITY, UNSPECIFIED TRIGGER: ICD-10-CM

## 2021-02-19 DIAGNOSIS — H65.23 BILATERAL CHRONIC SEROUS OTITIS MEDIA: Primary | ICD-10-CM

## 2021-02-19 PROCEDURE — 96372 THER/PROPH/DIAG INJ SC/IM: CPT | Performed by: NURSE PRACTITIONER

## 2021-02-19 PROCEDURE — 99213 OFFICE O/P EST LOW 20 MIN: CPT | Performed by: NURSE PRACTITIONER

## 2021-02-19 RX ORDER — CETIRIZINE HYDROCHLORIDE 10 MG/1
10 TABLET ORAL DAILY
Qty: 90 TABLET | Refills: 1 | Status: SHIPPED | OUTPATIENT
Start: 2021-02-19 | End: 2021-07-13

## 2021-02-19 RX ORDER — METHYLPREDNISOLONE ACETATE 80 MG/ML
80 INJECTION, SUSPENSION INTRA-ARTICULAR; INTRALESIONAL; INTRAMUSCULAR; SOFT TISSUE ONCE
Status: COMPLETED | OUTPATIENT
Start: 2021-02-19 | End: 2021-02-19

## 2021-02-19 RX ADMIN — METHYLPREDNISOLONE ACETATE 80 MG: 80 INJECTION, SUSPENSION INTRA-ARTICULAR; INTRALESIONAL; INTRAMUSCULAR; SOFT TISSUE at 11:42

## 2021-02-19 NOTE — PROGRESS NOTES
Subjective     Chief Complaint:    Chief Complaint   Patient presents with   • Dizziness     fluid in his ears   • Sinusitis       History of Present Illness:   Patient here for allergy symptoms and dizziness since Friday.  Denies ear pain but can feel fluid behind his ears.  Sometimes has drainage that he can feel running down his throat, but denies a sore throat.  Dizziness happens when he changes positions, especially when he lies down or rolls over in bed.  Symptoms are worse in the morning.  Has tried OTC Zyrtec in the past but not regularly, and restarted on Friday and hasn't noticed an improvement.  Always reports an infrequent use of nasal sprays.  Has been seen for similar ear complaints in the past where he has received IM steroids to help.  Reports oral steroids aggravate his GERD.  Previously seen by ENT, was supposed to have ear tubes placed, but never has had the procedure.      Review of Systems  Gen- No fevers, chills  CV- No chest pain, palpitations  Resp- No cough, dyspnea  GI- No N/V/D, abd pain  Neuro-No dizziness, headaches      I have reviewed and/or updated the patient's past medical, surgical, family, social history and problem list as appropriate.     Medications:    Current Outpatient Medications:   •  fluticasone (FLONASE) 50 MCG/ACT nasal spray, USE 2 SPRAYS IN EACH NOSTRIL EVERY DAY FOR ALLERGIC RHINITIS, Disp: 48 g, Rfl: 1  •  hydrOXYzine pamoate (VISTARIL) 50 MG capsule, TAKE 1 CAPSULE THREE TIMES A DAY AS NEEDED FOR ITCHING OR ANXIETY., Disp: 270 capsule, Rfl: 1  •  lisinopril (PRINIVIL,ZESTRIL) 10 MG tablet, Take 3 tablets by mouth Daily., Disp: 90 tablet, Rfl: 5  •  methadone (DOLOPHINE) 5 MG tablet, 1 tablet po up to qid., Disp: 120 tablet, Rfl: 0  •  omeprazole (priLOSEC) 40 MG capsule, TAKE 1 CAPSULE EVERY DAY, Disp: 90 capsule, Rfl: 1  •  predniSONE (DELTASONE) 20 MG tablet, USE AS DIRECTED FOR FLARES IN JOINT PAIN, Disp: 30 tablet, Rfl: 0  •  cetirizine (zyrTEC) 10 MG  "tablet, Take 1 tablet by mouth Daily., Disp: 90 tablet, Rfl: 1    Current Facility-Administered Medications:   •  cyanocobalamin injection 1,000 mcg, 1,000 mcg, Intramuscular, Q28 Days, Annel Armstrong MD, 1,000 mcg at 07/21/17 1137  •  cyanocobalamin injection 1,000 mcg, 1,000 mcg, Intramuscular, Q30 Days, Annel Armstrong MD, 1,000 mcg at 12/15/20 0948    Allergies:  Allergies   Allergen Reactions   • Meperidine        Objective     Vital Signs:   Vitals:    02/19/21 1106   BP: 140/80   Pulse: 78   Temp: 97.8 °F (36.6 °C)   SpO2: 98%   Weight: 112 kg (246 lb)   Height: 180.3 cm (71\")   PainSc: 0-No pain       Physical Exam:    Physical Exam  Vitals signs and nursing note reviewed.   Constitutional:       Appearance: Normal appearance.   HENT:      Head: Normocephalic and atraumatic.      Right Ear: A middle ear effusion is present. Tympanic membrane is bulging. Tympanic membrane is not erythematous.      Left Ear: A middle ear effusion is present. Tympanic membrane is not erythematous.      Nose: Nose normal.      Right Sinus: No maxillary sinus tenderness or frontal sinus tenderness.      Left Sinus: No maxillary sinus tenderness or frontal sinus tenderness.      Mouth/Throat:      Mouth: Mucous membranes are moist.      Pharynx: Oropharynx is clear.   Neck:      Musculoskeletal: Neck supple. No muscular tenderness.   Cardiovascular:      Rate and Rhythm: Normal rate and regular rhythm.      Pulses: Normal pulses.      Heart sounds: Normal heart sounds.   Pulmonary:      Effort: Pulmonary effort is normal.      Breath sounds: Normal breath sounds.   Lymphadenopathy:      Cervical: No cervical adenopathy.   Skin:     General: Skin is warm and dry.      Capillary Refill: Capillary refill takes less than 2 seconds.   Neurological:      General: No focal deficit present.      Mental Status: He is alert and oriented to person, place, and time.   Psychiatric:         Mood and Affect: Mood normal.         Behavior: " Behavior normal. Behavior is cooperative.         Assessment / Plan     Assessment/Plan:   Problem List Items Addressed This Visit     None      Visit Diagnoses     Bilateral chronic serous otitis media    -  Primary    Relevant Medications    methylPREDNISolone acetate (DEPO-medrol) injection 80 mg (Completed)    Allergic rhinitis, unspecified seasonality, unspecified trigger        Relevant Medications    cetirizine (zyrTEC) 10 MG tablet        -No antibiotic needed at this time.  Depo-medrol given in office to treat effusions as previously done in past visits and worked well.  -Continue taking Zyrtec on a daily basis at night instead of as needed.  -If no improvement, may need to another evaluation by ENT.    Follow up:  Return if symptoms worsen or fail to improve.    Electronically signed by BARBARA Cabral   02/19/2021 11:20 EST      Please note that portions of this note may have been completed with a voice recognition program. Efforts were made to edit the dictations, but occasionally words are mistranscribed.

## 2021-02-23 ENCOUNTER — TELEPHONE (OUTPATIENT)
Dept: FAMILY MEDICINE CLINIC | Facility: CLINIC | Age: 61
End: 2021-02-23

## 2021-02-23 RX ORDER — AMOXICILLIN AND CLAVULANATE POTASSIUM 875; 125 MG/1; MG/1
1 TABLET, FILM COATED ORAL 2 TIMES DAILY
Qty: 14 TABLET | Refills: 0 | Status: SHIPPED | OUTPATIENT
Start: 2021-02-23 | End: 2021-03-02

## 2021-02-23 NOTE — TELEPHONE ENCOUNTER
Patient states that he was seen last Friday with an earache and he was given Prednisone, but it has not helped and wondered if he could get an antibiotic called in to [facility].  He can be reached at [phone]

## 2021-03-08 ENCOUNTER — OFFICE VISIT (OUTPATIENT)
Dept: FAMILY MEDICINE CLINIC | Facility: CLINIC | Age: 61
End: 2021-03-08

## 2021-03-08 VITALS
SYSTOLIC BLOOD PRESSURE: 132 MMHG | RESPIRATION RATE: 20 BRPM | OXYGEN SATURATION: 98 % | TEMPERATURE: 98.2 F | BODY MASS INDEX: 35 KG/M2 | WEIGHT: 250 LBS | DIASTOLIC BLOOD PRESSURE: 78 MMHG | HEART RATE: 71 BPM | HEIGHT: 71 IN

## 2021-03-08 DIAGNOSIS — G89.4 CHRONIC PAIN SYNDROME: ICD-10-CM

## 2021-03-08 DIAGNOSIS — H81.13 BENIGN PAROXYSMAL POSITIONAL VERTIGO DUE TO BILATERAL VESTIBULAR DISORDER: Primary | ICD-10-CM

## 2021-03-08 DIAGNOSIS — H65.21 RIGHT CHRONIC SEROUS OTITIS MEDIA: ICD-10-CM

## 2021-03-08 DIAGNOSIS — H69.81 DYSFUNCTION OF RIGHT EUSTACHIAN TUBE: ICD-10-CM

## 2021-03-08 PROCEDURE — 96372 THER/PROPH/DIAG INJ SC/IM: CPT | Performed by: FAMILY MEDICINE

## 2021-03-08 PROCEDURE — 99214 OFFICE O/P EST MOD 30 MIN: CPT | Performed by: FAMILY MEDICINE

## 2021-03-08 RX ORDER — METHADONE HYDROCHLORIDE 5 MG/1
TABLET ORAL
Qty: 120 TABLET | Refills: 0 | Status: SHIPPED | OUTPATIENT
Start: 2021-03-08 | End: 2021-05-12 | Stop reason: SDUPTHER

## 2021-03-08 RX ORDER — TOPIRAMATE 25 MG/1
TABLET ORAL
COMMUNITY
Start: 2021-02-16 | End: 2021-06-08 | Stop reason: SDDI

## 2021-03-08 RX ORDER — MECLIZINE HYDROCHLORIDE 25 MG/1
25 TABLET ORAL 3 TIMES DAILY PRN
Qty: 30 TABLET | Refills: 1 | Status: SHIPPED | OUTPATIENT
Start: 2021-03-08 | End: 2021-06-08

## 2021-03-08 RX ORDER — METHYLPREDNISOLONE ACETATE 80 MG/ML
80 INJECTION, SUSPENSION INTRA-ARTICULAR; INTRALESIONAL; INTRAMUSCULAR; SOFT TISSUE ONCE
Status: COMPLETED | OUTPATIENT
Start: 2021-03-08 | End: 2021-03-08

## 2021-03-08 RX ADMIN — METHYLPREDNISOLONE ACETATE 80 MG: 80 INJECTION, SUSPENSION INTRA-ARTICULAR; INTRALESIONAL; INTRAMUSCULAR; SOFT TISSUE at 11:34

## 2021-03-08 NOTE — PROGRESS NOTES
Subjective   Sterling Whaley is a 60 y.o. male.     History of Present Illness   Mr. Whaley presents today with c/o bilateral ear fullness, congestion, nausea, positional vertigo. Seen 2 weeks ago for same. tx'd with abx, steroids, antihistamines, without much improvement. Worse when lying down in bed, rolling over. No new focal neuro symptoms. Has known chronic ETD dysfunction and T tubes were discussed with him by ENT some time ago but he declined. He would like new referral.    Also due for routine refill of pain medication. Has had consultation with Dr. Lopez regarding his severe C-DDD. Not felt to be a surgical candidate. On averages uses 2 methadone per day. rx generally lasts him 2 months. Denies side effects.    The following portions of the patient's history were reviewed and updated as appropriate: allergies, current medications, past family history, past medical history, past social history, past surgical history and problem list.    Review of Systems   Constitutional: Positive for fatigue. Negative for fever and unexpected weight change.   HENT: Positive for congestion, hearing loss (chronic), postnasal drip and sinus pressure (chronic). Negative for drooling, facial swelling, mouth sores, nosebleeds, rhinorrhea, sore throat and trouble swallowing.    Eyes: Negative for pain, discharge, redness and visual disturbance.   Respiratory: Negative for cough, shortness of breath and wheezing.    Cardiovascular: Negative for chest pain, palpitations and leg swelling.   Gastrointestinal: Negative for abdominal pain, blood in stool, diarrhea, nausea and vomiting.   Endocrine: Positive for heat intolerance. Negative for polydipsia and polyuria.   Genitourinary: Negative for dysuria and hematuria.   Musculoskeletal: Positive for arthralgias, back pain, joint swelling, myalgias, neck pain and neck stiffness.   Skin: Negative for rash and wound.   Neurological: Positive for dizziness, weakness, numbness and  headaches. Negative for tremors and syncope.   Hematological: Negative for adenopathy. Does not bruise/bleed easily.   Psychiatric/Behavioral: Positive for dysphoric mood (mild). Negative for confusion, hallucinations, sleep disturbance and suicidal ideas. The patient is nervous/anxious.    Pt's previous ROS reviewed and updated as indicated.       Objective    Vitals:    03/08/21 1046   BP: 132/78   Pulse: 71   Resp: 20   Temp: 98.2 °F (36.8 °C)   SpO2: 98%     Body mass index is 34.88 kg/m².      03/08/21  1046   Weight: 113 kg (250 lb)       Physical Exam  Vitals and nursing note reviewed.   Constitutional:       Appearance: He is well-developed, well-groomed and overweight. He is not ill-appearing.   HENT:      Head: Normocephalic and atraumatic.      Right Ear: Ear canal and external ear normal. A middle ear effusion (serous) is present. Tympanic membrane is injected and retracted.      Left Ear: Ear canal and external ear normal. Tympanic membrane is injected and retracted.      Nose: Mucosal edema and congestion present.   Eyes:      Conjunctiva/sclera: Conjunctivae normal.   Cardiovascular:      Rate and Rhythm: Normal rate and regular rhythm.      Pulses: Normal pulses.      Heart sounds: Normal heart sounds.   Pulmonary:      Effort: Pulmonary effort is normal.      Breath sounds: Normal breath sounds.   Musculoskeletal:      Right lower leg: No edema.      Left lower leg: No edema.   Lymphadenopathy:      Cervical: No cervical adenopathy.   Neurological:      Mental Status: He is alert and oriented to person, place, and time.      Cranial Nerves: Cranial nerves are intact.      Coordination: Coordination is intact.      Gait: Gait is intact.   Psychiatric:         Mood and Affect: Mood and affect normal.         Behavior: Behavior normal. Behavior is cooperative.         Assessment/Plan   Diagnoses and all orders for this visit:    1. Benign paroxysmal positional vertigo due to bilateral vestibular  disorder (Primary)  -     methylPREDNISolone acetate (DEPO-medrol) injection 80 mg  -     meclizine (ANTIVERT) 25 MG tablet; Take 1 tablet by mouth 3 (Three) Times a Day As Needed for Dizziness.  Dispense: 30 tablet; Refill: 1    2. Chronic pain syndrome  -     methadone (DOLOPHINE) 5 MG tablet; 1 tablet po up to qid.  Dispense: 120 tablet; Refill: 0    3. Dysfunction of right eustachian tube  -     Ambulatory Referral to ENT (Otolaryngology)    4. Right chronic serous otitis media  -     Ambulatory Referral to ENT (Otolaryngology)       Trial of depomedrol, meclizine, nasal CS, mucinex. Referral to ENT as above. Avoid high salt, increase water intake.    Stable chronic pain, routine refill as above.  As part of patient's treatment plan I am prescribing a controlled substance.  The patient has been made aware of the appropriate use of such medications, including potential risk of somnolence, limited ability to drive and/or work safely, and potential for dependence and/or overdose.  It has also been made clear that these medications are for use by this patient only, without concomitant use of alcohol or other substances, unless prescribed.  History and physical exam exhibit continued safe and appropriate use of controlled substance.  CLAUDIO reported.  I will contact patient regarding test results and provide instructions regarding any necessary changes in plan of care.    F/u in 3 months, sooner as needed.  Patient was encouraged to keep me informed of any acute changes, lack of improvement, or any new concerning symptoms.  Pt is aware of reasons to seek emergent care.  Patient voiced understanding of all instructions and denied further questions.

## 2021-04-12 RX ORDER — OMEPRAZOLE 40 MG/1
CAPSULE, DELAYED RELEASE ORAL
Qty: 90 CAPSULE | Refills: 1 | Status: SHIPPED | OUTPATIENT
Start: 2021-04-12 | End: 2021-09-02

## 2021-04-12 RX ORDER — FLUTICASONE PROPIONATE 50 MCG
SPRAY, SUSPENSION (ML) NASAL
Qty: 48 G | Refills: 1 | Status: SHIPPED | OUTPATIENT
Start: 2021-04-12 | End: 2021-09-02

## 2021-04-13 DIAGNOSIS — I10 ESSENTIAL HYPERTENSION: ICD-10-CM

## 2021-04-13 RX ORDER — LISINOPRIL 10 MG/1
30 TABLET ORAL DAILY
Qty: 90 TABLET | Refills: 5 | Status: SHIPPED | OUTPATIENT
Start: 2021-04-13 | End: 2021-06-08 | Stop reason: SDUPTHER

## 2021-05-12 DIAGNOSIS — G89.4 CHRONIC PAIN SYNDROME: ICD-10-CM

## 2021-05-12 RX ORDER — METHADONE HYDROCHLORIDE 5 MG/1
TABLET ORAL
Qty: 120 TABLET | Refills: 0 | Status: SHIPPED | OUTPATIENT
Start: 2021-05-12 | End: 2021-07-20 | Stop reason: SDUPTHER

## 2021-05-12 RX ORDER — MOMETASONE FUROATE 1 MG/G
CREAM TOPICAL
COMMUNITY
Start: 2021-05-03 | End: 2021-07-13

## 2021-05-12 RX ORDER — LEVOCETIRIZINE DIHYDROCHLORIDE 5 MG/1
5 TABLET, FILM COATED ORAL EVERY EVENING
COMMUNITY
Start: 2021-04-27 | End: 2021-06-08 | Stop reason: ALTCHOICE

## 2021-05-12 RX ORDER — AZELASTINE 1 MG/ML
2 SPRAY, METERED NASAL 2 TIMES DAILY
COMMUNITY
Start: 2021-04-27 | End: 2021-06-08 | Stop reason: ALTCHOICE

## 2021-05-12 NOTE — TELEPHONE ENCOUNTER
Caller: Sterling Whaley    Relationship: Self    Best call back number: [phone]    Medication needed:   Requested Prescriptions     Pending Prescriptions Disp Refills   • methadone (DOLOPHINE) 5 MG tablet 120 tablet 0     Si tablet po up to qid.       When do you need the refill by: ASAP        Does the patient have less than a 3 day supply:  [x] Yes  [] No    What is the patient's preferred pharmacy:      [facility]

## 2021-06-08 ENCOUNTER — OFFICE VISIT (OUTPATIENT)
Dept: FAMILY MEDICINE CLINIC | Facility: CLINIC | Age: 61
End: 2021-06-08

## 2021-06-08 VITALS
OXYGEN SATURATION: 98 % | RESPIRATION RATE: 18 BRPM | SYSTOLIC BLOOD PRESSURE: 136 MMHG | BODY MASS INDEX: 33.91 KG/M2 | HEART RATE: 84 BPM | DIASTOLIC BLOOD PRESSURE: 90 MMHG | WEIGHT: 243 LBS

## 2021-06-08 DIAGNOSIS — M72.0 DUPUYTREN'S CONTRACTURE OF RIGHT HAND: ICD-10-CM

## 2021-06-08 DIAGNOSIS — R73.01 IFG (IMPAIRED FASTING GLUCOSE): ICD-10-CM

## 2021-06-08 DIAGNOSIS — G89.4 CHRONIC PAIN SYNDROME: Primary | ICD-10-CM

## 2021-06-08 DIAGNOSIS — I10 ESSENTIAL HYPERTENSION: ICD-10-CM

## 2021-06-08 DIAGNOSIS — D69.6 THROMBOCYTOPENIA (HCC): ICD-10-CM

## 2021-06-08 DIAGNOSIS — E80.6 HYPERBILIRUBINEMIA: ICD-10-CM

## 2021-06-08 DIAGNOSIS — R79.89 LOW TESTOSTERONE: ICD-10-CM

## 2021-06-08 DIAGNOSIS — B35.6 TINEA CRURIS: ICD-10-CM

## 2021-06-08 DIAGNOSIS — E78.2 MIXED HYPERLIPIDEMIA: ICD-10-CM

## 2021-06-08 DIAGNOSIS — E55.9 VITAMIN D DEFICIENCY: ICD-10-CM

## 2021-06-08 PROCEDURE — 99214 OFFICE O/P EST MOD 30 MIN: CPT | Performed by: FAMILY MEDICINE

## 2021-06-08 RX ORDER — HYDROXYZINE PAMOATE 50 MG/1
50 CAPSULE ORAL 3 TIMES DAILY PRN
Qty: 270 CAPSULE | Refills: 1 | Status: SHIPPED | OUTPATIENT
Start: 2021-06-08 | End: 2021-07-06

## 2021-06-08 RX ORDER — LISINOPRIL 10 MG/1
30 TABLET ORAL DAILY
Qty: 90 TABLET | Refills: 5 | Status: SHIPPED | OUTPATIENT
Start: 2021-06-08 | End: 2022-08-01

## 2021-06-08 RX ORDER — TERBINAFINE HYDROCHLORIDE 250 MG/1
250 TABLET ORAL DAILY
Qty: 14 TABLET | Refills: 0 | Status: SHIPPED | OUTPATIENT
Start: 2021-06-08 | End: 2021-06-22

## 2021-06-08 NOTE — PATIENT INSTRUCTIONS
Jock Itch  Jock itch (tinea cruris) is an infection of the skin in the groin area. It is caused by a fungus, which is a type of germ that lives in dark, damp places. Jock itch causes an itchy rash in the groin and upper thigh area. It usually goes away in 2-3 weeks with treatment.  What are the causes?  The fungus that causes jock itch may be spread by:  · Touching a fungus infection elsewhere on your body, such as athlete's foot, and then touching your groin area.  · Sharing towels or clothing, such as socks or shoes, with someone who has a fungal infection.  What increases the risk?  Jock itch is most common in men and adolescent boys. You are also more likely to develop the condition if you:  · Are in a hot, humid climate.  · Wear tight-fitting clothing or wet bathing suits for long periods of time.  · Play sports.  · Are overweight.  · Have diabetes.  · Have a weakened immune system.  · Sweat a lot.  What are the signs or symptoms?  Symptoms of jock itch may include:  · A red, pink, or brown rash in the groin area. Blisters may be present. The rash may spread to the thighs, anus, and buttocks.  · Dry and scaly skin on or around the rash.  · Itchiness.  How is this diagnosed?  In most cases, your health care provider can make the diagnosis by looking at your rash. In some cases, a sample of infected skin may be scraped off. This sample may be examined under a microscope (biopsy) or by trying to grow the fungus from the sample (culture).  How is this treated?  Treatment for this condition may include:  · Antifungal medicine to kill the fungus. This may be a skin cream, ointment, or powder, or it may be a medicine that you take by mouth.  · Skin cream or ointment to reduce itching.  · Lifestyle changes, such as wearing looser clothing and caring for your skin.  Follow these instructions at home:  Skin care  · Apply skin creams, ointments, or powders exactly as told by your health care provider.  · Wear  loose-fitting clothing that does not rub against your groin area. Men should wear boxer shorts or loose-fitting underwear.  · Keep your groin area clean and dry.  ? Change your underwear every day.  ? Change out of wet bathing suits as soon as possible.  ? After bathing, use a separate towel to dry your groin area thoroughly and gently. Using a separate towel will help prevent spreading the infection to other areas of your body.  · Avoid hot baths and showers. Hot water can make itching worse.  · Do not scratch the affected area.  General instructions  · Take and apply over-the-counter and prescription medicines only as told by your health care provider.  · Do not share towels, clothing, or personal items with other people.  · Wash your hands often with soap and water, especially after touching your groin area. If soap and water are not available, use alcohol-based hand .  Contact a health care provider if:  · Your rash:  ? Gets worse or does not get better after 2 weeks of treatment.  ? Spreads.  ? Returns after treatment is finished.  · You have any of the following:  ? A fever.  ? New or worsening redness, swelling, or pain around your rash.  ? Fluid, blood, or pus coming from your rash.  Summary  · Jock itch (tinea cruris) is a fungal infection of the skin in the groin area.  · The fungus can be spread by sharing clothing or by touching a fungus infection elsewhere on your body and then touching your groin area.  · Treatment may include antifungal medicine and lifestyle changes, such as keeping the area clean and dry.  This information is not intended to replace advice given to you by your health care provider. Make sure you discuss any questions you have with your health care provider.  Document Revised: 11/30/2018 Document Reviewed: 11/28/2018  [employer] Patient Education © 2021 [employer]

## 2021-06-08 NOTE — PROGRESS NOTES
"Subjective   Sterling Andrea Whaley is a 61 y.o. male.     History of Present Illness   Mr. Whaley is a 61 year-old  male with diffuse spinal osteoarthritis, degenerative disc disease with stenosis. Here for routine f/u on chronic pain syndrome. He complains of burning/aching, severe pain radiating from neck into bilateral shoulders, upper back, down both arms.  Associated with difficulty sleeping, loss of range of motion in the neck.  He has particularly noticed difficulty \"looking up for too long\" which has significantly limited his activity.  Has noted increased weakness and painful paresthesia in the upper extremities.  Taking his routine medications as prescribed.  He has had extensive evaluation for his diffuse DDD/DJD in past including consultation with neurosurgery, pain management etc.  No recent fall or trauma.    He c/o \"jock itch\" not better with OTC meds. Also with intensely pruritic rash on shins for several weeks. Not responding to low dose topical CS.    Also c/o increased pain in palm of right hand with contracture of skin, development on \"Knot\" in palm. Limiting ROM, increased pain with use.    Chronic conditions include low plts, high BR, HLP, low testosterone, low vit D. Due for f/u labs.    Taking antihyertensive as rx'd. BP has been fairly well controlled.    Pt's previous HPI reviewed and updated as indicated.     The following portions of the patient's history were reviewed and updated as appropriate: allergies, current medications, past family history, past medical history, past social history, past surgical history and problem list.    Review of Systems   Constitutional: Positive for fatigue. Negative for fever and unexpected weight change.   HENT: Positive for congestion, hearing loss (chronic), postnasal drip and sinus pressure (chronic). Negative for drooling, facial swelling, mouth sores, nosebleeds, rhinorrhea, sore throat and trouble swallowing.    Eyes: Negative for pain, " discharge, redness and visual disturbance.   Respiratory: Negative for cough, shortness of breath and wheezing.    Cardiovascular: Negative for chest pain, palpitations and leg swelling.   Gastrointestinal: Negative for abdominal pain, blood in stool, diarrhea, nausea and vomiting.   Endocrine: Positive for heat intolerance. Negative for polydipsia and polyuria.   Genitourinary: Negative for dysuria and hematuria.   Musculoskeletal: Positive for arthralgias, back pain, joint swelling, myalgias, neck pain and neck stiffness.   Skin: Positive for rash. Negative for wound.   Neurological: Positive for dizziness, weakness, numbness and headaches. Negative for tremors and syncope.   Hematological: Negative for adenopathy. Does not bruise/bleed easily.   Psychiatric/Behavioral: Positive for dysphoric mood (mild). Negative for confusion, hallucinations, sleep disturbance and suicidal ideas. The patient is nervous/anxious.    Pt's previous ROS reviewed and updated as indicated.       Objective    Vitals:    06/08/21 0912   BP: 136/90   Pulse: 84   Resp: 18   SpO2: 98%     Body mass index is 33.91 kg/m².      06/08/21 0912   Weight: 110 kg (243 lb)         Physical Exam  Vitals and nursing note reviewed.   Constitutional:       General: He is not in acute distress.     Appearance: He is well-developed. He is obese. He is not ill-appearing.   HENT:      Head: Normocephalic and atraumatic.   Eyes:      General: No scleral icterus.  Cardiovascular:      Rate and Rhythm: Normal rate and regular rhythm.      Pulses: Normal pulses.      Heart sounds: Normal heart sounds. No murmur heard.     Pulmonary:      Effort: Pulmonary effort is normal.      Breath sounds: Normal breath sounds and air entry.   Musculoskeletal:      Right hand: Tenderness present. Decreased range of motion.      Cervical back: Deformity (loss of normal lordosis), spasms, tenderness (diffuse soft tissue) and bony tenderness (C4-C7) present.      Right lower  leg: No edema.      Left lower leg: No edema.      Comments: Contracture volar surface 2nd, 3rd, MCPs   Skin:     General: Skin is warm and dry.      Coloration: Skin is not jaundiced or pale.      Findings: Rash (intertrigenous tinea cruris, erytheymatous maculopapular on shins bilaterally) present. No bruising.   Neurological:      Mental Status: He is alert and oriented to person, place, and time.      Sensory: Sensation is intact.      Gait: Gait is intact.      Comments: No michael weakness, generally weak during motor exam BUE due to pain   Psychiatric:         Mood and Affect: Mood and affect normal.         Speech: Speech normal.         Behavior: Behavior is cooperative.         Thought Content: Thought content normal.         Cognition and Memory: Cognition and memory normal.         Judgment: Judgment normal.     Pt's previous physical exam reviewed and updated as indicated.        Assessment/Plan   Diagnoses and all orders for this visit:    1. Chronic pain syndrome (Primary)    2. Essential hypertension  -     lisinopril (PRINIVIL,ZESTRIL) 10 MG tablet; Take 3 tablets by mouth Daily.  Dispense: 90 tablet; Refill: 5  -     CBC & Differential  -     Comprehensive Metabolic Panel  -     TSH Rfx On Abnormal To Free T4    3. Vitamin D deficiency    4. Thrombocytopenia (CMS/HCC)  -     CBC & Differential    5. IFG (impaired fasting glucose)  -     Comprehensive Metabolic Panel  -     Hemoglobin A1c    6. Mixed hyperlipidemia  -     Comprehensive Metabolic Panel  -     Lipid Panel    7. Hyperbilirubinemia  -     Comprehensive Metabolic Panel  -     Bilirubin, Direct    8. Low testosterone  -     Testosterone    9. Tinea cruris  -     miconazole (MICOTIN) 2 % powder; Apply  topically to the appropriate area as directed As Needed for Itching.  Dispense: 85 g; Refill: 1  -     terbinafine (lamiSIL) 250 MG tablet; Take 1 tablet by mouth Daily for 14 days.  Dispense: 14 tablet; Refill: 0    10. Dupuytren's  contracture of right hand  -     Ambulatory Referral to Orthopedic Surgery    Other orders  -     hydrOXYzine pamoate (VISTARIL) 50 MG capsule; Take 1 capsule by mouth 3 (Three) Times a Day As Needed for Itching.  Dispense: 270 capsule; Refill: 1  -     triamcinolone (KENALOG) 0.1 % ointment; Apply thin layer to affected area/rash twice daily  Dispense: 30 g; Refill: 1      Inflammatory rash on BLEs, possibly eczema vs contact dermatitis. avoid tight occlusive clothing/socks. Trial of triamciniolone as above.    Assess status of vit/min deficiencies and replace as indicated.    HTN controlled, continue lisinopril.    Continue vistaril as needed for anxiety.     stable chronic pain syndrome. Continue current mgt plan. No aberrant behavior.  As part of patient's treatment plan I am prescribing a controlled substance.  The patient has been made aware of the appropriate use of such medications, including potential risk of somnolence, limited ability to drive and/or work safely, and potential for dependence and/or overdose.  It has also been made clear that these medications are for use by this patient only, without concomitant use of alcohol or other substances, unless prescribed.  History and physical exam exhibit continued safe and appropriate use of controlled substance.  CLAUDIO reviewed.  Patient has completed a prescribing agreement detailing terms of continued prescribing of controlled substances, including monitoring CLAUDIO reports, urine drug screening, and pill counts if necessary.  Patient is aware that inappropriate use will result in cessation of prescribing such medications.    Routine f/u in 3 months, sooner as needed/instructed.  I will contact patient regarding test results and provide instructions regarding any necessary changes in plan of care.  Patient was encouraged to keep me informed of any acute changes, lack of improvement, or any new concerning symptoms.  Pt is aware of reasons to seek emergent  care.  Patient voiced understanding of all instructions and denied further questions.

## 2021-06-09 LAB
ALBUMIN SERPL-MCNC: 4.3 G/DL (ref 3.5–5.2)
ALBUMIN/GLOB SERPL: 1.7 G/DL
ALP SERPL-CCNC: 113 U/L (ref 39–117)
ALT SERPL-CCNC: 37 U/L (ref 1–41)
AST SERPL-CCNC: 36 U/L (ref 1–40)
BASOPHILS # BLD AUTO: ABNORMAL 10*3/UL
BASOPHILS # BLD MANUAL: 0.08 10*3/MM3 (ref 0–0.2)
BASOPHILS NFR BLD MANUAL: 2 % (ref 0–1.5)
BILIRUB DIRECT SERPL-MCNC: 0.5 MG/DL (ref 0–0.3)
BILIRUB SERPL-MCNC: 2.4 MG/DL (ref 0–1.2)
BUN SERPL-MCNC: 12 MG/DL (ref 8–23)
BUN/CREAT SERPL: 13.8 (ref 7–25)
CALCIUM SERPL-MCNC: 9 MG/DL (ref 8.6–10.5)
CHLORIDE SERPL-SCNC: 108 MMOL/L (ref 98–107)
CHOLEST SERPL-MCNC: 160 MG/DL (ref 0–200)
CO2 SERPL-SCNC: 23.9 MMOL/L (ref 22–29)
CREAT SERPL-MCNC: 0.87 MG/DL (ref 0.76–1.27)
DIFFERENTIAL COMMENT: ABNORMAL
EOSINOPHIL # BLD AUTO: ABNORMAL 10*3/UL
EOSINOPHIL # BLD MANUAL: 0.08 10*3/MM3 (ref 0–0.4)
EOSINOPHIL NFR BLD AUTO: ABNORMAL %
EOSINOPHIL NFR BLD MANUAL: 2 % (ref 0.3–6.2)
ERYTHROCYTE [DISTWIDTH] IN BLOOD BY AUTOMATED COUNT: 12.8 % (ref 12.3–15.4)
GLOBULIN SER CALC-MCNC: 2.6 GM/DL
GLUCOSE SERPL-MCNC: 123 MG/DL (ref 65–99)
HBA1C MFR BLD: 5.8 % (ref 4.8–5.6)
HCT VFR BLD AUTO: 41.1 % (ref 37.5–51)
HDLC SERPL-MCNC: 28 MG/DL (ref 40–60)
HGB BLD-MCNC: 14.1 G/DL (ref 13–17.7)
LDLC SERPL CALC-MCNC: 65 MG/DL (ref 0–100)
LYMPHOCYTES # BLD AUTO: ABNORMAL 10*3/UL
LYMPHOCYTES # BLD MANUAL: 1.18 10*3/MM3 (ref 0.7–3.1)
LYMPHOCYTES NFR BLD AUTO: ABNORMAL %
LYMPHOCYTES NFR BLD MANUAL: 31.3 % (ref 19.6–45.3)
MCH RBC QN AUTO: 34.6 PG (ref 26.6–33)
MCHC RBC AUTO-ENTMCNC: 34.3 G/DL (ref 31.5–35.7)
MCV RBC AUTO: 101 FL (ref 79–97)
MONOCYTES # BLD MANUAL: 0.27 10*3/MM3 (ref 0.1–0.9)
MONOCYTES NFR BLD AUTO: ABNORMAL %
MONOCYTES NFR BLD MANUAL: 7.1 % (ref 5–12)
NEUTROPHILS # BLD MANUAL: 2.17 10*3/MM3 (ref 1.7–7)
NEUTROPHILS NFR BLD AUTO: ABNORMAL %
NEUTROPHILS NFR BLD MANUAL: 57.6 % (ref 42.7–76)
PLATELET # BLD AUTO: 59 10*3/MM3 (ref 140–450)
PLATELET BLD QL SMEAR: ABNORMAL
POTASSIUM SERPL-SCNC: 4.3 MMOL/L (ref 3.5–5.2)
PROT SERPL-MCNC: 6.9 G/DL (ref 6–8.5)
RBC # BLD AUTO: 4.07 10*6/MM3 (ref 4.14–5.8)
RBC MORPH BLD: ABNORMAL
SODIUM SERPL-SCNC: 144 MMOL/L (ref 136–145)
TESTOST SERPL-MCNC: 333 NG/DL (ref 264–916)
TRIGL SERPL-MCNC: 435 MG/DL (ref 0–150)
TSH SERPL DL<=0.005 MIU/L-ACNC: 2.2 UIU/ML (ref 0.27–4.2)
VLDLC SERPL CALC-MCNC: 67 MG/DL (ref 5–40)
WBC # BLD AUTO: 3.76 10*3/MM3 (ref 3.4–10.8)

## 2021-06-10 DIAGNOSIS — D69.6 THROMBOCYTOPENIA (HCC): Primary | ICD-10-CM

## 2021-06-10 DIAGNOSIS — D75.89 MACROCYTOSIS: ICD-10-CM

## 2021-06-10 DIAGNOSIS — R53.82 CHRONIC FATIGUE: ICD-10-CM

## 2021-06-10 NOTE — PROGRESS NOTES
Please inform pt his recent labs look stable other than worsening drop in platelets and increased triglycerides (over 400).     Referring him to hematology for follow up on platelets.     In order to decrease his triglycerides, he needs to:  1) increase physical activity to 150 min per week  2) avoid fast foods, junk foods, sweets, sugar beverages  3) increase vegetables, lean proteins, whole grains and fruits

## 2021-06-12 LAB
FOLATE SERPL-MCNC: 13.9 NG/ML (ref 4.78–24.2)
Lab: NORMAL
VIT B12 SERPL-MCNC: 480 PG/ML (ref 211–946)
WRITTEN AUTHORIZATION: NORMAL

## 2021-06-14 NOTE — PROGRESS NOTES
"Please inform pt his B12 and folate levels were normal. In addition, he should f/u with hematology. I know they have seen him previously but it is more for monitoring purposes. I am aware they can't \"fix\" his low platelets but we need to make sure there is not a blood problem that is worsening over time."

## 2021-07-06 RX ORDER — HYDROXYZINE PAMOATE 50 MG/1
CAPSULE ORAL
Qty: 270 CAPSULE | Refills: 1 | Status: SHIPPED | OUTPATIENT
Start: 2021-07-06 | End: 2022-04-20

## 2021-07-11 NOTE — PROGRESS NOTES
"  Subjective     PROBLEM LIST:  1. Thrombocytopenia  2. Hypertension  3. Depression  4. Beckwourth syndrome  5. TRUDI  6. RLS    CHIEF COMPLAINT: thrombocytopenia      HISTORY OF PRESENT ILLNESS:  The patient is a 61 y.o. male, referred for evaluation of thrombocytopenia.    I saw him in 2017 for the same.  At that time he had an abdominal ultrasound which showed fatty liver and mild splenomegaly.  There has been a gradual downtrend of his platelets over the past 5 years.  No new symptoms currently.  No issues with bruising or bleeding.  He has received periodic B12 injections with his PCP.    Labs 6/8/21:  Wbc 3.76, hgb 14.1, plt 59    tbili 2.4, ALT 37, AST 36    Folate 13.9, B12 480    9/21/20: wbc 5.2, hgb 14.8, plt 68  11/27/17: wbc 4.27, hgb 14.0, plt 74  2/4/16: wbc 5.49, hgb 13.5, plt 97    REVIEW OF SYSTEMS:  A 14 point review of systems was performed and is negative except as noted above.    Past Medical History:   Diagnosis Date   • Arthritis    • Bilateral carpal tunnel syndrome    • Depression    • Esophageal reflux    • Gastritis    • H/O pulmonary function tests 09/13/2012    normal   • Hiatal hernia    • History of depression    • History of meniscal tear    • History of renal calculi    • Hyperbilirubinemia     Beckwourth syndrome   • Hypertension    • Obstructive sleep apnea    • Plantar fascia rupture    • Renal calculi    • RLS (restless legs syndrome)    • Schatzki's ring    • TMJ pain dysfunction syndrome              Objective     /94   Pulse 79   Temp 97.3 °F (36.3 °C) (Temporal)   Resp 16   Ht 180.3 cm (71\")   Wt 110 kg (242 lb)   SpO2 99%   BMI 33.75 kg/m²   Performance Status:0              General: well appearing male in no acute distress  Neuro: alert and oriented  HEENT: sclerae anicteric, oropharynx clear  Extremities: no lower extremity edema  Skin: no rashes, lesions, bruising, or petechiae  Psych: mood and affect appropriate    Lab Results   Component Value Date    WBC 3.76 " 06/08/2021    HGB 14.1 06/08/2021    HCT 41.1 06/08/2021    .0 (H) 06/08/2021    PLT 59 (L) 06/08/2021     Lab Results   Component Value Date    GLUCOSE 133 (H) 09/14/2016    BUN 12 06/08/2021    CREATININE 0.87 06/08/2021    EGFRIFNONA 89 06/08/2021    EGFRIFAFRI 108 06/08/2021    BCR 13.8 06/08/2021    K 4.3 06/08/2021    CO2 23.9 06/08/2021    CALCIUM 9.0 06/08/2021    PROTENTOTREF 6.9 06/08/2021    ALBUMIN 4.30 06/08/2021    LABIL2 1.7 06/08/2021    AST 36 06/08/2021    ALT 37 06/08/2021                 Assessment/Plan     Sterling Whaley is a 61 y.o. male with chronic thrombocytopenia in the setting of fatty liver and splenomegaly.  Given the relative stability over time it is unlikely that there is a bone marrow disease.   ITP remains possible, but we discussed that this is a diagnosis of exclusion, and if this were ITP, it is not low enough to require any treatment at this point.    We will repeat abdominal ultrasound for further evaluation of liver function, assess for possible signs of cirrhosis.  If there are changes consistent with cirrhosis, I would recommend seeing a liver specialist for this.    I will also repeat CBC.  I will contact him with test results and arrange for further follow up based on those findings.           A total greater than 45 mins minutes was spent in face to face patient time, examination, counseling, charting, reviewing test results, and reviewing outside records.    Jayla Barker MD    7/13/2021

## 2021-07-13 ENCOUNTER — CONSULT (OUTPATIENT)
Dept: ONCOLOGY | Facility: CLINIC | Age: 61
End: 2021-07-13

## 2021-07-13 VITALS
DIASTOLIC BLOOD PRESSURE: 94 MMHG | HEIGHT: 71 IN | BODY MASS INDEX: 33.88 KG/M2 | HEART RATE: 79 BPM | OXYGEN SATURATION: 99 % | SYSTOLIC BLOOD PRESSURE: 155 MMHG | TEMPERATURE: 97.3 F | WEIGHT: 242 LBS | RESPIRATION RATE: 16 BRPM

## 2021-07-13 DIAGNOSIS — D69.6 THROMBOCYTOPENIA (HCC): Primary | ICD-10-CM

## 2021-07-13 PROCEDURE — 99204 OFFICE O/P NEW MOD 45 MIN: CPT | Performed by: INTERNAL MEDICINE

## 2021-07-16 ENCOUNTER — TELEPHONE (OUTPATIENT)
Dept: FAMILY MEDICINE CLINIC | Facility: CLINIC | Age: 61
End: 2021-07-16

## 2021-07-16 DIAGNOSIS — G89.4 CHRONIC PAIN SYNDROME: ICD-10-CM

## 2021-07-20 RX ORDER — METHADONE HYDROCHLORIDE 5 MG/1
TABLET ORAL
Qty: 120 TABLET | Refills: 0 | Status: SHIPPED | OUTPATIENT
Start: 2021-07-20 | End: 2021-09-09 | Stop reason: SDUPTHER

## 2021-07-20 NOTE — TELEPHONE ENCOUNTER
CLAUDIO reviewed. Refill approved. Medication E rx'd to pharmacy as requested. Pt to keep f/u apt as scheduled.    UDS and CSA UTD

## 2021-07-29 ENCOUNTER — HOSPITAL ENCOUNTER (OUTPATIENT)
Dept: ULTRASOUND IMAGING | Facility: HOSPITAL | Age: 61
Discharge: HOME | End: 2021-07-29
Admitting: INTERNAL MEDICINE

## 2021-07-29 ENCOUNTER — TELEPHONE (OUTPATIENT)
Dept: ONCOLOGY | Facility: CLINIC | Age: 61
End: 2021-07-29

## 2021-07-29 DIAGNOSIS — D69.6 THROMBOCYTOPENIA (HCC): ICD-10-CM

## 2021-07-29 DIAGNOSIS — D69.6 THROMBOCYTOPENIA (HCC): Primary | ICD-10-CM

## 2021-07-29 PROCEDURE — 76700 US EXAM ABDOM COMPLETE: CPT

## 2021-07-29 NOTE — TELEPHONE ENCOUNTER
Called patient, left  re: ultrasound results. This shows fatty liver and moderate splenomegaly, which is like the reason for his low platelets.  I would recommend f/u in 6 months with repeat labs, and my office will schedule.

## 2021-08-16 ENCOUNTER — OFFICE VISIT (OUTPATIENT)
Dept: FAMILY MEDICINE CLINIC | Facility: CLINIC | Age: 61
End: 2021-08-16

## 2021-08-16 VITALS
DIASTOLIC BLOOD PRESSURE: 86 MMHG | HEIGHT: 71 IN | HEART RATE: 75 BPM | OXYGEN SATURATION: 98 % | SYSTOLIC BLOOD PRESSURE: 138 MMHG | BODY MASS INDEX: 34.38 KG/M2 | WEIGHT: 245.6 LBS

## 2021-08-16 DIAGNOSIS — Z91.09 ENVIRONMENTAL ALLERGIES: ICD-10-CM

## 2021-08-16 DIAGNOSIS — J01.00 ACUTE NON-RECURRENT MAXILLARY SINUSITIS: Primary | ICD-10-CM

## 2021-08-16 PROCEDURE — 99213 OFFICE O/P EST LOW 20 MIN: CPT | Performed by: FAMILY MEDICINE

## 2021-08-16 PROCEDURE — 96372 THER/PROPH/DIAG INJ SC/IM: CPT | Performed by: FAMILY MEDICINE

## 2021-08-16 RX ORDER — AMOXICILLIN AND CLAVULANATE POTASSIUM 875; 125 MG/1; MG/1
1 TABLET, FILM COATED ORAL 2 TIMES DAILY
Qty: 20 TABLET | Refills: 0 | Status: SHIPPED | OUTPATIENT
Start: 2021-08-16 | End: 2021-08-26

## 2021-08-16 RX ORDER — CEFTRIAXONE 1 G/1
1 INJECTION, POWDER, FOR SOLUTION INTRAMUSCULAR; INTRAVENOUS ONCE
Status: COMPLETED | OUTPATIENT
Start: 2021-08-16 | End: 2021-08-16

## 2021-08-16 RX ORDER — METHYLPREDNISOLONE ACETATE 80 MG/ML
120 INJECTION, SUSPENSION INTRA-ARTICULAR; INTRALESIONAL; INTRAMUSCULAR; SOFT TISSUE ONCE
Status: COMPLETED | OUTPATIENT
Start: 2021-08-16 | End: 2021-08-16

## 2021-08-16 RX ADMIN — CEFTRIAXONE 1 G: 1 INJECTION, POWDER, FOR SOLUTION INTRAMUSCULAR; INTRAVENOUS at 16:19

## 2021-08-16 RX ADMIN — METHYLPREDNISOLONE ACETATE 120 MG: 80 INJECTION, SUSPENSION INTRA-ARTICULAR; INTRALESIONAL; INTRAMUSCULAR; SOFT TISSUE at 16:20

## 2021-08-16 NOTE — PROGRESS NOTES
Subjective   Sterling Whaley is a 61 y.o. male.     He c/o cough    Sinusitis  This is a new problem. The current episode started 1 to 4 weeks ago (approx 2 weeks). The problem has been gradually worsening since onset. There has been no fever. The pain is moderate. Associated symptoms include congestion, coughing (triggered by postnasal drip), a hoarse voice (mildly) and sinus pressure. Pertinent negatives include no chills, diaphoresis, ear pain, headaches, neck pain, shortness of breath, sneezing, sore throat or swollen glands. Treatments tried: allergy meds, nasal spray. The treatment provided no relief.     Visit began with his seasonal allergy symptoms but have progressed to purulent rhinorrhea, facial pain.    The following portions of the patient's history were reviewed and updated as appropriate: allergies, current medications, past family history, past medical history, past social history, past surgical history and problem list.    Review of Systems   Constitutional: Negative for chills, diaphoresis and fever.   HENT: Positive for congestion, hoarse voice (mildly), postnasal drip, sinus pressure and sinus pain. Negative for ear pain, mouth sores, sneezing and sore throat.    Eyes: Negative for pain, discharge and redness.   Respiratory: Positive for cough (triggered by postnasal drip). Negative for shortness of breath and wheezing.    Cardiovascular: Negative for chest pain.   Gastrointestinal: Negative for diarrhea, nausea and vomiting.   Musculoskeletal: Negative for neck pain.   Skin: Negative for rash.   Neurological: Negative for headaches.   Hematological: Negative for adenopathy.   Psychiatric/Behavioral: Negative for confusion.       Objective    Vitals:    08/16/21 1554   BP: 138/86   Pulse: 75   SpO2: 98%     Body mass index is 34.25 kg/m².      08/16/21  1554   Weight: 111 kg (245 lb 9.6 oz)       Physical Exam  Vitals and nursing note reviewed.   Constitutional:       General: He is not in  acute distress.     Appearance: He is well-developed, well-groomed and overweight. He is ill-appearing (mildly).   HENT:      Head: Normocephalic and atraumatic.      Right Ear: Ear canal and external ear normal. No middle ear effusion. Tympanic membrane is retracted.      Left Ear: Ear canal and external ear normal.  No middle ear effusion. Tympanic membrane is retracted.      Nose: Mucosal edema, congestion and rhinorrhea present.      Mouth/Throat:      Mouth: Mucous membranes are moist. No oral lesions.      Pharynx: Posterior oropharyngeal erythema (mild) present.   Cardiovascular:      Rate and Rhythm: Normal rate.      Pulses: Normal pulses.      Heart sounds: Normal heart sounds.   Pulmonary:      Effort: Pulmonary effort is normal.      Breath sounds: Normal breath sounds.   Lymphadenopathy:      Cervical: No cervical adenopathy.   Skin:     General: Skin is warm and dry.      Findings: No rash.   Neurological:      Mental Status: He is alert and oriented to person, place, and time.      Gait: Gait is intact.   Psychiatric:         Mood and Affect: Mood and affect normal.         Behavior: Behavior normal. Behavior is cooperative.         Assessment/Plan   Diagnoses and all orders for this visit:    1. Acute non-recurrent maxillary sinusitis (Primary)  -     cefTRIAXone (ROCEPHIN) injection 1 g  -     methylPREDNISolone acetate (DEPO-medrol) injection 120 mg  -     amoxicillin-clavulanate (Augmentin) 875-125 MG per tablet; Take 1 tablet by mouth 2 (Two) Times a Day for 10 days. START Tuesday 8/17/21  Dispense: 20 tablet; Refill: 0    2. Environmental allergies  -     methylPREDNISolone acetate (DEPO-medrol) injection 120 mg       Encouraged to continue medications for seasonal/environmental allergies.  Has developed secondary bacterial sinusitis.  Point-of-care treatment as above, then start Augmentin tomorrow.    He will keep his routine follow-up as scheduled, follow-up sooner as needed.  Patient was  encouraged to keep me informed of any acute changes, lack of improvement, or any new concerning symptoms.  Pt is aware of reasons to seek emergent care.  Patient voiced understanding of all instructions and denied further questions.    Please note that portions of this note may have been completed with a voice recognition program. Efforts were made to edit the dictations, but occasionally words are mistranscribed.

## 2021-08-17 RX ORDER — AZELASTINE 1 MG/ML
2 SPRAY, METERED NASAL 2 TIMES DAILY
COMMUNITY
Start: 2021-07-31 | End: 2021-12-13

## 2021-09-02 RX ORDER — OMEPRAZOLE 40 MG/1
CAPSULE, DELAYED RELEASE ORAL
Qty: 90 CAPSULE | Refills: 1 | Status: SHIPPED | OUTPATIENT
Start: 2021-09-02 | End: 2022-01-19

## 2021-09-02 RX ORDER — FLUTICASONE PROPIONATE 50 MCG
SPRAY, SUSPENSION (ML) NASAL
Qty: 48 G | Refills: 1 | Status: SHIPPED | OUTPATIENT
Start: 2021-09-02 | End: 2022-11-09 | Stop reason: HOSPADM

## 2021-09-09 ENCOUNTER — OFFICE VISIT (OUTPATIENT)
Dept: FAMILY MEDICINE CLINIC | Facility: CLINIC | Age: 61
End: 2021-09-09

## 2021-09-09 VITALS
RESPIRATION RATE: 18 BRPM | SYSTOLIC BLOOD PRESSURE: 132 MMHG | OXYGEN SATURATION: 98 % | HEIGHT: 71 IN | WEIGHT: 237.8 LBS | HEART RATE: 70 BPM | BODY MASS INDEX: 33.29 KG/M2 | DIASTOLIC BLOOD PRESSURE: 78 MMHG | TEMPERATURE: 97.4 F

## 2021-09-09 DIAGNOSIS — Z79.899 HIGH RISK MEDICATIONS (NOT ANTICOAGULANTS) LONG-TERM USE: ICD-10-CM

## 2021-09-09 DIAGNOSIS — G89.4 CHRONIC PAIN SYNDROME: ICD-10-CM

## 2021-09-09 DIAGNOSIS — E53.8 VITAMIN B 12 DEFICIENCY: ICD-10-CM

## 2021-09-09 DIAGNOSIS — I10 ESSENTIAL HYPERTENSION: Primary | ICD-10-CM

## 2021-09-09 DIAGNOSIS — D69.6 THROMBOCYTOPENIA (HCC): ICD-10-CM

## 2021-09-09 PROCEDURE — 96372 THER/PROPH/DIAG INJ SC/IM: CPT | Performed by: FAMILY MEDICINE

## 2021-09-09 PROCEDURE — 93000 ELECTROCARDIOGRAM COMPLETE: CPT | Performed by: FAMILY MEDICINE

## 2021-09-09 PROCEDURE — 99214 OFFICE O/P EST MOD 30 MIN: CPT | Performed by: FAMILY MEDICINE

## 2021-09-09 RX ORDER — LIDOCAINE 50 MG/G
3 PATCH TOPICAL EVERY 24 HOURS
Qty: 270 EACH | Refills: 3 | Status: SHIPPED | OUTPATIENT
Start: 2021-09-09 | End: 2021-12-13

## 2021-09-09 RX ORDER — LEVOCETIRIZINE DIHYDROCHLORIDE 5 MG/1
5 TABLET, FILM COATED ORAL EVERY EVENING
COMMUNITY
Start: 2021-07-28 | End: 2022-04-20

## 2021-09-09 RX ORDER — METHADONE HYDROCHLORIDE 5 MG/1
TABLET ORAL
Qty: 120 TABLET | Refills: 0 | Status: SHIPPED | OUTPATIENT
Start: 2021-09-09 | End: 2021-11-02 | Stop reason: SDUPTHER

## 2021-09-09 RX ORDER — DEXAMETHASONE 0.5 MG/1
TABLET ORAL
COMMUNITY
End: 2021-12-13

## 2021-09-09 RX ORDER — CYANOCOBALAMIN 1000 UG/ML
1000 INJECTION, SOLUTION INTRAMUSCULAR; SUBCUTANEOUS
Status: DISCONTINUED | OUTPATIENT
Start: 2021-09-09 | End: 2022-11-09 | Stop reason: HOSPADM

## 2021-09-09 RX ORDER — LIDOCAINE 50 MG/G
3 PATCH TOPICAL EVERY 24 HOURS
Qty: 90 EACH | Refills: 0 | Status: SHIPPED | OUTPATIENT
Start: 2021-09-09 | End: 2021-12-13

## 2021-09-09 RX ADMIN — CYANOCOBALAMIN 1000 MCG: 1000 INJECTION, SOLUTION INTRAMUSCULAR; SUBCUTANEOUS at 12:38

## 2021-09-09 NOTE — PROGRESS NOTES
"Subjective   Sterling Andrea Whaley is a 61 y.o. male.     History of Present Illness   Mr. Whaley is a 61 year-old  male with diffuse spinal osteoarthritis, degenerative disc disease with stenosis. Here for routine f/u on chronic pain syndrome. He complains of burning/aching, severe pain radiating from neck into bilateral shoulders, upper back, down both arms. Increased pain in hands, left worse than right. Awaiting orthopedic surgery on left hand and elbow.  Associated with difficulty sleeping, loss of range of motion in the neck.  He has particularly noticed difficulty \"looking up for too long\" which has significantly limited his activity.  Has noted increased weakness and painful paresthesia in the upper extremities.  Taking his routine medications as prescribed.  He has had extensive evaluation for his diffuse DDD/DJD in past including consultation with neurosurgery, pain management etc.  No recent fall or trauma. PADT reviewed and scanned to chart.     Chronic conditions include low plts, high BR, HLP, low testosterone, low vit D. Recent surveillance albs.     Taking antihyertensive as rx'd. BP has been fairly well controlled.    He continues to have significant muscle spasms when lying down at night or trying to relax.    Has low B12, needs  shot today    Pt's previous HPI reviewed and updated as indicated.     The following portions of the patient's history were reviewed and updated as appropriate: allergies, current medications, past family history, past medical history, past social history, past surgical history and problem list.    Review of Systems   Constitutional: Positive for fatigue. Negative for fever and unexpected weight change.   HENT: Positive for congestion, hearing loss (chronic), postnasal drip and sinus pressure (chronic). Negative for drooling, facial swelling, mouth sores, nosebleeds, rhinorrhea, sore throat and trouble swallowing.    Eyes: Negative for pain, discharge, redness and " visual disturbance.   Respiratory: Negative for cough, shortness of breath and wheezing.    Cardiovascular: Negative for chest pain, palpitations and leg swelling.   Gastrointestinal: Negative for abdominal pain, blood in stool, diarrhea, nausea and vomiting.   Endocrine: Positive for heat intolerance. Negative for polydipsia and polyuria.   Genitourinary: Negative for dysuria and hematuria.   Musculoskeletal: Positive for arthralgias, back pain, joint swelling, myalgias, neck pain and neck stiffness.   Skin: Positive for rash. Negative for wound.   Neurological: Positive for dizziness, weakness, numbness and headaches. Negative for tremors and syncope.   Hematological: Negative for adenopathy. Does not bruise/bleed easily.   Psychiatric/Behavioral: Positive for dysphoric mood (mild). Negative for confusion, hallucinations, sleep disturbance and suicidal ideas. The patient is nervous/anxious.    Pt's previous ROS reviewed and updated as indicated.       Objective    Vitals:    09/09/21 0940   BP: 132/78   Pulse: 70   Resp: 18   Temp: 97.4 °F (36.3 °C)   SpO2: 98%     Body mass index is 33.17 kg/m².      09/09/21  0940   Weight: 108 kg (237 lb 12.8 oz)       Physical Exam  Vitals and nursing note reviewed.   Constitutional:       General: He is not in acute distress.     Appearance: He is well-developed, well-groomed and overweight. He is not ill-appearing.   HENT:      Head: Normocephalic and atraumatic.   Eyes:      General: No scleral icterus.  Cardiovascular:      Rate and Rhythm: Normal rate and regular rhythm.      Pulses: Normal pulses.      Heart sounds: Normal heart sounds. No murmur heard.     Pulmonary:      Effort: Pulmonary effort is normal.      Breath sounds: Normal breath sounds and air entry.   Musculoskeletal:      Right hand: Tenderness present. Decreased range of motion.      Cervical back: Deformity (loss of normal lordosis), spasms, tenderness (diffuse soft tissue) and bony tenderness (C4-C7)  present.      Right lower leg: No edema.      Left lower leg: No edema.      Comments: Contracture volar surface 2nd, 3rd, MCPs   Skin:     General: Skin is warm and dry.      Coloration: Skin is not jaundiced or pale.      Findings: No bruising.   Neurological:      Mental Status: He is alert and oriented to person, place, and time.      Sensory: Sensation is intact.      Gait: Gait is intact.      Comments: No michael weakness, generally weak during motor exam BUE due to pain   Psychiatric:         Mood and Affect: Mood and affect normal.         Speech: Speech normal.         Behavior: Behavior is cooperative.         Thought Content: Thought content normal.         Cognition and Memory: Cognition and memory normal.         Judgment: Judgment normal.     Pt's previous physical exam reviewed and updated as indicated.          ECG 12 Lead    Date/Time: 9/9/2021 10:02 AM  Performed by: Annel Armstrong MD  Authorized by: Annel Armstrong MD   Comparison: compared with previous ECG from 10/24/2017  Similar to previous ECG  Comparison to previous ECG: Previous QTc = 437  Rhythm: sinus rhythm  Ectopy comments: none  Rate: normal  BPM: 62  Conduction: conduction normal  ST Segments: ST segments normal  T Waves: T waves normal  QRS axis: normal  Other findings: left ventricular hypertrophy  Other findings comments: by voltage criteria    Clinical impression: non-specific ECG  Comments: WV int: 183 ms  QRS dur: 90 s  QTc: 430              Assessment/Plan   Diagnoses and all orders for this visit:    1. Essential hypertension (Primary)    2. Thrombocytopenia (CMS/HCC)    3. Chronic pain syndrome  -     methadone (DOLOPHINE) 5 MG tablet; 1 tablet po up to qid.  Dispense: 120 tablet; Refill: 0  -     ECG 12 Lead    4. Vitamin B 12 deficiency  -     cyanocobalamin injection 1,000 mcg    5. High risk medications (not anticoagulants) long-term use  -     ECG 12 Lead    Other orders  -     lidocaine (Lidoderm) 5 %; Place 3 patches  on the skin as directed by provider Daily. Remove & Discard patch within 12 hours or as directed by MD  Dispense: 90 each; Refill: 0  -     lidocaine (Lidoderm) 5 %; Place 3 patches on the skin as directed by provider Daily. Remove & Discard patch within 12 hours or as directed by MD  Dispense: 270 each; Refill: 3      HTN controlled. Continue lisinopril. Pt advised to eat a heart healthy diet and get regular aerobic exercise.    Stable thrombocytopenia. No bleeding problems.    Continue B12 replacement.    Stable chronic pain syndrome. Will f/u with ortho for CTSx and ulnar neuropathy and planned procedures. EKG stable. Good functional benefit from treatement plan. Minimal side effects reported.  As part of patient's treatment plan I am prescribing a controlled substance.  The patient has been made aware of the appropriate use of such medications, including potential risk of somnolence, limited ability to drive and/or work safely, and potential for dependence and/or overdose.  It has also been made clear that these medications are for use by this patient only, without concomitant use of alcohol or other substances, unless prescribed.  History and physical exam exhibit continued safe and appropriate use of controlled substance.  CLAUDIO reviewed.  Patient has completed a prescribing agreement detailing terms of continued prescribing of controlled substances, including monitoring CLAUDIO reports, urine drug screening, and pill counts if necessary.  Patient is aware that inappropriate use will result in cessation of prescribing such medications.    F/u in 3 months, sooner as needed.  Patient was encouraged to keep me informed of any acute changes, lack of improvement, or any new concerning symptoms.  Pt is aware of reasons to seek emergent care.  Patient voiced understanding of all instructions and denied further questions.

## 2021-09-17 ENCOUNTER — PRIOR AUTHORIZATION (OUTPATIENT)
Dept: FAMILY MEDICINE CLINIC | Facility: CLINIC | Age: 61
End: 2021-09-17

## 2021-09-17 NOTE — TELEPHONE ENCOUNTER
A PRIOR AUTH HAS BEEN STARTED THROUGH [employer] FOR LIDOCAINE PATCHES.    WAITING ON A RESPONSE FROM THE INSURANCE.    Key: [id number]

## 2021-09-22 DIAGNOSIS — I10 ESSENTIAL HYPERTENSION: ICD-10-CM

## 2021-09-23 RX ORDER — LISINOPRIL 10 MG/1
TABLET ORAL
Qty: 90 TABLET | Refills: 5 | OUTPATIENT
Start: 2021-09-23

## 2021-09-29 DIAGNOSIS — I10 ESSENTIAL HYPERTENSION: ICD-10-CM

## 2021-09-29 RX ORDER — LISINOPRIL 10 MG/1
30 TABLET ORAL DAILY
Qty: 90 TABLET | Refills: 5 | OUTPATIENT
Start: 2021-09-29

## 2021-11-02 DIAGNOSIS — G89.4 CHRONIC PAIN SYNDROME: ICD-10-CM

## 2021-11-02 RX ORDER — METHADONE HYDROCHLORIDE 5 MG/1
TABLET ORAL
Qty: 120 TABLET | Refills: 0 | Status: SHIPPED | OUTPATIENT
Start: 2021-11-02 | End: 2022-01-11 | Stop reason: SDUPTHER

## 2021-11-02 NOTE — TELEPHONE ENCOUNTER
-- DO NOT REPLY / DO NOT REPLY ALL --  -- Message is from the [facility]--    COVID-19 Universal Screening: Negative    Caller is requesting an appointment - at a sooner time than what was available.      Caller declined scheduling with a trusted partner or sister site               Patient is willing to be seen by: PCP only    Reason for Visit: Former patient of Dr Bolaños will like to establish care with Dr Danilo Palomares for 6 month check up.     Is the patient currently scheduled? No    Preferred time to be seen: November 19, 2020 @ 11:00am showed the next available but if there's anything sooner, I'll take it. Please call me asap.     Caller Information       Type Contact Phone    09/08/2020 05:09 PM CDT Phone (Incoming) Marcos Loyd (Self) [phone] (H)          Alternative phone number: None     Turnaround time given to caller:   \"This message will be sent to [state Provider's name]. The clinical team will fulfill your request as soon as they review your message.\"   CLAUDIO reviewed. [facility] UTD. Refill approved and printed for . Pt to have UDS at time of . Pt to keep f/u apt as scheduled.

## 2021-11-02 NOTE — TELEPHONE ENCOUNTER
Caller: Sterling Whaley    Relationship: Self    Requested Prescriptions:   Requested Prescriptions     Pending Prescriptions Disp Refills   • methadone (DOLOPHINE) 5 MG tablet 120 tablet 0     Si tablet po up to qid.        Pharmacy where request should be sent: [id number] - [place], [place] - [address] - [phone]  - [phone] FX     Additional details provided by patient:     Best call back number: [phone]    Does the patient have less than a 3 day supply:  [] Yes  [x] No    Jayashree Lebron Rep   21 10:44 EDT

## 2021-11-03 ENCOUNTER — CLINICAL SUPPORT (OUTPATIENT)
Dept: FAMILY MEDICINE CLINIC | Facility: CLINIC | Age: 61
End: 2021-11-03

## 2021-11-03 DIAGNOSIS — Z51.81 ENCOUNTER FOR THERAPEUTIC DRUG MONITORING: Primary | ICD-10-CM

## 2021-11-10 LAB — DRUGS UR: NORMAL

## 2021-12-13 ENCOUNTER — OFFICE VISIT (OUTPATIENT)
Dept: FAMILY MEDICINE CLINIC | Facility: CLINIC | Age: 61
End: 2021-12-13

## 2021-12-13 VITALS
SYSTOLIC BLOOD PRESSURE: 130 MMHG | HEIGHT: 71 IN | OXYGEN SATURATION: 100 % | TEMPERATURE: 97.3 F | DIASTOLIC BLOOD PRESSURE: 84 MMHG | HEART RATE: 68 BPM | BODY MASS INDEX: 33.04 KG/M2 | WEIGHT: 236 LBS

## 2021-12-13 DIAGNOSIS — F90.2 ATTENTION DEFICIT HYPERACTIVITY DISORDER (ADHD), COMBINED TYPE: Primary | ICD-10-CM

## 2021-12-13 PROCEDURE — 99213 OFFICE O/P EST LOW 20 MIN: CPT | Performed by: FAMILY MEDICINE

## 2021-12-13 RX ORDER — DEXTROAMPHETAMINE SACCHARATE, AMPHETAMINE ASPARTATE, DEXTROAMPHETAMINE SULFATE AND AMPHETAMINE SULFATE 1.25; 1.25; 1.25; 1.25 MG/1; MG/1; MG/1; MG/1
5 TABLET ORAL 2 TIMES DAILY
Qty: 60 TABLET | Refills: 0 | Status: SHIPPED | OUTPATIENT
Start: 2021-12-13 | End: 2022-01-19 | Stop reason: SINTOL

## 2021-12-13 NOTE — PROGRESS NOTES
"Subjective   Sterling Andrea Whaley is a 61 y.o. male.     History of Present Illness  Mr. Whaley presents today with concern regarding distractibility, irritability, difficulty completing tasks.  Feels he has difficulty getting along with others.  He has concerns regarding possible ADHD.  Had difficulty performing in school, states he struggles with \"reading and writing\".  Never been treated for ADHD in the past.  He is unsure of any family history.  He does have history of chronic anxiety.  Denies worsening depression.    Adult ADHD self-report scale symptom checklist reviewed with the patient.  Consist inattentive versus hyperactive.  Fidgeting sit for long periods of time.  He is overly active and can build beings..  Only talks too much in social situations.  Often interrupts others.  Checkless scanned to chart.    Currently on methadone for chronic pain management.  Previous diagnoses include lumbar and cervical spinal stenosis, diffuse osteoarthritis, bilateral severe plantar fasciitis, bilateral epicondylitis.  Currently on disability.    Denies substance abuse.  Has displayed no aberrant behavior.    The following portions of the patient's history were reviewed and updated as appropriate: allergies, current medications, past family history, past medical history, past social history, past surgical history and problem list.    Review of Systems   Constitutional: Positive for fatigue. Negative for fever and unexpected weight change.   HENT: Positive for congestion, hearing loss (chronic), postnasal drip and sinus pressure (chronic). Negative for drooling, facial swelling, mouth sores, nosebleeds, rhinorrhea, sore throat and trouble swallowing.    Eyes: Negative for pain, discharge, redness and visual disturbance.   Respiratory: Negative for cough, shortness of breath and wheezing.    Cardiovascular: Negative for chest pain, palpitations and leg swelling.   Gastrointestinal: Negative for abdominal pain, blood in " stool, diarrhea, nausea and vomiting.   Endocrine: Positive for heat intolerance. Negative for polydipsia and polyuria.   Genitourinary: Negative for dysuria and hematuria.   Musculoskeletal: Positive for arthralgias, back pain, joint swelling, myalgias, neck pain and neck stiffness.   Skin: Positive for rash. Negative for wound.   Neurological: Positive for dizziness, weakness, numbness and headaches. Negative for tremors and syncope.   Hematological: Negative for adenopathy. Does not bruise/bleed easily.   Psychiatric/Behavioral: Positive for agitation, decreased concentration and dysphoric mood (mild). Negative for confusion, hallucinations, sleep disturbance and suicidal ideas. The patient is nervous/anxious.    Pt's previous ROS reviewed and updated as indicated.       Objective    Vitals:    12/13/21 1531   BP: 130/84   Pulse: 68   Temp: 97.3 °F (36.3 °C)   SpO2: 100%     Body mass index is 32.92 kg/m².      12/13/21  1531   Weight: 107 kg (236 lb)       Physical Exam  Vitals and nursing note reviewed.   Constitutional:       General: He is not in acute distress.     Appearance: He is well-developed, well-groomed and overweight. He is not ill-appearing.   Cardiovascular:      Rate and Rhythm: Normal rate and regular rhythm.      Pulses: Normal pulses.      Heart sounds: Normal heart sounds.   Musculoskeletal:      Right lower leg: No edema.      Left lower leg: No edema.   Neurological:      Mental Status: He is alert and oriented to person, place, and time.      Motor: No tremor.   Psychiatric:         Mood and Affect: Affect normal. Mood is anxious.         Speech: Speech is rapid and pressured (mildly). Speech is not tangential.         Behavior: Behavior is hyperactive (restless). Behavior is cooperative.         Thought Content: Thought content normal. Thought content is not paranoid or delusional. Thought content does not include suicidal ideation.         Cognition and Memory: Cognition and memory  normal.         Judgment: Judgment normal.       EKG reviewed on chart.    Lab Results   Component Value Date    WBC 3.76 06/08/2021    HGB 14.1 06/08/2021    HCT 41.1 06/08/2021    .0 (H) 06/08/2021    PLT 59 (L) 06/08/2021       Lab Results   Component Value Date    GLUCOSE 123 (H) 06/08/2021    BUN 12 06/08/2021    CREATININE 0.87 06/08/2021    EGFRIFNONA 89 06/08/2021    EGFRIFAFRI 108 06/08/2021    BCR 13.8 06/08/2021    K 4.3 06/08/2021    CO2 23.9 06/08/2021    CALCIUM 9.0 06/08/2021    PROTENTOTREF 6.9 06/08/2021    ALBUMIN 4.30 06/08/2021    LABIL2 1.7 06/08/2021    AST 36 06/08/2021    ALT 37 06/08/2021       Lab Results   Component Value Date    HGBA1C 5.80 (H) 06/08/2021       Lab Results   Component Value Date    TSH 2.200 06/08/2021           Assessment/Plan   Diagnoses and all orders for this visit:    1. Attention deficit hyperactivity disorder (ADHD), combined type (Primary)  -     amphetamine-dextroamphetamine (Adderall) 5 MG tablet; Take 1 tablet by mouth 2 (Two) Times a Day.  Dispense: 60 tablet; Refill: 0       Strongly suspected adult ADHD generally inattentive type, mild hyperactive symptoms.  We have discussed at length the risk/benefits and potential side effects of various treatment options for stimulant nonstimulant.  He voiced understanding and is amenable to a trial of low-dose Adderall, short acting twice daily as he generally has difficulty tolerating medications.  We also have reviewed behavioral management techniques.  As part of patient's treatment plan I am prescribing a controlled substance.  The patient has been made aware of the appropriate use of such medications, including potential risk of somnolence, limited ability to drive and/or work safely, and potential for dependence and/or overdose.  It has also been made clear that these medications are for use by this patient only, without concomitant use of alcohol or other substances, unless prescribed.  History and  physical exam exhibit continued safe and appropriate use of controlled substance.  CLAUDIO reviewed.    Follow-up in 1 month, sooner as needed.  Uptitrate dose or switch to long-acting depending on clinical response.  Patient was encouraged to keep me informed of any acute changes, lack of improvement, or any new concerning symptoms.  Pt is aware of reasons to seek emergent care.  Patient voiced understanding of all instructions and denied further questions.    Please note that portions of this note may have been completed with a voice recognition program. Efforts were made to edit the dictations, but occasionally words are mistranscribed.

## 2021-12-23 ENCOUNTER — TELEPHONE (OUTPATIENT)
Dept: FAMILY MEDICINE CLINIC | Facility: CLINIC | Age: 61
End: 2021-12-23

## 2021-12-23 NOTE — TELEPHONE ENCOUNTER
PT CALLED STATED THAT HE WAS PRESCRIBED RX FOR HIS NERVES, PT NOT SURE THE NAME OF RX, BUT WAS TOLD TO CALL BACK IN TO LET PCP KNOW IF RX NOT WORKING, PT STATED THAT HE STOMACH STAYS ALL MESSED UP AND WOULD LIKE TO KNOW WHAT DR SUGGEST.    PLEASE ADVISE.  CALL BACK:[phone]

## 2021-12-29 NOTE — TELEPHONE ENCOUNTER
"Patient is taking adderall with food and they \"stomach issues\" have improved, but he thinks that the dose should be increased  "

## 2022-01-11 ENCOUNTER — TELEPHONE (OUTPATIENT)
Dept: FAMILY MEDICINE CLINIC | Facility: CLINIC | Age: 62
End: 2022-01-11

## 2022-01-11 DIAGNOSIS — G89.4 CHRONIC PAIN SYNDROME: ICD-10-CM

## 2022-01-11 RX ORDER — METHADONE HYDROCHLORIDE 5 MG/1
TABLET ORAL
Qty: 120 TABLET | Refills: 0 | Status: SHIPPED | OUTPATIENT
Start: 2022-01-11 | End: 2022-03-15 | Stop reason: SDUPTHER

## 2022-01-11 NOTE — TELEPHONE ENCOUNTER
Caller: Sterling Whaley    Relationship: Self    Best call back number: [phone]    Requested Prescriptions:   Requested Prescriptions     Pending Prescriptions Disp Refills   • methadone (DOLOPHINE) 5 MG tablet 120 tablet 0     Si tablet po up to qid.        Pharmacy where request should be sent: [facility] - [place], [place] - [address] - [phone]  - [phone] FX     Additional details provided by patient: PATIENT OUT OF MEDICATION    Does the patient have less than a 3 day supply:  [x] Yes  [] No    Jayashree Marina Rep   22 08:35 EST

## 2022-01-11 NOTE — TELEPHONE ENCOUNTER
Rx Refill Note  Requested Prescriptions     Pending Prescriptions Disp Refills   • methadone (DOLOPHINE) 5 MG tablet 120 tablet 0     Si tablet po up to qid.      Last office visit with prescribing clinician: 2021      Next office visit with prescribing clinician: 2022    Ok to fill?    uds and csa are currently up to date.    Argelia Mak MA  22, 08:42 EST

## 2022-01-19 ENCOUNTER — OFFICE VISIT (OUTPATIENT)
Dept: FAMILY MEDICINE CLINIC | Facility: CLINIC | Age: 62
End: 2022-01-19

## 2022-01-19 VITALS
WEIGHT: 234 LBS | OXYGEN SATURATION: 99 % | TEMPERATURE: 97.4 F | DIASTOLIC BLOOD PRESSURE: 70 MMHG | SYSTOLIC BLOOD PRESSURE: 130 MMHG | HEART RATE: 71 BPM | BODY MASS INDEX: 32.76 KG/M2 | HEIGHT: 71 IN

## 2022-01-19 DIAGNOSIS — T88.7XXA SIDE EFFECT OF MEDICATION: ICD-10-CM

## 2022-01-19 DIAGNOSIS — F90.2 ADHD (ATTENTION DEFICIT HYPERACTIVITY DISORDER), COMBINED TYPE: ICD-10-CM

## 2022-01-19 DIAGNOSIS — K29.30 CHRONIC SUPERFICIAL GASTRITIS WITHOUT BLEEDING: Primary | ICD-10-CM

## 2022-01-19 PROCEDURE — 99214 OFFICE O/P EST MOD 30 MIN: CPT | Performed by: FAMILY MEDICINE

## 2022-01-19 RX ORDER — SUCRALFATE 1 G/1
1 TABLET ORAL 4 TIMES DAILY
Qty: 120 TABLET | Refills: 1 | Status: SHIPPED | OUTPATIENT
Start: 2022-01-19 | End: 2022-04-20

## 2022-01-19 RX ORDER — PANTOPRAZOLE SODIUM 40 MG/1
40 TABLET, DELAYED RELEASE ORAL DAILY
Qty: 30 TABLET | Refills: 5 | Status: SHIPPED | OUTPATIENT
Start: 2022-01-19 | End: 2022-02-18 | Stop reason: SDUPTHER

## 2022-01-19 NOTE — PROGRESS NOTES
"Subjective   Sterling Andrea Whaley is a 61 y.o. male.     History of Present Illness   Mr. Whaley presents today with c/o the recently started adderall possibly causing stomach cramps. He c/o aching/burning discomfort in upper mid abdomen. \"Knot comes up in stomach.\" starts not long after he gets up. Sometimes worse with eating, but not always. No nausea. Intermittent. No change in BMs. No trouble swallowing. Last EGD showed chronic gastritis. He is currently on prilosec but does not feel it is helping. No \"heartburn\" per se. Had side effects on Nexium previously. Has chronic anxiety, high psychosocial stress.    The following portions of the patient's history were reviewed and updated as appropriate: allergies, current medications, past family history, past medical history, past social history, past surgical history and problem list.    Review of Systems   Constitutional: Positive for fatigue. Negative for fever and unexpected weight change.   HENT: Positive for congestion, hearing loss (chronic), postnasal drip and sinus pressure (chronic). Negative for drooling, facial swelling, mouth sores, nosebleeds, rhinorrhea, sore throat and trouble swallowing.    Eyes: Negative for pain, discharge, redness and visual disturbance.   Respiratory: Negative for cough, shortness of breath and wheezing.    Cardiovascular: Negative for chest pain, palpitations and leg swelling.   Gastrointestinal: Positive for abdominal pain. Negative for blood in stool, diarrhea, nausea and vomiting.   Endocrine: Positive for heat intolerance. Negative for polydipsia and polyuria.   Genitourinary: Negative for dysuria and hematuria.   Musculoskeletal: Positive for arthralgias, back pain, joint swelling, myalgias, neck pain and neck stiffness.   Skin: Positive for rash. Negative for wound.   Neurological: Positive for dizziness, weakness, numbness and headaches. Negative for tremors and syncope.   Hematological: Negative for adenopathy. Does not " bruise/bleed easily.   Psychiatric/Behavioral: Positive for agitation, decreased concentration and dysphoric mood (mild). Negative for confusion, hallucinations, sleep disturbance and suicidal ideas. The patient is nervous/anxious.    Pt's previous ROS reviewed and updated as indicated.       Objective    Vitals:    01/19/22 0935   BP: 130/70   Pulse: 71   Temp: 97.4 °F (36.3 °C)   SpO2: 99%     Body mass index is 32.64 kg/m².      01/19/22  0935   Weight: 106 kg (234 lb)       Physical Exam  Vitals and nursing note reviewed.   Constitutional:       General: He is not in acute distress.     Appearance: He is well-developed and well-groomed. He is obese. He is not ill-appearing.   HENT:      Head: Normocephalic and atraumatic.   Eyes:      General: No scleral icterus.     Conjunctiva/sclera: Conjunctivae normal.   Cardiovascular:      Rate and Rhythm: Normal rate and regular rhythm.      Pulses: Normal pulses.      Heart sounds: Normal heart sounds. No murmur heard.      Pulmonary:      Effort: Pulmonary effort is normal.      Breath sounds: Normal breath sounds and air entry.   Abdominal:      General: Bowel sounds are normal. There is no distension.      Palpations: Abdomen is soft. There is no hepatomegaly, splenomegaly or mass.      Tenderness: There is no abdominal tenderness.      Comments: Exam limited by body habitus   Musculoskeletal:      Right lower leg: No edema.      Left lower leg: No edema.   Skin:     General: Skin is warm and dry.      Coloration: Skin is not jaundiced or pale.      Findings: No bruising.   Neurological:      Mental Status: He is alert and oriented to person, place, and time.      Gait: Gait is intact.   Psychiatric:         Mood and Affect: Affect normal. Mood is anxious (mildly).         Behavior: Behavior normal. Behavior is cooperative.         Cognition and Memory: Cognition normal.     Pt's previous physical exam reviewed and updated as indicated.    Lab Results   Component  Value Date    WBC 3.76 06/08/2021    HGB 14.1 06/08/2021    HCT 41.1 06/08/2021    .0 (H) 06/08/2021    PLT 59 (L) 06/08/2021       Lab Results   Component Value Date    GLUCOSE 123 (H) 06/08/2021    BUN 12 06/08/2021    CREATININE 0.87 06/08/2021    EGFRIFNONA 89 06/08/2021    EGFRIFAFRI 108 06/08/2021    BCR 13.8 06/08/2021    K 4.3 06/08/2021    CO2 23.9 06/08/2021    CALCIUM 9.0 06/08/2021    PROTENTOTREF 6.9 06/08/2021    ALBUMIN 4.30 06/08/2021    LABIL2 1.7 06/08/2021    AST 36 06/08/2021    ALT 37 06/08/2021       Lab Results   Component Value Date    HGBA1C 5.80 (H) 06/08/2021       Lab Results   Component Value Date    TSH 2.200 06/08/2021           Assessment/Plan   Diagnoses and all orders for this visit:    1. Chronic superficial gastritis without bleeding (Primary)  -     pantoprazole (Protonix) 40 MG EC tablet; Take 1 tablet by mouth Daily.  Dispense: 30 tablet; Refill: 5    2. Side effect of medication    3. ADHD (attention deficit hyperactivity disorder), combined type    Other orders  -     sucralfate (Carafate) 1 g tablet; Take 1 tablet by mouth 4 (Four) Times a Day.  Dispense: 120 tablet; Refill: 1       Suspect flare of his chronic gastritis in addition to possible GI side effects of low dose adderall started for ADHD. D/c adderall. D/c prilosec, start protonix and carafate. If minimal improvement, refer for f/u EGD.    Recently dx'd combined type adult ADHD. Hold off on medical mgnt at this time.    Routine f/u as scheduled, f/u sooner as needed.  Patient was encouraged to keep me informed of any acute changes, lack of improvement, or any new concerning symptoms.  Pt is aware of reasons to seek emergent care.  Patient voiced understanding of all instructions and denied further questions.

## 2022-01-20 PROBLEM — F90.2 ADHD (ATTENTION DEFICIT HYPERACTIVITY DISORDER), COMBINED TYPE: Status: ACTIVE | Noted: 2022-01-20

## 2022-01-20 PROBLEM — K29.30 CHRONIC SUPERFICIAL GASTRITIS WITHOUT BLEEDING: Status: ACTIVE | Noted: 2022-01-20

## 2022-01-25 ENCOUNTER — TELEPHONE (OUTPATIENT)
Dept: ONCOLOGY | Facility: CLINIC | Age: 62
End: 2022-01-25

## 2022-01-25 NOTE — TELEPHONE ENCOUNTER
Provider: DR DE LA CRUZ    Caller: GABE    Relationship to Patient: SELF    Reason for Call: GABE CANCELED HIS UPCOMING APPT. HE STATES THAT HE WILL HAVE DR IVEY DRAW HIS LABS AND IF THERE IS ANYTHING WRONG WITH THEM, HE WILL CALL THE OFFICE AND R/S

## 2022-02-18 ENCOUNTER — OFFICE VISIT (OUTPATIENT)
Dept: FAMILY MEDICINE CLINIC | Facility: CLINIC | Age: 62
End: 2022-02-18

## 2022-02-18 VITALS
SYSTOLIC BLOOD PRESSURE: 132 MMHG | BODY MASS INDEX: 33.23 KG/M2 | HEIGHT: 71 IN | DIASTOLIC BLOOD PRESSURE: 92 MMHG | WEIGHT: 237.4 LBS | HEART RATE: 79 BPM | OXYGEN SATURATION: 97 % | TEMPERATURE: 98.9 F

## 2022-02-18 DIAGNOSIS — R10.13 ABDOMINAL PAIN, EPIGASTRIC: ICD-10-CM

## 2022-02-18 DIAGNOSIS — K27.9 PUD (PEPTIC ULCER DISEASE): ICD-10-CM

## 2022-02-18 DIAGNOSIS — F41.1 GAD (GENERALIZED ANXIETY DISORDER): Primary | ICD-10-CM

## 2022-02-18 DIAGNOSIS — K29.30 CHRONIC SUPERFICIAL GASTRITIS WITHOUT BLEEDING: ICD-10-CM

## 2022-02-18 DIAGNOSIS — K44.9 HIATAL HERNIA WITH GERD WITHOUT ESOPHAGITIS: ICD-10-CM

## 2022-02-18 DIAGNOSIS — K21.9 HIATAL HERNIA WITH GERD WITHOUT ESOPHAGITIS: ICD-10-CM

## 2022-02-18 PROCEDURE — 99214 OFFICE O/P EST MOD 30 MIN: CPT | Performed by: FAMILY MEDICINE

## 2022-02-18 RX ORDER — PANTOPRAZOLE SODIUM 40 MG/1
40 TABLET, DELAYED RELEASE ORAL 2 TIMES DAILY
Qty: 60 TABLET | Refills: 2 | Status: SHIPPED | OUTPATIENT
Start: 2022-02-18 | End: 2022-04-20

## 2022-02-18 RX ORDER — AMOXICILLIN 500 MG/1
500 CAPSULE ORAL 2 TIMES DAILY
Qty: 20 CAPSULE | Refills: 0 | Status: SHIPPED | OUTPATIENT
Start: 2022-02-18 | End: 2022-02-28

## 2022-02-18 RX ORDER — DOXYCYCLINE HYCLATE 100 MG/1
100 CAPSULE ORAL 2 TIMES DAILY
Qty: 20 CAPSULE | Refills: 0 | Status: SHIPPED | OUTPATIENT
Start: 2022-02-18 | End: 2022-02-28

## 2022-02-21 LAB
H PYLORI IGA SER-ACNC: <9 UNITS (ref 0–8.9)
UREA BREATH TEST QL: NEGATIVE

## 2022-02-23 ENCOUNTER — TELEPHONE (OUTPATIENT)
Dept: FAMILY MEDICINE CLINIC | Facility: CLINIC | Age: 62
End: 2022-02-23

## 2022-02-23 NOTE — TELEPHONE ENCOUNTER
VICKIM to schedule AWV that Is Overdue. I was going to offer to move his appt on 3/18/22 to 3/11/22 or sooner with BARBARA Mohan. Please advise patient and schedule. Thanks.

## 2022-03-15 DIAGNOSIS — G89.4 CHRONIC PAIN SYNDROME: ICD-10-CM

## 2022-03-15 RX ORDER — METHADONE HYDROCHLORIDE 5 MG/1
TABLET ORAL
Qty: 120 TABLET | Refills: 0 | Status: SHIPPED | OUTPATIENT
Start: 2022-03-15 | End: 2022-04-20 | Stop reason: SDUPTHER

## 2022-03-15 NOTE — TELEPHONE ENCOUNTER
Rx Refill Note  Requested Prescriptions     Pending Prescriptions Disp Refills   • methadone (DOLOPHINE) 5 MG tablet 120 tablet 0     Si tablet po up to qid.      Last office visit with prescribing clinician: 2022      Next office visit with prescribing clinician: 2022            Pepper Handley MA  03/15/22, 10:48 EDT

## 2022-03-18 ENCOUNTER — OFFICE VISIT (OUTPATIENT)
Dept: FAMILY MEDICINE CLINIC | Facility: CLINIC | Age: 62
End: 2022-03-18

## 2022-03-18 VITALS
TEMPERATURE: 98.6 F | HEART RATE: 68 BPM | WEIGHT: 237.8 LBS | OXYGEN SATURATION: 98 % | HEIGHT: 71 IN | DIASTOLIC BLOOD PRESSURE: 78 MMHG | SYSTOLIC BLOOD PRESSURE: 128 MMHG | BODY MASS INDEX: 33.29 KG/M2

## 2022-03-18 DIAGNOSIS — H65.23 CHRONIC SEROUS OTITIS MEDIA, BILATERAL: ICD-10-CM

## 2022-03-18 DIAGNOSIS — K27.9 PUD (PEPTIC ULCER DISEASE): ICD-10-CM

## 2022-03-18 DIAGNOSIS — Z12.5 PROSTATE CANCER SCREENING: ICD-10-CM

## 2022-03-18 DIAGNOSIS — K21.9 HIATAL HERNIA WITH GERD WITHOUT ESOPHAGITIS: ICD-10-CM

## 2022-03-18 DIAGNOSIS — J30.9 ACUTE ALLERGIC RHINITIS: ICD-10-CM

## 2022-03-18 DIAGNOSIS — Z00.00 INITIAL MEDICARE ANNUAL WELLNESS VISIT: Primary | ICD-10-CM

## 2022-03-18 DIAGNOSIS — K44.9 HIATAL HERNIA WITH GERD WITHOUT ESOPHAGITIS: ICD-10-CM

## 2022-03-18 DIAGNOSIS — D69.6 THROMBOCYTOPENIA: ICD-10-CM

## 2022-03-18 PROCEDURE — 96372 THER/PROPH/DIAG INJ SC/IM: CPT | Performed by: FAMILY MEDICINE

## 2022-03-18 PROCEDURE — 1170F FXNL STATUS ASSESSED: CPT | Performed by: FAMILY MEDICINE

## 2022-03-18 PROCEDURE — 99214 OFFICE O/P EST MOD 30 MIN: CPT | Performed by: FAMILY MEDICINE

## 2022-03-18 PROCEDURE — 96160 PT-FOCUSED HLTH RISK ASSMT: CPT | Performed by: FAMILY MEDICINE

## 2022-03-18 PROCEDURE — 1160F RVW MEDS BY RX/DR IN RCRD: CPT | Performed by: FAMILY MEDICINE

## 2022-03-18 PROCEDURE — G0439 PPPS, SUBSEQ VISIT: HCPCS | Performed by: FAMILY MEDICINE

## 2022-03-18 RX ORDER — MONTELUKAST SODIUM 10 MG/1
10 TABLET ORAL NIGHTLY
Qty: 30 TABLET | Refills: 2 | Status: SHIPPED | OUTPATIENT
Start: 2022-03-18 | End: 2022-10-19

## 2022-03-18 RX ORDER — METHYLPREDNISOLONE ACETATE 40 MG/ML
40 INJECTION, SUSPENSION INTRA-ARTICULAR; INTRALESIONAL; INTRAMUSCULAR; SOFT TISSUE ONCE
Status: COMPLETED | OUTPATIENT
Start: 2022-03-18 | End: 2022-03-18

## 2022-03-18 RX ADMIN — METHYLPREDNISOLONE ACETATE 40 MG: 40 INJECTION, SUSPENSION INTRA-ARTICULAR; INTRALESIONAL; INTRAMUSCULAR; SOFT TISSUE at 12:32

## 2022-03-18 NOTE — PATIENT INSTRUCTIONS
Advance Care Planning and Advance Directives     You make decisions on a daily basis - decisions about where you want to live, your career, your home, your life. Perhaps one of the most important decisions you face is your choice for future medical care. Take time to talk with your family and your healthcare team and start planning today.  Advance Care Planning is a process that can help you:  · Understand possible future healthcare decisions in light of your own experiences  · Reflect on those decision in light of your goals and values  · Discuss your decisions with those closest to you and the healthcare professionals that care for you  · Make a plan by creating a document that reflects your wishes    Surrogate Decision Maker  In the event of a medical emergency, which has left you unable to communicate or to make your own decisions, you would need someone to make decisions for you.  It is important to discuss your preferences for medical treatment with this person while you are in good health.     Qualities of a surrogate decision maker:  • Willing to take on this role and responsibility  • Knows what you want for future medical care  • Willing to follow your wishes even if they don't agree with them  • Able to make difficult medical decisions under stressful circumstances    Advance Directives  These are legal documents you can create that will guide your healthcare team and decision maker(s) when needed. These documents can be stored in the electronic medical record.    · Living Will - a legal document to guide your care if you have a terminal condition or a serious illness and are unable to communicate. States vary by statute in document names/types, but most forms may include one or more of the following:        -  Directions regarding life-prolonging treatments        -  Directions regarding artificially provided nutrition/hydration        -  Choosing a healthcare decision maker        -  Direction  regarding organ/tissue donation    · Durable Power of  for Healthcare - this document names an -in-fact to make medical decisions for you, but it may also allow this person to make personal and financial decisions for you. Please seek the advice of an  if you need this type of document.    **Advance Directives are not required and no one may discriminate against you if you do not sign one.    Medical Orders  Many states allow specific forms/orders signed by your physician to record your wishes for medical treatment in your current state of health. This form, signed in personal communication with your physician, addresses resuscitation and other medical interventions that you may or may not want.      For more information or to schedule a time with a [facility] Advance Care Planning Facilitator contact: [facility], [place] or call [phone] and someone will contact you directly.    Medicare Wellness  Personal Prevention Plan of Service     Date of Office Visit:    Encounter Provider:  Edison Coyne DO  Place of Service:  [facility]  Patient Name: Sterling Whaley  :  1960    As part of the Medicare Wellness portion of your visit today, we are providing you with this personalized preventive plan of services (PPPS). This plan is based upon recommendations of the [place] Preventive Services Task Force (USPSTF) and the Advisory Committee on Immunization Practices (ACIP).    This lists the preventive care services that should be considered, and provides dates of when you are due. Items listed as completed are up-to-date and do not require any further intervention.    Health Maintenance   Topic Date Due   • TDAP/TD VACCINES (1 - Tdap) Never done   • ZOSTER VACCINE (1 of 2) Never done   • ANNUAL WELLNESS VISIT  2020   • COVID-19 Vaccine (3 - Booster for Moderna series) 2022   • INFLUENZA VACCINE  2022 (Originally  8/1/2021)   • LIPID PANEL  06/08/2022   • COLORECTAL CANCER SCREENING  04/01/2025   • HEPATITIS C SCREENING  Completed   • Pneumococcal Vaccine 0-64  Aged Out       No orders of the defined types were placed in this encounter.      No follow-ups on file.

## 2022-03-19 LAB
ALBUMIN SERPL-MCNC: 4.2 G/DL (ref 3.5–5.2)
ALBUMIN/GLOB SERPL: 1.4 G/DL
ALP SERPL-CCNC: 139 U/L (ref 39–117)
ALT SERPL-CCNC: 44 U/L (ref 1–41)
AST SERPL-CCNC: 44 U/L (ref 1–40)
BASOPHILS # BLD AUTO: 0.02 10*3/MM3 (ref 0–0.2)
BASOPHILS NFR BLD AUTO: 0.5 % (ref 0–1.5)
BILIRUB SERPL-MCNC: 2.9 MG/DL (ref 0–1.2)
BUN SERPL-MCNC: 11 MG/DL (ref 8–23)
BUN/CREAT SERPL: 12.1 (ref 7–25)
CALCIUM SERPL-MCNC: 9.4 MG/DL (ref 8.6–10.5)
CHLORIDE SERPL-SCNC: 105 MMOL/L (ref 98–107)
CO2 SERPL-SCNC: 27.8 MMOL/L (ref 22–29)
CREAT SERPL-MCNC: 0.91 MG/DL (ref 0.76–1.27)
EGFRCR SERPLBLD CKD-EPI 2021: 95.9 ML/MIN/1.73
EOSINOPHIL # BLD AUTO: 0.1 10*3/MM3 (ref 0–0.4)
EOSINOPHIL NFR BLD AUTO: 2.4 % (ref 0.3–6.2)
ERYTHROCYTE [DISTWIDTH] IN BLOOD BY AUTOMATED COUNT: 12.6 % (ref 12.3–15.4)
GLOBULIN SER CALC-MCNC: 2.9 GM/DL
GLUCOSE SERPL-MCNC: 117 MG/DL (ref 65–99)
HCT VFR BLD AUTO: 40 % (ref 37.5–51)
HGB BLD-MCNC: 14 G/DL (ref 13–17.7)
IMM GRANULOCYTES # BLD AUTO: 0.01 10*3/MM3 (ref 0–0.05)
IMM GRANULOCYTES NFR BLD AUTO: 0.2 % (ref 0–0.5)
LYMPHOCYTES # BLD AUTO: 1.05 10*3/MM3 (ref 0.7–3.1)
LYMPHOCYTES NFR BLD AUTO: 25.2 % (ref 19.6–45.3)
MCH RBC QN AUTO: 35 PG (ref 26.6–33)
MCHC RBC AUTO-ENTMCNC: 35 G/DL (ref 31.5–35.7)
MCV RBC AUTO: 100 FL (ref 79–97)
MONOCYTES # BLD AUTO: 0.35 10*3/MM3 (ref 0.1–0.9)
MONOCYTES NFR BLD AUTO: 8.4 % (ref 5–12)
NEUTROPHILS # BLD AUTO: 2.64 10*3/MM3 (ref 1.7–7)
NEUTROPHILS NFR BLD AUTO: 63.3 % (ref 42.7–76)
PLATELET # BLD AUTO: 62 10*3/MM3 (ref 140–450)
POTASSIUM SERPL-SCNC: 4.7 MMOL/L (ref 3.5–5.2)
PROT SERPL-MCNC: 7.1 G/DL (ref 6–8.5)
PSA SERPL-MCNC: 0.66 NG/ML (ref 0–4)
RBC # BLD AUTO: 4 10*6/MM3 (ref 4.14–5.8)
SODIUM SERPL-SCNC: 142 MMOL/L (ref 136–145)
WBC # BLD AUTO: 4.17 10*3/MM3 (ref 3.4–10.8)

## 2022-04-20 ENCOUNTER — OFFICE VISIT (OUTPATIENT)
Dept: FAMILY MEDICINE CLINIC | Facility: CLINIC | Age: 62
End: 2022-04-20

## 2022-04-20 VITALS
HEIGHT: 71 IN | HEART RATE: 67 BPM | OXYGEN SATURATION: 98 % | WEIGHT: 230 LBS | DIASTOLIC BLOOD PRESSURE: 80 MMHG | SYSTOLIC BLOOD PRESSURE: 124 MMHG | TEMPERATURE: 97.4 F | BODY MASS INDEX: 32.2 KG/M2

## 2022-04-20 DIAGNOSIS — K27.9 PUD (PEPTIC ULCER DISEASE): ICD-10-CM

## 2022-04-20 DIAGNOSIS — M51.36 DEGENERATION OF INTERVERTEBRAL DISC OF LUMBAR REGION: ICD-10-CM

## 2022-04-20 DIAGNOSIS — E66.09 CLASS 1 OBESITY DUE TO EXCESS CALORIES WITH SERIOUS COMORBIDITY AND BODY MASS INDEX (BMI) OF 32.0 TO 32.9 IN ADULT: ICD-10-CM

## 2022-04-20 DIAGNOSIS — G89.29 CHRONIC GENERALIZED ABDOMINAL PAIN: ICD-10-CM

## 2022-04-20 DIAGNOSIS — K44.9 HIATAL HERNIA WITH GERD WITHOUT ESOPHAGITIS: ICD-10-CM

## 2022-04-20 DIAGNOSIS — R74.01 ELEVATION OF LEVELS OF LIVER TRANSAMINASE LEVELS: ICD-10-CM

## 2022-04-20 DIAGNOSIS — M48.061 SPINAL STENOSIS OF LUMBAR REGION, UNSPECIFIED WHETHER NEUROGENIC CLAUDICATION PRESENT: ICD-10-CM

## 2022-04-20 DIAGNOSIS — K76.0 FATTY LIVER: ICD-10-CM

## 2022-04-20 DIAGNOSIS — M53.3 CHRONIC LEFT SI JOINT PAIN: Primary | ICD-10-CM

## 2022-04-20 DIAGNOSIS — I10 PRIMARY HYPERTENSION: ICD-10-CM

## 2022-04-20 DIAGNOSIS — K21.9 HIATAL HERNIA WITH GERD WITHOUT ESOPHAGITIS: ICD-10-CM

## 2022-04-20 DIAGNOSIS — R10.84 CHRONIC GENERALIZED ABDOMINAL PAIN: ICD-10-CM

## 2022-04-20 DIAGNOSIS — R16.1 SPLENOMEGALY: ICD-10-CM

## 2022-04-20 DIAGNOSIS — E78.2 MIXED HYPERLIPIDEMIA: ICD-10-CM

## 2022-04-20 DIAGNOSIS — E80.6 HYPERBILIRUBINEMIA: ICD-10-CM

## 2022-04-20 DIAGNOSIS — M47.812 OSTEOARTHRITIS OF CERVICAL SPINE, UNSPECIFIED SPINAL OSTEOARTHRITIS COMPLICATION STATUS: ICD-10-CM

## 2022-04-20 DIAGNOSIS — G89.29 CHRONIC LEFT SI JOINT PAIN: Primary | ICD-10-CM

## 2022-04-20 DIAGNOSIS — M48.02 SPINAL STENOSIS OF CERVICAL REGION: ICD-10-CM

## 2022-04-20 DIAGNOSIS — G89.4 CHRONIC PAIN SYNDROME: ICD-10-CM

## 2022-04-20 DIAGNOSIS — D69.6 THROMBOCYTOPENIA: ICD-10-CM

## 2022-04-20 DIAGNOSIS — F41.1 GAD (GENERALIZED ANXIETY DISORDER): ICD-10-CM

## 2022-04-20 DIAGNOSIS — Z79.899 HIGH RISK MEDICATIONS (NOT ANTICOAGULANTS) LONG-TERM USE: ICD-10-CM

## 2022-04-20 PROCEDURE — 99214 OFFICE O/P EST MOD 30 MIN: CPT | Performed by: FAMILY MEDICINE

## 2022-04-20 RX ORDER — METHADONE HYDROCHLORIDE 5 MG/1
TABLET ORAL
Qty: 120 TABLET | Refills: 0 | Status: SHIPPED | OUTPATIENT
Start: 2022-04-20 | End: 2022-06-27 | Stop reason: SDUPTHER

## 2022-04-20 RX ORDER — PANTOPRAZOLE SODIUM 40 MG/1
40 TABLET, DELAYED RELEASE ORAL 2 TIMES DAILY
Qty: 60 TABLET | Refills: 1 | Status: SHIPPED | OUTPATIENT
Start: 2022-04-20 | End: 2022-07-20

## 2022-04-20 NOTE — PROGRESS NOTES
"Subjective   Sterling Andrea Whaley is a 61 y.o. male.     History of Present Illness   Mr. Whaley is a 61 year-old  male with diffuse spinal osteoarthritis, degenerative disc disease with stenosis. Here for routine f/u on chronic pain syndrome. He complains of burning/aching, severe pain radiating from neck into bilateral shoulders, upper back, down both arms. Associated with difficulty sleeping, loss of range of motion in the neck.  He has particularly noticed difficulty \"looking up for too long\" which has significantly limited his activity.  Has noted increased weakness and painful paresthesia in the upper extremities.  Taking his routine medications as prescribed.  He has had extensive evaluation for his diffuse DDD/DJD in past including consultation with neurosurgery, pain management etc.  No recent fall or trauma.  Currently on methadone maximum dose of 20 mg daily.    He does report increasing left \"hip\" pain which he localizes to the left buttock particularly over the SI joint.  Increased pain with bearing weight on that leg alone such as while trying to balance while getting on and off a motorcycle or in and out of car.  No new neurological symptoms in that leg. No change in activity or trauma.     Also with ongoing consultation with orthopedics in regard to palmar contraction the right hand, bilateral ulnar neuropathy etc.  He is considering surgical intervention.    Chronic conditions include chronic idiopathic thrombocytopenia which has been stable without bleeding tendencies.    Hyperbilirubinemia which has been stable.  Abdominal imaging showed fatty liver.    Also with hypogonadism, not currently on testosterone replacement.    Also with low vitamin D.  Takes replacement intermittently.    Taking antihyertensive as rx'd. BP has been fairly well controlled.    Currently on Zoloft for generalized anxiety, intermittent depressive mood.  Denies side effects.  Denies depression.  Tolerating " well.    Seen by Dr. Coyne approximately 1 month ago.  At that time complained of dyspepsia, generalized abdominal pain.  Switch to pantoprazole.  At the same time treated with corticosteroids, antibiotics for persistent sinus infection chronic serous otitis.  Given Carafate for his stomach which has helped somewhat.    Pt's previous HPI reviewed and updated as indicated.     The following portions of the patient's history were reviewed and updated as appropriate: allergies, current medications, past family history, past medical history, past social history, past surgical history and problem list.    Review of Systems   Constitutional: Positive for fatigue. Negative for fever and unexpected weight change.   HENT: Positive for congestion, hearing loss (chronic), postnasal drip and sinus pressure (chronic). Negative for drooling, facial swelling, mouth sores, nosebleeds, rhinorrhea, sore throat and trouble swallowing.    Eyes: Negative for pain, discharge, redness and visual disturbance.   Respiratory: Negative for cough, shortness of breath and wheezing.    Cardiovascular: Negative for chest pain, palpitations and leg swelling.   Gastrointestinal: Positive for abdominal pain and nausea. Negative for blood in stool, diarrhea and vomiting.   Endocrine: Positive for heat intolerance. Negative for polydipsia and polyuria.   Genitourinary: Negative for dysuria and hematuria.   Musculoskeletal: Positive for arthralgias, back pain, gait problem, joint swelling, myalgias, neck pain and neck stiffness.   Skin: Positive for rash. Negative for wound.   Neurological: Positive for dizziness, weakness, numbness and headaches. Negative for tremors and syncope.   Hematological: Negative for adenopathy. Does not bruise/bleed easily.   Psychiatric/Behavioral: Positive for agitation, decreased concentration and dysphoric mood (mild). Negative for confusion, hallucinations, sleep disturbance and suicidal ideas. The patient is  nervous/anxious.    Pt's previous ROS reviewed and updated as indicated.       Objective    Vitals:    04/20/22 0940   BP: 124/80   Pulse: 67   Temp: 97.4 °F (36.3 °C)   SpO2: 98%     Body mass index is 32.08 kg/m².      04/20/22  0940   Weight: 104 kg (230 lb)       Physical Exam  Vitals and nursing note reviewed.   Constitutional:       General: He is not in acute distress.     Appearance: He is well-developed and well-groomed. He is obese. He is not ill-appearing.   HENT:      Head: Normocephalic and atraumatic.      Mouth/Throat:      Mouth: Mucous membranes are moist.   Eyes:      General: No scleral icterus.     Conjunctiva/sclera: Conjunctivae normal.   Neck:      Thyroid: No thyroid mass.      Vascular: Normal carotid pulses. No carotid bruit.   Cardiovascular:      Rate and Rhythm: Normal rate and regular rhythm.      Pulses: Normal pulses.      Heart sounds: Normal heart sounds. No murmur heard.  Pulmonary:      Effort: Pulmonary effort is normal.      Breath sounds: Normal breath sounds and air entry.   Abdominal:      General: Bowel sounds are normal. There is no distension.      Palpations: Abdomen is soft. There is no hepatomegaly, splenomegaly or mass.      Tenderness: There is generalized abdominal tenderness (mild).      Comments: Exam limited by body habitus   Musculoskeletal:      Cervical back: Deformity (loss of normal lordosis), spasms and bony tenderness present. Decreased range of motion.      Thoracic back: Deformity (increased kyphosis) and spasms (mild) present. No bony tenderness. Decreased range of motion.      Lumbar back: Deformity (loss of normal lordosis), spasms, tenderness (left SI joint) and bony tenderness present. Decreased range of motion. Negative right straight leg raise test and negative left straight leg raise test.      Right lower leg: No edema.      Left lower leg: No edema.   Lymphadenopathy:      Cervical: No cervical adenopathy.   Skin:     General: Skin is warm and  dry.      Coloration: Skin is not jaundiced or pale.      Findings: No bruising.   Neurological:      Mental Status: He is alert and oriented to person, place, and time.      Sensory: Sensation is intact.      Motor: Motor function is intact.      Gait: Gait abnormal (mildly antalgic on left).      Deep Tendon Reflexes: Reflexes are normal and symmetric.   Psychiatric:         Mood and Affect: Affect normal. Mood is anxious (mildly).         Behavior: Behavior normal. Behavior is cooperative.         Cognition and Memory: Cognition normal.     Pt's previous physical exam reviewed and updated as indicated.    Lab Results   Component Value Date    WBC 4.17 03/18/2022    HGB 14.0 03/18/2022    HCT 40.0 03/18/2022    .0 (H) 03/18/2022    PLT 62 (L) 03/18/2022       Lab Results   Component Value Date    GLUCOSE 117 (H) 03/18/2022    BUN 11 03/18/2022    CREATININE 0.91 03/18/2022    EGFRIFNONA 89 06/08/2021    EGFRIFAFRI 108 06/08/2021    BCR 12.1 03/18/2022    K 4.7 03/18/2022    CO2 27.8 03/18/2022    CALCIUM 9.4 03/18/2022    PROTENTOTREF 7.1 03/18/2022    ALBUMIN 4.20 03/18/2022    LABIL2 1.4 03/18/2022    AST 44 (H) 03/18/2022    ALT 44 (H) 03/18/2022       Lab Results   Component Value Date    CHLPL 160 06/08/2021    TRIG 435 (H) 06/08/2021    HDL 28 (L) 06/08/2021    LDL 65 06/08/2021       Lab Results   Component Value Date    HGBA1C 5.80 (H) 06/08/2021       Lab Results   Component Value Date    TSH 2.200 06/08/2021       Lab Results   Component Value Date    PSA 0.659 03/18/2022         Assessment/Plan   Diagnoses and all orders for this visit:    1. Chronic left SI joint pain (Primary)  -     XR sacroiliac joints 1 or 2 vw; Future    2. Chronic pain syndrome  -     methadone (DOLOPHINE) 5 MG tablet; 1 tablet po up to qid.  Dispense: 120 tablet; Refill: 0    3. Spinal stenosis of lumbar region, unspecified whether neurogenic claudication present    4. Degeneration of intervertebral disc of lumbar  region    5. Spinal stenosis of cervical region    6. Osteoarthritis of cervical spine, unspecified spinal osteoarthritis complication status    7. TYSON (generalized anxiety disorder)    8. Hyperbilirubinemia  -     CT Abdomen Pelvis With Contrast; Future    9. Fatty liver    10. Thrombocytopenia (HCC)  -     CT Abdomen Pelvis With Contrast; Future    11. Primary hypertension    12. Mixed hyperlipidemia    13. High risk medications (not anticoagulants) long-term use    14. Splenomegaly  -     CT Abdomen Pelvis With Contrast; Future    15. Chronic generalized abdominal pain  -     CT Abdomen Pelvis With Contrast; Future    16. Elevation of levels of liver transaminase levels  -     CT Abdomen Pelvis With Contrast; Future    17. Class 1 obesity due to excess calories with serious comorbidity and body mass index (BMI) of 32.0 to 32.9 in adult       Acute exacerbation of chronic left SI joint pain.  Baseline x-ray.  If minimal improvement with conservative management may need procedural intervention.    Patient's Body mass index is 32.08 kg/m². indicating that he is obese (BMI >30). Obesity-related health conditions include the following: obstructive sleep apnea, hypertension, dyslipidemias and osteoarthritis. Obesity is unchanged. BMI is is above average; BMI management plan is completed. We discussed portion control, increasing exercise and management of depression/anxiety/stress to control compensatory eating..    Multifactorial chronic pain syndrome generally stable other than exacerbation of SI pain as above.  Continue current treatment regimen including methadone 5 mg, maximum dose 20 mg/day.  Good clinical benefit.  No aberrant behavior.  No reported side effects.  As part of patient's treatment plan I am prescribing a controlled substance.  The patient has been made aware of the appropriate use of such medications, including potential risk of somnolence, limited ability to drive and/or work safely, and potential  for dependence and/or overdose.  It has also been made clear that these medications are for use by this patient only, without concomitant use of alcohol or other substances, unless prescribed.  History and physical exam exhibit continued safe and appropriate use of controlled substance.  CLAUDIO reviewed.  Patient has completed a prescribing agreement detailing terms of continued prescribing of controlled substances, including monitoring CLAUDIO reports, urine drug screening, and pill counts if necessary.  Patient is aware that inappropriate use will result in cessation of prescribing such medications.    TYSON stable continue Zoloft.    Stable hyperbilirubinemia, stable thrombocytopenia.  Reviewed need for healthy eating, weight loss for management of fatty liver.  Mildly elevated liver enzymes but stable.    He declines statin for management of hyperlipidemia.    Continue lisinopril for management of hypertension.    Due to his persistent abdominal complaints of unclear etiology, associated hyperbilirubinemia, associated abnormal liver enzymes, associated thrombocytopenia, abdominal tenderness on exam recommend CT abdomen pelvis to which he is amenable.    Routine follow-up in 3 months, sooner as needed/instructed.  I will contact patient regarding test results and provide instructions regarding any necessary changes in plan of care.  Patient was encouraged to keep me informed of any acute changes, lack of improvement, or any new concerning symptoms.  Pt is aware of reasons to seek emergent care.  Patient voiced understanding of all instructions and denied further questions.    Please note that portions of this note may have been completed with a voice recognition program. Efforts were made to edit the dictations, but occasionally words are mistranscribed.

## 2022-04-22 PROBLEM — F90.2 ADHD (ATTENTION DEFICIT HYPERACTIVITY DISORDER), COMBINED TYPE: Status: RESOLVED | Noted: 2022-01-20 | Resolved: 2022-04-22

## 2022-04-22 PROBLEM — R16.1 SPLENOMEGALY: Status: ACTIVE | Noted: 2022-04-22

## 2022-05-05 ENCOUNTER — HOSPITAL ENCOUNTER (OUTPATIENT)
Dept: CT IMAGING | Facility: HOSPITAL | Age: 62
Discharge: HOME | End: 2022-05-05

## 2022-05-05 ENCOUNTER — HOSPITAL ENCOUNTER (OUTPATIENT)
Dept: GENERAL RADIOLOGY | Facility: HOSPITAL | Age: 62
Discharge: HOME | End: 2022-05-05

## 2022-05-05 DIAGNOSIS — M53.3 CHRONIC LEFT SI JOINT PAIN: ICD-10-CM

## 2022-05-05 DIAGNOSIS — E80.6 HYPERBILIRUBINEMIA: ICD-10-CM

## 2022-05-05 DIAGNOSIS — G89.29 CHRONIC LEFT SI JOINT PAIN: ICD-10-CM

## 2022-05-05 DIAGNOSIS — R10.84 CHRONIC GENERALIZED ABDOMINAL PAIN: ICD-10-CM

## 2022-05-05 DIAGNOSIS — G89.29 CHRONIC GENERALIZED ABDOMINAL PAIN: ICD-10-CM

## 2022-05-05 DIAGNOSIS — R74.01 ELEVATION OF LEVELS OF LIVER TRANSAMINASE LEVELS: ICD-10-CM

## 2022-05-05 DIAGNOSIS — D69.6 THROMBOCYTOPENIA: ICD-10-CM

## 2022-05-05 DIAGNOSIS — R16.1 SPLENOMEGALY: ICD-10-CM

## 2022-05-05 LAB — CREAT BLDA-MCNC: 0.8 MG/DL (ref 0.6–1.3)

## 2022-05-05 PROCEDURE — 25010000002 IOPAMIDOL 61 % SOLUTION: Performed by: FAMILY MEDICINE

## 2022-05-05 PROCEDURE — 72200 X-RAY EXAM SI JOINTS: CPT

## 2022-05-05 PROCEDURE — 74177 CT ABD & PELVIS W/CONTRAST: CPT

## 2022-05-05 PROCEDURE — 82565 ASSAY OF CREATININE: CPT

## 2022-05-05 RX ADMIN — IOPAMIDOL 100 ML: 612 INJECTION, SOLUTION INTRAVENOUS at 08:38

## 2022-05-06 DIAGNOSIS — R16.1 SPLENOMEGALY: ICD-10-CM

## 2022-05-06 DIAGNOSIS — K76.6 PORTAL HYPERTENSION: ICD-10-CM

## 2022-05-06 DIAGNOSIS — K74.60 CIRRHOSIS OF LIVER WITHOUT ASCITES, UNSPECIFIED HEPATIC CIRRHOSIS TYPE: Primary | ICD-10-CM

## 2022-05-06 NOTE — PROGRESS NOTES
Please inform pt his recent CT abdomen showed:  1) chronic liver changes called cirrhosis, this has also led to enlargement of his spleen  2) kidney stones both sides but not causing blockages    He definitely needs to see a liver specialist. I have placed referral to Dr. Newell in [place].

## 2022-05-26 DIAGNOSIS — F41.1 GAD (GENERALIZED ANXIETY DISORDER): ICD-10-CM

## 2022-06-21 DIAGNOSIS — F41.1 GAD (GENERALIZED ANXIETY DISORDER): ICD-10-CM

## 2022-06-27 DIAGNOSIS — G89.4 CHRONIC PAIN SYNDROME: ICD-10-CM

## 2022-06-27 RX ORDER — METHADONE HYDROCHLORIDE 5 MG/1
TABLET ORAL
Qty: 120 TABLET | Refills: 0 | Status: SHIPPED | OUTPATIENT
Start: 2022-06-27 | End: 2022-08-26 | Stop reason: SDUPTHER

## 2022-06-28 ENCOUNTER — CLINICAL SUPPORT (OUTPATIENT)
Dept: FAMILY MEDICINE CLINIC | Facility: CLINIC | Age: 62
End: 2022-06-28

## 2022-06-28 DIAGNOSIS — Z51.81 ENCOUNTER FOR THERAPEUTIC DRUG MONITORING: Primary | ICD-10-CM

## 2022-07-05 LAB — DRUGS UR: NORMAL

## 2022-07-20 ENCOUNTER — OFFICE VISIT (OUTPATIENT)
Dept: FAMILY MEDICINE CLINIC | Facility: CLINIC | Age: 62
End: 2022-07-20

## 2022-07-20 VITALS
OXYGEN SATURATION: 99 % | TEMPERATURE: 97.5 F | HEIGHT: 71 IN | BODY MASS INDEX: 31.58 KG/M2 | SYSTOLIC BLOOD PRESSURE: 142 MMHG | WEIGHT: 225.6 LBS | HEART RATE: 66 BPM | DIASTOLIC BLOOD PRESSURE: 84 MMHG | RESPIRATION RATE: 16 BRPM

## 2022-07-20 DIAGNOSIS — K76.0 FATTY LIVER: ICD-10-CM

## 2022-07-20 DIAGNOSIS — Z79.899 HIGH RISK MEDICATIONS (NOT ANTICOAGULANTS) LONG-TERM USE: ICD-10-CM

## 2022-07-20 DIAGNOSIS — K74.60 CIRRHOSIS OF LIVER WITHOUT ASCITES, UNSPECIFIED HEPATIC CIRRHOSIS TYPE: ICD-10-CM

## 2022-07-20 DIAGNOSIS — F41.1 GAD (GENERALIZED ANXIETY DISORDER): ICD-10-CM

## 2022-07-20 DIAGNOSIS — R16.1 SPLENOMEGALY: ICD-10-CM

## 2022-07-20 DIAGNOSIS — N20.0 RECURRENT KIDNEY STONES: ICD-10-CM

## 2022-07-20 DIAGNOSIS — I10 PRIMARY HYPERTENSION: ICD-10-CM

## 2022-07-20 DIAGNOSIS — E80.6 HYPERBILIRUBINEMIA: ICD-10-CM

## 2022-07-20 DIAGNOSIS — D69.6 THROMBOCYTOPENIA: ICD-10-CM

## 2022-07-20 DIAGNOSIS — G89.4 CHRONIC PAIN SYNDROME: ICD-10-CM

## 2022-07-20 DIAGNOSIS — M48.02 SPINAL STENOSIS OF CERVICAL REGION: ICD-10-CM

## 2022-07-20 DIAGNOSIS — Z28.21 PNEUMOCOCCAL VACCINE REFUSED: ICD-10-CM

## 2022-07-20 DIAGNOSIS — M48.061 SPINAL STENOSIS OF LUMBAR REGION, UNSPECIFIED WHETHER NEUROGENIC CLAUDICATION PRESENT: Primary | ICD-10-CM

## 2022-07-20 PROCEDURE — 99214 OFFICE O/P EST MOD 30 MIN: CPT | Performed by: FAMILY MEDICINE

## 2022-07-20 RX ORDER — TIZANIDINE 2 MG/1
TABLET ORAL
Qty: 60 TABLET | Refills: 2 | Status: SHIPPED | OUTPATIENT
Start: 2022-07-20 | End: 2022-11-09 | Stop reason: HOSPADM

## 2022-07-20 NOTE — PROGRESS NOTES
"Subjective   Sterling Andrea Whaley is a 62 y.o. male.     History of Present Illness   Mr. Whaley is a 62 year-old  male with diffuse spinal osteoarthritis, degenerative disc disease with stenosis. Here for routine f/u on chronic pain syndrome. He complains of burning/aching, severe pain radiating from neck into bilateral shoulders, upper back, down both arms. Associated with difficulty sleeping, loss of range of motion in the neck.  He has particularly noticed difficulty \"looking up for too long\" which has significantly limited his activity.  Has noted increased weakness and painful paresthesia in the upper extremities. Taking his routine medications as prescribed.  He has had extensive evaluation for his diffuse DDD/DJD in past including consultation with neurosurgery, pain management etc.  No recent fall or trauma.  Currently on methadone maximum dose of 20 mg daily.    Requests trial of muscle relaxant due to increase muscle pain/tension upper back, neck.     Has pervious dx of chronic idiopathic thrombocytopenia which has been stable without bleeding tendencies.     Hyperbilirubinemia which has been stable.  Abdominal imaging showed fatty liver.     Also with low vitamin D.  Takes replacement intermittently.     Taking antihyertensive as rx'd. BP has been fairly well controlled.     Currently on Zoloft for generalized anxiety, intermittent depressive mood. Denies side effects.  Denies depression.  Tolerating well.     Feels he may have passed kidney stone since last visit. No residual symptoms.   Kidney stones passed again    Back in March he was noted to have bump in alk phos and LFTs as well as his usual hyperbilirubinemia. Thrombocytopenia stable at 62 at that time. Advised to have CT of his abd due to persistent, yet somewhat vague GI complaints. Ct showed cirrhosis with findings of portal hypertension with moderate splenomegaly, nonobstructing nephrolithiasis, and nonspecific peripncratic " adenopathy possibly consistent with chronic liver disease. He was referred to GI in [place] 5/6/22. Apt scheduled for 8/25/22.    Pt's previous HPI reviewed and updated as indicated.     The following portions of the patient's history were reviewed and updated as appropriate: allergies, current medications, past family history, past medical history, past social history, past surgical history and problem list.    Review of Systems   Constitutional: Positive for fatigue and unexpected weight change. Negative for fever.   HENT: Positive for congestion, hearing loss (chronic), postnasal drip and sinus pressure (chronic). Negative for drooling, facial swelling, mouth sores, nosebleeds, rhinorrhea, sore throat and trouble swallowing.    Eyes: Negative for pain, discharge, redness and visual disturbance.   Respiratory: Negative for cough, shortness of breath and wheezing.    Cardiovascular: Negative for chest pain, palpitations and leg swelling.   Gastrointestinal: Positive for nausea. Negative for abdominal pain, blood in stool, diarrhea and vomiting.   Endocrine: Positive for heat intolerance. Negative for polydipsia and polyuria.   Genitourinary: Negative for dysuria and hematuria.   Musculoskeletal: Positive for arthralgias, back pain, gait problem, joint swelling, myalgias, neck pain and neck stiffness.   Skin: Positive for rash. Negative for wound.   Neurological: Positive for dizziness, weakness, numbness and headaches. Negative for tremors and syncope.   Hematological: Negative for adenopathy. Does not bruise/bleed easily.   Psychiatric/Behavioral: Positive for decreased concentration and dysphoric mood (mild). Negative for confusion, hallucinations, sleep disturbance and suicidal ideas. The patient is nervous/anxious.    Pt's previous ROS reviewed and updated as indicated.       Objective    Vitals:    07/20/22 0938   BP: 142/84   Pulse: 66   Resp: 16   Temp: 97.5 °F (36.4 °C)   SpO2: 99%     Body mass index is  31.46 kg/m².      07/20/22  0938   Weight: 102 kg (225 lb 9.6 oz)     Physical Exam  Vitals and nursing note reviewed.   Constitutional:       General: He is not in acute distress.     Appearance: He is well-developed and well-groomed. He is not ill-appearing.   Eyes:      General: No scleral icterus.     Conjunctiva/sclera: Conjunctivae normal.   Neck:      Thyroid: No thyroid mass.      Vascular: Normal carotid pulses. No carotid bruit.   Cardiovascular:      Rate and Rhythm: Normal rate and regular rhythm.      Pulses: Normal pulses.      Heart sounds: Normal heart sounds. No murmur heard.  Pulmonary:      Effort: Pulmonary effort is normal.      Breath sounds: Normal breath sounds and air entry.   Abdominal:      General: Bowel sounds are decreased. There is no distension.      Palpations: Abdomen is soft. There is no hepatomegaly or mass.      Tenderness: There is no abdominal tenderness.      Comments: Exam limited by body habitus   Musculoskeletal:      Cervical back: Deformity (loss of normal lordosis), spasms and bony tenderness present. Decreased range of motion.      Thoracic back: Deformity (increased kyphosis) and spasms (mild) present. No bony tenderness. Decreased range of motion.      Lumbar back: Deformity (loss of normal lordosis), spasms, tenderness (left SI joint) and bony tenderness present. Decreased range of motion. Negative right straight leg raise test and negative left straight leg raise test.      Right lower leg: No edema.      Left lower leg: No edema.   Lymphadenopathy:      Cervical: No cervical adenopathy.   Skin:     General: Skin is warm and dry.      Coloration: Skin is not jaundiced or pale.      Findings: No bruising.   Neurological:      Mental Status: He is alert and oriented to person, place, and time.      Gait: Gait abnormal (mildly antalgic on left).   Psychiatric:         Mood and Affect: Mood and affect normal.         Behavior: Behavior normal. Behavior is cooperative.          Cognition and Memory: Cognition normal.     Pt's previous physical exam reviewed and updated as indicated.    XR Sacroiliac Joints 1 or 2 View  Result Date: 5/5/2022  Degenerative changes.  No acute bony abnormality.    This report was signed and finalized on 5/5/2022 9:20 AM by Scott Lindsey MD.    CT Abdomen Pelvis With Contrast  Result Date: 5/5/2022  1. Cirrhosis with findings of portal hypertension with moderate splenomegaly. 2. Bilateral nephrolithiasis without obstructing stone. 3. Nonspecific peripancreatic adenopathy. Mild adenopathy can be seen with chronic liver disease.   This study was performed with techniques to keep radiation doses as low as reasonably achievable (ALARA). Individualized dose reduction techniques using automated exposure control or adjustment of vA and/or kV according to the patient size were employed.  This report was signed and finalized on 5/5/2022 9:47 AM by Scott Lindsey MD.    Lab Results   Component Value Date    WBC 4.17 03/18/2022    HGB 14.0 03/18/2022    HCT 40.0 03/18/2022    .0 (H) 03/18/2022    PLT 62 (L) 03/18/2022     Lab Results   Component Value Date    GLUCOSE 117 (H) 03/18/2022    BUN 11 03/18/2022    CREATININE 0.80 05/05/2022    EGFRIFNONA 89 06/08/2021    EGFRIFAFRI 108 06/08/2021    BCR 12.1 03/18/2022    K 4.7 03/18/2022    CO2 27.8 03/18/2022    CALCIUM 9.4 03/18/2022    PROTENTOTREF 7.1 03/18/2022    ALBUMIN 4.20 03/18/2022    LABIL2 1.4 03/18/2022    AST 44 (H) 03/18/2022    ALT 44 (H) 03/18/2022     Lab Results   Component Value Date    IHFMFKMI35 480 06/08/2021     Lab Results   Component Value Date    HEPBCAB Negative 06/09/2020         Assessment & Plan   Diagnoses and all orders for this visit:    1. Spinal stenosis of lumbar region, unspecified whether neurogenic claudication present (Primary)  -     tiZANidine (ZANAFLEX) 2 MG tablet; 1-2 po qhs as needed for muscle spasm  Dispense: 60 tablet; Refill: 2    2. Spinal stenosis of cervical  region  -     tiZANidine (ZANAFLEX) 2 MG tablet; 1-2 po qhs as needed for muscle spasm  Dispense: 60 tablet; Refill: 2    3. High risk medications (not anticoagulants) long-term use    4. Primary hypertension    5. Thrombocytopenia (HCC)    6. Pneumococcal vaccine refused    7. Recurrent kidney stones    8. Hyperbilirubinemia    9. Fatty liver    10. Cirrhosis of liver without ascites, unspecified hepatic cirrhosis type (HCC)    11. Chronic pain syndrome    12. TYSNO (generalized anxiety disorder)    13. Splenomegaly       Trial of tizanidine for worsening muscle spasm. Risks, benefits, and potential side effects of medications reviewed with patient.     Otherwise generally stable multifactorial chronic pain syndrome. Continue current tx with methadone max dose per day 20 mg, average dosing 10 mg per day. Good clinical/functional benefit, no aberrant behavior. No reported side effects.  As part of patient's treatment plan I am prescribing a controlled substance.  The patient has been made aware of the appropriate use of such medications, including potential risk of somnolence, limited ability to drive and/or work safely, and potential for dependence and/or overdose.  It has also been made clear that these medications are for use by this patient only, without concomitant use of alcohol or other substances, unless prescribed.  History and physical exam exhibit continued safe and appropriate use of controlled substance.  Claudio reveiwed.  Patient has completed a prescribing agreement detailing terms of continued prescribing of controlled substances, including monitoring CLAUDIO reports, urine drug screening, and pill counts if necessary.  Patient is aware that inappropriate use will result in cessation of prescribing such medications.    TYSON stable on zoloft.    Continue lisinopril for HTN.    F/u with GI as scheduled for further eval/mgnt of cirrhosis, etc.     Routine f/u in 3 months, sooner as needed.  Patient was  encouraged to keep me informed of any acute changes, lack of improvement, or any new concerning symptoms.  Pt is aware of reasons to seek emergent care.  Patient voiced understanding of all instructions and denied further questions.    Please note that portions of this note may have been completed with a voice recognition program. Efforts were made to edit the dictations, but occasionally words are mistranscribed.

## 2022-07-30 DIAGNOSIS — I10 ESSENTIAL HYPERTENSION: ICD-10-CM

## 2022-08-01 RX ORDER — LISINOPRIL 10 MG/1
30 TABLET ORAL DAILY
Qty: 90 TABLET | Refills: 5 | Status: SHIPPED | OUTPATIENT
Start: 2022-08-01 | End: 2022-11-21 | Stop reason: SDUPTHER

## 2022-08-12 NOTE — TELEPHONE ENCOUNTER
Caller: Sterling Whaley    Relationship: Self    Best call back number:[phone]    Requested Prescriptions:   Requested Prescriptions     Pending Prescriptions Disp Refills   • methadone (DOLOPHINE) 5 MG tablet 120 tablet 0     Si tablet po up to qid.        [facility] [place], [place] - [address] - [phone]  - [phone] FX     Additional details provided by patient: PATIENT HAS 3 OR 4 LEFT    Does the patient have less than a 3 day supply:  [] Yes  [] No    Jayashree Casillas Rep   03/15/22 10:37 EDT            English

## 2022-08-26 DIAGNOSIS — F41.1 GAD (GENERALIZED ANXIETY DISORDER): ICD-10-CM

## 2022-08-26 DIAGNOSIS — G89.4 CHRONIC PAIN SYNDROME: ICD-10-CM

## 2022-08-26 RX ORDER — METHADONE HYDROCHLORIDE 5 MG/1
TABLET ORAL
Qty: 12 TABLET | Refills: 0 | Status: SHIPPED | OUTPATIENT
Start: 2022-08-26 | End: 2022-09-06 | Stop reason: SDUPTHER

## 2022-08-26 NOTE — TELEPHONE ENCOUNTER
Caller: Sterling Whaley    Relationship: Self    Best call back number: [phone]    Requested Prescriptions:   Requested Prescriptions     Pending Prescriptions Disp Refills   • methadone (DOLOPHINE) 5 MG tablet 120 tablet 0     Si tablet po up to qid.   • sertraline (ZOLOFT) 50 MG tablet 90 tablet 0     Sig: Take 1 tablet by mouth Daily.        [facility] [place] [address] [address] - [phone]  - [phone] FX     Additional details provided by patient: PATIENT NEEDS THIS MEDICATION BY 2022    Does the patient have less than a 3 day supply:  [x] Yes  [] No    Jayashree Lino Rep   22 11:46 EDT

## 2022-08-26 NOTE — TELEPHONE ENCOUNTER
Rx Refill Note  Requested Prescriptions     Pending Prescriptions Disp Refills   • methadone (DOLOPHINE) 5 MG tablet 120 tablet 0     Si tablet po up to qid.     Signed Prescriptions Disp Refills   • sertraline (ZOLOFT) 50 MG tablet 90 tablet 0     Sig: Take 1 tablet by mouth Daily.     Authorizing Provider: MITCHEL IVEY     Ordering User: PEPPER HANDLEY      Last office visit with prescribing clinician: 2022      Next office visit with prescribing clinician: 10/19/2022            Pepper Handley MA  22, 14:56 EDT

## 2022-09-06 DIAGNOSIS — G89.4 CHRONIC PAIN SYNDROME: ICD-10-CM

## 2022-09-06 NOTE — TELEPHONE ENCOUNTER
Caller: Sterling Whaleyne    Relationship: Self    Best call back number: [phone]    Requested Prescriptions:   Requested Prescriptions     Pending Prescriptions Disp Refills   • methadone (DOLOPHINE) 5 MG tablet 12 tablet 0     Si tablet po up to qid.        Pharm[facility] where [facility] be sent: [facility] - [place], [place] - [address] - [phone]  - [phone] FX     Additional details provided by patient: JENNIFER SAYS HE ONLY GOT 12 PILLS FOR HIS LATEST REFILL.  HE SAID HE NORMALLY GETS 120.    Does the patient have less than a 3 day supply:  [x] Yes  [] No    Jayashree Vasquez Rep   22 14:56 EDT

## 2022-09-07 RX ORDER — METHADONE HYDROCHLORIDE 5 MG/1
TABLET ORAL
Qty: 120 TABLET | Refills: 0 | Status: SHIPPED | OUTPATIENT
Start: 2022-09-07 | End: 2022-11-21 | Stop reason: SDUPTHER

## 2022-10-10 ENCOUNTER — OFFICE VISIT (OUTPATIENT)
Dept: FAMILY MEDICINE CLINIC | Facility: CLINIC | Age: 62
End: 2022-10-10

## 2022-10-10 VITALS
OXYGEN SATURATION: 98 % | DIASTOLIC BLOOD PRESSURE: 80 MMHG | WEIGHT: 218.25 LBS | SYSTOLIC BLOOD PRESSURE: 140 MMHG | BODY MASS INDEX: 30.56 KG/M2 | HEART RATE: 65 BPM | HEIGHT: 71 IN

## 2022-10-10 DIAGNOSIS — R30.0 DYSURIA: ICD-10-CM

## 2022-10-10 DIAGNOSIS — R31.9 HEMATURIA, UNSPECIFIED TYPE: Primary | ICD-10-CM

## 2022-10-10 LAB
BILIRUB BLD-MCNC: NEGATIVE MG/DL
CLARITY, POC: CLEAR
COLOR UR: YELLOW
EXPIRATION DATE: 0
GLUCOSE UR STRIP-MCNC: NEGATIVE MG/DL
KETONES UR QL: NEGATIVE
LEUKOCYTE EST, POC: NEGATIVE
Lab: 0
NITRITE UR-MCNC: NEGATIVE MG/ML
PH UR: 6 [PH] (ref 5–8)
PROT UR STRIP-MCNC: NEGATIVE MG/DL
RBC # UR STRIP: ABNORMAL /UL
SP GR UR: 1.01 (ref 1–1.03)
UROBILINOGEN UR QL: NORMAL

## 2022-10-10 PROCEDURE — 99213 OFFICE O/P EST LOW 20 MIN: CPT | Performed by: NURSE PRACTITIONER

## 2022-10-10 PROCEDURE — 81003 URINALYSIS AUTO W/O SCOPE: CPT | Performed by: NURSE PRACTITIONER

## 2022-10-10 RX ORDER — CEFUROXIME AXETIL 250 MG/1
250 TABLET ORAL 2 TIMES DAILY
Qty: 14 TABLET | Refills: 0 | Status: SHIPPED | OUTPATIENT
Start: 2022-10-10 | End: 2022-10-14

## 2022-10-12 LAB
BACTERIA UR CULT: NO GROWTH
BACTERIA UR CULT: NORMAL
C TRACH RRNA SPEC QL NAA+PROBE: NEGATIVE
N GONORRHOEA RRNA SPEC QL NAA+PROBE: NEGATIVE

## 2022-10-14 ENCOUNTER — OFFICE VISIT (OUTPATIENT)
Dept: FAMILY MEDICINE CLINIC | Facility: CLINIC | Age: 62
End: 2022-10-14

## 2022-10-14 VITALS
SYSTOLIC BLOOD PRESSURE: 123 MMHG | DIASTOLIC BLOOD PRESSURE: 78 MMHG | WEIGHT: 220.4 LBS | BODY MASS INDEX: 30.85 KG/M2 | TEMPERATURE: 98.3 F | HEIGHT: 71 IN | OXYGEN SATURATION: 97 % | HEART RATE: 65 BPM

## 2022-10-14 DIAGNOSIS — K12.0 APHTHOUS ULCER: Primary | ICD-10-CM

## 2022-10-14 PROCEDURE — 99213 OFFICE O/P EST LOW 20 MIN: CPT | Performed by: NURSE PRACTITIONER

## 2022-10-14 RX ORDER — DEXAMETHASONE 0.5 MG/5ML
SOLUTION ORAL
Qty: 160 ML | Refills: 0 | Status: SHIPPED | OUTPATIENT
Start: 2022-10-14 | End: 2022-11-07

## 2022-10-14 NOTE — PROGRESS NOTES
"      Subjective     Chief Complaint:    Chief Complaint   Patient presents with   • Mouth Lesions       History of Present Illness:   Mouth lesions x a few days, his mouth feels sore. He has about 5 lesions in his mouth. He is able to eat and drink but is painful. Salt makes it worse.   No cold symptoms but has some chronic post nasal drip and is taking zyrtec       Review of Systems  Gen- No fevers, chills  CV- No chest pain, palpitations  Resp- No cough, dyspnea  GI- No N/V/D, abd pain  Neuro-No dizziness, headaches      I have reviewed and/or updated the patient's past medical, surgical, family, social history and problem list as appropriate.     Medications:    Current Outpatient Medications:   •  fluticasone (FLONASE) 50 MCG/ACT nasal spray, USE 2 SPRAYS IN EACH NOSTRIL EVERY DAY FOR ALLERGIC RHINITIS, Disp: 48 g, Rfl: 1  •  lisinopril (PRINIVIL,ZESTRIL) 10 MG tablet, TAKE 3 TABLETS BY MOUTH DAILY, Disp: 90 tablet, Rfl: 5  •  methadone (DOLOPHINE) 5 MG tablet, 1 tablet po up to qid., Disp: 120 tablet, Rfl: 0  •  montelukast (Singulair) 10 MG tablet, Take 1 tablet by mouth Every Night., Disp: 30 tablet, Rfl: 2  •  sertraline (ZOLOFT) 50 MG tablet, Take 1 tablet by mouth Daily., Disp: 90 tablet, Rfl: 0  •  tiZANidine (ZANAFLEX) 2 MG tablet, 1-2 po qhs as needed for muscle spasm, Disp: 60 tablet, Rfl: 2  •  dexamethasone 0.5 MG/5ML solution, Swish and spit 3-4 times per day, Disp: 160 mL, Rfl: 0    Current Facility-Administered Medications:   •  cyanocobalamin injection 1,000 mcg, 1,000 mcg, Intramuscular, Q28 Days, Annel Armstrong MD, 1,000 mcg at 09/09/21 1238    Allergies:  Allergies   Allergen Reactions   • Meperidine Unknown - High Severity     claustraphobic       Objective     Vital Signs:   Vitals:    10/14/22 0932   BP: 123/78   Pulse: 65   Temp: 98.3 °F (36.8 °C)   SpO2: 97%   Weight: 100 kg (220 lb 6.4 oz)   Height: 180.3 cm (70.98\")   PainSc:   7     Body mass index is 30.75 kg/m².    Physical " Exam:    Physical Exam  Vitals and nursing note reviewed.   Constitutional:       Appearance: He is well-developed.   HENT:      Head: Normocephalic and atraumatic.      Mouth/Throat:      Comments: apthous ulcers noted on inner low lip, gum, and under tongue  Eyes:      Pupils: Pupils are equal, round, and reactive to light.   Cardiovascular:      Rate and Rhythm: Normal rate and regular rhythm.      Heart sounds: Normal heart sounds.   Pulmonary:      Effort: Pulmonary effort is normal.      Breath sounds: Normal breath sounds.   Abdominal:      General: Bowel sounds are normal. There is no distension.      Palpations: Abdomen is soft.      Tenderness: There is no abdominal tenderness.   Musculoskeletal:      Cervical back: Neck supple.   Skin:     General: Skin is warm and dry.   Neurological:      Mental Status: He is alert and oriented to person, place, and time.   Psychiatric:         Behavior: Behavior normal.         Assessment / Plan     Assessment/Plan:   Problem List Items Addressed This Visit    None  Visit Diagnoses     Aphthous ulcer    -  Primary    Relevant Medications    dexamethasone 0.5 MG/5ML solution        -- first occurrence, will do dex, ddx PFAPA    Follow up:  As needed    Electronically signed by BARBARA Cabral   10/14/2022 09:40 EDT      Please note that portions of this note may have been completed with a voice recognition program. Efforts were made to edit the dictations, but occasionally words are mistranscribed.

## 2022-10-17 ENCOUNTER — PATIENT OUTREACH (OUTPATIENT)
Dept: PHARMACY | Facility: TELEHEALTH | Age: 62
End: 2022-10-17

## 2022-10-17 NOTE — OUTREACH NOTE
Medication Adherence Call    Patient was identified as having care opportunities. [facility] med filled 10/1.    Annel Quinonez, PharmD  [facility]  10/17/22

## 2022-10-19 ENCOUNTER — OFFICE VISIT (OUTPATIENT)
Dept: FAMILY MEDICINE CLINIC | Facility: CLINIC | Age: 62
End: 2022-10-19

## 2022-10-19 VITALS
BODY MASS INDEX: 30.13 KG/M2 | DIASTOLIC BLOOD PRESSURE: 90 MMHG | TEMPERATURE: 97 F | HEART RATE: 64 BPM | OXYGEN SATURATION: 98 % | HEIGHT: 71 IN | WEIGHT: 215.2 LBS | SYSTOLIC BLOOD PRESSURE: 148 MMHG

## 2022-10-19 DIAGNOSIS — K21.9 HIATAL HERNIA WITH GERD WITHOUT ESOPHAGITIS: ICD-10-CM

## 2022-10-19 DIAGNOSIS — Z79.899 HIGH RISK MEDICATIONS (NOT ANTICOAGULANTS) LONG-TERM USE: ICD-10-CM

## 2022-10-19 DIAGNOSIS — K44.9 HIATAL HERNIA WITH GERD WITHOUT ESOPHAGITIS: ICD-10-CM

## 2022-10-19 DIAGNOSIS — Z11.4 SCREENING FOR HIV (HUMAN IMMUNODEFICIENCY VIRUS): ICD-10-CM

## 2022-10-19 DIAGNOSIS — K76.6 PORTAL HYPERTENSION: ICD-10-CM

## 2022-10-19 DIAGNOSIS — R73.01 IFG (IMPAIRED FASTING GLUCOSE): ICD-10-CM

## 2022-10-19 DIAGNOSIS — E78.2 MIXED HYPERLIPIDEMIA: ICD-10-CM

## 2022-10-19 DIAGNOSIS — Z11.59 ENCOUNTER FOR SCREENING FOR OTHER VIRAL DISEASES: ICD-10-CM

## 2022-10-19 DIAGNOSIS — R16.1 SPLENOMEGALY: ICD-10-CM

## 2022-10-19 DIAGNOSIS — K74.60 CIRRHOSIS OF LIVER WITHOUT ASCITES, UNSPECIFIED HEPATIC CIRRHOSIS TYPE: ICD-10-CM

## 2022-10-19 DIAGNOSIS — R63.4 ABNORMAL WEIGHT LOSS: ICD-10-CM

## 2022-10-19 DIAGNOSIS — Z11.3 SCREEN FOR STD (SEXUALLY TRANSMITTED DISEASE): ICD-10-CM

## 2022-10-19 DIAGNOSIS — E80.6 HYPERBILIRUBINEMIA: ICD-10-CM

## 2022-10-19 DIAGNOSIS — Z28.21 INFLUENZA VACCINATION DECLINED BY PATIENT: ICD-10-CM

## 2022-10-19 DIAGNOSIS — K76.0 FATTY LIVER: ICD-10-CM

## 2022-10-19 DIAGNOSIS — G89.4 CHRONIC PAIN SYNDROME: ICD-10-CM

## 2022-10-19 DIAGNOSIS — K12.0 APHTHOUS ULCER: Primary | ICD-10-CM

## 2022-10-19 DIAGNOSIS — I10 PRIMARY HYPERTENSION: ICD-10-CM

## 2022-10-19 PROCEDURE — 99214 OFFICE O/P EST MOD 30 MIN: CPT | Performed by: FAMILY MEDICINE

## 2022-10-19 RX ORDER — VALACYCLOVIR HYDROCHLORIDE 1 G/1
1000 TABLET, FILM COATED ORAL 3 TIMES DAILY
Qty: 21 TABLET | Refills: 0 | Status: SHIPPED | OUTPATIENT
Start: 2022-10-19 | End: 2022-10-26

## 2022-10-19 NOTE — PROGRESS NOTES
"Subjective   Sterling Andrea Whaley is a 62 y.o. male.     History of Present Illness  Mr. Whaley presents today with concern for poorly healing oral ulcers. 10/10 for possible UTI. Placed on ceftin while awaiting culture report. Culture negative, therefore he dec'd abx after 3 days treatment. shortly after develpoed what sounds like aphthous ulcers diffusely in mouth, gums, lips. tx'd with oral dex swish. Some better but still persisting. Of note, has mary swishing with listerine as well, using \"charcoal\" toothpast.    Still with trouble with dysuria, penis staying sore. Felt it may be due to increase sexual activity recently.    Having some ED, been on zoloft for approx 1 year. Does help anxiety. Reluctant to stop med.    Noted to be down 10 lbs from 7/2022. States it is because his partner is cooking and he \"is eating better\".    Of note, has dx of cirrhosis on imaging in addition to fatty liver. Was referred to GI but has not kept consultation apt as of yet. Apparently same-day dx'd his apt in August with GI. Currently scheduled for 11/7/22.    Is interested in STD screening.    Other chronic conditions include HTN, HLP. Also on low dose methdone for mgnt of chronic pain due to severe diffuse DDD/DJD.    Weight = 250 lbs 3/8/21  Weight today - 215 lbs  Down 35 lbs in 18 months without intention.    The following portions of the patient's history were reviewed and updated as appropriate: allergies, current medications, past family history, past medical history, past social history, past surgical history and problem list.    Review of Systems   Constitutional: Positive for fatigue and unexpected weight change. Negative for fever.   HENT: Positive for congestion, hearing loss (chronic), mouth sores, postnasal drip and sinus pressure (chronic). Negative for drooling, facial swelling, nosebleeds, rhinorrhea, sore throat and trouble swallowing.    Eyes: Negative for pain, discharge, redness and visual disturbance. "   Respiratory: Negative for cough, shortness of breath and wheezing.    Cardiovascular: Negative for chest pain, palpitations and leg swelling.   Gastrointestinal: Positive for nausea. Negative for abdominal pain, blood in stool, diarrhea and vomiting.   Endocrine: Positive for heat intolerance. Negative for polydipsia and polyuria.   Genitourinary: Negative for dysuria and hematuria.   Musculoskeletal: Positive for arthralgias, back pain, gait problem, joint swelling, myalgias, neck pain and neck stiffness.   Skin: Positive for rash. Negative for wound.   Neurological: Positive for dizziness, weakness, numbness and headaches. Negative for tremors and syncope.   Hematological: Negative for adenopathy. Does not bruise/bleed easily.   Psychiatric/Behavioral: Positive for decreased concentration and dysphoric mood (mild). Negative for confusion, hallucinations, sleep disturbance and suicidal ideas. The patient is nervous/anxious.    Pt's previous ROS reviewed and updated as indicated.       Objective    Vitals:    10/19/22 0947   BP: 148/90   Pulse: 64   Temp: 97 °F (36.1 °C)   SpO2: 98%     Body mass index is 30.03 kg/m².      10/19/22  0947   Weight: 97.6 kg (215 lb 3.2 oz)         Physical Exam  Vitals and nursing note reviewed.   Constitutional:       General: He is not in acute distress.     Appearance: He is well-developed, well-groomed and overweight. He is not ill-appearing.   HENT:      Head: Atraumatic.      Mouth/Throat:      Mouth: Mucous membranes are moist. Oral lesions (healing aphthous ulcers on gums, inner lips, tonue) present.      Pharynx: Oropharynx is clear.   Eyes:      General: No scleral icterus.     Conjunctiva/sclera: Conjunctivae normal.   Neck:      Thyroid: No thyroid mass.      Vascular: Normal carotid pulses. No carotid bruit.   Cardiovascular:      Rate and Rhythm: Normal rate and regular rhythm.      Pulses: Normal pulses.      Heart sounds: Normal heart sounds. No murmur  heard.  Pulmonary:      Effort: Pulmonary effort is normal.      Breath sounds: Normal breath sounds and air entry.   Abdominal:      General: Bowel sounds are decreased. There is no distension.      Palpations: Abdomen is soft. There is no hepatomegaly or mass.      Tenderness: There is no abdominal tenderness.   Musculoskeletal:      Cervical back: Deformity (loss of normal lordosis), spasms and bony tenderness present. Decreased range of motion.      Thoracic back: Deformity (increased kyphosis) and spasms (mild) present. No bony tenderness. Decreased range of motion.      Lumbar back: Deformity (loss of normal lordosis), spasms, tenderness (left SI joint) and bony tenderness present. Decreased range of motion. Negative right straight leg raise test and negative left straight leg raise test.      Right lower leg: No edema.      Left lower leg: No edema.   Lymphadenopathy:      Cervical: No cervical adenopathy.   Skin:     General: Skin is warm and dry.      Coloration: Skin is not jaundiced or pale.      Findings: No bruising.   Neurological:      Mental Status: He is alert and oriented to person, place, and time.      Motor: No tremor.      Gait: Gait abnormal (mildly antalgic on left).   Psychiatric:         Mood and Affect: Mood and affect normal.         Behavior: Behavior normal. Behavior is cooperative.         Cognition and Memory: Cognition normal.     Pt's previous physical exam reviewed and updated as indicated.    CT abd/pelvis with contrast 5/5/22 - cirrhosis with portal hypertension, moderate splenomegaly, non-obstructing nephrolithiasis bilaterally, peripancreatic adenopathy, mild    Brief Urine Lab Results  (Last result in the past 365 days)      Color   Clarity   Blood   Leuk Est   Nitrite   Protein   CREAT   Urine HCG        10/10/22 1119 Yellow   Clear   3+   Negative   Negative   Negative               Urine culture negative.  Urine chlamydia/gonorrhea test negative.    Immunization History    Administered Date(s) Administered   • COVID-19 (MODERNA) 1st, 2nd, 3rd Dose Only 08/04/2021, 09/01/2021       Assessment & Plan   Diagnoses and all orders for this visit:    1. Aphthous ulcer (Primary)    2. Chronic pain syndrome    3. Cirrhosis of liver without ascites, unspecified hepatic cirrhosis type (HCC)  -     CBC & Differential  -     Comprehensive Metabolic Panel  -     Bilirubin, Direct  -     Hepatitis C Antibody  -     Hepatitis B Virus (HBV) Screening and Diagnosis  -     CESAR With / DsDNA, RNP, Sjogrens A / B, Smith  -     C-reactive Protein  -     Sedimentation Rate    4. Fatty liver    5. IFG (impaired fasting glucose)  -     TSH Rfx On Abnormal To Free T4  -     Hemoglobin A1c    6. Mixed hyperlipidemia  -     Lipid Panel    7. Primary hypertension  -     TSH Rfx On Abnormal To Free T4    8. Screening for HIV (human immunodeficiency virus)  -     HIV-1 / O / 2 Ag / Antibody 4th Generation    9. Screen for STD (sexually transmitted disease)  -     RPR  -     HSV 1 & 2 - Specific Antibody, IgG    10. Abnormal weight loss  -     HIV-1 / O / 2 Ag / Antibody 4th Generation  -     CESAR With / DsDNA, RNP, Sjogrens A / B, Smith  -     C-reactive Protein  -     Sedimentation Rate    11. Encounter for screening for other viral diseases  -     Hepatitis B Virus (HBV) Screening and Diagnosis    12. High risk medications (not anticoagulants) long-term use    13. Hyperbilirubinemia    14. Hiatal hernia with GERD without esophagitis    15. Splenomegaly    16. Portal hypertension (HCC)    17. Influenza vaccination declined by patient    Other orders  -     valACYclovir (Valtrex) 1000 MG tablet; Take 1 tablet by mouth 3 (Three) Times a Day for 7 days.  Dispense: 21 tablet; Refill: 0       Aphthous ulcers healing slowly. Possibly reaction to ceftin, possibly herpetic as he did have concomitant sore throat. Continue dex solution as instructed, trial of antiviral as above as well.     STD screening per pt  request.    Stable chronic pain syndrome. Continue current mend mgnt. Pending review of hepatic function, consider weaning down dose or d/c methadone. No aberrant behavior.    Strongly advised to keep f/u with GI as scheduled next month as cause of cirrhosis unknown at this time (?LEE). STD screening.    F/u in  month for weight check, f/u sooner as needed/instructed.  I will contact patient regarding test results and provide instructions regarding any necessary changes in plan of care.  Patient was encouraged to keep me informed of any acute changes, lack of improvement, or any new concerning symptoms.  Pt is aware of reasons to seek emergent care.  Patient voiced understanding of all instructions and denied further questions.    Please note that portions of this note may have been completed with a voice recognition program. Efforts were made to edit the dictations, but occasionally words are mistranscribed.

## 2022-10-20 LAB
ALBUMIN SERPL-MCNC: 4.4 G/DL (ref 3.5–5.2)
ALBUMIN/GLOB SERPL: 1.5 G/DL
ALP SERPL-CCNC: 136 U/L (ref 39–117)
ALT SERPL-CCNC: 30 U/L (ref 1–41)
ANA SER QL: NEGATIVE
AST SERPL-CCNC: 31 U/L (ref 1–40)
BASOPHILS # BLD AUTO: 0.02 10*3/MM3 (ref 0–0.2)
BASOPHILS NFR BLD AUTO: 0.3 % (ref 0–1.5)
BILIRUB DIRECT SERPL-MCNC: 0.4 MG/DL (ref 0–0.3)
BILIRUB SERPL-MCNC: 1.8 MG/DL (ref 0–1.2)
BUN SERPL-MCNC: 21 MG/DL (ref 8–23)
BUN/CREAT SERPL: 16 (ref 7–25)
CALCIUM SERPL-MCNC: 9.5 MG/DL (ref 8.6–10.5)
CHLORIDE SERPL-SCNC: 102 MMOL/L (ref 98–107)
CHOLEST SERPL-MCNC: 129 MG/DL (ref 0–200)
CO2 SERPL-SCNC: 29 MMOL/L (ref 22–29)
CREAT SERPL-MCNC: 1.31 MG/DL (ref 0.76–1.27)
CRP SERPL-MCNC: <0.3 MG/DL (ref 0–0.5)
EGFRCR SERPLBLD CKD-EPI 2021: 61.5 ML/MIN/1.73
EOSINOPHIL # BLD AUTO: 0.11 10*3/MM3 (ref 0–0.4)
EOSINOPHIL NFR BLD AUTO: 1.9 % (ref 0.3–6.2)
ERYTHROCYTE [DISTWIDTH] IN BLOOD BY AUTOMATED COUNT: 12.6 % (ref 12.3–15.4)
ERYTHROCYTE [SEDIMENTATION RATE] IN BLOOD BY WESTERGREN METHOD: 14 MM/HR (ref 0–20)
GLOBULIN SER CALC-MCNC: 3 GM/DL
GLUCOSE SERPL-MCNC: 110 MG/DL (ref 65–99)
HBA1C MFR BLD: 5.7 % (ref 4.8–5.6)
HBV CORE AB SERPL QL IA: NEGATIVE
HBV SURFACE AB SER QL: REACTIVE
HBV SURFACE AG SERPL QL IA: NEGATIVE
HCT VFR BLD AUTO: 37.3 % (ref 37.5–51)
HCV AB S/CO SERPL IA: <0.1 S/CO RATIO (ref 0–0.9)
HDLC SERPL-MCNC: 36 MG/DL (ref 40–60)
HGB BLD-MCNC: 13.3 G/DL (ref 13–17.7)
HIV 1+2 AB+HIV1 P24 AG SERPL QL IA: NON REACTIVE
HSV1 IGG SER IA-ACNC: 7.58 INDEX (ref 0–0.9)
HSV2 IGG SER IA-ACNC: <0.91 INDEX (ref 0–0.9)
IMM GRANULOCYTES # BLD AUTO: 0.03 10*3/MM3 (ref 0–0.05)
IMM GRANULOCYTES NFR BLD AUTO: 0.5 % (ref 0–0.5)
IMP & REVIEW OF LAB RESULTS: NORMAL
LABORATORY COMMENT REPORT: NORMAL
LDLC SERPL CALC-MCNC: 72 MG/DL (ref 0–100)
LYMPHOCYTES # BLD AUTO: 1.24 10*3/MM3 (ref 0.7–3.1)
LYMPHOCYTES NFR BLD AUTO: 21 % (ref 19.6–45.3)
MCH RBC QN AUTO: 35.4 PG (ref 26.6–33)
MCHC RBC AUTO-ENTMCNC: 35.7 G/DL (ref 31.5–35.7)
MCV RBC AUTO: 99.2 FL (ref 79–97)
MONOCYTES # BLD AUTO: 0.44 10*3/MM3 (ref 0.1–0.9)
MONOCYTES NFR BLD AUTO: 7.5 % (ref 5–12)
NEUTROPHILS # BLD AUTO: 4.06 10*3/MM3 (ref 1.7–7)
NEUTROPHILS NFR BLD AUTO: 68.8 % (ref 42.7–76)
NRBC BLD AUTO-RTO: 0 /100 WBC (ref 0–0.2)
PLATELET # BLD AUTO: 94 10*3/MM3 (ref 140–450)
POTASSIUM SERPL-SCNC: 4.5 MMOL/L (ref 3.5–5.2)
PROT SERPL-MCNC: 7.4 G/DL (ref 6–8.5)
RBC # BLD AUTO: 3.76 10*6/MM3 (ref 4.14–5.8)
RPR SER QL: NON REACTIVE
SODIUM SERPL-SCNC: 140 MMOL/L (ref 136–145)
TRIGL SERPL-MCNC: 117 MG/DL (ref 0–150)
TSH SERPL DL<=0.005 MIU/L-ACNC: 2.3 UIU/ML (ref 0.27–4.2)
VLDLC SERPL CALC-MCNC: 21 MG/DL (ref 5–40)
WBC # BLD AUTO: 5.9 10*3/MM3 (ref 3.4–10.8)

## 2022-10-21 ENCOUNTER — TELEPHONE (OUTPATIENT)
Dept: FAMILY MEDICINE CLINIC | Facility: CLINIC | Age: 62
End: 2022-10-21

## 2022-10-23 PROBLEM — K76.6 PORTAL HYPERTENSION: Status: ACTIVE | Noted: 2022-10-23

## 2022-10-24 NOTE — PROGRESS NOTES
Inform pt his labs were fine other than:  1) low red blood cells, low platelets but stable  2) worsened kidney function  3) stable liver function  4) stable prediabetes  5) negative STD screen  6) herpes testing was positive because he has h/o fever blisters - test for sexually transmitted herpes was negative  7) not sign of autoimmune illness    See message regarding pt question of B12 level. This was not checked as we have checked in past and it has always been normal.    In regard to mouth sores, they can take up to 6 weeks to heal. Did he stop listerine? Stop harsh toothpaste, use baking soda only, etc?    He needs to make sure he keeps the apt with Dr. Newell scheduled for next month.

## 2022-11-07 ENCOUNTER — LAB (OUTPATIENT)
Dept: LAB | Facility: HOSPITAL | Age: 62
End: 2022-11-07

## 2022-11-07 ENCOUNTER — OFFICE VISIT (OUTPATIENT)
Dept: GASTROENTEROLOGY | Facility: CLINIC | Age: 62
End: 2022-11-07

## 2022-11-07 VITALS
TEMPERATURE: 97.8 F | HEART RATE: 62 BPM | HEIGHT: 71 IN | DIASTOLIC BLOOD PRESSURE: 81 MMHG | BODY MASS INDEX: 30.8 KG/M2 | SYSTOLIC BLOOD PRESSURE: 165 MMHG | WEIGHT: 220 LBS

## 2022-11-07 DIAGNOSIS — Z11.59 ENCOUNTER FOR SCREENING FOR OTHER VIRAL DISEASES: ICD-10-CM

## 2022-11-07 DIAGNOSIS — K21.9 GASTROESOPHAGEAL REFLUX DISEASE WITHOUT ESOPHAGITIS: ICD-10-CM

## 2022-11-07 DIAGNOSIS — E66.09 CLASS 1 OBESITY DUE TO EXCESS CALORIES WITHOUT SERIOUS COMORBIDITY WITH BODY MASS INDEX (BMI) OF 30.0 TO 30.9 IN ADULT: ICD-10-CM

## 2022-11-07 DIAGNOSIS — R16.1 SPLENOMEGALY: ICD-10-CM

## 2022-11-07 DIAGNOSIS — K74.60 LIVER CIRRHOSIS SECONDARY TO NASH: Primary | ICD-10-CM

## 2022-11-07 DIAGNOSIS — K74.60 LIVER CIRRHOSIS SECONDARY TO NASH: ICD-10-CM

## 2022-11-07 DIAGNOSIS — D69.6 ACQUIRED THROMBOCYTOPENIA: ICD-10-CM

## 2022-11-07 DIAGNOSIS — K75.81 LIVER CIRRHOSIS SECONDARY TO NASH: ICD-10-CM

## 2022-11-07 DIAGNOSIS — K75.81 LIVER CIRRHOSIS SECONDARY TO NASH: Primary | ICD-10-CM

## 2022-11-07 DIAGNOSIS — Z12.11 ENCOUNTER FOR SCREENING FOR MALIGNANT NEOPLASM OF COLON: ICD-10-CM

## 2022-11-07 LAB
INR PPP: 1.2 (ref 0.9–1.1)
PROTHROMBIN TIME: 15.6 SECONDS (ref 12.5–14.5)

## 2022-11-07 PROCEDURE — 86708 HEPATITIS A ANTIBODY: CPT

## 2022-11-07 PROCEDURE — 84466 ASSAY OF TRANSFERRIN: CPT

## 2022-11-07 PROCEDURE — 85610 PROTHROMBIN TIME: CPT

## 2022-11-07 PROCEDURE — 82728 ASSAY OF FERRITIN: CPT

## 2022-11-07 PROCEDURE — 86015 ACTIN ANTIBODY EACH: CPT

## 2022-11-07 PROCEDURE — 36415 COLL VENOUS BLD VENIPUNCTURE: CPT

## 2022-11-07 PROCEDURE — 82103 ALPHA-1-ANTITRYPSIN TOTAL: CPT

## 2022-11-07 PROCEDURE — 82105 ALPHA-FETOPROTEIN SERUM: CPT

## 2022-11-07 PROCEDURE — 82390 ASSAY OF CERULOPLASMIN: CPT

## 2022-11-07 PROCEDURE — 85025 COMPLETE CBC W/AUTO DIFF WBC: CPT

## 2022-11-07 PROCEDURE — 86381 MITOCHONDRIAL ANTIBODY EACH: CPT

## 2022-11-07 PROCEDURE — 80053 COMPREHEN METABOLIC PANEL: CPT

## 2022-11-07 PROCEDURE — 99204 OFFICE O/P NEW MOD 45 MIN: CPT | Performed by: INTERNAL MEDICINE

## 2022-11-07 PROCEDURE — 83540 ASSAY OF IRON: CPT

## 2022-11-07 RX ORDER — SODIUM CHLORIDE 9 MG/ML
70 INJECTION, SOLUTION INTRAVENOUS CONTINUOUS PRN
Status: CANCELLED | OUTPATIENT
Start: 2022-11-07

## 2022-11-07 RX ORDER — OMEPRAZOLE 40 MG/1
40 CAPSULE, DELAYED RELEASE ORAL DAILY
COMMUNITY
End: 2023-02-13

## 2022-11-07 NOTE — H&P (VIEW-ONLY)
New Patient Consult      Date: 2022   Patient Name: Sterling Whaley  MRN: [id number]  : 1960     Referring Physician: Annel Armstrong MD    Chief Complaint   Patient presents with   • Cirrhosis       History of Present Illness: Sterling Whaley is a 62 y.o. male who is here today to establish care with Gastroenterology for evaluation of his cirrhosis.    Patient had a CT abdomen pelvis done in 2022 for further evaluation of his elevated liver enzymes which revealed signs of cirrhosis with splenomegaly without any ascites or liver lesions.  He also had ultrasound done year ago in 2021 which revealed again fatty liver disease with significant splenomegaly suggesting portal hypertension.  Patient states that he had a fatty liver disease for many years and had a elevated liver enzymes for a while.  He denies any alcohol consumption.  No family stop any cirrhosis.  No confusional episodes.  No constipation.    He has a abdominal discomfort or feeling of nervousness whenever he gets tensed up or stressed.  He will have occasional nausea associated with the symptoms.  His bowel movements have been regular.  He is on methadone for many years for his joint pains but as per patient it did not not cause any constipation.    Patient has been taking PPI for intermittent epigastric pain but denies any reflux symptoms as such.  Denies any dysphagia.  No blood in the stool his weight has been stable.  No prior history of any anemia.  His last EGD was in  and colonoscopy was over 10 years ago.  He does not know anything about the family.  He states that he lost about 25 pounds over years.      Subjective      Past Medical History:   Past Medical History:   Diagnosis Date   • Anxiety    • Arthritis    • Bilateral carpal tunnel syndrome    • Depression    • Esophageal reflux    • Gastritis    • H/O pulmonary function tests 2012    normal   • Hiatal hernia    • History of depression     • History of meniscal tear    • History of renal calculi    • Hyperbilirubinemia     [place] syndrome   • Hypertension    • Kidney stone    • Obstructive sleep apnea    • Plantar fascia rupture    • Renal calculi    • RLS (restless legs syndrome)    • Schatzki's ring    • Sinus problem    • Sleep apnea    • Snores    • TMJ pain dysfunction syndrome        Past Surgical History:   Past Surgical History:   Procedure Laterality Date   • ESOPHAGOSCOPY / EGD  2003    [facility]   • FOOT SURGERY Left    • KNEE ARTHROSCOPY Right     medial meniscus repair   • MEDIAL COLLATERAL LIGAMENT REPAIR, KNEE Left        Family History:   Family History   Problem Relation Age of Onset   • Arthritis Mother        Social History:   Social History     Socioeconomic History   • Marital status:    Tobacco Use   • Smoking status: Never   • Smokeless tobacco: Never   Vaping Use   • Vaping Use: Never used   Substance and Sexual Activity   • Alcohol use: Never   • Drug use: Never   • Sexual activity: Defer         Current Outpatient Medications:   •  fluticasone (FLONASE) 50 MCG/ACT nasal spray, USE 2 SPRAYS IN EACH NOSTRIL EVERY DAY FOR ALLERGIC RHINITIS, Disp: 48 g, Rfl: 1  •  lisinopril (PRINIVIL,ZESTRIL) 10 MG tablet, TAKE 3 TABLETS BY MOUTH DAILY, Disp: 90 tablet, Rfl: 5  •  methadone (DOLOPHINE) 5 MG tablet, 1 tablet po up to qid., Disp: 120 tablet, Rfl: 0  •  omeprazole (priLOSEC) 40 MG capsule, Take 1 capsule by mouth Daily., Disp: , Rfl:   •  sertraline (ZOLOFT) 50 MG tablet, Take 1 tablet by mouth Daily., Disp: 90 tablet, Rfl: 0  •  dexamethasone 0.5 MG/5ML solution, Swish and spit 3-4 times per day, Disp: 160 mL, Rfl: 0  •  tiZANidine (ZANAFLEX) 2 MG tablet, 1-2 po qhs as needed for muscle spasm, Disp: 60 tablet, Rfl: 2    Current Facility-Administered Medications:   •  cyanocobalamin injection 1,000 mcg, 1,000 mcg, Intramuscular, Q28 Days, Annel Armstrong MD, 1,000 mcg at 09/09/21 1238    Allergies   Allergen Reactions   •  "Meperidine Unknown - High Severity     claustraphobic       Review of Systems:   Review of Systems   Constitutional: Positive for fatigue and unexpected weight loss. Negative for appetite change and fever.   HENT: Negative for trouble swallowing.    Gastrointestinal: Positive for abdominal pain (with nerves) and nausea. Negative for abdominal distention, anal bleeding, blood in stool, constipation, diarrhea, rectal pain, vomiting, GERD and indigestion.       The following portions of the patient's history were reviewed and updated as appropriate: allergies, current medications, past family history, past medical history, past social history, past surgical history and problem list.    Objective     Physical Exam:  Vital Signs:   Vitals:    11/07/22 1556   BP: 165/81   Pulse: 62   Temp: 97.8 °F (36.6 °C)   TempSrc: Infrared   Weight: 99.8 kg (220 lb)   Height: 180.3 cm (71\")       Physical Exam  Constitutional:       Appearance: He is obese.   HENT:      Head: Normocephalic and atraumatic.   Eyes:      Conjunctiva/sclera: Conjunctivae normal.   Abdominal:      General: Abdomen is flat. There is no distension.      Palpations: There is no mass.      Tenderness: There is no abdominal tenderness. There is no guarding or rebound.      Hernia: No hernia is present.      Comments: No signs of ascites   Musculoskeletal:      Cervical back: Normal range of motion and neck supple.   Neurological:      Mental Status: He is alert.           Results Review:   I have reviewed the patient's new clinical and imaging results and agree with the interpretation.     Office Visit on 10/19/2022   Component Date Value Ref Range Status   • WBC 10/19/2022 5.90  3.40 - 10.80 10*3/mm3 Final   • RBC 10/19/2022 3.76 (L)  4.14 - 5.80 10*6/mm3 Final   • Hemoglobin 10/19/2022 13.3  13.0 - 17.7 g/dL Final   • Hematocrit 10/19/2022 37.3 (L)  37.5 - 51.0 % Final   • MCV 10/19/2022 99.2 (H)  79.0 - 97.0 fL Final   • MCH 10/19/2022 35.4 (H)  26.6 - 33.0 " pg Final   • MCHC 10/19/2022 35.7  31.5 - 35.7 g/dL Final   • RDW 10/19/2022 12.6  12.3 - 15.4 % Final   • Platelets 10/19/2022 94 (L)  140 - 450 10*3/mm3 Final   • Neutrophil Rel % 10/19/2022 68.8  42.7 - 76.0 % Final   • Lymphocyte Rel % 10/19/2022 21.0  19.6 - 45.3 % Final   • Monocyte Rel % 10/19/2022 7.5  5.0 - 12.0 % Final   • Eosinophil Rel % 10/19/2022 1.9  0.3 - 6.2 % Final   • Basophil Rel % 10/19/2022 0.3  0.0 - 1.5 % Final   • Neutrophils Absolute 10/19/2022 4.06  1.70 - 7.00 10*3/mm3 Final   • Lymphocytes Absolute 10/19/2022 1.24  0.70 - 3.10 10*3/mm3 Final   • Monocytes Absolute 10/19/2022 0.44  0.10 - 0.90 10*3/mm3 Final   • Eosinophils Absolute 10/19/2022 0.11  0.00 - 0.40 10*3/mm3 Final   • Basophils Absolute 10/19/2022 0.02  0.00 - 0.20 10*3/mm3 Final   • Immature Granulocyte Rel % 10/19/2022 0.5  0.0 - 0.5 % Final   • Immature Grans Absolute 10/19/2022 0.03  0.00 - 0.05 10*3/mm3 Final   • nRBC 10/19/2022 0.0  0.0 - 0.2 /100 WBC Final   • Glucose 10/19/2022 110 (H)  65 - 99 mg/dL Final   • BUN 10/19/2022 21  8 - 23 mg/dL Final   • Creatinine 10/19/2022 1.31 (H)  0.76 - 1.27 mg/dL Final   • EGFR Result 10/19/2022 61.5  >60.0 mL/min/1.73 Final    Comment: National Kidney Foundation and American Society of  Nephrology (ASN) Task Force recommended calculation based  on the Chronic Kidney Disease Epidemiology Collaboration  (CKD-EPI) equation refit without adjustment for race.  GFR Normal >60  Chronic Kidney Disease <60  Kidney Failure <15     • BUN/Creatinine Ratio 10/19/2022 16.0  7.0 - 25.0 Final   • Sodium 10/19/2022 140  136 - 145 mmol/L Final   • Potassium 10/19/2022 4.5  3.5 - 5.2 mmol/L Final   • Chloride 10/19/2022 102  98 - 107 mmol/L Final   • Total CO2 10/19/2022 29.0  22.0 - 29.0 mmol/L Final   • Calcium 10/19/2022 9.5  8.6 - 10.5 mg/dL Final   • Total Protein 10/19/2022 7.4  6.0 - 8.5 g/dL Final   • Albumin 10/19/2022 4.40  3.50 - 5.20 g/dL Final   • Globulin 10/19/2022 3.0  gm/dL Final    • A/G Ratio 10/19/2022 1.5  g/dL Final   • Total Bilirubin 10/19/2022 1.8 (H)  0.0 - 1.2 mg/dL Final   • Alkaline Phosphatase 10/19/2022 136 (H)  39 - 117 U/L Final   • AST (SGOT) 10/19/2022 31  1 - 40 U/L Final   • ALT (SGPT) 10/19/2022 30  1 - 41 U/L Final   • Bilirubin, Direct 10/19/2022 0.4 (H)  0.0 - 0.3 mg/dL Final   • TSH 10/19/2022 2.300  0.270 - 4.200 uIU/mL Final   • Hemoglobin A1C 10/19/2022 5.70 (H)  4.80 - 5.60 % Final    Comment: Hemoglobin A1C Ranges:  Increased Risk for Diabetes  5.7% to 6.4%  Diabetes                     >= 6.5%  Diabetic Goal                < 7.0%     • HIV Screen 4th Gen w/RFX (Referenc* 10/19/2022 Non Reactive  Non Reactive Final    Comment: HIV Negative  HIV-1/HIV-2 antibodies and HIV-1 p24 antigen were NOT detected.  There is no laboratory evidence of HIV infection.     • Hep C Virus Ab 10/19/2022 <0.1  0.0 - 0.9 s/co ratio Final    Comment:                                   Negative:     < 0.8                               Indeterminate: 0.8 - 0.9                                    Positive:     > 0.9   HCV antibody alone does not differentiate between   previous resolved infection and active infection.   The CDC and current clinical guidelines recommend   that a positive HCV antibody result be followed up   with an HCV RNA test to support the diagnosis of   acute HCV infection. [facility] offers Hepatitis C   Virus (HCV) RNA, Diagnosis, CHANI (160402) and   Hepatitis C Virus (HCV) Antibody with reflex to   Quantitative Real-time PCR (614858).     • Hepatitis B Surface Ag 10/19/2022 Negative  Negative Final   • Hep B S Ab 10/19/2022 Reactive   Final    Comment:               Non Reactive: Inconsistent with immunity,                              less than 10 mIU/mL                Reactive:     Consistent with immunity,                              greater than 9.9 mIU/mL     • Hep B Core Total Ab 10/19/2022 Negative  Negative Final   • RFX TO HBC IGM 10/19/2022 Comment   Final     "Reflex criteria was not met.   • Interpretation 10/19/2022 Comment   Final    Comment:                    HBV Serology Interpretation Chart   -------------------------------------------------------------------   Interpretation             HBsAg  anti-HBs  anti-HBc  anti-HBc IgM   -------------------------------------------------------------------   Key - Analyte present: + Analyte absent: - Test not indicated: TNI   -------------------------------------------------------------------   Susceptible (never   infected and no evidence    -        -         -          TNI   of vaccination)   -------------------------------------------------------------------   Immune due to natural   resolved infection          -        +         +          TNI   -------------------------------------------------------------------   Immune due to vaccination   -        +         -          TNI   -------------------------------------------------------------------   Acute Infection             +        -         +           +   -------------------------------------------------------------------                              Chronic infection           +        -         +           -   -------------------------------------------------------------------   Interpretation unclear*     -        -         +          +/-   -------------------------------------------------------------------   *Multiple possibilities: resolved infection (most common); false-    positive anti-HBc (susceptible); \"low-level\" chronic infection\";    resolving acute infection.     • RPR 10/19/2022 Non Reactive  Non Reactive Final   • HSV 1 IgG, Type Specific 10/19/2022 7.58 (H)  0.00 - 0.90 index Final    Comment:                                  Negative        <0.91                                   Equivocal 0.91 - 1.09                                   Positive        >1.09   Note: Negative indicates no antibodies detected to   HSV-1. Equivocal may suggest early " infection.  If   clinically appropriate, retest at later date. Positive   indicates antibodies detected to HSV-1.     • HSV 2 IgG 10/19/2022 <0.91  0.00 - 0.90 index Final    Comment:                                  Negative        <0.91                                   Equivocal 0.91 - 1.09                                   Positive        >1.09   Note: Negative indicates no HSV-2 antibodies detected.   Positive indicates HSV-2 antibodies detected.   Equivocal and low positive HSV-2 screens   (Index 0.91-5.00) may be false positive and are   reflexed to supplemental testing in accordance with   CDC guidelines.     • CESAR Direct 10/19/2022 Negative  Negative Final   • C-Reactive Protein 10/19/2022 <0.30  0.00 - 0.50 mg/dL Final   • Sed Rate 10/19/2022 14  0 - 20 mm/hr Final   • Total Cholesterol 10/19/2022 129  0 - 200 mg/dL Final    Comment: Cholesterol Reference Ranges  (U.S. Department of Health and Human Services ATP III  Classifications)  Desirable          <200 mg/dL  Borderline High    200-239 mg/dL  High Risk          >240 mg/dL  Triglyceride Reference Ranges  (U.S. Department of Health and Human Services ATP III  Classifications)  Normal           <150 mg/dL  Borderline High  150-199 mg/dL  High             200-499 mg/dL  Very High        >500 mg/dL  HDL Reference Ranges  (U.S. Department of Health and Human Services ATP III  Classifications)  Low     <40 mg/dl (major risk factor for CHD)  High    >60 mg/dl ('negative' risk factor for CHD)  LDL Reference Ranges  (U.S. Department of Health and Human Services ATP III  Classifications)  Optimal          <100 mg/dL  Near Optimal     100-129 mg/dL  Borderline High  130-159 mg/dL  High             160-189 mg/dL  Very High        >189 mg/dL     • Triglycerides 10/19/2022 117  0 - 150 mg/dL Final   • HDL Cholesterol 10/19/2022 36 (L)  40 - 60 mg/dL Final   • VLDL Cholesterol Daniele 10/19/2022 21  5 - 40 mg/dL Final   • LDL Chol Calc (NIH) 10/19/2022 72  0 - 100 mg/dL  Final   Office Visit on 10/10/2022   Component Date Value Ref Range Status   • Urine Culture 10/10/2022 Final report   Final   • Result 1 10/10/2022 No growth   Final   • Chlamydia trachomatis, CHANI 10/10/2022 Negative  Negative Final   • Neisseria gonorrhoeae, CHANI 10/10/2022 Negative  Negative Final   • Color 10/10/2022 Yellow  Yellow, Straw, Dark Yellow, Lindsey Final-Edited   • Clarity, UA 10/10/2022 Clear  Clear Final-Edited   • Specific Gravity  10/10/2022 1.015  1.005 - 1.030 Final-Edited   • pH, Urine 10/10/2022 6.0  5.0 - 8.0 Final-Edited   • Leukocytes 10/10/2022 Negative  Negative Final-Edited   • Nitrite, UA 10/10/2022 Negative  Negative Final-Edited   • Protein, POC 10/10/2022 Negative  Negative mg/dL Final-Edited   • Glucose, UA 10/10/2022 Negative  Negative mg/dL Final-Edited   • Ketones, UA 10/10/2022 Negative  Negative Final-Edited   • Urobilinogen, UA 10/10/2022 Normal  Normal, 0.2 E.U./dL Final-Edited   • Bilirubin 10/10/2022 Negative  Negative Final-Edited   • Blood, UA 10/10/2022 3+ (A)  Negative Final-Edited   • Lot Number 10/10/2022 0   Final-Edited   • Expiration Date 10/10/2022 0   Final-Edited      No radiology results for the last 90 days.    Assessment / Plan      Assessment & Plan:  1. Liver cirrhosis secondary to LEE (HCC); MELD; to be calculated after today's blood work  2. Acquired thrombocytopenia (HCC)  3. Splenomegaly  4. Class 1 obesity due to excess calories without serious comorbidity with body mass index (BMI) of 30.0 to 30.9 in adult  Patient had a nonalcoholic fatty liver disease for many years.  He had fluctuating elevated liver enzymes on record at least for 8 years.  He is nonalcoholic.  Recent CT abdomen pelvis done in April 2022 revealed signs of cirrhosis without any ascites or liver lesions.  Hep C antibody was negative.  His prior CESAR and hep B serology was negative.     We will get liver work-up to rule out any other etiology for his cirrhosis other than nonalcoholic  fatty liver disease.   Low-salt diet and discussed with the patient  Lifestyle changes and dietary changes discussed including losing weight to less than 200 pounds  Patient also has a CKD and may be difficult to manage him if he develops ascites  We will get alpha-fetoprotein level  We will schedule him for an EGD to rule out esophageal varices and possible variceal banding  He needs an ultrasound abdomen for HCC surveillance    The indications, technique, alternatives and potential risk and complications were discussed with the patient including but not limited to bleeding, bowel perforations, missing lesions and anesthetic complications. The patient understands and wishes to proceed with the procedure and has given their verbal consent. Written patient education information was given to the patient.   The patient will call if they have further questions before procedure.     - Case Request; Standing  - Implement Anesthesia Orders Day of Procedure; Standing  - Obtain Informed Consent; Standing  - Verify NPO; Standing  - Oxygen Therapy- Nasal Cannula; 2 LPM; Titrate for SPO2: equal to or greater than, 90%; Standing  - sodium chloride 0.9 % infusion  - Case Request  - CBC Auto Differential; Future  - Comprehensive Metabolic Panel; Future  - Protime-INR; Future  - Hepatitis A Antibody, Total; Future  - Alpha - 1 - Antitrypsin; Future  - Anti-Smooth Muscle Antibody Titer; Future  - Ceruloplasmin; Future  - Mitochondrial Antibodies, M2; Future  - Iron Profile; Future  - Ferritin; Future  - AFP Tumor Marker; Future  - US Abdomen Complete; Future    5. Gastroesophageal reflux disease without esophagitis  Patient states that he does not get any reflux symptoms however he does get epigastric pain intermittently.  Symptoms controlled with Prilosec 40 m p.o. daily dose.  We will recommend further after EGD    6. Encounter for screening for malignant neoplasm of colon  His last colonoscopy was over 10 years ago details  unknown.  He is due for his screening colonoscopy.  We will schedule him for colonoscopy when once EGD is performed        Follow Up:   Return in about 8 weeks (around 1/2/2023) for Follow Up after procedure.    Mónica Newell MD  [facility]  11/7/2022  18:00 EST    Please note that portions of this note may have been completed with a voice recognition program.

## 2022-11-07 NOTE — PROGRESS NOTES
New Patient Consult      Date: 2022   Patient Name: Sterling Whaley  MRN: [id number]  : 1960     Referring Physician: Annel Armstrong MD    Chief Complaint   Patient presents with   • Cirrhosis       History of Present Illness: Sterling Whaley is a 62 y.o. male who is here today to establish care with Gastroenterology for evaluation of his cirrhosis.    Patient had a CT abdomen pelvis done in 2022 for further evaluation of his elevated liver enzymes which revealed signs of cirrhosis with splenomegaly without any ascites or liver lesions.  He also had ultrasound done year ago in 2021 which revealed again fatty liver disease with significant splenomegaly suggesting portal hypertension.  Patient states that he had a fatty liver disease for many years and had a elevated liver enzymes for a while.  He denies any alcohol consumption.  No family stop any cirrhosis.  No confusional episodes.  No constipation.    He has a abdominal discomfort or feeling of nervousness whenever he gets tensed up or stressed.  He will have occasional nausea associated with the symptoms.  His bowel movements have been regular.  He is on methadone for many years for his joint pains but as per patient it did not not cause any constipation.    Patient has been taking PPI for intermittent epigastric pain but denies any reflux symptoms as such.  Denies any dysphagia.  No blood in the stool his weight has been stable.  No prior history of any anemia.  His last EGD was in  and colonoscopy was over 10 years ago.  He does not know anything about the family.  He states that he lost about 25 pounds over years.      Subjective      Past Medical History:   Past Medical History:   Diagnosis Date   • Anxiety    • Arthritis    • Bilateral carpal tunnel syndrome    • Depression    • Esophageal reflux    • Gastritis    • H/O pulmonary function tests 2012    normal   • Hiatal hernia    • History of depression     • History of meniscal tear    • History of renal calculi    • Hyperbilirubinemia     Waurika syndrome   • Hypertension    • Kidney stone    • Obstructive sleep apnea    • Plantar fascia rupture    • Renal calculi    • RLS (restless legs syndrome)    • Schatzki's ring    • Sinus problem    • Sleep apnea    • Snores    • TMJ pain dysfunction syndrome        Past Surgical History:   Past Surgical History:   Procedure Laterality Date   • ESOPHAGOSCOPY / EGD  2003    [facility]   • FOOT SURGERY Left    • KNEE ARTHROSCOPY Right     medial meniscus repair   • MEDIAL COLLATERAL LIGAMENT REPAIR, KNEE Left        Family History:   Family History   Problem Relation Age of Onset   • Arthritis Mother        Social History:   Social History     Socioeconomic History   • Marital status:    Tobacco Use   • Smoking status: Never   • Smokeless tobacco: Never   Vaping Use   • Vaping Use: Never used   Substance and Sexual Activity   • Alcohol use: Never   • Drug use: Never   • Sexual activity: Defer         Current Outpatient Medications:   •  fluticasone (FLONASE) 50 MCG/ACT nasal spray, USE 2 SPRAYS IN EACH NOSTRIL EVERY DAY FOR ALLERGIC RHINITIS, Disp: 48 g, Rfl: 1  •  lisinopril (PRINIVIL,ZESTRIL) 10 MG tablet, TAKE 3 TABLETS BY MOUTH DAILY, Disp: 90 tablet, Rfl: 5  •  methadone (DOLOPHINE) 5 MG tablet, 1 tablet po up to qid., Disp: 120 tablet, Rfl: 0  •  omeprazole (priLOSEC) 40 MG capsule, Take 1 capsule by mouth Daily., Disp: , Rfl:   •  sertraline (ZOLOFT) 50 MG tablet, Take 1 tablet by mouth Daily., Disp: 90 tablet, Rfl: 0  •  dexamethasone 0.5 MG/5ML solution, Swish and spit 3-4 times per day, Disp: 160 mL, Rfl: 0  •  tiZANidine (ZANAFLEX) 2 MG tablet, 1-2 po qhs as needed for muscle spasm, Disp: 60 tablet, Rfl: 2    Current Facility-Administered Medications:   •  cyanocobalamin injection 1,000 mcg, 1,000 mcg, Intramuscular, Q28 Days, Annel Armstrong MD, 1,000 mcg at 09/09/21 1238    Allergies   Allergen Reactions   •  "Meperidine Unknown - High Severity     claustraphobic       Review of Systems:   Review of Systems   Constitutional: Positive for fatigue and unexpected weight loss. Negative for appetite change and fever.   HENT: Negative for trouble swallowing.    Gastrointestinal: Positive for abdominal pain (with nerves) and nausea. Negative for abdominal distention, anal bleeding, blood in stool, constipation, diarrhea, rectal pain, vomiting, GERD and indigestion.       The following portions of the patient's history were reviewed and updated as appropriate: allergies, current medications, past family history, past medical history, past social history, past surgical history and problem list.    Objective     Physical Exam:  Vital Signs:   Vitals:    11/07/22 1556   BP: 165/81   Pulse: 62   Temp: 97.8 °F (36.6 °C)   TempSrc: Infrared   Weight: 99.8 kg (220 lb)   Height: 180.3 cm (71\")       Physical Exam  Constitutional:       Appearance: He is obese.   HENT:      Head: Normocephalic and atraumatic.   Eyes:      Conjunctiva/sclera: Conjunctivae normal.   Abdominal:      General: Abdomen is flat. There is no distension.      Palpations: There is no mass.      Tenderness: There is no abdominal tenderness. There is no guarding or rebound.      Hernia: No hernia is present.      Comments: No signs of ascites   Musculoskeletal:      Cervical back: Normal range of motion and neck supple.   Neurological:      Mental Status: He is alert.           Results Review:   I have reviewed the patient's new clinical and imaging results and agree with the interpretation.     Office Visit on 10/19/2022   Component Date Value Ref Range Status   • WBC 10/19/2022 5.90  3.40 - 10.80 10*3/mm3 Final   • RBC 10/19/2022 3.76 (L)  4.14 - 5.80 10*6/mm3 Final   • Hemoglobin 10/19/2022 13.3  13.0 - 17.7 g/dL Final   • Hematocrit 10/19/2022 37.3 (L)  37.5 - 51.0 % Final   • MCV 10/19/2022 99.2 (H)  79.0 - 97.0 fL Final   • MCH 10/19/2022 35.4 (H)  26.6 - 33.0 " pg Final   • MCHC 10/19/2022 35.7  31.5 - 35.7 g/dL Final   • RDW 10/19/2022 12.6  12.3 - 15.4 % Final   • Platelets 10/19/2022 94 (L)  140 - 450 10*3/mm3 Final   • Neutrophil Rel % 10/19/2022 68.8  42.7 - 76.0 % Final   • Lymphocyte Rel % 10/19/2022 21.0  19.6 - 45.3 % Final   • Monocyte Rel % 10/19/2022 7.5  5.0 - 12.0 % Final   • Eosinophil Rel % 10/19/2022 1.9  0.3 - 6.2 % Final   • Basophil Rel % 10/19/2022 0.3  0.0 - 1.5 % Final   • Neutrophils Absolute 10/19/2022 4.06  1.70 - 7.00 10*3/mm3 Final   • Lymphocytes Absolute 10/19/2022 1.24  0.70 - 3.10 10*3/mm3 Final   • Monocytes Absolute 10/19/2022 0.44  0.10 - 0.90 10*3/mm3 Final   • Eosinophils Absolute 10/19/2022 0.11  0.00 - 0.40 10*3/mm3 Final   • Basophils Absolute 10/19/2022 0.02  0.00 - 0.20 10*3/mm3 Final   • Immature Granulocyte Rel % 10/19/2022 0.5  0.0 - 0.5 % Final   • Immature Grans Absolute 10/19/2022 0.03  0.00 - 0.05 10*3/mm3 Final   • nRBC 10/19/2022 0.0  0.0 - 0.2 /100 WBC Final   • Glucose 10/19/2022 110 (H)  65 - 99 mg/dL Final   • BUN 10/19/2022 21  8 - 23 mg/dL Final   • Creatinine 10/19/2022 1.31 (H)  0.76 - 1.27 mg/dL Final   • EGFR Result 10/19/2022 61.5  >60.0 mL/min/1.73 Final    Comment: National Kidney Foundation and American Society of  Nephrology (ASN) Task Force recommended calculation based  on the Chronic Kidney Disease Epidemiology Collaboration  (CKD-EPI) equation refit without adjustment for race.  GFR Normal >60  Chronic Kidney Disease <60  Kidney Failure <15     • BUN/Creatinine Ratio 10/19/2022 16.0  7.0 - 25.0 Final   • Sodium 10/19/2022 140  136 - 145 mmol/L Final   • Potassium 10/19/2022 4.5  3.5 - 5.2 mmol/L Final   • Chloride 10/19/2022 102  98 - 107 mmol/L Final   • Total CO2 10/19/2022 29.0  22.0 - 29.0 mmol/L Final   • Calcium 10/19/2022 9.5  8.6 - 10.5 mg/dL Final   • Total Protein 10/19/2022 7.4  6.0 - 8.5 g/dL Final   • Albumin 10/19/2022 4.40  3.50 - 5.20 g/dL Final   • Globulin 10/19/2022 3.0  gm/dL Final    • A/G Ratio 10/19/2022 1.5  g/dL Final   • Total Bilirubin 10/19/2022 1.8 (H)  0.0 - 1.2 mg/dL Final   • Alkaline Phosphatase 10/19/2022 136 (H)  39 - 117 U/L Final   • AST (SGOT) 10/19/2022 31  1 - 40 U/L Final   • ALT (SGPT) 10/19/2022 30  1 - 41 U/L Final   • Bilirubin, Direct 10/19/2022 0.4 (H)  0.0 - 0.3 mg/dL Final   • TSH 10/19/2022 2.300  0.270 - 4.200 uIU/mL Final   • Hemoglobin A1C 10/19/2022 5.70 (H)  4.80 - 5.60 % Final    Comment: Hemoglobin A1C Ranges:  Increased Risk for Diabetes  5.7% to 6.4%  Diabetes                     >= 6.5%  Diabetic Goal                < 7.0%     • HIV Screen 4th Gen w/RFX (Referenc* 10/19/2022 Non Reactive  Non Reactive Final    Comment: HIV Negative  HIV-1/HIV-2 antibodies and HIV-1 p24 antigen were NOT detected.  There is no laboratory evidence of HIV infection.     • Hep C Virus Ab 10/19/2022 <0.1  0.0 - 0.9 s/co ratio Final    Comment:                                   Negative:     < 0.8                               Indeterminate: 0.8 - 0.9                                    Positive:     > 0.9   HCV antibody alone does not differentiate between   previous resolved infection and active infection.   The CDC and current clinical guidelines recommend   that a positive HCV antibody result be followed up   with an HCV RNA test to support the diagnosis of   acute HCV infection. [facility] offers Hepatitis C   Virus (HCV) RNA, Diagnosis, CHANI ([id number]) and   Hepatitis C Virus (HCV) Antibody with reflex to   Quantitative Real-time PCR ([id number]).     • Hepatitis B Surface Ag 10/19/2022 Negative  Negative Final   • Hep B S Ab 10/19/2022 Reactive   Final    Comment:               Non Reactive: Inconsistent with immunity,                              less than 10 mIU/mL                Reactive:     Consistent with immunity,                              greater than 9.9 mIU/mL     • Hep B Core Total Ab 10/19/2022 Negative  Negative Final   • RFX TO HBC IGM 10/19/2022 Comment   Final     "Reflex criteria was not met.   • Interpretation 10/19/2022 Comment   Final    Comment:                    HBV Serology Interpretation Chart   -------------------------------------------------------------------   Interpretation             HBsAg  anti-HBs  anti-HBc  anti-HBc IgM   -------------------------------------------------------------------   Key - Analyte present: + Analyte absent: - Test not indicated: TNI   -------------------------------------------------------------------   Susceptible (never   infected and no evidence    -        -         -          TNI   of vaccination)   -------------------------------------------------------------------   Immune due to natural   resolved infection          -        +         +          TNI   -------------------------------------------------------------------   Immune due to vaccination   -        +         -          TNI   -------------------------------------------------------------------   Acute Infection             +        -         +           +   -------------------------------------------------------------------                              Chronic infection           +        -         +           -   -------------------------------------------------------------------   Interpretation unclear*     -        -         +          +/-   -------------------------------------------------------------------   *Multiple possibilities: resolved infection (most common); false-    positive anti-HBc (susceptible); \"low-level\" chronic infection\";    resolving acute infection.     • RPR 10/19/2022 Non Reactive  Non Reactive Final   • HSV 1 IgG, Type Specific 10/19/2022 7.58 (H)  0.00 - 0.90 index Final    Comment:                                  Negative        <0.91                                   Equivocal 0.91 - 1.09                                   Positive        >1.09   Note: Negative indicates no antibodies detected to   HSV-1. Equivocal may suggest early " infection.  If   clinically appropriate, retest at later date. Positive   indicates antibodies detected to HSV-1.     • HSV 2 IgG 10/19/2022 <0.91  0.00 - 0.90 index Final    Comment:                                  Negative        <0.91                                   Equivocal 0.91 - 1.09                                   Positive        >1.09   Note: Negative indicates no HSV-2 antibodies detected.   Positive indicates HSV-2 antibodies detected.   Equivocal and low positive HSV-2 screens   (Index 0.91-5.00) may be false positive and are   reflexed to supplemental testing in accordance with   CDC guidelines.     • CESAR Direct 10/19/2022 Negative  Negative Final   • C-Reactive Protein 10/19/2022 <0.30  0.00 - 0.50 mg/dL Final   • Sed Rate 10/19/2022 14  0 - 20 mm/hr Final   • Total Cholesterol 10/19/2022 129  0 - 200 mg/dL Final    Comment: Cholesterol Reference Ranges  (U.S. Department of Health and Human Services ATP III  Classifications)  Desirable          <200 mg/dL  Borderline High    200-239 mg/dL  High Risk          >240 mg/dL  Triglyceride Reference Ranges  (U.S. Department of Health and Human Services ATP III  Classifications)  Normal           <150 mg/dL  Borderline High  150-199 mg/dL  High             200-499 mg/dL  Very High        >500 mg/dL  HDL Reference Ranges  (U.S. Department of Health and Human Services ATP III  Classifications)  Low     <40 mg/dl (major risk factor for CHD)  High    >60 mg/dl ('negative' risk factor for CHD)  LDL Reference Ranges  (U.S. Department of Health and Human Services ATP III  Classifications)  Optimal          <100 mg/dL  Near Optimal     100-129 mg/dL  Borderline High  130-159 mg/dL  High             160-189 mg/dL  Very High        >189 mg/dL     • Triglycerides 10/19/2022 117  0 - 150 mg/dL Final   • HDL Cholesterol 10/19/2022 36 (L)  40 - 60 mg/dL Final   • VLDL Cholesterol Daniele 10/19/2022 21  5 - 40 mg/dL Final   • LDL Chol Calc (NIH) 10/19/2022 72  0 - 100 mg/dL  Final   Office Visit on 10/10/2022   Component Date Value Ref Range Status   • Urine Culture 10/10/2022 Final report   Final   • Result 1 10/10/2022 No growth   Final   • Chlamydia trachomatis, CHANI 10/10/2022 Negative  Negative Final   • Neisseria gonorrhoeae, CHANI 10/10/2022 Negative  Negative Final   • Color 10/10/2022 Yellow  Yellow, Straw, Dark Yellow, Lindsey Final-Edited   • Clarity, UA 10/10/2022 Clear  Clear Final-Edited   • Specific Gravity  10/10/2022 1.015  1.005 - 1.030 Final-Edited   • pH, Urine 10/10/2022 6.0  5.0 - 8.0 Final-Edited   • Leukocytes 10/10/2022 Negative  Negative Final-Edited   • Nitrite, UA 10/10/2022 Negative  Negative Final-Edited   • Protein, POC 10/10/2022 Negative  Negative mg/dL Final-Edited   • Glucose, UA 10/10/2022 Negative  Negative mg/dL Final-Edited   • Ketones, UA 10/10/2022 Negative  Negative Final-Edited   • Urobilinogen, UA 10/10/2022 Normal  Normal, 0.2 E.U./dL Final-Edited   • Bilirubin 10/10/2022 Negative  Negative Final-Edited   • Blood, UA 10/10/2022 3+ (A)  Negative Final-Edited   • Lot Number 10/10/2022 0   Final-Edited   • Expiration Date 10/10/2022 0   Final-Edited      No radiology results for the last 90 days.    Assessment / Plan      Assessment & Plan:  1. Liver cirrhosis secondary to LEE (HCC); MELD; to be calculated after today's blood work  2. Acquired thrombocytopenia (HCC)  3. Splenomegaly  4. Class 1 obesity due to excess calories without serious comorbidity with body mass index (BMI) of 30.0 to 30.9 in adult  Patient had a nonalcoholic fatty liver disease for many years.  He had fluctuating elevated liver enzymes on record at least for 8 years.  He is nonalcoholic.  Recent CT abdomen pelvis done in April 2022 revealed signs of cirrhosis without any ascites or liver lesions.  Hep C antibody was negative.  His prior CESAR and hep B serology was negative.     We will get liver work-up to rule out any other etiology for his cirrhosis other than nonalcoholic  fatty liver disease.   Low-salt diet and discussed with the patient  Lifestyle changes and dietary changes discussed including losing weight to less than 200 pounds  Patient also has a CKD and may be difficult to manage him if he develops ascites  We will get alpha-fetoprotein level  We will schedule him for an EGD to rule out esophageal varices and possible variceal banding  He needs an ultrasound abdomen for HCC surveillance    The indications, technique, alternatives and potential risk and complications were discussed with the patient including but not limited to bleeding, bowel perforations, missing lesions and anesthetic complications. The patient understands and wishes to proceed with the procedure and has given their verbal consent. Written patient education information was given to the patient.   The patient will call if they have further questions before procedure.     - Case Request; Standing  - Implement Anesthesia Orders Day of Procedure; Standing  - Obtain Informed Consent; Standing  - Verify NPO; Standing  - Oxygen Therapy- Nasal Cannula; 2 LPM; Titrate for SPO2: equal to or greater than, 90%; Standing  - sodium chloride 0.9 % infusion  - Case Request  - CBC Auto Differential; Future  - Comprehensive Metabolic Panel; Future  - Protime-INR; Future  - Hepatitis A Antibody, Total; Future  - Alpha - 1 - Antitrypsin; Future  - Anti-Smooth Muscle Antibody Titer; Future  - Ceruloplasmin; Future  - Mitochondrial Antibodies, M2; Future  - Iron Profile; Future  - Ferritin; Future  - AFP Tumor Marker; Future  - US Abdomen Complete; Future    5. Gastroesophageal reflux disease without esophagitis  Patient states that he does not get any reflux symptoms however he does get epigastric pain intermittently.  Symptoms controlled with Prilosec 40 m p.o. daily dose.  We will recommend further after EGD    6. Encounter for screening for malignant neoplasm of colon  His last colonoscopy was over 10 years ago details  unknown.  He is due for his screening colonoscopy.  We will schedule him for colonoscopy when once EGD is performed        Follow Up:   Return in about 8 weeks (around 1/2/2023) for Follow Up after procedure.    Mónica Newell MD  [facility]  11/7/2022  18:00 EST    Please note that portions of this note may have been completed with a voice recognition program.

## 2022-11-08 LAB
ALBUMIN SERPL-MCNC: 3.8 G/DL (ref 3.5–5.2)
ALBUMIN/GLOB SERPL: 1.1 G/DL
ALP SERPL-CCNC: 114 U/L (ref 39–117)
ALPHA-FETOPROTEIN: 5.82 NG/ML (ref 0–8.3)
ALPHA1 GLOB MFR UR ELPH: 137 MG/DL (ref 90–200)
ALT SERPL W P-5'-P-CCNC: 44 U/L (ref 1–41)
ANION GAP SERPL CALCULATED.3IONS-SCNC: 10.1 MMOL/L (ref 5–15)
AST SERPL-CCNC: 37 U/L (ref 1–40)
BASOPHILS # BLD AUTO: 0.03 10*3/MM3 (ref 0–0.2)
BASOPHILS NFR BLD AUTO: 0.7 % (ref 0–1.5)
BILIRUB SERPL-MCNC: 1.7 MG/DL (ref 0–1.2)
BUN SERPL-MCNC: 16 MG/DL (ref 8–23)
BUN/CREAT SERPL: 20.3 (ref 7–25)
CALCIUM SPEC-SCNC: 9 MG/DL (ref 8.6–10.5)
CERULOPLASMIN SERPL-MCNC: 17 MG/DL (ref 16–31)
CHLORIDE SERPL-SCNC: 106 MMOL/L (ref 98–107)
CO2 SERPL-SCNC: 25.9 MMOL/L (ref 22–29)
CREAT SERPL-MCNC: 0.79 MG/DL (ref 0.76–1.27)
DEPRECATED RDW RBC AUTO: 49 FL (ref 37–54)
EGFRCR SERPLBLD CKD-EPI 2021: 100.4 ML/MIN/1.73
EOSINOPHIL # BLD AUTO: 0.15 10*3/MM3 (ref 0–0.4)
EOSINOPHIL NFR BLD AUTO: 3.4 % (ref 0.3–6.2)
ERYTHROCYTE [DISTWIDTH] IN BLOOD BY AUTOMATED COUNT: 13.1 % (ref 12.3–15.4)
FERRITIN SERPL-MCNC: 411 NG/ML (ref 30–400)
GLOBULIN UR ELPH-MCNC: 3.4 GM/DL
GLUCOSE SERPL-MCNC: 96 MG/DL (ref 65–99)
HCT VFR BLD AUTO: 35.4 % (ref 37.5–51)
HGB BLD-MCNC: 12.2 G/DL (ref 13–17.7)
IRON 24H UR-MRATE: 86 MCG/DL (ref 59–158)
IRON SATN MFR SERPL: 25 % (ref 20–50)
LYMPHOCYTES # BLD AUTO: 1.07 10*3/MM3 (ref 0.7–3.1)
LYMPHOCYTES NFR BLD AUTO: 24.5 % (ref 19.6–45.3)
MCH RBC QN AUTO: 35.2 PG (ref 26.6–33)
MCHC RBC AUTO-ENTMCNC: 34.5 G/DL (ref 31.5–35.7)
MCV RBC AUTO: 102 FL (ref 79–97)
MONOCYTES # BLD AUTO: 0.38 10*3/MM3 (ref 0.1–0.9)
MONOCYTES NFR BLD AUTO: 8.7 % (ref 5–12)
NEUTROPHILS NFR BLD AUTO: 2.7 10*3/MM3 (ref 1.7–7)
NEUTROPHILS NFR BLD AUTO: 62 % (ref 42.7–76)
PLATELET # BLD AUTO: 56 10*3/MM3 (ref 140–450)
PMV BLD AUTO: 13.6 FL (ref 6–12)
POTASSIUM SERPL-SCNC: 4.3 MMOL/L (ref 3.5–5.2)
PROT SERPL-MCNC: 7.2 G/DL (ref 6–8.5)
RBC # BLD AUTO: 3.47 10*6/MM3 (ref 4.14–5.8)
SODIUM SERPL-SCNC: 142 MMOL/L (ref 136–145)
TIBC SERPL-MCNC: 346 MCG/DL (ref 298–536)
TRANSFERRIN SERPL-MCNC: 232 MG/DL (ref 200–360)
WBC NRBC COR # BLD: 4.36 10*3/MM3 (ref 3.4–10.8)

## 2022-11-09 ENCOUNTER — HOSPITAL ENCOUNTER (OUTPATIENT)
Facility: HOSPITAL | Age: 62
Setting detail: HOSPITAL OUTPATIENT SURGERY
Discharge: HOME | End: 2022-11-09
Attending: INTERNAL MEDICINE | Admitting: INTERNAL MEDICINE

## 2022-11-09 ENCOUNTER — ANESTHESIA (OUTPATIENT)
Dept: GASTROENTEROLOGY | Facility: HOSPITAL | Age: 62
End: 2022-11-09

## 2022-11-09 ENCOUNTER — ANESTHESIA EVENT (OUTPATIENT)
Dept: GASTROENTEROLOGY | Facility: HOSPITAL | Age: 62
End: 2022-11-09

## 2022-11-09 VITALS
TEMPERATURE: 97.9 F | SYSTOLIC BLOOD PRESSURE: 138 MMHG | OXYGEN SATURATION: 98 % | RESPIRATION RATE: 17 BRPM | HEIGHT: 72 IN | WEIGHT: 216 LBS | DIASTOLIC BLOOD PRESSURE: 90 MMHG | BODY MASS INDEX: 29.26 KG/M2 | HEART RATE: 68 BPM

## 2022-11-09 DIAGNOSIS — K75.81 LIVER CIRRHOSIS SECONDARY TO NASH: ICD-10-CM

## 2022-11-09 DIAGNOSIS — K74.60 LIVER CIRRHOSIS SECONDARY TO NASH: ICD-10-CM

## 2022-11-09 LAB
HAV AB SER QL IA: NEGATIVE
MITOCHONDRIA M2 IGG SER-ACNC: <20 UNITS (ref 0–20)
SMA IGG SER-ACNC: 11 UNITS (ref 0–19)

## 2022-11-09 PROCEDURE — 25010000002 PROPOFOL 200 MG/20ML EMULSION: Performed by: NURSE ANESTHETIST, CERTIFIED REGISTERED

## 2022-11-09 PROCEDURE — 88305 TISSUE EXAM BY PATHOLOGIST: CPT

## 2022-11-09 PROCEDURE — 43239 EGD BIOPSY SINGLE/MULTIPLE: CPT | Performed by: INTERNAL MEDICINE

## 2022-11-09 RX ORDER — PROPOFOL 10 MG/ML
INJECTION, EMULSION INTRAVENOUS AS NEEDED
Status: DISCONTINUED | OUTPATIENT
Start: 2022-11-09 | End: 2022-11-09 | Stop reason: SURG

## 2022-11-09 RX ORDER — SODIUM CHLORIDE 9 MG/ML
70 INJECTION, SOLUTION INTRAVENOUS CONTINUOUS PRN
Status: DISCONTINUED | OUTPATIENT
Start: 2022-11-09 | End: 2022-11-09 | Stop reason: HOSPADM

## 2022-11-09 RX ORDER — LIDOCAINE HYDROCHLORIDE 20 MG/ML
INJECTION, SOLUTION INTRAVENOUS AS NEEDED
Status: DISCONTINUED | OUTPATIENT
Start: 2022-11-09 | End: 2022-11-09 | Stop reason: SURG

## 2022-11-09 RX ADMIN — PROPOFOL 100 MG: 10 INJECTION, EMULSION INTRAVENOUS at 11:25

## 2022-11-09 RX ADMIN — LIDOCAINE HYDROCHLORIDE 60 MG: 20 INJECTION, SOLUTION INTRAVENOUS at 11:20

## 2022-11-09 RX ADMIN — SODIUM CHLORIDE 70 ML/HR: 9 INJECTION, SOLUTION INTRAVENOUS at 09:38

## 2022-11-09 RX ADMIN — PROPOFOL 100 MG: 10 INJECTION, EMULSION INTRAVENOUS at 11:20

## 2022-11-09 NOTE — DISCHARGE INSTRUCTIONS
- Discharge patient to home (ambulatory).   - Low sodium diet.   - Continue present medications.   - Avoid NSAIDS  - low dose PPI  - Await pathology results.   - Non selective beta blockers for portal HTN gastropathy prophylaxis   - Repeat upper endoscopy in 2 years for surveillance.   - Return to GI office in 8 weeks.     To assist you in voiding:  Drink plenty of fluids  Listen to running water while attempting to void.    If you are unable to urinate and you have an uncomfortable urge to void or it has been   6 hours since you were discharged, return to the Emergency Room     No pushing, pulling, tugging,  heavy lifting, or strenuous activity.  No major decision making, driving, or drinking alcoholic beverages for 24 hours. ( due to the medications you have  received)  Always use good hand hygiene/washing techniques.  NO driving while taking pain medications.

## 2022-11-09 NOTE — ANESTHESIA PREPROCEDURE EVALUATION
Anesthesia Evaluation     Patient summary reviewed and Nursing notes reviewed   NPO Solid Status: > 8 hours  NPO Liquid Status: > 8 hours           Airway   Mallampati: II  TM distance: >3 FB  Neck ROM: full  Possible difficult intubation  Dental - normal exam     Pulmonary - normal exam   (+) sleep apnea,   Cardiovascular - normal exam    (+) hypertension well controlled, hyperlipidemia,       Neuro/Psych  (+) numbness, psychiatric history Anxiety and Depression,    GI/Hepatic/Renal/Endo    (+) obesity,  hiatal hernia, GERD,  liver disease fatty liver disease, renal disease stones,     Musculoskeletal     (+) neck stiffness,   Abdominal  - normal exam   Substance History      OB/GYN          Other   arthritis,                      Anesthesia Plan    ASA 3     MAC     intravenous induction     Anesthetic plan, risks, benefits, and alternatives have been provided, discussed and informed consent has been obtained with: patient.  Pre-procedure education provided  Plan discussed with CRNA.        CODE STATUS:

## 2022-11-09 NOTE — ANESTHESIA POSTPROCEDURE EVALUATION
Patient: Sterling Whaley    Procedure Summary     Date: 11/09/22 Room / Location: [facility] ENDOSCOPY 2 / [facility] ENDOSCOPY    Anesthesia Start: 1108 Anesthesia Stop: 1128    Procedure: ESOPHAGOGASTRODUODENOSCOPY WITH BANDING Diagnosis:       Liver cirrhosis secondary to LEE (HCC)      (Liver cirrhosis secondary to LEE (HCC) [K75.81, K74.60])    Surgeons: Mónica Newell MD Provider: Sandeep Rios CRNA    Anesthesia Type: MAC ASA Status: 3          Anesthesia Type: MAC    Vitals  No vitals data found for the desired time range.          Post Anesthesia Care and Evaluation    Patient location during evaluation: bedside  Patient participation: complete - patient participated  Level of consciousness: awake and alert  Pain score: 0  Pain management: adequate    Airway patency: patent  Anesthetic complications: No anesthetic complications  PONV Status: none  Cardiovascular status: acceptable  Respiratory status: acceptable  Hydration status: acceptable

## 2022-11-10 LAB — REF LAB TEST METHOD: NORMAL

## 2022-11-21 ENCOUNTER — OFFICE VISIT (OUTPATIENT)
Dept: FAMILY MEDICINE CLINIC | Facility: CLINIC | Age: 62
End: 2022-11-21

## 2022-11-21 VITALS
WEIGHT: 220 LBS | SYSTOLIC BLOOD PRESSURE: 128 MMHG | TEMPERATURE: 97.9 F | HEART RATE: 58 BPM | BODY MASS INDEX: 29.8 KG/M2 | HEIGHT: 72 IN | OXYGEN SATURATION: 99 % | DIASTOLIC BLOOD PRESSURE: 80 MMHG

## 2022-11-21 DIAGNOSIS — E78.2 MIXED HYPERLIPIDEMIA: ICD-10-CM

## 2022-11-21 DIAGNOSIS — G89.4 CHRONIC PAIN SYNDROME: ICD-10-CM

## 2022-11-21 DIAGNOSIS — I10 ESSENTIAL HYPERTENSION: ICD-10-CM

## 2022-11-21 DIAGNOSIS — Z28.21 INFLUENZA VACCINE REFUSED: ICD-10-CM

## 2022-11-21 DIAGNOSIS — Z79.899 HIGH RISK MEDICATIONS (NOT ANTICOAGULANTS) LONG-TERM USE: ICD-10-CM

## 2022-11-21 DIAGNOSIS — K76.6 PORTAL HYPERTENSION: ICD-10-CM

## 2022-11-21 DIAGNOSIS — Z28.21 PNEUMOCOCCAL VACCINE REFUSED: ICD-10-CM

## 2022-11-21 DIAGNOSIS — I10 PRIMARY HYPERTENSION: ICD-10-CM

## 2022-11-21 DIAGNOSIS — K74.60 LIVER CIRRHOSIS SECONDARY TO NASH: Primary | ICD-10-CM

## 2022-11-21 DIAGNOSIS — D69.6 THROMBOCYTOPENIA: ICD-10-CM

## 2022-11-21 DIAGNOSIS — Z78.9 DIFFICULTY WITH CPAP USE: ICD-10-CM

## 2022-11-21 DIAGNOSIS — G47.33 OBSTRUCTIVE SLEEP APNEA SYNDROME: ICD-10-CM

## 2022-11-21 DIAGNOSIS — K75.81 LIVER CIRRHOSIS SECONDARY TO NASH: Primary | ICD-10-CM

## 2022-11-21 DIAGNOSIS — F32.A DEPRESSIVE DISORDER: ICD-10-CM

## 2022-11-21 DIAGNOSIS — F41.1 GAD (GENERALIZED ANXIETY DISORDER): ICD-10-CM

## 2022-11-21 PROCEDURE — 99396 PREV VISIT EST AGE 40-64: CPT | Performed by: FAMILY MEDICINE

## 2022-11-21 PROCEDURE — 2014F MENTAL STATUS ASSESS: CPT | Performed by: FAMILY MEDICINE

## 2022-11-21 PROCEDURE — 3008F BODY MASS INDEX DOCD: CPT | Performed by: FAMILY MEDICINE

## 2022-11-21 RX ORDER — LISINOPRIL 10 MG/1
20 TABLET ORAL DAILY
Qty: 90 TABLET | Refills: 5
Start: 2022-11-21

## 2022-11-21 RX ORDER — FLUTICASONE PROPIONATE 50 MCG
1 SPRAY, SUSPENSION (ML) NASAL DAILY PRN
COMMUNITY
Start: 2022-11-15

## 2022-11-21 RX ORDER — LEVOCETIRIZINE DIHYDROCHLORIDE 5 MG/1
5 TABLET, FILM COATED ORAL DAILY PRN
COMMUNITY

## 2022-11-21 RX ORDER — PROPRANOLOL HYDROCHLORIDE 10 MG/1
10 TABLET ORAL 2 TIMES DAILY
Qty: 60 TABLET | Refills: 5 | Status: SHIPPED | OUTPATIENT
Start: 2022-11-21

## 2022-11-21 RX ORDER — METHADONE HYDROCHLORIDE 5 MG/1
TABLET ORAL
Qty: 120 TABLET | Refills: 0 | Status: SHIPPED | OUTPATIENT
Start: 2022-11-21 | End: 2023-01-13 | Stop reason: SDUPTHER

## 2022-11-21 NOTE — PROGRESS NOTES
"Subjective   Sterling Andrea Whaley is a 62 y.o. male.     History of Present Illness   Mr. Whaley presents today for routine follow-up and several medical problems.  Seen for routine follow-up 1 month ago.  Noted to have decline in weight.  Pending consultation/eval by gastroenterology for cirrhosis.  He was advised to follow-up in 1 month for weight recheck.    Since his last visit he has had consultation with ENT for persistent dizziness/vertigo.  Currently on Xyzal and Flonase.  Had persistent, intermittent aphthous ulcers.  He states these have improved somewhat although he continues to have some general oral irritation and tendency to have \"bloody spit\" in the morning which resolves.    Had an EGD per Dr. Newell. Found to have esophageal varices, mild portal hypertension gastropathy.  Pathology negative, no H. pylori.  Has follow-up scheduled in January for hepatic ultrasound.  Was advised to begin beta-blocker therapy managed by PCP.    He continues to have chronic fatigue, chronic headaches upon awakening.  Partner with him today states she has noted significant snoring, apneic spells.  He had a home sleep study in 2019 showing moderate TRUDI.  He attempted CPAP but was unable to tolerate.  Admits he did not \"try it for long\".    Labs in October showed worsening creatinine.  Fortunately, he has discontinued NSAIDs and creatinine now within normal range.    On Zoloft for depressive disorder with TYSON.  Moods recently stable.    Other chronic conditions include hypertension, hyperlipidemia for which he is on lisinopril.  Has declined statin therapy.    On low-dose methadone, stable dosing for several years for chronic pain due to severe/diffuse DDD/DJD.  No aberrant behavior.  Good clinical benefit.    The following portions of the patient's history were reviewed and updated as appropriate: allergies, current medications, past family history, past medical history, past social history, past surgical history and " problem list.    Review of Systems   Constitutional: Positive for fatigue. Negative for fever and unexpected weight change.   HENT: Positive for congestion, hearing loss (chronic), mouth sores (intermittent), postnasal drip and sinus pressure (chronic). Negative for drooling, facial swelling, nosebleeds, rhinorrhea, sore throat and trouble swallowing.    Eyes: Negative for pain, discharge, redness and visual disturbance.   Respiratory: Negative for cough, shortness of breath and wheezing.    Cardiovascular: Negative for chest pain, palpitations and leg swelling.   Gastrointestinal: Positive for nausea. Negative for abdominal pain, blood in stool, diarrhea and vomiting.   Endocrine: Positive for heat intolerance. Negative for polydipsia and polyuria.   Genitourinary: Negative for dysuria and hematuria.   Musculoskeletal: Positive for arthralgias, back pain, gait problem, joint swelling, myalgias, neck pain and neck stiffness.   Skin: Negative for rash and wound.   Neurological: Positive for dizziness, weakness, numbness and headaches. Negative for tremors and syncope.   Hematological: Negative for adenopathy. Does not bruise/bleed easily.   Psychiatric/Behavioral: Positive for decreased concentration and dysphoric mood (mild). Negative for confusion, hallucinations, sleep disturbance and suicidal ideas. The patient is nervous/anxious.    Pt's previous ROS reviewed and updated as indicated.       Objective    Vitals:    11/21/22 0925   BP: 128/80   Pulse: 58   Temp: 97.9 °F (36.6 °C)   SpO2: 99%     Body mass index is 29.84 kg/m².      11/21/22  0925   Weight: 99.8 kg (220 lb)       Physical Exam  Vitals and nursing note reviewed.   Constitutional:       General: He is not in acute distress.     Appearance: He is well-developed, well-groomed and overweight. He is not ill-appearing.   HENT:      Head: Atraumatic.   Eyes:      General: No scleral icterus.     Conjunctiva/sclera: Conjunctivae normal.      Pupils: Pupils  are equal, round, and reactive to light.   Neck:      Thyroid: No thyroid mass.   Cardiovascular:      Rate and Rhythm: Normal rate and regular rhythm.      Pulses: Normal pulses.      Heart sounds: Normal heart sounds.   Pulmonary:      Effort: Pulmonary effort is normal.      Breath sounds: Normal breath sounds.   Musculoskeletal:      Cervical back: Spasms and bony tenderness present. Decreased range of motion.      Right lower leg: No edema.      Left lower leg: No edema.   Lymphadenopathy:      Cervical: No cervical adenopathy.   Skin:     General: Skin is warm and dry.      Coloration: Skin is not jaundiced or pale.      Findings: No bruising.   Neurological:      Mental Status: He is alert and oriented to person, place, and time.      Gait: Gait is intact.   Psychiatric:         Mood and Affect: Mood and affect normal.         Behavior: Behavior normal. Behavior is cooperative.         Cognition and Memory: Cognition normal.     Pt's previous physical exam reviewed and updated as indicated.    Recent Results (from the past 672 hour(s))   AFP Tumor Marker    Collection Time: 11/07/22  5:18 PM    Specimen: Blood   Result Value Ref Range    ALPHA-FETOPROTEIN 5.82 0 - 8.3 ng/mL   CBC Auto Differential    Collection Time: 11/07/22  5:18 PM    Specimen: Blood   Result Value Ref Range    WBC 4.36 3.40 - 10.80 10*3/mm3    RBC 3.47 (L) 4.14 - 5.80 10*6/mm3    Hemoglobin 12.2 (L) 13.0 - 17.7 g/dL    Hematocrit 35.4 (L) 37.5 - 51.0 %    .0 (H) 79.0 - 97.0 fL    MCH 35.2 (H) 26.6 - 33.0 pg    MCHC 34.5 31.5 - 35.7 g/dL    RDW 13.1 12.3 - 15.4 %    RDW-SD 49.0 37.0 - 54.0 fl    MPV 13.6 (H) 6.0 - 12.0 fL    Platelets 56 (L) 140 - 450 10*3/mm3    Neutrophil % 62.0 42.7 - 76.0 %    Lymphocyte % 24.5 19.6 - 45.3 %    Monocyte % 8.7 5.0 - 12.0 %    Eosinophil % 3.4 0.3 - 6.2 %    Basophil % 0.7 0.0 - 1.5 %    Neutrophils, Absolute 2.70 1.70 - 7.00 10*3/mm3    Lymphocytes, Absolute 1.07 0.70 - 3.10 10*3/mm3     Monocytes, Absolute 0.38 0.10 - 0.90 10*3/mm3    Eosinophils, Absolute 0.15 0.00 - 0.40 10*3/mm3    Basophils, Absolute 0.03 0.00 - 0.20 10*3/mm3   Comprehensive Metabolic Panel    Collection Time: 11/07/22  5:18 PM    Specimen: Blood   Result Value Ref Range    Glucose 96 65 - 99 mg/dL    BUN 16 8 - 23 mg/dL    Creatinine 0.79 0.76 - 1.27 mg/dL    Sodium 142 136 - 145 mmol/L    Potassium 4.3 3.5 - 5.2 mmol/L    Chloride 106 98 - 107 mmol/L    CO2 25.9 22.0 - 29.0 mmol/L    Calcium 9.0 8.6 - 10.5 mg/dL    Total Protein 7.2 6.0 - 8.5 g/dL    Albumin 3.80 3.50 - 5.20 g/dL    ALT (SGPT) 44 (H) 1 - 41 U/L    AST (SGOT) 37 1 - 40 U/L    Alkaline Phosphatase 114 39 - 117 U/L    Total Bilirubin 1.7 (H) 0.0 - 1.2 mg/dL    Globulin 3.4 gm/dL    A/G Ratio 1.1 g/dL    BUN/Creatinine Ratio 20.3 7.0 - 25.0    Anion Gap 10.1 5.0 - 15.0 mmol/L    eGFR 100.4 >60.0 mL/min/1.73   Protime-INR    Collection Time: 11/07/22  5:18 PM    Specimen: Blood   Result Value Ref Range    Protime 15.6 (H) 12.5 - 14.5 Seconds    INR 1.20 (H) 0.90 - 1.10   Hepatitis A Antibody, Total    Collection Time: 11/07/22  5:18 PM    Specimen: Blood   Result Value Ref Range    Hep A Total Ab Negative Negative   Alpha - 1 - Antitrypsin    Collection Time: 11/07/22  5:18 PM    Specimen: Blood   Result Value Ref Range    ALPHA -1 ANTITRYPSIN 137 90 - 200 mg/dL   Anti-Smooth Muscle Antibody Titer    Collection Time: 11/07/22  5:18 PM    Specimen: Blood   Result Value Ref Range    Smooth Muscle Ab 11 0 - 19 Units   Ceruloplasmin    Collection Time: 11/07/22  5:18 PM    Specimen: Blood   Result Value Ref Range    Ceruloplasmin 17 16 - 31 mg/dL   Mitochondrial Antibodies, M2    Collection Time: 11/07/22  5:18 PM    Specimen: Blood   Result Value Ref Range    Mitochondrial Ab <20.0 0.0 - 20.0 Units   Iron Profile    Collection Time: 11/07/22  5:18 PM    Specimen: Blood   Result Value Ref Range    Iron 86 59 - 158 mcg/dL    Iron Saturation 25 20 - 50 %    Transferrin  "232 200 - 360 mg/dL    TIBC 346 298 - 536 mcg/dL   Ferritin    Collection Time: 22  5:18 PM    Specimen: Blood   Result Value Ref Range    Ferritin 411.00 (H) 30.00 - 400.00 ng/mL   TISSUE EXAM, [facility])    Collection Time: 22 11:25 AM    Specimen: Gastric, Antrum; Tissue   Result Value Ref Range    Reference Lab Report       [facility]  [address] [place] [address]  Phone: [phone] or [phone]  Fax: [phone]  Darell Moore M.D., Medical Director    PATIENT NAME                                     LABORATORY NO.  [id number]   GABE METZ.                          [id number]  [id number]                                 AGE                    SEX   N              CLIENT REF #  [facility]                    62        1960           [id number]      [id number]    [address]                        REQUESTING M.D.           ATTENDING M.D.         COPY TO.  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 See diagnosis for details.    Professional interpretation rendered by Eleno Uriostegui M.D., F.C.A.P. at [facility] [employer], [address] [place] [address].    GROSS DESCRIPTION:  Specimen is received in 1 formalin filled container labeled \"gastric antrum and  body\" and consists of a single " portion of tan soft tissue measuring 0.3 x 0.2 x 0.2  cm.  The specimen is submitted entirely in 1 cassette.  BW    REVIEWED, DIAGNOSED AND ELECTRONICALLY  SIGNED BY:    Eleno Uriostegui M.D., F.C.A.P.  CPT CODES:  48855       EGD report reviewed with pt.    GI consultation note reviewed with pt.    Previous sleep study, 2019 reviewed with pt.    CT abd/pelvis 5/2022: cirrhosis, portal hypertension, splenomegaly, peripancreatic adenopathy, bilateral nephrolithiasis - non-obstructing    Assessment & Plan   Diagnoses and all orders for this visit:    1. 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 Portal hypertension (HCC)    3. Thrombocytopenia (HCC)    4. Chronic pain syndrome  -     methadone (DOLOPHINE) 5 MG tablet; 1 tablet po up to qid.  Dispense: 120 tablet; Refill: 0    5. TYSON (generalized anxiety disorder)  -     sertraline (ZOLOFT) 50 MG tablet; Take 1 tablet by mouth Daily.  Dispense: 90 tablet; Refill: 0    6. Obstructive sleep apnea syndrome  -     Ambulatory Referral to Sleep Medicine    7. Difficulty with CPAP use  -     Ambulatory Referral to Sleep Medicine    8. Pneumococcal vaccine refused    9. Influenza vaccine refused    10. Depressive disorder    11. Primary hypertension    12. Mixed hyperlipidemia    13. High risk medications (not anticoagulants) long-term use    14. Essential hypertension  -     lisinopril (PRINIVIL,ZESTRIL) 10 MG tablet; Take 2 tablets by mouth Daily.  Dispense: 90 tablet; Refill: 5    Other orders  -     propranolol (INDERAL) 10 MG tablet; Take 1 tablet by mouth 2 (Two) Times a Day.  Dispense: 60 tablet; Refill: 5       Follow-up with gastroenterology as scheduled for continued management/surveillance of cirrhosis due to LEE.  Propranolol initiated as above, he historically has had difficulty tolerating medications.  Uptitrate as indicated/tolerated.    Stable multifactorial chronic pain syndrome.  Continue low-dose methadone.  As part of patient's treatment plan I  am prescribing a controlled substance.  The patient has been made aware of the appropriate use of such medications, including potential risk of somnolence, limited ability to drive and/or work safely, and potential for dependence and/or overdose.  It has also been made clear that these medications are for use by this patient only, without concomitant use of alcohol or other substances, unless prescribed.  History and physical exam exhibit continued safe and appropriate use of controlled substance.  CLAUDIO reviewed.  Patient has completed a prescribing agreement detailing terms of continued prescribing of controlled substances, including monitoring CLAUDIO reports, urine drug screening, and pill counts if necessary.  Patient is aware that inappropriate use will result in cessation of prescribing such medications.    Depression with TYSON, continue Zoloft.    Hypertension controlled, continue lisinopril at 20 mg daily.  (Propranolol as above)    Pt advised to eat a heart healthy diet and get regular aerobic exercise.    Avoid NSAIDs.    Is amenable to eval by sleep medicine due to suspected severe TRUDI (home sleep study 2019 showed moderate).  He has had significant difficulty tolerating CPAP but is once again encouraged to reconsider based on long-term consequences of untreated sleep apnea.    Routine follow-up in 3 months, sooner as needed.  Patient was encouraged to keep me informed of any acute changes, lack of improvement, or any new concerning symptoms.  Pt is aware of reasons to seek emergent care.  Patient voiced understanding of all instructions and denied further questions.    I spent 40 minutes caring for Sterling on this date of service. This time includes time spent by me in the following activities:preparing for the visit, reviewing tests, obtaining and/or reviewing a separately obtained history, performing a medically appropriate examination and/or evaluation , counseling and educating the  patient/family/caregiver, ordering medications, tests, or procedures, documenting information in the medical record and care coordination.      Please note that portions of this note may have been completed with a voice recognition program.               NOTE TO PATIENT: The 21st Century Cures Act makes medical notes like these available to patients in the interest of transparency. However, be advised this is a medical document. It is intended as peer to peer communication. It is written in medical language and may contain abbreviations or verbiage that are unfamiliar. It may appear blunt or direct. Medical documents are intended to carry relevant information, facts as evident, and the clinical opinion of the practitioner.

## 2023-01-13 DIAGNOSIS — G89.4 CHRONIC PAIN SYNDROME: ICD-10-CM

## 2023-01-13 NOTE — TELEPHONE ENCOUNTER
Rx Refill Note  Requested Prescriptions     Pending Prescriptions Disp Refills   • methadone (DOLOPHINE) 5 MG tablet 120 tablet 0     Si tablet po up to qid.      Last office visit with prescribing clinician: 2022   Last telemedicine visit with prescribing clinician: 2023   Next office visit with prescribing clinician: 2023                         Would you like a call back once the refill request has been completed: [] Yes [] No    If the office needs to give you a call back, can they leave a voicemail: [] Yes [] No    Gemini Navarro MA  23, 14:43 EST

## 2023-01-16 ENCOUNTER — HOSPITAL ENCOUNTER (OUTPATIENT)
Dept: ULTRASOUND IMAGING | Facility: HOSPITAL | Age: 63
Discharge: HOME | End: 2023-01-16
Admitting: INTERNAL MEDICINE
Payer: MEDICARE

## 2023-01-16 ENCOUNTER — OFFICE VISIT (OUTPATIENT)
Dept: GASTROENTEROLOGY | Facility: CLINIC | Age: 63
End: 2023-01-16
Payer: MEDICARE

## 2023-01-16 VITALS
WEIGHT: 221 LBS | HEART RATE: 58 BPM | HEIGHT: 72 IN | SYSTOLIC BLOOD PRESSURE: 175 MMHG | DIASTOLIC BLOOD PRESSURE: 91 MMHG | BODY MASS INDEX: 29.93 KG/M2 | TEMPERATURE: 98.2 F | RESPIRATION RATE: 12 BRPM

## 2023-01-16 DIAGNOSIS — I85.10 SECONDARY ESOPHAGEAL VARICES WITHOUT BLEEDING: ICD-10-CM

## 2023-01-16 DIAGNOSIS — N13.30 HYDRONEPHROSIS OF RIGHT KIDNEY: ICD-10-CM

## 2023-01-16 DIAGNOSIS — Z12.11 ENCOUNTER FOR SCREENING FOR MALIGNANT NEOPLASM OF COLON: ICD-10-CM

## 2023-01-16 DIAGNOSIS — K75.81 LIVER CIRRHOSIS SECONDARY TO NASH: ICD-10-CM

## 2023-01-16 DIAGNOSIS — K74.60 LIVER CIRRHOSIS SECONDARY TO NASH: ICD-10-CM

## 2023-01-16 DIAGNOSIS — R59.0 LYMPHADENOPATHY, ABDOMINAL: Primary | ICD-10-CM

## 2023-01-16 DIAGNOSIS — K21.9 GASTROESOPHAGEAL REFLUX DISEASE WITHOUT ESOPHAGITIS: ICD-10-CM

## 2023-01-16 PROCEDURE — 99214 OFFICE O/P EST MOD 30 MIN: CPT | Performed by: INTERNAL MEDICINE

## 2023-01-16 PROCEDURE — 76700 US EXAM ABDOM COMPLETE: CPT

## 2023-01-16 RX ORDER — SODIUM, POTASSIUM,MAG SULFATES 17.5-3.13G
2 SOLUTION, RECONSTITUTED, ORAL ORAL ONCE
Qty: 354 ML | Refills: 0 | Status: SHIPPED | OUTPATIENT
Start: 2023-01-16 | End: 2023-01-16

## 2023-01-16 RX ORDER — SODIUM CHLORIDE 9 MG/ML
70 INJECTION, SOLUTION INTRAVENOUS CONTINUOUS PRN
Status: CANCELLED | OUTPATIENT
Start: 2023-01-16

## 2023-01-16 RX ORDER — CHLORHEXIDINE GLUCONATE 0.12 MG/ML
RINSE ORAL
COMMUNITY
Start: 2022-12-14 | End: 2023-01-23

## 2023-01-16 RX ORDER — METHADONE HYDROCHLORIDE 5 MG/1
TABLET ORAL
Qty: 120 TABLET | Refills: 0 | Status: SHIPPED | OUTPATIENT
Start: 2023-01-16 | End: 2023-03-21 | Stop reason: SDUPTHER

## 2023-01-16 NOTE — PROGRESS NOTES
Follow Up Note     Date: 2023   Patient Name: Sterling Whaley  MRN: [id number]  : 1960     Referring Physician: Annel Armstrong MD    Chief Complaint:    Chief Complaint   Patient presents with   • Cirrhosis     Franco   • splenomegaly   • thrombocytopenia       Interval History:   2023  Sterling Whaley is a 62 y.o. male who is here today for follow up for his cirrhosis.  He had a recent EGD and is here for follow-up.  Denies any confusional episodes.  Has been having daily bowel movements.    2022  Sterling Whaley is a 62 y.o. male who is here today to establish care with Gastroenterology for evaluation of his cirrhosis.     Patient had a CT abdomen pelvis done in 2022 for further evaluation of his elevated liver enzymes which revealed signs of cirrhosis with splenomegaly without any ascites or liver lesions.  He also had ultrasound done year ago in 2021 which revealed again fatty liver disease with significant splenomegaly suggesting portal hypertension.  Patient states that he had a fatty liver disease for many years and had a elevated liver enzymes for a while.  He denies any alcohol consumption.  No family stop any cirrhosis.  No confusional episodes.  No constipation.     He has a abdominal discomfort or feeling of nervousness whenever he gets tensed up or stressed.  He will have occasional nausea associated with the symptoms.  His bowel movements have been regular.  He is on methadone for many years for his joint pains but as per patient it did not not cause any constipation.     Patient has been taking PPI for intermittent epigastric pain but denies any reflux symptoms as such.  Denies any dysphagia.  No blood in the stool his weight has been stable.  No prior history of any anemia.  His last EGD was in  and colonoscopy was over 10 years ago.  He does not know anything about the family.  He states that he lost about 25 pounds over years  Subjective       Past Medical History:   Past Medical History:   Diagnosis Date   • Anxiety    • Arthritis    • Bilateral carpal tunnel syndrome    • Depression    • Esophageal reflux    • Gastritis    • H/O pulmonary function tests 09/13/2012    normal   • Hiatal hernia    • History of depression    • History of meniscal tear    • History of renal calculi    • Hyperbilirubinemia     [place] syndrome   • Hypertension    • Kidney stone    • Obstructive sleep apnea    • Plantar fascia rupture    • Renal calculi    • RLS (restless legs syndrome)    • Schatzki's ring    • Sinus problem    • Sleep apnea    • Snores    • TMJ pain dysfunction syndrome      Past Surgical History:   Past Surgical History:   Procedure Laterality Date   • ENDOSCOPY     • ENDOSCOPY W/ BANDING N/A 11/9/2022    Procedure: ESOPHAGOGASTRODUODENOSCOPY WITH BIOPSY;  Surgeon: Mónica Newell MD;  Location: [facility] ENDOSCOPY;  Service: Gastroenterology;  Laterality: N/A;   • ESOPHAGOSCOPY / EGD  2003    [place]   • FOOT SURGERY Left    • KNEE ARTHROSCOPY Right     medial meniscus repair   • MEDIAL COLLATERAL LIGAMENT REPAIR, KNEE Left        Family History:   Family History   Problem Relation Age of Onset   • Arthritis Mother    • Colon cancer Neg Hx        Social History:   Social History     Socioeconomic History   • Marital status:    Tobacco Use   • Smoking status: Never   • Smokeless tobacco: Never   Vaping Use   • Vaping Use: Never used   Substance and Sexual Activity   • Alcohol use: Never   • Drug use: Never   • Sexual activity: Defer       Medications:     Current Outpatient Medications:   •  chlorhexidine (PERIDEX) 0.12 % solution, , Disp: , Rfl:   •  fluticasone (FLONASE) 50 MCG/ACT nasal spray, , Disp: , Rfl:   •  levocetirizine (XYZAL) 5 MG tablet, Take 5 mg by mouth Every Evening., Disp: , Rfl:   •  lisinopril (PRINIVIL,ZESTRIL) 10 MG tablet, Take 2 tablets by mouth Daily., Disp: 90 tablet, Rfl: 5  •  methadone (DOLOPHINE) 5 MG tablet,  "1 tablet po up to qid., Disp: 120 tablet, Rfl: 0  •  omeprazole (priLOSEC) 40 MG capsule, Take 1 capsule by mouth Daily., Disp: , Rfl:   •  propranolol (INDERAL) 10 MG tablet, Take 1 tablet by mouth 2 (Two) Times a Day., Disp: 60 tablet, Rfl: 5  •  sertraline (ZOLOFT) 50 MG tablet, Take 1 tablet by mouth Daily., Disp: 90 tablet, Rfl: 0    Allergies:   Allergies   Allergen Reactions   • Meperidine Unknown - High Severity     claustraphobic       Review of Systems:   Review of Systems   Constitutional: Negative for appetite change, fatigue, fever and unexpected weight loss.   HENT: Positive for mouth sores. Negative for trouble swallowing.    Gastrointestinal: Positive for GERD. Negative for abdominal distention, abdominal pain, anal bleeding, blood in stool, constipation, diarrhea, nausea, rectal pain, vomiting and indigestion.       The following portions of the patient's history were reviewed and updated as appropriate: allergies, current medications, past family history, past medical history, past social history, past surgical history and problem list.    Objective     Physical Exam:  Vital Signs:   Vitals:    01/16/23 1339   BP: 175/91   Pulse: 58   Resp: 12   Temp: 98.2 °F (36.8 °C)   TempSrc: Infrared   Weight: 100 kg (221 lb)   Height: 182.9 cm (72\")       Physical Exam  Constitutional:       Appearance: He is obese.   HENT:      Head: Normocephalic and atraumatic.   Eyes:      Conjunctiva/sclera: Conjunctivae normal.   Abdominal:      General: Abdomen is flat. There is no distension.      Palpations: There is no mass.      Tenderness: There is no abdominal tenderness. There is no guarding or rebound.      Hernia: No hernia is present.   Musculoskeletal:      Cervical back: Normal range of motion and neck supple.   Neurological:      Mental Status: He is alert.         Results Review:   I reviewed the patient's new clinical results.    Admission on 11/09/2022, Discharged on 11/09/2022   Component Date Value " "Ref Range Status   • Reference Lab Report 2022    Final                    Value:[facility]  [address] [place] [address]  Phone: [phone] or [phone]  Fax: [phone]  Darell Moore M.D., Medical Director    PATIENT NAME                                     LABORATORY NO.  [id number]   GABE METZ.                          [id number]  [id number]                                 AGE                    SEX   SSN              CLIENT REF #  [facility] MARTINS                    62        1960           [id number]      [id number]    [address]                        REQUESTING MBRANDON           ATTENDING MTITUS.         COPY TO.  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 See diagnosis for details.    Professional interpretation rendered by Eleno Uriostegui M.D., F.C.A.P. at [facility], [employer], [address] [place] [address].    GROSS DESCRIPTION:  Specimen is received in 1 formalin filled container labeled \"gastric antrum and  body\" and consists of a single portion of tan soft tissue measuring 0.3 x 0.2 x 0.2  cm.  The specimen is submitted entirely in 1 cassette.  BW    REVIEWED, DIAGNOSED AND ELECTRONICALLY  SIGNED BY:    Eleno Uriostegui M.D., F.C.A.P.  CPT CODES:  91080     Lab on 2022 "   Component Date Value Ref Range Status   • ALPHA-FETOPROTEIN 11/07/2022 5.82  0 - 8.3 ng/mL Final   • WBC 11/07/2022 4.36  3.40 - 10.80 10*3/mm3 Final   • RBC 11/07/2022 3.47 (L)  4.14 - 5.80 10*6/mm3 Final   • Hemoglobin 11/07/2022 12.2 (L)  13.0 - 17.7 g/dL Final   • Hematocrit 11/07/2022 35.4 (L)  37.5 - 51.0 % Final   • MCV 11/07/2022 102.0 (H)  79.0 - 97.0 fL Final   • MCH 11/07/2022 35.2 (H)  26.6 - 33.0 pg Final   • MCHC 11/07/2022 34.5  31.5 - 35.7 g/dL Final   • RDW 11/07/2022 13.1  12.3 - 15.4 % Final   • RDW-SD 11/07/2022 49.0  37.0 - 54.0 fl Final   • MPV 11/07/2022 13.6 (H)  6.0 - 12.0 fL Final   • Platelets 11/07/2022 56 (L)  140 - 450 10*3/mm3 Final   • Neutrophil % 11/07/2022 62.0  42.7 - 76.0 % Final   • Lymphocyte % 11/07/2022 24.5  19.6 - 45.3 % Final   • Monocyte % 11/07/2022 8.7  5.0 - 12.0 % Final   • Eosinophil % 11/07/2022 3.4  0.3 - 6.2 % Final   • Basophil % 11/07/2022 0.7  0.0 - 1.5 % Final   • Neutrophils, Absolute 11/07/2022 2.70  1.70 - 7.00 10*3/mm3 Final   • Lymphocytes, Absolute 11/07/2022 1.07  0.70 - 3.10 10*3/mm3 Final   • Monocytes, Absolute 11/07/2022 0.38  0.10 - 0.90 10*3/mm3 Final   • Eosinophils, Absolute 11/07/2022 0.15  0.00 - 0.40 10*3/mm3 Final   • Basophils, Absolute 11/07/2022 0.03  0.00 - 0.20 10*3/mm3 Final   • Glucose 11/07/2022 96  65 - 99 mg/dL Final   • BUN 11/07/2022 16  8 - 23 mg/dL Final   • Creatinine 11/07/2022 0.79  0.76 - 1.27 mg/dL Final   • Sodium 11/07/2022 142  136 - 145 mmol/L Final   • Potassium 11/07/2022 4.3  3.5 - 5.2 mmol/L Final   • Chloride 11/07/2022 106  98 - 107 mmol/L Final   • CO2 11/07/2022 25.9  22.0 - 29.0 mmol/L Final   • Calcium 11/07/2022 9.0  8.6 - 10.5 mg/dL Final   • Total Protein 11/07/2022 7.2  6.0 - 8.5 g/dL Final   • Albumin 11/07/2022 3.80  3.50 - 5.20 g/dL Final   • ALT (SGPT) 11/07/2022 44 (H)  1 - 41 U/L Final   • AST (SGOT) 11/07/2022 37  1 - 40 U/L Final   • Alkaline Phosphatase 11/07/2022 114  39 - 117 U/L Final   •  Total Bilirubin 11/07/2022 1.7 (H)  0.0 - 1.2 mg/dL Final   • Globulin 11/07/2022 3.4  gm/dL Final   • A/G Ratio 11/07/2022 1.1  g/dL Final   • BUN/Creatinine Ratio 11/07/2022 20.3  7.0 - 25.0 Final   • Anion Gap 11/07/2022 10.1  5.0 - 15.0 mmol/L Final   • eGFR 11/07/2022 100.4  >60.0 mL/min/1.73 Final    National Kidney Foundation and American Society of Nephrology (ASN) Task Force recommended calculation based on the Chronic Kidney Disease Epidemiology Collaboration (CKD-EPI) equation refit without adjustment for race.   • Protime 11/07/2022 15.6 (H)  12.5 - 14.5 Seconds Final   • INR 11/07/2022 1.20 (H)  0.90 - 1.10 Final   • Hep A Total Ab 11/07/2022 Negative  Negative Final   • ALPHA -1 ANTITRYPSIN 11/07/2022 137  90 - 200 mg/dL Final   • Smooth Muscle Ab 11/07/2022 11  0 - 19 Units Final                     Negative                     0 - 19                   Weak positive               20 - 30                   Moderate to strong positive     >30   Actin Antibodies are found in 52-85% of patients with   autoimmune hepatitis or chronic active hepatitis and   in 22% of patients with primary biliary cirrhosis.   • Ceruloplasmin 11/07/2022 17  16 - 31 mg/dL Final   • Mitochondrial Ab 11/07/2022 <20.0  0.0 - 20.0 Units Final                                    Negative    0.0 - 20.0                                  Equivocal  20.1 - 24.9                                  Positive         >24.9  Mitochondrial (M2) Antibodies are found in 90-96% of  patients with primary biliary cirrhosis.   • Iron 11/07/2022 86  59 - 158 mcg/dL Final   • Iron Saturation 11/07/2022 25  20 - 50 % Final   • Transferrin 11/07/2022 232  200 - 360 mg/dL Final   • TIBC 11/07/2022 346  298 - 536 mcg/dL Final   • Ferritin 11/07/2022 411.00 (H)  30.00 - 400.00 ng/mL Final   Office Visit on 10/19/2022   Component Date Value Ref Range Status   • WBC 10/19/2022 5.90  3.40 - 10.80 10*3/mm3 Final   • RBC 10/19/2022 3.76 (L)  4.14 - 5.80 10*6/mm3  Final   • Hemoglobin 10/19/2022 13.3  13.0 - 17.7 g/dL Final   • Hematocrit 10/19/2022 37.3 (L)  37.5 - 51.0 % Final   • MCV 10/19/2022 99.2 (H)  79.0 - 97.0 fL Final   • MCH 10/19/2022 35.4 (H)  26.6 - 33.0 pg Final   • MCHC 10/19/2022 35.7  31.5 - 35.7 g/dL Final   • RDW 10/19/2022 12.6  12.3 - 15.4 % Final   • Platelets 10/19/2022 94 (L)  140 - 450 10*3/mm3 Final   • Neutrophil Rel % 10/19/2022 68.8  42.7 - 76.0 % Final   • Lymphocyte Rel % 10/19/2022 21.0  19.6 - 45.3 % Final   • Monocyte Rel % 10/19/2022 7.5  5.0 - 12.0 % Final   • Eosinophil Rel % 10/19/2022 1.9  0.3 - 6.2 % Final   • Basophil Rel % 10/19/2022 0.3  0.0 - 1.5 % Final   • Neutrophils Absolute 10/19/2022 4.06  1.70 - 7.00 10*3/mm3 Final   • Lymphocytes Absolute 10/19/2022 1.24  0.70 - 3.10 10*3/mm3 Final   • Monocytes Absolute 10/19/2022 0.44  0.10 - 0.90 10*3/mm3 Final   • Eosinophils Absolute 10/19/2022 0.11  0.00 - 0.40 10*3/mm3 Final   • Basophils Absolute 10/19/2022 0.02  0.00 - 0.20 10*3/mm3 Final   • Immature Granulocyte Rel % 10/19/2022 0.5  0.0 - 0.5 % Final   • Immature Grans Absolute 10/19/2022 0.03  0.00 - 0.05 10*3/mm3 Final   • nRBC 10/19/2022 0.0  0.0 - 0.2 /100 WBC Final   • Glucose 10/19/2022 110 (H)  65 - 99 mg/dL Final   • BUN 10/19/2022 21  8 - 23 mg/dL Final   • Creatinine 10/19/2022 1.31 (H)  0.76 - 1.27 mg/dL Final   • EGFR Result 10/19/2022 61.5  >60.0 mL/min/1.73 Final    Comment: National Kidney Foundation and American Society of  Nephrology (ASN) Task Force recommended calculation based  on the Chronic Kidney Disease Epidemiology Collaboration  (CKD-EPI) equation refit without adjustment for race.  GFR Normal >60  Chronic Kidney Disease <60  Kidney Failure <15     • BUN/Creatinine Ratio 10/19/2022 16.0  7.0 - 25.0 Final   • Sodium 10/19/2022 140  136 - 145 mmol/L Final   • Potassium 10/19/2022 4.5  3.5 - 5.2 mmol/L Final   • Chloride 10/19/2022 102  98 - 107 mmol/L Final   • Total CO2 10/19/2022 29.0  22.0 - 29.0 mmol/L  Final   • Calcium 10/19/2022 9.5  8.6 - 10.5 mg/dL Final   • Total Protein 10/19/2022 7.4  6.0 - 8.5 g/dL Final   • Albumin 10/19/2022 4.40  3.50 - 5.20 g/dL Final   • Globulin 10/19/2022 3.0  gm/dL Final   • A/G Ratio 10/19/2022 1.5  g/dL Final   • Total Bilirubin 10/19/2022 1.8 (H)  0.0 - 1.2 mg/dL Final   • Alkaline Phosphatase 10/19/2022 136 (H)  39 - 117 U/L Final   • AST (SGOT) 10/19/2022 31  1 - 40 U/L Final   • ALT (SGPT) 10/19/2022 30  1 - 41 U/L Final   • Bilirubin, Direct 10/19/2022 0.4 (H)  0.0 - 0.3 mg/dL Final   • TSH 10/19/2022 2.300  0.270 - 4.200 uIU/mL Final   • Hemoglobin A1C 10/19/2022 5.70 (H)  4.80 - 5.60 % Final    Comment: Hemoglobin A1C Ranges:  Increased Risk for Diabetes  5.7% to 6.4%  Diabetes                     >= 6.5%  Diabetic Goal                < 7.0%     • HIV Screen 4th Gen w/RFX (Referenc* 10/19/2022 Non Reactive  Non Reactive Final    Comment: HIV Negative  HIV-1/HIV-2 antibodies and HIV-1 p24 antigen were NOT detected.  There is no laboratory evidence of HIV infection.     • Hep C Virus Ab 10/19/2022 <0.1  0.0 - 0.9 s/co ratio Final    Comment:                                   Negative:     < 0.8                               Indeterminate: 0.8 - 0.9                                    Positive:     > 0.9   HCV antibody alone does not differentiate between   previous resolved infection and active infection.   The CDC and current clinical guidelines recommend   that a positive HCV antibody result be followed up   with an HCV RNA test to support the diagnosis of   acute HCV infection. [facility] offers Hepatitis C   Virus (HCV) RNA, Diagnosis, CHANI (807690) and   Hepatitis C Virus (HCV) Antibody with reflex to   Quantitative Real-time PCR (963220).     • Hepatitis B Surface Ag 10/19/2022 Negative  Negative Final   • Hep B S Ab 10/19/2022 Reactive   Final    Comment:               Non Reactive: Inconsistent with immunity,                              less than 10 mIU/mL                 "Reactive:     Consistent with immunity,                              greater than 9.9 mIU/mL     • Hep B Core Total Ab 10/19/2022 Negative  Negative Final   • RFX TO HBC IGM 10/19/2022 Comment   Final    Reflex criteria was not met.   • Interpretation 10/19/2022 Comment   Final    Comment:                    HBV Serology Interpretation Chart   -------------------------------------------------------------------   Interpretation             HBsAg  anti-HBs  anti-HBc  anti-HBc IgM   -------------------------------------------------------------------   Key - Analyte present: + Analyte absent: - Test not indicated: TNI   -------------------------------------------------------------------   Susceptible (never   infected and no evidence    -        -         -          TNI   of vaccination)   -------------------------------------------------------------------   Immune due to natural   resolved infection          -        +         +          TNI   -------------------------------------------------------------------   Immune due to vaccination   -        +         -          TNI   -------------------------------------------------------------------   Acute Infection             +        -         +           +   -------------------------------------------------------------------                              Chronic infection           +        -         +           -   -------------------------------------------------------------------   Interpretation unclear*     -        -         +          +/-   -------------------------------------------------------------------   *Multiple possibilities: resolved infection (most common); false-    positive anti-HBc (susceptible); \"low-level\" chronic infection\";    resolving acute infection.     • RPR 10/19/2022 Non Reactive  Non Reactive Final   • HSV 1 IgG, Type Specific 10/19/2022 7.58 (H)  0.00 - 0.90 index Final    Comment:                                  Negative        <0.91      "                              Equivocal 0.91 - 1.09                                   Positive        >1.09   Note: Negative indicates no antibodies detected to   HSV-1. Equivocal may suggest early infection.  If   clinically appropriate, retest at later date. Positive   indicates antibodies detected to HSV-1.     • HSV 2 IgG 10/19/2022 <0.91  0.00 - 0.90 index Final    Comment:                                  Negative        <0.91                                   Equivocal 0.91 - 1.09                                   Positive        >1.09   Note: Negative indicates no HSV-2 antibodies detected.   Positive indicates HSV-2 antibodies detected.   Equivocal and low positive HSV-2 screens   (Index 0.91-5.00) may be false positive and are   reflexed to supplemental testing in accordance with   CDC guidelines.     • CESAR Direct 10/19/2022 Negative  Negative Final   • C-Reactive Protein 10/19/2022 <0.30  0.00 - 0.50 mg/dL Final   • Sed Rate 10/19/2022 14  0 - 20 mm/hr Final   • Total Cholesterol 10/19/2022 129  0 - 200 mg/dL Final    Comment: Cholesterol Reference Ranges  (U.S. Department of Health and Human Services ATP III  Classifications)  Desirable          <200 mg/dL  Borderline High    200-239 mg/dL  High Risk          >240 mg/dL  Triglyceride Reference Ranges  (U.S. Department of Health and Human Services ATP III  Classifications)  Normal           <150 mg/dL  Borderline High  150-199 mg/dL  High             200-499 mg/dL  Very High        >500 mg/dL  HDL Reference Ranges  (U.S. Department of Health and Human Services ATP III  Classifications)  Low     <40 mg/dl (major risk factor for CHD)  High    >60 mg/dl ('negative' risk factor for CHD)  LDL Reference Ranges  (U.S. Department of Health and Human Services ATP III  Classifications)  Optimal          <100 mg/dL  Near Optimal     100-129 mg/dL  Borderline High  130-159 mg/dL  High             160-189 mg/dL  Very High        >189 mg/dL     • Triglycerides  10/19/2022 117  0 - 150 mg/dL Final   • HDL Cholesterol 10/19/2022 36 (L)  40 - 60 mg/dL Final   • VLDL Cholesterol Daniele 10/19/2022 21  5 - 40 mg/dL Final   • LDL Chol Calc ([facility]) 10/19/2022 72  0 - 100 mg/dL Final      No radiology results for the last 90 days.    EGD; 11/9/2022  - Normal oropharynx.  - Z-line regular, 40 cm from the incisors.  - Grade II esophageal varices.  - Portal hypertensive gastropathy.  - Erythematous mucosa in the posterior wall of the stomach, antrum and prepyloric region of the stomach.  Biopsied.  - Normal duodenal bulb, first portion of the duodenum, second portion of the duodenum and third portion of the. Duodenum    Path; HP -ve    Assessment / Plan    Addendum  1/16/2023 8 PM  Ultrasound abdomen done today on 01/16/2022 revealed a new moderate right hydronephrosis, recommended CT abdomen pelvis with IV and oral contrast to rule out ureteral obstruction  With change his CAT scan to CT abdomen pelvis with p.o. and oral contrast as urgent.  Stat CBC CMP  Urology referral    1. 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 Acquired thrombocytopenia (HCC)  3. Splenomegaly  4.  Secondary esophageal varices -grade 2 (11/2022)  5.  Peripancreatic lymphadenopathy  1/16/2023  He is immune to hepatitis B but not immune to hepatitis A.  CESAR, anti-smooth muscle antibody, antimitochondrial antibody negative.  A1 AT and ceruloplasmin level normal.  Iron studies negative for hemochromatosis.    His CT abdomen pelvis done in April 2022 also revealed peripancreatic adenopathy along with cirrhosis findings..  This could be related to cirrhosis however pancreatic pathology need to be ruled out.   We will schedule him for a CT pancreatic protocol for further evaluation.    EGD done on 11/9/2022 revealed grade 2 esophageal varices with portal hypertensive gastropathy.  Repeat EGD in November 2024 for further esophageal variceal surveillance    Patient likely has a Lee induced early  cirrhosis.  He needs hepatitis A vaccine with booster at PCPs office.  Patient gained about 4 pounds discussed on losing weight and importance of voiding any further weight gain.  Continue low-salt diet. Continue Inderal 10 mg p.o. twice daily for portal hypertensive gastropathy prophylaxis.  Follow-up 3 months with the labs.    11/7/2022  Patient had a nonalcoholic fatty liver disease for many years.  He had fluctuating elevated liver enzymes on record at least for 8 years.  He is nonalcoholic.  Recent CT abdomen pelvis done in April 2022 revealed signs of cirrhosis without any ascites or liver lesions.  Hep C antibody was negative.  His prior CESAR and hep B serology was negative.      6. Gastroesophageal reflux disease without esophagit  No reflux symptoms now.  Keep PPI as needed     7. Encounter for screening for malignant neoplasm of colon  His last colonoscopy over 10 years ago details unknown.  We will schedule him for colonoscopy now    The indications, technique, alternatives and potential risk and complications were discussed with the patient including but not limited to bleeding, bowel perforations, missing lesions and anesthetic complications. The patient understands and wishes to proceed with the procedure and has given their verbal consent. Written patient education information was given to the patient.   The patient will call if they have further questions before procedure.         Follow Up:   No follow-ups on file.    Mónica Newell MD  [facility] [place]  1/16/2023  13:41 EST     Please note that portions of this note may have been completed with a voice recognition program.

## 2023-01-17 ENCOUNTER — TELEPHONE (OUTPATIENT)
Dept: GASTROENTEROLOGY | Facility: CLINIC | Age: 63
End: 2023-01-17
Payer: MEDICARE

## 2023-01-17 NOTE — TELEPHONE ENCOUNTER
----- Message from Mónica Newell MD sent at 1/16/2023  7:27 PM EST -----  Let him know that his ultrasound done reported as having a large right kidney.   There is some suspicion whether he has any ureteral blockage.     I have ordered a stat CT with IV and p.o. contrast  I have also ordered CBC CMP that he has to get it done tomorrow  I have also ordered urology referral urgent

## 2023-01-19 ENCOUNTER — HOSPITAL ENCOUNTER (OUTPATIENT)
Dept: CT IMAGING | Facility: HOSPITAL | Age: 63
Discharge: HOME | End: 2023-01-19
Payer: MEDICARE

## 2023-01-19 ENCOUNTER — LAB (OUTPATIENT)
Dept: LAB | Facility: HOSPITAL | Age: 63
End: 2023-01-19
Payer: MEDICARE

## 2023-01-19 DIAGNOSIS — K74.60 LIVER CIRRHOSIS SECONDARY TO NASH: ICD-10-CM

## 2023-01-19 DIAGNOSIS — N13.30 HYDRONEPHROSIS OF RIGHT KIDNEY: ICD-10-CM

## 2023-01-19 DIAGNOSIS — K75.81 LIVER CIRRHOSIS SECONDARY TO NASH: ICD-10-CM

## 2023-01-19 DIAGNOSIS — R59.0 LYMPHADENOPATHY, ABDOMINAL: ICD-10-CM

## 2023-01-19 LAB
ALBUMIN SERPL-MCNC: 4 G/DL (ref 3.5–5.2)
ALBUMIN/GLOB SERPL: 1.1 G/DL
ALP SERPL-CCNC: 98 U/L (ref 39–117)
ALT SERPL W P-5'-P-CCNC: 30 U/L (ref 1–41)
ANION GAP SERPL CALCULATED.3IONS-SCNC: 8.2 MMOL/L (ref 5–15)
AST SERPL-CCNC: 31 U/L (ref 1–40)
BILIRUB SERPL-MCNC: 3.8 MG/DL (ref 0–1.2)
BUN SERPL-MCNC: 15 MG/DL (ref 8–23)
BUN/CREAT SERPL: 15.5 (ref 7–25)
CALCIUM SPEC-SCNC: 9.5 MG/DL (ref 8.6–10.5)
CHLORIDE SERPL-SCNC: 105 MMOL/L (ref 98–107)
CO2 SERPL-SCNC: 25.8 MMOL/L (ref 22–29)
CREAT SERPL-MCNC: 0.97 MG/DL (ref 0.76–1.27)
EGFRCR SERPLBLD CKD-EPI 2021: 88.3 ML/MIN/1.73
GLOBULIN UR ELPH-MCNC: 3.5 GM/DL
GLUCOSE SERPL-MCNC: 95 MG/DL (ref 65–99)
INR PPP: 1.29 (ref 0.9–1.1)
POTASSIUM SERPL-SCNC: 4.3 MMOL/L (ref 3.5–5.2)
PROT SERPL-MCNC: 7.5 G/DL (ref 6–8.5)
PROTHROMBIN TIME: 16.5 SECONDS (ref 12.5–14.5)
SODIUM SERPL-SCNC: 139 MMOL/L (ref 136–145)

## 2023-01-19 PROCEDURE — 25010000002 IOPAMIDOL 61 % SOLUTION: Performed by: INTERNAL MEDICINE

## 2023-01-19 PROCEDURE — 85025 COMPLETE CBC W/AUTO DIFF WBC: CPT

## 2023-01-19 PROCEDURE — 82105 ALPHA-FETOPROTEIN SERUM: CPT

## 2023-01-19 PROCEDURE — 36415 COLL VENOUS BLD VENIPUNCTURE: CPT

## 2023-01-19 PROCEDURE — 80053 COMPREHEN METABOLIC PANEL: CPT

## 2023-01-19 PROCEDURE — 85610 PROTHROMBIN TIME: CPT

## 2023-01-19 PROCEDURE — 74177 CT ABD & PELVIS W/CONTRAST: CPT

## 2023-01-19 RX ADMIN — IOPAMIDOL 100 ML: 612 INJECTION, SOLUTION INTRAVENOUS at 15:07

## 2023-01-20 LAB
AFP-TM SERPL-MCNC: 6.2 NG/ML (ref 0–8.4)
BASOPHILS # BLD AUTO: 0.05 10*3/MM3 (ref 0–0.2)
BASOPHILS NFR BLD AUTO: 0.8 % (ref 0–1.5)
DEPRECATED RDW RBC AUTO: 46.2 FL (ref 37–54)
EOSINOPHIL # BLD AUTO: 0.19 10*3/MM3 (ref 0–0.4)
EOSINOPHIL NFR BLD AUTO: 2.9 % (ref 0.3–6.2)
ERYTHROCYTE [DISTWIDTH] IN BLOOD BY AUTOMATED COUNT: 12.9 % (ref 12.3–15.4)
HCT VFR BLD AUTO: 39.4 % (ref 37.5–51)
HGB BLD-MCNC: 14 G/DL (ref 13–17.7)
LYMPHOCYTES # BLD AUTO: 1.8 10*3/MM3 (ref 0.7–3.1)
LYMPHOCYTES NFR BLD AUTO: 27.1 % (ref 19.6–45.3)
MCH RBC QN AUTO: 34.9 PG (ref 26.6–33)
MCHC RBC AUTO-ENTMCNC: 35.5 G/DL (ref 31.5–35.7)
MCV RBC AUTO: 98.3 FL (ref 79–97)
MONOCYTES # BLD AUTO: 0.69 10*3/MM3 (ref 0.1–0.9)
MONOCYTES NFR BLD AUTO: 10.4 % (ref 5–12)
NEUTROPHILS NFR BLD AUTO: 3.9 10*3/MM3 (ref 1.7–7)
NEUTROPHILS NFR BLD AUTO: 58.5 % (ref 42.7–76)
PLATELET # BLD AUTO: 88 10*3/MM3 (ref 140–450)
PMV BLD AUTO: 13.2 FL (ref 6–12)
RBC # BLD AUTO: 4.01 10*6/MM3 (ref 4.14–5.8)
WBC NRBC COR # BLD: 6.65 10*3/MM3 (ref 3.4–10.8)

## 2023-01-23 ENCOUNTER — OFFICE VISIT (OUTPATIENT)
Dept: UROLOGY | Facility: CLINIC | Age: 63
End: 2023-01-23
Payer: MEDICARE

## 2023-01-23 ENCOUNTER — OFFICE VISIT (OUTPATIENT)
Dept: FAMILY MEDICINE CLINIC | Facility: CLINIC | Age: 63
End: 2023-01-23
Payer: MEDICARE

## 2023-01-23 VITALS
TEMPERATURE: 98.3 F | WEIGHT: 223 LBS | HEART RATE: 73 BPM | BODY MASS INDEX: 30.2 KG/M2 | DIASTOLIC BLOOD PRESSURE: 70 MMHG | SYSTOLIC BLOOD PRESSURE: 108 MMHG | HEIGHT: 72 IN | OXYGEN SATURATION: 98 %

## 2023-01-23 VITALS
HEIGHT: 72 IN | OXYGEN SATURATION: 96 % | SYSTOLIC BLOOD PRESSURE: 118 MMHG | HEART RATE: 66 BPM | DIASTOLIC BLOOD PRESSURE: 84 MMHG | BODY MASS INDEX: 29.93 KG/M2 | TEMPERATURE: 98.1 F | RESPIRATION RATE: 17 BRPM | WEIGHT: 221 LBS

## 2023-01-23 DIAGNOSIS — K12.2 UVULITIS: Primary | ICD-10-CM

## 2023-01-23 DIAGNOSIS — K74.60 LIVER CIRRHOSIS SECONDARY TO NASH: ICD-10-CM

## 2023-01-23 DIAGNOSIS — M54.32 BILATERAL SCIATICA: ICD-10-CM

## 2023-01-23 DIAGNOSIS — M48.061 SPINAL STENOSIS OF LUMBAR REGION, UNSPECIFIED WHETHER NEUROGENIC CLAUDICATION PRESENT: ICD-10-CM

## 2023-01-23 DIAGNOSIS — M54.31 BILATERAL SCIATICA: ICD-10-CM

## 2023-01-23 DIAGNOSIS — N20.0 KIDNEY STONE: Primary | ICD-10-CM

## 2023-01-23 DIAGNOSIS — K75.81 LIVER CIRRHOSIS SECONDARY TO NASH: ICD-10-CM

## 2023-01-23 DIAGNOSIS — M51.36 DEGENERATION OF INTERVERTEBRAL DISC OF LUMBAR REGION: ICD-10-CM

## 2023-01-23 DIAGNOSIS — Z23 NEED FOR HEPATITIS A VACCINATION: ICD-10-CM

## 2023-01-23 LAB
BILIRUB BLD-MCNC: NEGATIVE MG/DL
CLARITY, POC: CLEAR
COLOR UR: ABNORMAL
EXPIRATION DATE: ABNORMAL
GLUCOSE UR STRIP-MCNC: ABNORMAL MG/DL
KETONES UR QL: NEGATIVE
LEUKOCYTE EST, POC: ABNORMAL
Lab: ABNORMAL
NITRITE UR-MCNC: NEGATIVE MG/ML
PH UR: 5.5 [PH] (ref 5–8)
PROT UR STRIP-MCNC: ABNORMAL MG/DL
RBC # UR STRIP: ABNORMAL /UL
SP GR UR: 1.02 (ref 1–1.03)
UROBILINOGEN UR QL: NORMAL

## 2023-01-23 PROCEDURE — 81003 URINALYSIS AUTO W/O SCOPE: CPT | Performed by: NURSE PRACTITIONER

## 2023-01-23 PROCEDURE — 99214 OFFICE O/P EST MOD 30 MIN: CPT | Performed by: NURSE PRACTITIONER

## 2023-01-23 PROCEDURE — 99214 OFFICE O/P EST MOD 30 MIN: CPT | Performed by: FAMILY MEDICINE

## 2023-01-23 PROCEDURE — 96372 THER/PROPH/DIAG INJ SC/IM: CPT | Performed by: FAMILY MEDICINE

## 2023-01-23 RX ORDER — GABAPENTIN 100 MG/1
CAPSULE ORAL
Qty: 90 CAPSULE | Refills: 0 | Status: SHIPPED | OUTPATIENT
Start: 2023-01-23 | End: 2023-02-13

## 2023-01-23 RX ORDER — ACETAMINOPHEN 325 MG/1
975 TABLET ORAL ONCE
Status: CANCELLED | OUTPATIENT
Start: 2023-01-23 | End: 2023-01-23

## 2023-01-23 RX ORDER — SODIUM CHLORIDE 9 MG/ML
100 INJECTION, SOLUTION INTRAVENOUS CONTINUOUS
Status: CANCELLED | OUTPATIENT
Start: 2023-01-23

## 2023-01-23 RX ORDER — CEPHALEXIN 500 MG/1
500 CAPSULE ORAL 3 TIMES DAILY
Qty: 21 CAPSULE | Refills: 0 | Status: SHIPPED | OUTPATIENT
Start: 2023-01-23 | End: 2023-01-30

## 2023-01-23 RX ORDER — TAMSULOSIN HYDROCHLORIDE 0.4 MG/1
1 CAPSULE ORAL NIGHTLY
Qty: 30 CAPSULE | Refills: 0 | Status: SHIPPED | OUTPATIENT
Start: 2023-01-23 | End: 2023-02-13

## 2023-01-23 RX ORDER — TAMSULOSIN HYDROCHLORIDE 0.4 MG/1
1 CAPSULE ORAL NIGHTLY
Qty: 30 CAPSULE | Refills: 0 | Status: SHIPPED | OUTPATIENT
Start: 2023-01-23 | End: 2023-01-23

## 2023-01-23 RX ORDER — CEFTRIAXONE 1 G/1
1 INJECTION, POWDER, FOR SOLUTION INTRAMUSCULAR; INTRAVENOUS ONCE
Status: COMPLETED | OUTPATIENT
Start: 2023-01-23 | End: 2023-01-23

## 2023-01-23 RX ADMIN — CEFTRIAXONE 1 G: 1 INJECTION, POWDER, FOR SOLUTION INTRAMUSCULAR; INTRAVENOUS at 14:38

## 2023-01-23 NOTE — PROGRESS NOTES
Office Visit [facility]     Patient Name: Sterling Whaley  : 1960   MRN: [id number]     Chief Complaint: Kidney Stones.    Chief Complaint   Patient presents with   • Nephrolithiasis     Hydro       Referring Provider: Mónica Newell, *    History of Present Illness: Sterling Whaley is a 62 y.o. male who presents today for further management of nephrolithiasis.  male presented for CT with and without contrast and was diagnosed with a right ureteral stone. male is doing well. No fever, chills, nausea, vomiting, hematuria, flank pain, dysuria.     Stone prevention medications: None    Stone related history  Family history of stones:   yes  Renal disease or anatomic abnormality: no  Malabsorptive disease or gastric bypass: no  Frequent UTI's    no  Parathyroid disease    no    Dietary Considerations  Soda - 0 per day  Fast food - 1 per week  Water - 5 glasses per day  No Adds salt to foods    Subjective      Review of System:   Constitutional: No fevers or chills  Genitourinary: Negative for new lower urinary tract symptoms, current gross hematuria or dysuria.  Musculoskeletal: No flank pain      Past Medical History:   Past Medical History:   Diagnosis Date   • Anxiety    • Arthritis    • Bilateral carpal tunnel syndrome    • Depression    • Esophageal reflux    • Gastritis    • H/O pulmonary function tests 2012    normal   • Hiatal hernia    • History of depression    • History of meniscal tear    • History of renal calculi    • Hyperbilirubinemia     [place] syndrome   • Hypertension    • Kidney stone    • Obstructive sleep apnea    • Plantar fascia rupture    • Renal calculi    • RLS (restless legs syndrome)    • Schatzki's ring    • Sinus problem    • Sleep apnea    • Snores    • TMJ pain dysfunction syndrome        Past Surgical History:   Past Surgical History:   Procedure Laterality Date   • ENDOSCOPY     • ENDOSCOPY W/ BANDING N/A 2022    Procedure:  "ESOPHAGOGASTRODUODENOSCOPY WITH BIOPSY;  Surgeon: Mónica Newell MD;  Location: [facility] ENDOSCOPY;  Service: Gastroenterology;  Laterality: N/A;   • ESOPHAGOSCOPY / EGD  2003    [place]   • FOOT SURGERY Left    • KNEE ARTHROSCOPY Right     medial meniscus repair   • MEDIAL COLLATERAL LIGAMENT REPAIR, KNEE Left        Family History:   Family History   Problem Relation Age of Onset   • Arthritis Mother    • Colon cancer Neg Hx        Social History:   Social History     Socioeconomic History   • Marital status:    Tobacco Use   • Smoking status: Never   • Smokeless tobacco: Never   Vaping Use   • Vaping Use: Never used   Substance and Sexual Activity   • Alcohol use: Never   • Drug use: Never   • Sexual activity: Defer       Medications:     Current Outpatient Medications:   •  chlorhexidine (PERIDEX) 0.12 % solution, , Disp: , Rfl:   •  fluticasone (FLONASE) 50 MCG/ACT nasal spray, , Disp: , Rfl:   •  levocetirizine (XYZAL) 5 MG tablet, Take 5 mg by mouth Every Evening., Disp: , Rfl:   •  lisinopril (PRINIVIL,ZESTRIL) 10 MG tablet, Take 2 tablets by mouth Daily., Disp: 90 tablet, Rfl: 5  •  methadone (DOLOPHINE) 5 MG tablet, 1 tablet po up to qid., Disp: 120 tablet, Rfl: 0  •  omeprazole (priLOSEC) 40 MG capsule, Take 1 capsule by mouth Daily., Disp: , Rfl:   •  propranolol (INDERAL) 10 MG tablet, Take 1 tablet by mouth 2 (Two) Times a Day., Disp: 60 tablet, Rfl: 5  •  sertraline (ZOLOFT) 50 MG tablet, Take 1 tablet by mouth Daily., Disp: 90 tablet, Rfl: 0    Allergies:   Allergies   Allergen Reactions   • Meperidine Unknown - High Severity     claustraphobic       Objective     Physical Exam:   Vital Signs:   Vitals:    01/23/23 1149   BP: 118/84   BP Location: Left arm   Patient Position: Sitting   Cuff Size: Adult   Pulse: 66   Resp: 17   Temp: 98.1 °F (36.7 °C)   TempSrc: Temporal   SpO2: 96%   Weight: 100 kg (221 lb)   Height: 182.9 cm (72.01\")     Body mass index is 29.97 kg/m².     Physical " Exam  Constitutional: NAD, WDWN.   Neurological: A + O x 3    Psychiatric:  Normal mood and affect      Labs  Lab Results   Component Value Date    GLUCOSE 95 01/19/2023    BUN 15 01/19/2023    CREATININE 0.97 01/19/2023    EGFRIFNONA 89 06/08/2021    EGFRIFAFRI 108 06/08/2021    BCR 15.5 01/19/2023    K 4.3 01/19/2023    CO2 25.8 01/19/2023    CALCIUM 9.5 01/19/2023    PROTENTOTREF 7.4 10/19/2022    ALBUMIN 4.0 01/19/2023    LABIL2 1.5 10/19/2022    AST 31 01/19/2023    ALT 30 01/19/2023       Lab Results   Component Value Date    WBC 6.65 01/19/2023    HGB 14.0 01/19/2023    HCT 39.4 01/19/2023    MCV 98.3 (H) 01/19/2023    PLT 88 (L) 01/19/2023       Uric Acid   Date Value Ref Range Status   09/05/2019 6.3 3.4 - 7.0 mg/dL Final       Lab Results   Component Value Date    CALCIUM 9.5 01/19/2023       Brief Urine Lab Results  (Last result in the past 365 days)      Color   Clarity   Blood   Leuk Est   Nitrite   Protein   CREAT   Urine HCG        01/23/23 1157 Dark Yellow   Clear   3+   Trace   Negative   Trace                 No results found for: URINECX    )No components found for: STONEANALYSI      Radiologic Studies  CT Abdomen Pelvis With Contrast    Result Date: 1/19/2023  Right hydronephrosis explained by 2 adjacent stones in the right ureter at the level of the pelvic brim.  Left nephrolithiasis.  Cirrhosis and portal hypertension without ascites.  Bilateral, simple appearing renal cystic lesions.  This report was signed and finalized on 1/19/2023 3:30 PM by Mariella Moreno MD.      I have personally reviewed these labs and images.    Assessment / Plan      Assessment  Mr. Whaley is a 62 y.o. male with LEE here to discuss nephrolithiasis.  Patient has approximate 9 mm right ureteral stone with a smaller stone stacked we discussed surgical management he would like to take Flomax until he has his surgery.  He is currently asymptomatic UA 3+ erythrocytes and trace leukocyte Estrace.    We had informed discussion  about the causes of stones, in the main treatments for nephrolithiasis.  The 3 main surgical treatments for stones are percutaneous nephrolithotomy, ureteroscopy and laser lithotripsy, and shockwave lithotripsy.  Based on patient factors and the stone size and location, I have recommended ureteroscopy.  We discussed the risks, benefits, and alternatives to this therapy.  The main risks that we discussed were bleeding, urinary infection, damage to nearby structures, need for further procedures, and cardiopulmonary complications from anesthesia.  The patient voiced understanding and wished to proceed.     We discussed leaving strings so patient can remove his stent at home in approximately 1 week after surgery at this time he is in agreement to this.    Plan  1. Schedule for right URS/HLL stent       Follow Up:   No follow-ups on file.    BARBARA Cazares, NP-C  [facility]

## 2023-01-23 NOTE — PROGRESS NOTES
Subjective   Sterling Whaley is a 62 y.o. male.     History of Present Illness   Mr. Whaley presents today with several concerns.    His main concern is that of swelling, discomfort of throat/uvula. Present x several days. Mild difficulty swelling. He has previous dx of TRUDI but has not been able to tolerate CPAP.    He c/o increased bilateral buttock pain, radiates into BLEs. No increased weakness. Has L-DDD/DJD with stenosis. No change in bowel/bladder function. Currently on low dose methadone for pain. Burning pain now impairing sleep.    Following closely with GI For cirrhosis due to LEE. Advised to avoid tylenol, NSAIDs.    Referred to urology by Dr. Allison due to kidney stones on right side with hydronephrosis. He has been scheduled for right CALIN/HLL stent.    The following portions of the patient's history were reviewed and updated as appropriate: allergies, current medications, past family history, past medical history, past social history, past surgical history and problem list.    Review of Systems   Constitutional: Positive for fatigue. Negative for fever and unexpected weight change.   HENT: Positive for congestion, hearing loss (chronic), mouth sores (intermittent), postnasal drip, sinus pressure (chronic) and sore throat. Negative for drooling, facial swelling, nosebleeds, rhinorrhea and trouble swallowing.    Eyes: Negative for pain, discharge, redness and visual disturbance.   Respiratory: Negative for cough, shortness of breath and wheezing.    Cardiovascular: Negative for chest pain, palpitations and leg swelling.   Gastrointestinal: Positive for nausea. Negative for abdominal pain, blood in stool, diarrhea and vomiting.   Endocrine: Positive for heat intolerance. Negative for polydipsia and polyuria.   Genitourinary: Negative for dysuria and hematuria.   Musculoskeletal: Positive for arthralgias, back pain, gait problem, joint swelling, myalgias, neck pain and neck stiffness.   Skin:  Negative for rash and wound.   Neurological: Positive for dizziness, weakness, numbness and headaches. Negative for tremors and syncope.   Hematological: Negative for adenopathy. Does not bruise/bleed easily.   Psychiatric/Behavioral: Positive for decreased concentration and dysphoric mood (mild). Negative for confusion, hallucinations, sleep disturbance and suicidal ideas. The patient is nervous/anxious.    Pt's previous ROS reviewed and updated as indicated.       Objective    Vitals:    01/23/23 1405   BP: 108/70   Pulse: 73   Temp: 98.3 °F (36.8 °C)   SpO2: 98%     Body mass index is 30.24 kg/m².      01/23/23  1405   Weight: 101 kg (223 lb)       Physical Exam  Vitals and nursing note reviewed.   Constitutional:       General: He is not in acute distress.     Appearance: He is well-developed, well-groomed and overweight. He is not ill-appearing.   HENT:      Head: Atraumatic.      Mouth/Throat:      Mouth: Mucous membranes are moist. No oral lesions.      Pharynx: Posterior oropharyngeal erythema and uvula swelling (mild) present.   Eyes:      General: No scleral icterus.     Conjunctiva/sclera: Conjunctivae normal.      Pupils: Pupils are equal, round, and reactive to light.   Neck:      Thyroid: No thyroid mass.   Cardiovascular:      Rate and Rhythm: Normal rate and regular rhythm.      Pulses: Normal pulses.      Heart sounds: Normal heart sounds.   Pulmonary:      Effort: Pulmonary effort is normal.      Breath sounds: Normal breath sounds.   Musculoskeletal:      Cervical back: Spasms and bony tenderness present. Decreased range of motion.      Right lower leg: No edema.      Left lower leg: No edema.   Lymphadenopathy:      Cervical: No cervical adenopathy.   Skin:     General: Skin is warm and dry.      Coloration: Skin is not jaundiced or pale.      Findings: No bruising.   Neurological:      Mental Status: He is alert and oriented to person, place, and time.      Gait: Gait is intact.   Psychiatric:          Mood and Affect: Mood and affect normal.         Behavior: Behavior normal. Behavior is cooperative.         Cognition and Memory: Cognition normal.     Pt's previous physical exam reviewed and updated as indicated.    Lab Results   Component Value Date    WBC 6.65 01/19/2023    HGB 14.0 01/19/2023    HCT 39.4 01/19/2023    MCV 98.3 (H) 01/19/2023    PLT 88 (L) 01/19/2023     Lab Results   Component Value Date    GLUCOSE 95 01/19/2023    BUN 15 01/19/2023    CREATININE 0.97 01/19/2023    EGFRIFNONA 89 06/08/2021    EGFRIFAFRI 108 06/08/2021    BCR 15.5 01/19/2023    K 4.3 01/19/2023    CO2 25.8 01/19/2023    CALCIUM 9.5 01/19/2023    PROTENTOTREF 7.4 10/19/2022    ALBUMIN 4.0 01/19/2023    LABIL2 1.5 10/19/2022    AST 31 01/19/2023    ALT 30 01/19/2023       Assessment & Plan   Diagnoses and all orders for this visit:    1. Uvulitis (Primary)  -     cefTRIAXone (ROCEPHIN) injection 1 g  -     cephalexin (Keflex) 500 MG capsule; Take 1 capsule by mouth 3 (Three) Times a Day for 7 days. START TUESDAY  Dispense: 21 capsule; Refill: 0    2. Spinal stenosis of lumbar region, unspecified whether neurogenic claudication present  -     gabapentin (Neurontin) 100 MG capsule; 1 po qhs x 3 days for pain. May increase to 1 po tid as needed for pain as needed/tolerated.  Dispense: 90 capsule; Refill: 0    3. Degeneration of intervertebral disc of lumbar region  -     gabapentin (Neurontin) 100 MG capsule; 1 po qhs x 3 days for pain. May increase to 1 po tid as needed for pain as needed/tolerated.  Dispense: 90 capsule; Refill: 0    4. Bilateral sciatica  -     gabapentin (Neurontin) 100 MG capsule; 1 po qhs x 3 days for pain. May increase to 1 po tid as needed for pain as needed/tolerated.  Dispense: 90 capsule; Refill: 0    5. Need for hepatitis A vaccination  -     hepatitis A (HAVRIX) vaccine 1,440 Units    6. 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 Trial of abx tx as above. If minimal improvement refer to ENT for further eval/imaging, etc.    I have reviewed risks/benefits and potential side effects of various treatment options for neuropathic pain assoc'd with L-DDD/DJD. Pt voices understanding and wishes to proceed with trial of low dose gabapentin.  As part of patient's treatment plan I am prescribing a controlled substance.  The patient has been made aware of the appropriate use of such medications, including potential risk of somnolence, limited ability to drive and/or work safely, and potential for dependence and/or overdose.  It has also been made clear that these medications are for use by this patient only, without concomitant use of alcohol or other substances, unless prescribed.  History and physical exam exhibit continued safe and appropriate use of controlled substance.  CLAUDIO reviewed.  Patient has completed a prescribing agreement detailing terms of continued prescribing of controlled substances, including monitoring CLAUDIO reports, urine drug screening, and pill counts if necessary.  Patient is aware that inappropriate use will result in cessation of prescribing such medications.    F/u with urology as scheduled for stone removal.    F/u with GI as scheduled.    F/u per routine, f/u sooner as needed.  Patient was encouraged to keep me informed of any acute changes, lack of improvement, or any new concerning symptoms.  Pt is aware of reasons to seek emergent care.  Patient voiced understanding of all instructions and denied further questions.    Please note that portions of this note may have been completed with a voice recognition program.

## 2023-01-27 ENCOUNTER — TELEPHONE (OUTPATIENT)
Dept: FAMILY MEDICINE CLINIC | Facility: CLINIC | Age: 63
End: 2023-01-27
Payer: MEDICARE

## 2023-01-27 DIAGNOSIS — M48.061 SPINAL STENOSIS OF LUMBAR REGION, UNSPECIFIED WHETHER NEUROGENIC CLAUDICATION PRESENT: Primary | ICD-10-CM

## 2023-01-27 DIAGNOSIS — M54.41 CHRONIC BILATERAL LOW BACK PAIN WITH BILATERAL SCIATICA: ICD-10-CM

## 2023-01-27 DIAGNOSIS — G89.29 CHRONIC BILATERAL LOW BACK PAIN WITH BILATERAL SCIATICA: ICD-10-CM

## 2023-01-27 DIAGNOSIS — M54.42 CHRONIC BILATERAL LOW BACK PAIN WITH BILATERAL SCIATICA: ICD-10-CM

## 2023-01-30 ENCOUNTER — EXTERNAL PBMM DATA (OUTPATIENT)
Dept: PHARMACY | Facility: OTHER | Age: 63
End: 2023-01-30
Payer: MEDICARE

## 2023-02-02 NOTE — TELEPHONE ENCOUNTER
Pt already on methadone. No further opioid analgesics can be rx'd. He should avoid NSAIDs due to his lower platelet count.    Suggest we update his lower back imaging. Xray entered. May need subsequent CT or MRI with referral to specialist.

## 2023-02-03 NOTE — TELEPHONE ENCOUNTER
Attempted to contact patient.     There was no answer, left message for patient to contact the clinic.

## 2023-02-13 ENCOUNTER — HOSPITAL ENCOUNTER (OUTPATIENT)
Dept: GENERAL RADIOLOGY | Facility: HOSPITAL | Age: 63
Discharge: HOME | End: 2023-02-13
Payer: MEDICARE

## 2023-02-13 ENCOUNTER — PRE-ADMISSION TESTING (OUTPATIENT)
Dept: PREADMISSION TESTING | Facility: HOSPITAL | Age: 63
End: 2023-02-13
Payer: MEDICARE

## 2023-02-13 VITALS — WEIGHT: 228 LBS | HEIGHT: 72 IN | BODY MASS INDEX: 30.88 KG/M2

## 2023-02-13 DIAGNOSIS — G89.29 CHRONIC BILATERAL LOW BACK PAIN WITH BILATERAL SCIATICA: ICD-10-CM

## 2023-02-13 DIAGNOSIS — N20.0 KIDNEY STONE: ICD-10-CM

## 2023-02-13 DIAGNOSIS — M48.061 SPINAL STENOSIS OF LUMBAR REGION, UNSPECIFIED WHETHER NEUROGENIC CLAUDICATION PRESENT: ICD-10-CM

## 2023-02-13 DIAGNOSIS — M54.41 CHRONIC BILATERAL LOW BACK PAIN WITH BILATERAL SCIATICA: ICD-10-CM

## 2023-02-13 DIAGNOSIS — M54.42 CHRONIC BILATERAL LOW BACK PAIN WITH BILATERAL SCIATICA: ICD-10-CM

## 2023-02-13 LAB
ALBUMIN SERPL-MCNC: 3.8 G/DL (ref 3.5–5.2)
ALBUMIN/GLOB SERPL: 1.3 G/DL
ALP SERPL-CCNC: 95 U/L (ref 39–117)
ALT SERPL W P-5'-P-CCNC: 28 U/L (ref 1–41)
ANION GAP SERPL CALCULATED.3IONS-SCNC: 7.5 MMOL/L (ref 5–15)
AST SERPL-CCNC: 28 U/L (ref 1–40)
BACTERIA UR QL AUTO: ABNORMAL /HPF
BILIRUB SERPL-MCNC: 2.2 MG/DL (ref 0–1.2)
BILIRUB UR QL STRIP: NEGATIVE
BUN SERPL-MCNC: 20 MG/DL (ref 8–23)
BUN/CREAT SERPL: 19.2 (ref 7–25)
CALCIUM SPEC-SCNC: 9.4 MG/DL (ref 8.6–10.5)
CHLORIDE SERPL-SCNC: 106 MMOL/L (ref 98–107)
CLARITY UR: CLEAR
CO2 SERPL-SCNC: 27.5 MMOL/L (ref 22–29)
COLOR UR: YELLOW
CREAT SERPL-MCNC: 1.04 MG/DL (ref 0.76–1.27)
EGFRCR SERPLBLD CKD-EPI 2021: 81.2 ML/MIN/1.73
GLOBULIN UR ELPH-MCNC: 2.9 GM/DL
GLUCOSE SERPL-MCNC: 80 MG/DL (ref 65–99)
GLUCOSE UR STRIP-MCNC: NEGATIVE MG/DL
HGB UR QL STRIP.AUTO: ABNORMAL
HYALINE CASTS UR QL AUTO: ABNORMAL /LPF
KETONES UR QL STRIP: NEGATIVE
LEUKOCYTE ESTERASE UR QL STRIP.AUTO: NEGATIVE
MUCOUS THREADS URNS QL MICRO: ABNORMAL /HPF
NITRITE UR QL STRIP: NEGATIVE
PH UR STRIP.AUTO: 5.5 [PH] (ref 5–8)
POTASSIUM SERPL-SCNC: 4.1 MMOL/L (ref 3.5–5.2)
PROT SERPL-MCNC: 6.7 G/DL (ref 6–8.5)
PROT UR QL STRIP: NEGATIVE
RBC # UR STRIP: ABNORMAL /HPF
REF LAB TEST METHOD: ABNORMAL
SODIUM SERPL-SCNC: 141 MMOL/L (ref 136–145)
SP GR UR STRIP: 1.02 (ref 1–1.03)
SQUAMOUS #/AREA URNS HPF: ABNORMAL /HPF
UROBILINOGEN UR QL STRIP: ABNORMAL
WBC # UR STRIP: ABNORMAL /HPF

## 2023-02-13 PROCEDURE — 80053 COMPREHEN METABOLIC PANEL: CPT

## 2023-02-13 PROCEDURE — 81001 URINALYSIS AUTO W/SCOPE: CPT

## 2023-02-13 PROCEDURE — 72100 X-RAY EXAM L-S SPINE 2/3 VWS: CPT

## 2023-02-13 PROCEDURE — 85027 COMPLETE CBC AUTOMATED: CPT | Performed by: UROLOGY

## 2023-02-13 RX ORDER — NAPROXEN SODIUM 220 MG
220 TABLET ORAL 2 TIMES DAILY PRN
COMMUNITY
End: 2023-02-21

## 2023-02-16 DIAGNOSIS — F41.1 GAD (GENERALIZED ANXIETY DISORDER): ICD-10-CM

## 2023-02-21 ENCOUNTER — OFFICE VISIT (OUTPATIENT)
Dept: FAMILY MEDICINE CLINIC | Facility: CLINIC | Age: 63
End: 2023-02-21
Payer: MEDICARE

## 2023-02-21 VITALS
WEIGHT: 225 LBS | HEART RATE: 60 BPM | OXYGEN SATURATION: 99 % | HEIGHT: 72 IN | DIASTOLIC BLOOD PRESSURE: 80 MMHG | BODY MASS INDEX: 30.48 KG/M2 | TEMPERATURE: 98.7 F | SYSTOLIC BLOOD PRESSURE: 122 MMHG

## 2023-02-21 DIAGNOSIS — R93.7 ABNORMAL X-RAY OF LUMBAR SPINE: ICD-10-CM

## 2023-02-21 DIAGNOSIS — M54.42 CHRONIC BILATERAL LOW BACK PAIN WITH LEFT-SIDED SCIATICA: ICD-10-CM

## 2023-02-21 DIAGNOSIS — J31.0 RHINOSINUSITIS: Primary | ICD-10-CM

## 2023-02-21 DIAGNOSIS — G89.29 CHRONIC BILATERAL LOW BACK PAIN WITH LEFT-SIDED SCIATICA: ICD-10-CM

## 2023-02-21 DIAGNOSIS — M47.816 OSTEOARTHRITIS OF LUMBAR SPINE, UNSPECIFIED SPINAL OSTEOARTHRITIS COMPLICATION STATUS: ICD-10-CM

## 2023-02-21 DIAGNOSIS — M51.36 DEGENERATION OF INTERVERTEBRAL DISC OF LUMBAR REGION: ICD-10-CM

## 2023-02-21 DIAGNOSIS — R29.898 LEFT LEG WEAKNESS: ICD-10-CM

## 2023-02-21 DIAGNOSIS — M79.605 LEFT LEG PAIN: ICD-10-CM

## 2023-02-21 DIAGNOSIS — J32.9 RHINOSINUSITIS: Primary | ICD-10-CM

## 2023-02-21 DIAGNOSIS — M48.061 SPINAL STENOSIS OF LUMBAR REGION, UNSPECIFIED WHETHER NEUROGENIC CLAUDICATION PRESENT: ICD-10-CM

## 2023-02-21 PROCEDURE — 99214 OFFICE O/P EST MOD 30 MIN: CPT | Performed by: FAMILY MEDICINE

## 2023-02-21 PROCEDURE — 96372 THER/PROPH/DIAG INJ SC/IM: CPT | Performed by: FAMILY MEDICINE

## 2023-02-21 RX ORDER — METHYLPREDNISOLONE ACETATE 40 MG/ML
40 INJECTION, SUSPENSION INTRA-ARTICULAR; INTRALESIONAL; INTRAMUSCULAR; SOFT TISSUE ONCE
Status: COMPLETED | OUTPATIENT
Start: 2023-02-21 | End: 2023-02-21

## 2023-02-21 RX ADMIN — METHYLPREDNISOLONE ACETATE 40 MG: 40 INJECTION, SUSPENSION INTRA-ARTICULAR; INTRALESIONAL; INTRAMUSCULAR; SOFT TISSUE at 12:31

## 2023-02-21 NOTE — PROGRESS NOTES
Subjective   Sterling Whaley is a 62 y.o. male.     History of Present Illness   Presents for fu on back pain, recent xray etc. Symptoms unchanged. Gabapentin not helpful. Continues to have pain bilateral lower back and hip/buttock pain with radiation to left leg.    Increased nasal congestion, drainage, since particle/dust exposure. Requesting steroid.      The following portions of the patient's history were reviewed and updated as appropriate: allergies, current medications, past family history, past medical history, past social history, past surgical history and problem list.    Review of Systems   Constitutional: Positive for fatigue. Negative for fever and unexpected weight change.   HENT: Positive for congestion, hearing loss (chronic), mouth sores (intermittent), postnasal drip and sinus pressure. Negative for drooling, facial swelling, nosebleeds, rhinorrhea, sore throat and trouble swallowing.    Eyes: Negative for pain, discharge, redness and visual disturbance.   Respiratory: Negative for cough, shortness of breath and wheezing.    Cardiovascular: Negative for chest pain, palpitations and leg swelling.   Gastrointestinal: Positive for nausea. Negative for abdominal pain, blood in stool, diarrhea and vomiting.   Endocrine: Positive for heat intolerance. Negative for polydipsia and polyuria.   Genitourinary: Negative for dysuria and hematuria.   Musculoskeletal: Positive for arthralgias, back pain, gait problem, joint swelling, myalgias, neck pain and neck stiffness.   Skin: Negative for rash and wound.   Neurological: Positive for weakness, numbness and headaches. Negative for dizziness, tremors and syncope.   Hematological: Negative for adenopathy. Does not bruise/bleed easily.   Psychiatric/Behavioral: Positive for decreased concentration and dysphoric mood (mild). Negative for confusion, hallucinations, sleep disturbance and suicidal ideas. The patient is nervous/anxious.    Pt's previous ROS  reviewed and updated as indicated.       Objective    Vitals:    02/21/23 1032   BP: 122/80   Pulse: 60   Temp: 98.7 °F (37.1 °C)   SpO2: 99%     Body mass index is 30.52 kg/m².      02/21/23  1032   Weight: 102 kg (225 lb)       Physical Exam  Vitals and nursing note reviewed.   Constitutional:       General: He is not in acute distress.     Appearance: He is well-developed and well-groomed. He is obese. He is not ill-appearing.   HENT:      Head: Atraumatic.      Right Ear: No middle ear effusion. A PE tube is present.      Left Ear:  No middle ear effusion. A PE tube is present.      Nose: Mucosal edema and congestion present. No rhinorrhea.      Mouth/Throat:      Mouth: Mucous membranes are moist. No oral lesions.      Pharynx: Oropharynx is clear.   Eyes:      General: No scleral icterus.     Conjunctiva/sclera: Conjunctivae normal.      Pupils: Pupils are equal, round, and reactive to light.   Neck:      Thyroid: No thyroid mass.   Cardiovascular:      Rate and Rhythm: Normal rate and regular rhythm.      Pulses: Normal pulses.      Heart sounds: Normal heart sounds.   Pulmonary:      Effort: Pulmonary effort is normal.      Breath sounds: Normal breath sounds.   Musculoskeletal:      Cervical back: Spasms and bony tenderness present. Decreased range of motion.      Lumbar back: Deformity (loss of normal lordosis), spasms and tenderness present. Decreased range of motion. Negative right straight leg raise test and negative left straight leg raise test.      Right lower leg: No edema.      Left lower leg: No edema.   Lymphadenopathy:      Cervical: No cervical adenopathy.   Skin:     General: Skin is warm and dry.      Coloration: Skin is not jaundiced or pale.      Findings: No bruising.   Neurological:      Mental Status: He is alert and oriented to person, place, and time.      Gait: Gait is intact.      Comments: Mild weakness left leg vs right in hip flexion, plantar flexion   Psychiatric:         Mood  and Affect: Mood and affect normal.         Behavior: Behavior normal. Behavior is cooperative.         Cognition and Memory: Cognition normal.     Pt's previous physical exam reviewed and updated as indicated.    XR Spine Lumbar 2 or 3 View  Result Date: 2/13/2023  1. Degenerative changes of the lumbar spine as above. 2. Left nephrolithiasis. 3. Mild degenerative change of the SI joints.   Images were reviewed, interpreted, and dictated by Dr. Mariella Moreno MD Transcribed by Mee Newell PA-C.  This report was signed and finalized on 2/13/2023 3:15 PM by Mariella Moreno MD.    Lab Results   Component Value Date    SEDRATE 14 10/19/2022     Lab Results   Component Value Date    WBC 3.67 02/13/2023    HGB 12.5 (L) 02/13/2023    HCT 35.8 (L) 02/13/2023    .8 (H) 02/13/2023    PLT 54 (L) 02/13/2023     Lab Results   Component Value Date    PSA 0.659 03/18/2022     Lab Results   Component Value Date    ALKPHOS 95 02/13/2023         Assessment & Plan   Diagnoses and all orders for this visit:    1. Rhinosinusitis (Primary)  -     methylPREDNISolone acetate (DEPO-medrol) injection 40 mg    2. Spinal stenosis of lumbar region, unspecified whether neurogenic claudication present  -     Ambulatory Referral to Neurosurgery  -     MRI Lumbar Spine Without Contrast; Future    3. Degeneration of intervertebral disc of lumbar region  -     Ambulatory Referral to Neurosurgery  -     MRI Lumbar Spine Without Contrast; Future    4. Osteoarthritis of lumbar spine, unspecified spinal osteoarthritis complication status  -     Ambulatory Referral to Neurosurgery  -     MRI Lumbar Spine Without Contrast; Future    5. Chronic bilateral low back pain with left-sided sciatica  -     Ambulatory Referral to Neurosurgery  -     MRI Lumbar Spine Without Contrast; Future    6. Left leg pain  -     MRI Lumbar Spine Without Contrast; Future    7. Left leg weakness  -     MRI Lumbar Spine Without Contrast; Future    8. Abnormal x-ray of  "lumbar spine  -     MRI Lumbar Spine Without Contrast; Future       Moderate disc space narrowing L4-S1 with OA as well. Some of his \"hip\" pain may be due to SI joint DJD as well. MRI as above, refer to neurosurgery per his request.    Continue flonase, xyzal.    Routine f/u 3 months, sooner as needed/instructed.  I will contact patient regarding test results and provide instructions regarding any necessary changes in plan of care.  Patient was encouraged to keep me informed of any acute changes, lack of improvement, or any new concerning symptoms.  Pt is aware of reasons to seek emergent care.  Patient voiced understanding of all instructions and denied further questions.    Please note that portions of this note may have been completed with a voice recognition program.                        "

## 2023-02-24 ENCOUNTER — ANESTHESIA EVENT (OUTPATIENT)
Dept: PERIOP | Facility: HOSPITAL | Age: 63
End: 2023-02-24
Payer: MEDICARE

## 2023-02-24 ENCOUNTER — ANESTHESIA (OUTPATIENT)
Dept: PERIOP | Facility: HOSPITAL | Age: 63
End: 2023-02-24
Payer: MEDICARE

## 2023-02-24 ENCOUNTER — HOSPITAL ENCOUNTER (OUTPATIENT)
Facility: HOSPITAL | Age: 63
Setting detail: HOSPITAL OUTPATIENT SURGERY
Discharge: HOME | End: 2023-02-24
Attending: UROLOGY | Admitting: UROLOGY
Payer: MEDICARE

## 2023-02-24 VITALS
OXYGEN SATURATION: 99 % | TEMPERATURE: 98.1 F | HEART RATE: 65 BPM | RESPIRATION RATE: 14 BRPM | DIASTOLIC BLOOD PRESSURE: 107 MMHG | SYSTOLIC BLOOD PRESSURE: 163 MMHG

## 2023-02-24 DIAGNOSIS — N20.0 KIDNEY STONE: ICD-10-CM

## 2023-02-24 PROCEDURE — C1769 GUIDE WIRE: HCPCS | Performed by: UROLOGY

## 2023-02-24 PROCEDURE — C1758 CATHETER, URETERAL: HCPCS | Performed by: UROLOGY

## 2023-02-24 PROCEDURE — 25510000001 IOPAMIDOL 61 % SOLUTION: Performed by: UROLOGY

## 2023-02-24 PROCEDURE — C2617 STENT, NON-COR, TEM W/O DEL: HCPCS | Performed by: UROLOGY

## 2023-02-24 PROCEDURE — 52356 CYSTO/URETERO W/LITHOTRIPSY: CPT | Performed by: UROLOGY

## 2023-02-24 PROCEDURE — 25010000002 ONDANSETRON PER 1 MG: Performed by: NURSE ANESTHETIST, CERTIFIED REGISTERED

## 2023-02-24 PROCEDURE — 0 CEFAZOLIN SODIUM-DEXTROSE 2-3 GM-%(50ML) RECONSTITUTED SOLUTION: Performed by: UROLOGY

## 2023-02-24 PROCEDURE — 74420 UROGRAPHY RTRGR +-KUB: CPT | Performed by: UROLOGY

## 2023-02-24 PROCEDURE — 25010000002 DEXAMETHASONE PER 1 MG: Performed by: NURSE ANESTHETIST, CERTIFIED REGISTERED

## 2023-02-24 PROCEDURE — 25010000002 PROPOFOL 200 MG/20ML EMULSION: Performed by: NURSE ANESTHETIST, CERTIFIED REGISTERED

## 2023-02-24 PROCEDURE — 25010000002 FENTANYL CITRATE (PF) 100 MCG/2ML SOLUTION: Performed by: NURSE ANESTHETIST, CERTIFIED REGISTERED

## 2023-02-24 DEVICE — URETERAL STENT
Type: IMPLANTABLE DEVICE | Site: URETER | Status: FUNCTIONAL
Brand: CONTOUR™

## 2023-02-24 RX ORDER — TAMSULOSIN HYDROCHLORIDE 0.4 MG/1
1 CAPSULE ORAL NIGHTLY
Qty: 10 CAPSULE | Refills: 0 | Status: SHIPPED | OUTPATIENT
Start: 2023-02-24

## 2023-02-24 RX ORDER — DEXAMETHASONE SODIUM PHOSPHATE 4 MG/ML
INJECTION, SOLUTION INTRA-ARTICULAR; INTRALESIONAL; INTRAMUSCULAR; INTRAVENOUS; SOFT TISSUE AS NEEDED
Status: DISCONTINUED | OUTPATIENT
Start: 2023-02-24 | End: 2023-02-24 | Stop reason: SURG

## 2023-02-24 RX ORDER — LABETALOL HYDROCHLORIDE 5 MG/ML
5 INJECTION, SOLUTION INTRAVENOUS ONCE
Status: COMPLETED | OUTPATIENT
Start: 2023-02-24 | End: 2023-02-24

## 2023-02-24 RX ORDER — ACETAMINOPHEN 325 MG/1
975 TABLET ORAL ONCE
Status: COMPLETED | OUTPATIENT
Start: 2023-02-24 | End: 2023-02-24

## 2023-02-24 RX ORDER — ONDANSETRON 2 MG/ML
INJECTION INTRAMUSCULAR; INTRAVENOUS AS NEEDED
Status: DISCONTINUED | OUTPATIENT
Start: 2023-02-24 | End: 2023-02-24 | Stop reason: SURG

## 2023-02-24 RX ORDER — PHENAZOPYRIDINE HYDROCHLORIDE 100 MG/1
100 TABLET, FILM COATED ORAL 3 TIMES DAILY PRN
Qty: 21 TABLET | Refills: 0 | Status: SHIPPED | OUTPATIENT
Start: 2023-02-24

## 2023-02-24 RX ORDER — ACETAMINOPHEN 500 MG
1000 TABLET ORAL EVERY 6 HOURS
Qty: 30 TABLET | Refills: 0 | Status: SHIPPED | OUTPATIENT
Start: 2023-02-24 | End: 2023-02-28

## 2023-02-24 RX ORDER — CEFAZOLIN SODIUM 2 G/50ML
2 SOLUTION INTRAVENOUS ONCE
Status: COMPLETED | OUTPATIENT
Start: 2023-02-24 | End: 2023-02-24

## 2023-02-24 RX ORDER — DOCUSATE SODIUM 100 MG/1
100 CAPSULE, LIQUID FILLED ORAL 2 TIMES DAILY
Qty: 15 CAPSULE | Refills: 1 | Status: SHIPPED | OUTPATIENT
Start: 2023-02-24

## 2023-02-24 RX ORDER — ONDANSETRON 2 MG/ML
4 INJECTION INTRAMUSCULAR; INTRAVENOUS ONCE AS NEEDED
Status: DISCONTINUED | OUTPATIENT
Start: 2023-02-24 | End: 2023-02-24 | Stop reason: HOSPADM

## 2023-02-24 RX ORDER — LABETALOL HYDROCHLORIDE 5 MG/ML
5 INJECTION, SOLUTION INTRAVENOUS ONCE
Status: DISCONTINUED | OUTPATIENT
Start: 2023-02-24 | End: 2023-02-24 | Stop reason: HOSPADM

## 2023-02-24 RX ORDER — PROPOFOL 10 MG/ML
INJECTION, EMULSION INTRAVENOUS AS NEEDED
Status: DISCONTINUED | OUTPATIENT
Start: 2023-02-24 | End: 2023-02-24 | Stop reason: SURG

## 2023-02-24 RX ORDER — LIDOCAINE HYDROCHLORIDE 20 MG/ML
INJECTION, SOLUTION INTRAVENOUS AS NEEDED
Status: DISCONTINUED | OUTPATIENT
Start: 2023-02-24 | End: 2023-02-24 | Stop reason: SURG

## 2023-02-24 RX ORDER — SODIUM CHLORIDE 9 MG/ML
100 INJECTION, SOLUTION INTRAVENOUS CONTINUOUS
Status: DISCONTINUED | OUTPATIENT
Start: 2023-02-24 | End: 2023-02-24 | Stop reason: HOSPADM

## 2023-02-24 RX ORDER — OXYBUTYNIN CHLORIDE 10 MG/1
10 TABLET, EXTENDED RELEASE ORAL DAILY PRN
Qty: 10 TABLET | Refills: 0 | Status: SHIPPED | OUTPATIENT
Start: 2023-02-24

## 2023-02-24 RX ORDER — SULFAMETHOXAZOLE AND TRIMETHOPRIM 800; 160 MG/1; MG/1
1 TABLET ORAL 2 TIMES DAILY
Qty: 6 TABLET | Refills: 0 | Status: SHIPPED | OUTPATIENT
Start: 2023-02-24

## 2023-02-24 RX ORDER — FENTANYL CITRATE 50 UG/ML
INJECTION, SOLUTION INTRAMUSCULAR; INTRAVENOUS AS NEEDED
Status: DISCONTINUED | OUTPATIENT
Start: 2023-02-24 | End: 2023-02-24 | Stop reason: SURG

## 2023-02-24 RX ADMIN — PROPOFOL 150 MG: 10 INJECTION, EMULSION INTRAVENOUS at 10:43

## 2023-02-24 RX ADMIN — DEXAMETHASONE SODIUM PHOSPHATE 8 MG: 4 INJECTION, SOLUTION INTRAMUSCULAR; INTRAVENOUS at 10:46

## 2023-02-24 RX ADMIN — SODIUM CHLORIDE 100 ML/HR: 9 INJECTION, SOLUTION INTRAVENOUS at 08:19

## 2023-02-24 RX ADMIN — GLYCOPYRROLATE 0.2 MG: 0.2 INJECTION, SOLUTION INTRAMUSCULAR; INTRAVENOUS at 10:52

## 2023-02-24 RX ADMIN — ONDANSETRON 4 MG: 2 INJECTION INTRAMUSCULAR; INTRAVENOUS at 10:46

## 2023-02-24 RX ADMIN — FENTANYL CITRATE 100 MCG: 50 INJECTION INTRAMUSCULAR; INTRAVENOUS at 10:40

## 2023-02-24 RX ADMIN — LABETALOL HYDROCHLORIDE 5 MG: 5 INJECTION, SOLUTION INTRAVENOUS at 12:02

## 2023-02-24 RX ADMIN — ACETAMINOPHEN 975 MG: 325 TABLET, FILM COATED ORAL at 08:20

## 2023-02-24 RX ADMIN — CEFAZOLIN SODIUM 2 G: 2 SOLUTION INTRAVENOUS at 10:40

## 2023-02-24 RX ADMIN — LIDOCAINE HYDROCHLORIDE 40 MG: 20 INJECTION, SOLUTION INTRAVENOUS at 10:43

## 2023-02-24 RX ADMIN — GLYCOPYRROLATE 0.2 MG: 0.2 INJECTION, SOLUTION INTRAMUSCULAR; INTRAVENOUS at 10:46

## 2023-02-24 NOTE — ANESTHESIA POSTPROCEDURE EVALUATION
Patient: Sterling Whaley    Procedure Summary     Date: 02/24/23 Room / Location: [facility] FLUORO /  KOURTNEY OR    Anesthesia Start: 1040 Anesthesia Stop:     Procedure: URETEROSCOPY LASER LITHOTRIPSY WITH STENT INSERTION (Right) Diagnosis:       Kidney stone      (Kidney stone [N20.0])    Surgeons: Moses Purvis MD Provider: Joe Holt CRNA    Anesthesia Type: MAC ASA Status: 3          Anesthesia Type: MAC    Vitals  HR 63  Resp 12  Sat 97  Temp 97  /92      Post Anesthesia Care and Evaluation    Patient location during evaluation: PACU  Patient participation: complete - patient participated  Level of consciousness: awake and alert and sleepy but conscious  Pain management: satisfactory to patient    Airway patency: patent  Anesthetic complications: No anesthetic complications    Cardiovascular status: acceptable and stable  Respiratory status: acceptable and nasal cannula  Hydration status: acceptable

## 2023-02-24 NOTE — ANESTHESIA PROCEDURE NOTES
Airway  Urgency: elective    Date/Time: 2/24/2023 10:44 AM  Airway not difficult    General Information and Staff    Patient location during procedure: OR  CRNA/CAA: Roque Lincoln, NACHO    Indications and Patient Condition  Indications for airway management: airway protection    Preoxygenated: yes  Mask difficulty assessment: 0 - not attempted    Final Airway Details  Final airway type: supraglottic airway      Successful airway: classic  Size 4     Number of attempts at approach: 1  Assessment: lips, teeth, and gum same as pre-op and atraumatic intubation    Additional Comments  Cuff pressure/leak less than [id number]

## 2023-02-24 NOTE — ANESTHESIA PREPROCEDURE EVALUATION
Anesthesia Evaluation     Patient summary reviewed and Nursing notes reviewed   no history of anesthetic complications:  NPO Solid Status: > 8 hours  NPO Liquid Status: > 8 hours           Airway   Mallampati: II  TM distance: >3 FB  Neck ROM: full  Possible difficult intubation  Dental - normal exam     Pulmonary - normal exam   (+) sleep apnea on CPAP,   (-) not a smoker  Cardiovascular - normal exam  Exercise tolerance: good (4-7 METS)    ECG reviewed  Patient on routine beta blocker    (+) hypertension well controlled, hyperlipidemia,     ROS comment: 9/9/21 ekg - sinus rhythm    Neuro/Psych  (+) numbness, psychiatric history Anxiety and Depression,    GI/Hepatic/Renal/Endo    (+) obesity, morbid obesity, hiatal hernia, GERD,  hepatitis, liver disease fatty liver disease, renal disease stones,     Musculoskeletal     (+) back pain, chronic pain, neck stiffness,   Abdominal  - normal exam   Substance History      OB/GYN          Other   arthritis, blood dyscrasia thrombocytopenia,                     Anesthesia Plan    ASA 3     MAC     (Risks and benefits discussed including risk of aspiration, recall and dental damage. All patient questions answered.    Will continue with plan of care.)  intravenous induction     Anesthetic plan, risks, benefits, and alternatives have been provided, discussed and informed consent has been obtained with: patient.  Pre-procedure education provided      CODE STATUS:

## 2023-02-27 ENCOUNTER — EXTERNAL PBMM DATA (OUTPATIENT)
Dept: PHARMACY | Facility: OTHER | Age: 63
End: 2023-02-27
Payer: MEDICARE

## 2023-03-15 PROBLEM — Z12.11 ENCOUNTER FOR SCREENING FOR MALIGNANT NEOPLASM OF COLON: Status: ACTIVE | Noted: 2023-03-15

## 2023-03-21 DIAGNOSIS — F41.1 GAD (GENERALIZED ANXIETY DISORDER): ICD-10-CM

## 2023-03-21 DIAGNOSIS — G89.4 CHRONIC PAIN SYNDROME: ICD-10-CM

## 2023-03-21 NOTE — TELEPHONE ENCOUNTER
[facility] review shows he has not filled medication since Nov 2022. Please confirm with pharmacy when he filled last.

## 2023-03-21 NOTE — TELEPHONE ENCOUNTER
Rx Refill Note  Requested Prescriptions     Pending Prescriptions Disp Refills   • methadone (DOLOPHINE) 5 MG tablet 120 tablet 0     Si tablet po up to qid.     Signed Prescriptions Disp Refills   • sertraline (ZOLOFT) 50 MG tablet 90 tablet 0     Sig: Take 1 tablet by mouth Daily.     Authorizing Provider: MITCHEL IVEY     Ordering User: GEMINI NAVARRO      Last office visit with prescribing clinician: 2023   Last telemedicine visit with prescribing clinician: 2023   Next office visit with prescribing clinician: 2023                         Would you like a call back once the refill request has been completed: [] Yes [] No    If the office needs to give you a call back, can they leave a voicemail: [] Yes [] No    Gemini Navarro MA  23, 09:06 EDT

## 2023-03-21 NOTE — TELEPHONE ENCOUNTER
Rx Refill Note  Requested Prescriptions     Pending Prescriptions Disp Refills   • methadone (DOLOPHINE) 5 MG tablet 120 tablet 0     Si tablet po up to qid.     Signed Prescriptions Disp Refills   • sertraline (ZOLOFT) 50 MG tablet 90 tablet 0     Sig: Take 1 tablet by mouth Daily.     Authorizing Provider: MITCHEL IVEY     Ordering User: DANIEL MÉNDEZ      Last office visit with prescribing clinician: 2023   Last telemedicine visit with prescribing clinician: 2023   Next office visit with prescribing clinician: 2023                         Would you like a call back once the refill request has been completed: [] Yes [] No    If the office needs to give you a call back, can they leave a voicemail: [] Yes [] No    Pepper Handley MA  23, 15:49 EDT

## 2023-03-21 NOTE — TELEPHONE ENCOUNTER
Please provide answer to my previous question which was :    Please confirm last fill date with pharmacy (not patient).

## 2023-03-21 NOTE — TELEPHONE ENCOUNTER
Caller: Sterling Whaley    Relationship: Self    Best call back number: [phone]    Requested Prescriptions:   Requested Prescriptions     Pending Prescriptions Disp Refills   • methadone (DOLOPHINE) 5 MG tablet 120 tablet 0     Si tablet po up to qid.   • sertraline (ZOLOFT) 50 MG tablet 90 tablet 0     Sig: Take 1 tablet by mouth Daily.        Pharmacy where request should be sent: [facility] - [place] [address] [address] - [phone] [facility] [phone]     Last office visit with prescribing clinician: 2023   Last telemedicine visit with prescribing clinician: 2023   Next office visit with prescribing clinician: 2023     Additional details provided by patient: PATIENT HAS 2 DAYS LEFT OF MEDICATIONS    Does the patient have less than a 3 day supply:  [x] Yes  [] No    Would you like a call back once the refill request has been completed: [x] Yes [] No    If the office needs to give you a call back, can they leave a voicemail: [x] Yes [] No    Jayashree Liriano Rep   23 09:03 EDT

## 2023-03-22 RX ORDER — METHADONE HYDROCHLORIDE 5 MG/1
TABLET ORAL
Qty: 120 TABLET | Refills: 0 | Status: SHIPPED | OUTPATIENT
Start: 2023-03-22

## 2023-03-22 NOTE — TELEPHONE ENCOUNTER
I called and spoke with [facility] pharmacy.    The last fill date on the methadone was 1/16/23

## 2023-04-14 DIAGNOSIS — I10 ESSENTIAL HYPERTENSION: ICD-10-CM

## 2023-04-14 RX ORDER — LISINOPRIL 10 MG/1
TABLET ORAL
Qty: 90 TABLET | Refills: 4 | Status: SHIPPED | OUTPATIENT
Start: 2023-04-14

## 2023-05-08 ENCOUNTER — TRANSCRIBE ORDERS (OUTPATIENT)
Dept: ADMINISTRATIVE | Facility: HOSPITAL | Age: 63
End: 2023-05-08
Payer: MEDICARE

## 2023-05-08 DIAGNOSIS — M48.062 SPINAL STENOSIS OF LUMBAR REGION WITH NEUROGENIC CLAUDICATION: ICD-10-CM

## 2023-05-08 DIAGNOSIS — M43.16 SPONDYLOLISTHESIS AT L4-L5 LEVEL: ICD-10-CM

## 2023-05-08 DIAGNOSIS — M54.40 LOW BACK PAIN WITH SCIATICA, SCIATICA LATERALITY UNSPECIFIED, UNSPECIFIED BACK PAIN LATERALITY, UNSPECIFIED CHRONICITY: Primary | ICD-10-CM

## 2023-05-22 ENCOUNTER — HOSPITAL ENCOUNTER (OUTPATIENT)
Dept: CT IMAGING | Facility: HOSPITAL | Age: 63
Discharge: HOME | End: 2023-05-22
Admitting: PHYSICIAN ASSISTANT
Payer: MEDICARE

## 2023-05-22 DIAGNOSIS — M48.062 SPINAL STENOSIS OF LUMBAR REGION WITH NEUROGENIC CLAUDICATION: ICD-10-CM

## 2023-05-22 DIAGNOSIS — M43.16 SPONDYLOLISTHESIS AT L4-L5 LEVEL: ICD-10-CM

## 2023-05-22 DIAGNOSIS — M54.40 LOW BACK PAIN WITH SCIATICA, SCIATICA LATERALITY UNSPECIFIED, UNSPECIFIED BACK PAIN LATERALITY, UNSPECIFIED CHRONICITY: ICD-10-CM

## 2023-05-22 PROCEDURE — 72131 CT LUMBAR SPINE W/O DYE: CPT

## 2023-05-23 DIAGNOSIS — G89.4 CHRONIC PAIN SYNDROME: ICD-10-CM

## 2023-05-23 RX ORDER — TIZANIDINE 2 MG/1
TABLET ORAL
COMMUNITY
Start: 2023-04-19

## 2023-05-23 RX ORDER — METHADONE HYDROCHLORIDE 5 MG/1
TABLET ORAL
Qty: 120 TABLET | Refills: 0 | OUTPATIENT
Start: 2023-05-23

## 2023-05-23 RX ORDER — METHADONE HYDROCHLORIDE 5 MG/1
TABLET ORAL
Qty: 120 TABLET | Refills: 0 | Status: SHIPPED | OUTPATIENT
Start: 2023-05-23

## 2023-05-23 NOTE — TELEPHONE ENCOUNTER
Caller: Sterlnig Whaley    Relationship: Self    Best call back number: [phone]       What was the call regarding: PATIENT CALLED TO CHECK THE STATUS OF HIS REQUEST FOR A REFILL. PATIENT IS OUT OF MEDICATION.    Do you require a callback: YES

## 2023-05-23 NOTE — TELEPHONE ENCOUNTER
Rx Refill Note  Requested Prescriptions     Pending Prescriptions Disp Refills   • methadone (DOLOPHINE) 5 MG tablet [Pharmacy Med Name: METHADONE 5MG TAB] 120 tablet 0     Sig: TAKE 1 TABLET BY MOUTH UP TO 4 TIMES DAILY      Last office visit with prescribing clinician: 2/21/2023   Last telemedicine visit with prescribing clinician: Visit date not found   Next office visit with prescribing clinician: 6/6/2023                         Would you like a call back once the refill request has been completed: [] Yes [] No    If the office needs to give you a call back, can they leave a voicemail: [] Yes [] No    Monica Segovia MA  05/23/23, 11:51 EDT

## 2023-05-23 NOTE — TELEPHONE ENCOUNTER
Caller: WhaleySterling sherman Andrea    Relationship: Self    Best call back number: [phone] TO LEAVE DETAILED MESSAGE IF NO ANSWER    Requested Prescriptions:   Requested Prescriptions     Pending Prescriptions Disp Refills   • methadone (DOLOPHINE) 5 MG tablet 120 tablet 0     Si tablet po up to qid.        Pharmacy where request should be sent: [facility] - [place], [place] - [address] - [phone]  - [phone] FX     Last office visit with prescribing clinician: 2023   Last telemedicine visit with prescribing clinician: Visit date not found   Next office visit with prescribing clinician: 2023     Additional details provided by patient: PLEASE REFILL 90 DAY SUPPLY WITH REFILLS.    Does the patient have less than a 3 day supply:  [x] Yes  [] No OUT  Would you like a call back once the refill request has been completed: [] Yes [x] No    If the office needs to give you a call back, can they leave a voicemail: [x] Yes [] No    Jayashree Carlos Rep   23 08:07 EDT

## 2023-06-06 ENCOUNTER — OFFICE VISIT (OUTPATIENT)
Dept: FAMILY MEDICINE CLINIC | Facility: CLINIC | Age: 63
End: 2023-06-06
Payer: MEDICARE

## 2023-06-06 VITALS
BODY MASS INDEX: 31.21 KG/M2 | HEART RATE: 62 BPM | SYSTOLIC BLOOD PRESSURE: 145 MMHG | HEIGHT: 72 IN | DIASTOLIC BLOOD PRESSURE: 90 MMHG | OXYGEN SATURATION: 98 % | TEMPERATURE: 98 F | WEIGHT: 230.4 LBS

## 2023-06-06 DIAGNOSIS — Z00.00 ENCOUNTER FOR PREVENTIVE HEALTH EXAMINATION: ICD-10-CM

## 2023-06-06 DIAGNOSIS — D69.6 THROMBOCYTOPENIA: ICD-10-CM

## 2023-06-06 DIAGNOSIS — I10 PRIMARY HYPERTENSION: ICD-10-CM

## 2023-06-06 DIAGNOSIS — Z00.00 ENCOUNTER FOR PREVENTATIVE ADULT HEALTH CARE EXAMINATION: ICD-10-CM

## 2023-06-06 DIAGNOSIS — F41.1 GAD (GENERALIZED ANXIETY DISORDER): ICD-10-CM

## 2023-06-06 DIAGNOSIS — G47.33 OBSTRUCTIVE SLEEP APNEA SYNDROME: ICD-10-CM

## 2023-06-06 DIAGNOSIS — E66.09 CLASS 1 OBESITY DUE TO EXCESS CALORIES WITH SERIOUS COMORBIDITY AND BODY MASS INDEX (BMI) OF 31.0 TO 31.9 IN ADULT: ICD-10-CM

## 2023-06-06 DIAGNOSIS — Z78.9 DIFFICULTY WITH CPAP USE: ICD-10-CM

## 2023-06-06 DIAGNOSIS — R16.1 SPLENOMEGALY: ICD-10-CM

## 2023-06-06 DIAGNOSIS — K76.6 PORTAL HYPERTENSION: ICD-10-CM

## 2023-06-06 DIAGNOSIS — Z00.00 MEDICARE ANNUAL WELLNESS VISIT, SUBSEQUENT: Primary | ICD-10-CM

## 2023-06-06 DIAGNOSIS — K75.81 LIVER CIRRHOSIS SECONDARY TO NASH: ICD-10-CM

## 2023-06-06 DIAGNOSIS — Z28.21 PNEUMOCOCCAL VACCINE REFUSED: ICD-10-CM

## 2023-06-06 DIAGNOSIS — E55.9 VITAMIN D DEFICIENCY: ICD-10-CM

## 2023-06-06 DIAGNOSIS — K74.60 LIVER CIRRHOSIS SECONDARY TO NASH: ICD-10-CM

## 2023-06-06 DIAGNOSIS — E78.2 MIXED HYPERLIPIDEMIA: ICD-10-CM

## 2023-06-06 DIAGNOSIS — Z53.20 REFUSAL OF STATIN MEDICATION BY PATIENT: ICD-10-CM

## 2023-06-06 DIAGNOSIS — F32.A DEPRESSIVE DISORDER: ICD-10-CM

## 2023-06-06 DIAGNOSIS — G89.4 CHRONIC PAIN SYNDROME: ICD-10-CM

## 2023-06-06 RX ORDER — PROPRANOLOL HYDROCHLORIDE 10 MG/1
10 TABLET ORAL 2 TIMES DAILY
Qty: 60 TABLET | Refills: 5 | Status: SHIPPED | OUTPATIENT
Start: 2023-06-06

## 2023-06-06 RX ORDER — SERTRALINE HYDROCHLORIDE 100 MG/1
100 TABLET, FILM COATED ORAL DAILY
Qty: 90 TABLET | Refills: 3 | Status: SHIPPED | OUTPATIENT
Start: 2023-06-06

## 2023-06-06 NOTE — PATIENT INSTRUCTIONS
Medicare Wellness  Personal Prevention Plan of Service     Date of Office Visit:    Encounter Provider:  Annel Armstrong MD  Place of Service:  [facility] [place]  Patient Name: Sterling Whaley  :  1960    As part of the Medicare Wellness portion of your visit today, we are providing you with this personalized preventive plan of services (PPPS). This plan is based upon recommendations of the [place] Preventive Services Task Force (USPSTF) and the Advisory Committee on Immunization Practices (ACIP).    This lists the preventive care services that should be considered, and provides dates of when you are due. Items listed as completed are up-to-date and do not require any further intervention.    Health Maintenance   Topic Date Due    Pneumococcal Vaccine 0-64 (1 - PCV) Never done    TDAP/TD VACCINES (1 - Tdap) Never done    ZOSTER VACCINE (1 of 2) Never done    Hepatitis B (1 of 3 - Risk 3-dose series) Never done    COVID-19 Vaccine (3 - Moderna series) 10/27/2021    ANNUAL WELLNESS VISIT  2023    INFLUENZA VACCINE  2023    LIPID PANEL  10/19/2023    COLORECTAL CANCER SCREENING  2025    HEPATITIS C SCREENING  Completed       No orders of the defined types were placed in this encounter.      Return in about 3 months (around 2023).

## 2023-06-06 NOTE — PROGRESS NOTES
The ABCs of the Annual Wellness Visit  Subsequent Medicare Wellness Visit    Subjective    Sterling Whaley is a 63 y.o. male who presents for a Subsequent Medicare Wellness Visit and Encounter for Preventive Health exam.    Needs 2nd hep A booster. Not yet due.    Has declined pneumococcal vaccine, further COVID boosters.    The following portions of the patient's history were reviewed and   updated as appropriate: allergies, current medications, past family history, past medical history, past social history, past surgical history, and problem list.    Compared to one year ago, the patient feels his physical   health is worse.    Compared to one year ago, the patient feels his mental   health is the same.    Recent Hospitalizations:  He was not admitted to the hospital during the last year.       Current Medical Providers:  Patient Care Team:  Annel Armstrong MD as PCP - General    Outpatient Medications Prior to Visit   Medication Sig Dispense Refill    fluticasone (FLONASE) 50 MCG/ACT nasal spray 1 spray into the nostril(s) as directed by provider Daily As Needed.      levocetirizine (XYZAL) 5 MG tablet Take 1 tablet by mouth Daily As Needed.      lisinopril (PRINIVIL,ZESTRIL) 10 MG tablet TAKE 3 TABLETS BY MOUTH DAILY 90 tablet 4    methadone (DOLOPHINE) 5 MG tablet 1 tablet po up to qid. 120 tablet 0    tiZANidine (ZANAFLEX) 2 MG tablet       propranolol (INDERAL) 10 MG tablet Take 1 tablet by mouth 2 (Two) Times a Day. 60 tablet 5    sertraline (ZOLOFT) 50 MG tablet Take 1 tablet by mouth Daily. 90 tablet 0    oxybutynin XL (Ditropan XL) 10 MG 24 hr tablet Take 1 tablet by mouth Daily As Needed (bladder spasm). 10 tablet 0    sulfamethoxazole-trimethoprim (Bactrim DS) 800-160 MG per tablet Take 1 tablet by mouth 2 (Two) Times a Day. 6 tablet 0     No facility-administered medications prior to visit.       Opioid medication/s are on active medication list.  and I have evaluated his active treatment plan  and pain score trends (see table).  There were no vitals filed for this visit.  I have reviewed the chart for potential of high risk medication and harmful drug interactions in the elderly.          Aspirin is not on active medication list.  Aspirin use is contraindicated for this patient due to: thrombocytopenia.  .    Patient Active Problem List   Diagnosis    Osteoarthritis of cervical spine    Spinal stenosis of cervical region    Chronic pain syndrome    Mixed hyperlipidemia    Primary hypertension    Degeneration of intervertebral disc of lumbar region    Spinal stenosis of lumbar region    Class 1 obesity due to excess calories with serious comorbidity and body mass index (BMI) of 31.0 to 31.9 in adult    Obstructive sleep apnea syndrome    Arthralgia of multiple joints    Dyssomnia    Degeneration of intervertebral disc of thoracic region    Thrombocytopenia    High risk medications (not anticoagulants) long-term use    Liver cirrhosis secondary to LEE    Vitamin D deficiency    IFG (impaired fasting glucose)    Plantar fasciitis, bilateral    Impingement syndrome of left shoulder    TYSON (generalized anxiety disorder)    Depressive disorder    Vitamin B 12 deficiency    ETD (Eustachian tube dysfunction), bilateral    Lateral epicondylitis of both elbows    Hyperbilirubinemia    Hearing loss associated with syndrome of right ear    Chronic serous otitis media, bilateral    Recurrent kidney stones    Refusal of statin medication by patient    Influenza vaccine refused    Pneumococcal vaccine refused    Chronic superficial gastritis without bleeding    Hiatal hernia with GERD without esophagitis    Splenomegaly    Portal hypertension    Difficulty with CPAP use    Osteoarthritis of lumbar spine     Advance Care Planning   Advance Care Planning     Advance Directive is not on file.  ACP discussion was held with the patient during this visit. Patient does not have an advance directive, information provided.    "  Objective    Vitals:    23 1100   BP: 145/90   Pulse: 62   Temp: 98 °F (36.7 °C)   SpO2: 98%   Weight: 105 kg (230 lb 6.4 oz)   Height: 182.9 cm (72\")     Estimated body mass index is 31.25 kg/m² as calculated from the following:    Height as of this encounter: 182.9 cm (72\").    Weight as of this encounter: 105 kg (230 lb 6.4 oz).    BMI is >= 30 and <35. (Class 1 Obesity). The following options were offered after discussion;: weight loss educational material (shared in after visit summary), exercise counseling/recommendations, and nutrition counseling/recommendations      Does the patient have evidence of cognitive impairment? No          HEALTH RISK ASSESSMENT    Smoking Status:  Social History     Tobacco Use   Smoking Status Never   Smokeless Tobacco Never     Alcohol Consumption:  Social History     Substance and Sexual Activity   Alcohol Use Never     Fall Risk Screen:    TAMARA Fall Risk Assessment was completed, and patient is at LOW risk for falls.Assessment completed on:2023    Depression Screenin/6/2023    10:56 AM   PHQ-2/PHQ-9 Depression Screening   Little Interest or Pleasure in Doing Things 0-->not at all   Feeling Down, Depressed or Hopeless 0-->not at all   PHQ-9: Brief Depression Severity Measure Score 0       Health Habits and Functional and Cognitive Screenin/6/2023    10:56 AM   Functional & Cognitive Status   Do you have difficulty preparing food and eating? No   Do you have difficulty bathing yourself, getting dressed or grooming yourself? No   Do you have difficulty using the toilet? No   Do you have difficulty moving around from place to place? Yes   Do you have trouble with steps or getting out of a bed or a chair? Yes   Current Diet Unhealthy Diet   Dental Exam Up to date   Eye Exam Not up to date   Exercise (times per week) 0 times per week   Current Exercises Include No Regular Exercise   Do you need help using the phone?  No   Are you deaf or do you have " serious difficulty hearing?  Yes   Do you need help with transportation? No   Do you need help shopping? No   Do you need help preparing meals?  No   Do you need help with housework?  No   Do you need help with laundry? No   Do you need help taking your medications? No   Do you need help managing money? No   Do you ever drive or ride in a car without wearing a seat belt? No       Age-appropriate Screening Schedule:  Refer to the list below for future screening recommendations based on patient's age, sex and/or medical conditions. Orders for these recommended tests are listed in the plan section. The patient has been provided with a written plan.    Health Maintenance   Topic Date Due    TDAP/TD VACCINES (1 - Tdap) Never done    ZOSTER VACCINE (1 of 2) Never done    ANNUAL WELLNESS VISIT  03/18/2023    Pneumococcal Vaccine 0-64 (1 - PCV) 06/09/2024 (Originally 4/29/1966)    COVID-19 Vaccine (3 - Moderna series) 06/11/2024 (Originally 10/27/2021)    INFLUENZA VACCINE  08/01/2023    LIPID PANEL  10/19/2023    COLORECTAL CANCER SCREENING  04/01/2025    HEPATITIS C SCREENING  Completed    Hepatitis B  Discontinued                  CMS Preventative Services Quick Reference  Risk Factors Identified During Encounter  Chronic Pain: Natural history and expected course discussed. Questions answered.  Depression/Dysphoria:  pt amenable to med dose adjusment  Fall Risk-High or Moderate: Discussed Fall Prevention in the home and Information on Fall Prevention Shared in After Visit Summary  Hearing Problem:  pt will consider audiology referral  Immunizations Discussed/Encouraged: Tdap, Hepatitis A Vaccine/Series, Hepatitis B Vaccine/Series, Influenza, Pneumococcal 23, Prevnar 20 (Pneumococcal 20-valent conjugate), Shingrix, and COVID19  The above risks/problems have been discussed with the patient.  Pertinent information has been shared with the patient in the After Visit Summary.  An After Visit Summary and PPPS were made  "available to the patient.    Follow Up:   Next Medicare Wellness visit to be scheduled in 1 year.       Additional E&M Note during same encounter follows:  Patient has multiple medical problems which are significant and separately identifiable that require additional work above and beyond the Medicare Wellness Visit.      Chief Complaint  Medicare Wellness-subsequent and Encounter for Preventive Health Exam    Subjective        HPI  Sterling Whaley is also being seen today for routine f/u on several chronic med problems and Encounter for Preventive Health Exam.    Since last visit has had consultation with Dr. Lopez, regarding lumbar radiculopathy, leg pain, etc. Scheduled for surgery    Has kept f/u with gastroenterology for mgnt of cirrhosis due to sandoval. Advised to lose weight. Pt having trouble limiting caloric intake.     Checking BP at home - 150/80s - apparently has only been taking 20 mgs of lisinopril rather than 30    C/o worsened dysphoric mood, irritability. Denies SI. Taking zoloft 50 mg daily.    Has upcoming apt with ENT - continues to have intermittent \"blood spit\".    Review of Systems   Constitutional:  Positive for fatigue. Negative for appetite change and fever.   HENT:  Positive for congestion, mouth sores (intermittent) and postnasal drip. Negative for nosebleeds and sore throat.    Eyes:  Negative for visual disturbance.   Respiratory:  Negative for cough, shortness of breath and wheezing.    Cardiovascular:  Negative for chest pain, palpitations and leg swelling.   Gastrointestinal:  Positive for nausea. Negative for abdominal pain, blood in stool and vomiting.   Genitourinary:  Negative for difficulty urinating, dysuria and hematuria.   Musculoskeletal:  Positive for arthralgias, back pain, neck pain and neck stiffness.   Skin:  Negative for rash and wound.   Neurological:  Negative for syncope, facial asymmetry, speech difficulty and confusion.   Hematological:  Negative for " "adenopathy. Bruises/bleeds easily.   Psychiatric/Behavioral:  Positive for dysphoric mood. Negative for suicidal ideas. The patient is nervous/anxious.      Objective   Vital Signs:  /90   Pulse 62   Temp 98 °F (36.7 °C)   Ht 182.9 cm (72\")   Wt 105 kg (230 lb 6.4 oz)   SpO2 98%   BMI 31.25 kg/m²     Physical Exam  Vitals and nursing note reviewed.   Constitutional:       General: He is not in acute distress.     Appearance: He is well-developed and well-groomed. He is obese. He is not ill-appearing.   HENT:      Head: Atraumatic.      Mouth/Throat:      Mouth: Mucous membranes are moist. No oral lesions.   Eyes:      General: No scleral icterus.     Conjunctiva/sclera: Conjunctivae normal.      Pupils: Pupils are equal, round, and reactive to light.   Neck:      Thyroid: No thyroid mass.   Cardiovascular:      Rate and Rhythm: Normal rate and regular rhythm.      Pulses: Normal pulses.      Heart sounds: Normal heart sounds.   Pulmonary:      Effort: Pulmonary effort is normal.      Breath sounds: Normal breath sounds.   Musculoskeletal:      Cervical back: Spasms and bony tenderness present. Decreased range of motion.      Lumbar back: Spasms and tenderness present. Decreased range of motion.      Right lower leg: No edema.      Left lower leg: No edema.   Lymphadenopathy:      Cervical: No cervical adenopathy.   Skin:     General: Skin is warm and dry.      Coloration: Skin is not jaundiced or pale.      Findings: No bruising or rash.   Neurological:      Mental Status: He is alert and oriented to person, place, and time.      Gait: Gait is intact.   Psychiatric:         Mood and Affect: Mood and affect normal.         Behavior: Behavior normal. Behavior is cooperative.         Cognition and Memory: Cognition normal.   Pt's previous physical exam reviewed and updated as indicated.                  Lab Results   Component Value Date    WBC 3.67 02/13/2023    HGB 12.5 (L) 02/13/2023    HCT 35.8 (L) " 02/13/2023    .8 (H) 02/13/2023    PLT 54 (L) 02/13/2023       Lab Results   Component Value Date    GLUCOSE 80 02/13/2023    BUN 20 02/13/2023    CREATININE 1.04 02/13/2023    EGFRIFNONA 89 06/08/2021    EGFRIFAFRI 108 06/08/2021    BCR 19.2 02/13/2023    K 4.1 02/13/2023    CO2 27.5 02/13/2023    CALCIUM 9.4 02/13/2023    PROTENTOTREF 7.4 10/19/2022    ALBUMIN 3.8 02/13/2023    LABIL2 1.5 10/19/2022    AST 28 02/13/2023    ALT 28 02/13/2023       Lab Results   Component Value Date    CHLPL 129 10/19/2022    TRIG 117 10/19/2022    HDL 36 (L) 10/19/2022    LDL 72 10/19/2022       Lab Results   Component Value Date    HGBA1C 5.70 (H) 10/19/2022       Lab Results   Component Value Date    TSH 2.300 10/19/2022            Assessment and Plan   Diagnoses and all orders for this visit:    1. Medicare annual wellness visit, subsequent (Primary)    2. Encounter for preventative adult health care examination    3. TYSON (generalized anxiety disorder)  -     sertraline (ZOLOFT) 100 MG tablet; Take 1 tablet by mouth Daily.  Dispense: 90 tablet; Refill: 3    4. Refusal of statin medication by patient    5. Primary hypertension    6. Mixed hyperlipidemia    7. Thrombocytopenia    8. Vitamin D deficiency    9. Class 1 obesity due to excess calories with serious comorbidity and body mass index (BMI) of 31.0 to 31.9 in adult    10. Splenomegaly    11. Portal hypertension    12. 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 Depressive disorder    14. Pneumococcal vaccine refused    15. Obstructive sleep apnea syndrome    16. Difficulty with CPAP use    17. Chronic pain syndrome    18. Encounter for preventive health examination    Other orders  -     propranolol (INDERAL) 10 MG tablet; Take 1 tablet by mouth 2 (Two) Times a Day.  Dispense: 60 tablet; Refill: 5      HTN poorly controlled. Increase lisinopril to 30 mg daily as previously rx'd. Pt advised to eat a heart healthy diet and get regular aerobic exercise.    Worsened  depression. Increase dose fo zoloft to 100 mg daily.    Age appropriate preventive care reviewed including cancer screenings, safety measures, mental health concerns, supplements, prevention of CV disease and DM, etc. Handout provided.         Follow Up   Return in about 3 months (around 9/6/2023).  F/u sooner as needed.  Patient was encouraged to keep me informed of any acute changes, lack of improvement, or any new concerning symptoms.  Pt is aware of reasons to seek emergent care.  Patient voiced understanding of all instructions and denied further questions.    Patient was given instructions and counseling regarding his condition or for health maintenance advice. Please see specific information pulled into the AVS if appropriate.     Please note that portions of this note may have been completed with a voice recognition program.

## 2023-06-09 PROBLEM — Z12.11 ENCOUNTER FOR SCREENING FOR MALIGNANT NEOPLASM OF COLON: Status: RESOLVED | Noted: 2023-03-15 | Resolved: 2023-06-09

## 2023-07-18 ENCOUNTER — TELEPHONE (OUTPATIENT)
Dept: FAMILY MEDICINE CLINIC | Facility: CLINIC | Age: 63
End: 2023-07-18
Payer: MEDICARE

## 2023-07-19 ENCOUNTER — CLINICAL SUPPORT (OUTPATIENT)
Dept: FAMILY MEDICINE CLINIC | Facility: CLINIC | Age: 63
End: 2023-07-19
Payer: MEDICARE

## 2023-07-19 DIAGNOSIS — Z51.81 ENCOUNTER FOR THERAPEUTIC DRUG MONITORING: Primary | ICD-10-CM

## 2023-07-27 LAB — DRUGS UR: NORMAL

## 2023-08-15 DIAGNOSIS — G89.4 CHRONIC PAIN SYNDROME: ICD-10-CM

## 2023-08-15 RX ORDER — METHADONE HYDROCHLORIDE 5 MG/1
TABLET ORAL
Qty: 60 TABLET | Refills: 0 | Status: CANCELLED | OUTPATIENT
Start: 2023-08-15

## 2023-08-15 NOTE — TELEPHONE ENCOUNTER
Rx Refill Note  Requested Prescriptions     Pending Prescriptions Disp Refills    methadone (DOLOPHINE) 5 MG tablet 60 tablet 0     Si tablet po up to qid.      Last office visit with prescribing clinician: 2023   Last telemedicine visit with prescribing clinician: Visit date not found   Next office visit with prescribing clinician: 2023                         Would you like a call back once the refill request has been completed: [] Yes [] No    If the office needs to give you a call back, can they leave a voicemail: [] Yes [] No    Gemini Mae MA  08/15/23, 17:32 EDT

## 2023-08-17 RX ORDER — METHADONE HYDROCHLORIDE 5 MG/1
TABLET ORAL
Qty: 60 TABLET | Refills: 0 | Status: SHIPPED | OUTPATIENT
Start: 2023-08-17

## 2023-09-06 ENCOUNTER — OFFICE VISIT (OUTPATIENT)
Dept: FAMILY MEDICINE CLINIC | Facility: CLINIC | Age: 63
End: 2023-09-06
Payer: MEDICARE

## 2023-09-06 VITALS
BODY MASS INDEX: 31.56 KG/M2 | TEMPERATURE: 97.9 F | DIASTOLIC BLOOD PRESSURE: 94 MMHG | HEIGHT: 72 IN | WEIGHT: 233 LBS | OXYGEN SATURATION: 97 % | SYSTOLIC BLOOD PRESSURE: 138 MMHG | HEART RATE: 72 BPM

## 2023-09-06 DIAGNOSIS — D69.6 THROMBOCYTOPENIA: ICD-10-CM

## 2023-09-06 DIAGNOSIS — E66.09 CLASS 1 OBESITY DUE TO EXCESS CALORIES WITH SERIOUS COMORBIDITY AND BODY MASS INDEX (BMI) OF 31.0 TO 31.9 IN ADULT: ICD-10-CM

## 2023-09-06 DIAGNOSIS — R79.9 ABNORMAL BLOOD CHEMISTRY: ICD-10-CM

## 2023-09-06 DIAGNOSIS — I10 PRIMARY HYPERTENSION: Primary | ICD-10-CM

## 2023-09-06 DIAGNOSIS — Z12.5 SCREENING FOR PROSTATE CANCER: ICD-10-CM

## 2023-09-06 DIAGNOSIS — Z28.21 INFLUENZA VACCINE REFUSED: ICD-10-CM

## 2023-09-06 DIAGNOSIS — F32.A DEPRESSIVE DISORDER: ICD-10-CM

## 2023-09-06 DIAGNOSIS — E78.2 MIXED HYPERLIPIDEMIA: ICD-10-CM

## 2023-09-06 DIAGNOSIS — B35.4 TINEA CORPORIS: ICD-10-CM

## 2023-09-06 DIAGNOSIS — K75.81 LIVER CIRRHOSIS SECONDARY TO NASH: ICD-10-CM

## 2023-09-06 DIAGNOSIS — K74.60 LIVER CIRRHOSIS SECONDARY TO NASH: ICD-10-CM

## 2023-09-06 DIAGNOSIS — R73.03 PREDIABETES: ICD-10-CM

## 2023-09-06 DIAGNOSIS — Z28.21 PNEUMOCOCCAL VACCINE REFUSED: ICD-10-CM

## 2023-09-06 DIAGNOSIS — G89.4 CHRONIC PAIN SYNDROME: ICD-10-CM

## 2023-09-06 DIAGNOSIS — Z53.20 REFUSAL OF STATIN MEDICATION BY PATIENT: ICD-10-CM

## 2023-09-06 DIAGNOSIS — Z23 NEED FOR HEPATITIS A IMMUNIZATION: ICD-10-CM

## 2023-09-06 DIAGNOSIS — K76.6 PORTAL HYPERTENSION: ICD-10-CM

## 2023-09-06 RX ORDER — KETOCONAZOLE 20 MG/G
1 CREAM TOPICAL EVERY 12 HOURS SCHEDULED
Qty: 60 G | Refills: 0 | Status: SHIPPED | OUTPATIENT
Start: 2023-09-06

## 2023-09-06 RX ORDER — METHADONE HYDROCHLORIDE 5 MG/1
TABLET ORAL
Qty: 60 TABLET | Refills: 0 | Status: SHIPPED | OUTPATIENT
Start: 2023-09-06

## 2023-09-06 NOTE — PROGRESS NOTES
Subjective   Sterling Whaley is a 63 y.o. male.     History of Present Illness  Here for f/u on several chronic med problems.    At last visit, dose of lisinopril increased for BP not at goal. Taking as rx'd. Denies side effects. BP improved.    Dose of zoloft increased due to c/o worsened depression, anxiety. Feels it is helping. Denies SI.    Requests tx for persistent tinea corporis not responding to antifungal powder.    2nd Hep A vaccine due.    Followed by GI for LEE with cirrhosis, portal HTN. No recent changes to meds. Plts low, stable. No bleeding tendencies.    Has declined statin for HLP.    Chronic pain due ot severe C/T/L DDD/DJD. Stable dose of methadone for several years. Averaging 10 mg per day. No aberrant behavior. No recent injury. No change in symptoms recently. Denies side effects from medication.    The following portions of the patient's history were reviewed and updated as appropriate: allergies, current medications, past family history, past medical history, past social history, past surgical history, and problem list.    Review of Systems   Constitutional:  Positive for fatigue. Negative for fever and unexpected weight change.   HENT:  Positive for congestion, hearing loss (chronic), mouth sores (intermittent), postnasal drip and sinus pressure. Negative for drooling, facial swelling, nosebleeds, rhinorrhea, sore throat and trouble swallowing.    Eyes:  Negative for pain, discharge, redness and visual disturbance.   Respiratory:  Negative for cough, shortness of breath and wheezing.    Cardiovascular:  Negative for chest pain, palpitations and leg swelling.   Gastrointestinal:  Positive for nausea. Negative for abdominal pain, blood in stool, diarrhea and vomiting.   Endocrine: Positive for heat intolerance. Negative for polydipsia and polyuria.   Genitourinary:  Negative for dysuria and hematuria.   Musculoskeletal:  Positive for arthralgias, back pain, gait problem, joint swelling,  myalgias, neck pain and neck stiffness.   Skin:  Negative for rash and wound.   Neurological:  Positive for weakness, numbness and headaches. Negative for dizziness, tremors and syncope.   Hematological:  Negative for adenopathy. Does not bruise/bleed easily.   Psychiatric/Behavioral:  Positive for decreased concentration and dysphoric mood (mild). Negative for confusion, hallucinations, sleep disturbance and suicidal ideas. The patient is nervous/anxious.    Pt's previous ROS reviewed and updated as indicated.     Objective   Vitals:    09/06/23 0935   BP: 138/94   Pulse: 72   Temp: 97.9 °F (36.6 °C)   SpO2: 97%     Body mass index is 31.6 kg/m².      09/06/23 0935   Weight: 106 kg (233 lb)       Physical Exam  Vitals and nursing note reviewed.   Constitutional:       General: He is not in acute distress.     Appearance: He is well-developed and well-groomed. He is obese. He is not ill-appearing.   HENT:      Head: Atraumatic.      Mouth/Throat:      Mouth: Mucous membranes are moist. No oral lesions.   Eyes:      General: No scleral icterus.     Conjunctiva/sclera: Conjunctivae normal.      Pupils: Pupils are equal, round, and reactive to light.   Neck:      Thyroid: No thyroid mass.   Cardiovascular:      Rate and Rhythm: Normal rate and regular rhythm.      Pulses: Normal pulses.      Heart sounds: Normal heart sounds.   Pulmonary:      Effort: Pulmonary effort is normal.      Breath sounds: Normal breath sounds.   Abdominal:      General: Bowel sounds are normal. There is no distension.      Palpations: Abdomen is soft. There is no hepatomegaly, splenomegaly or mass.      Tenderness: There is no abdominal tenderness.   Musculoskeletal:      Cervical back: Spasms and bony tenderness present. Decreased range of motion.      Lumbar back: Spasms and tenderness present. Decreased range of motion.      Right lower leg: No edema.      Left lower leg: No edema.   Lymphadenopathy:      Cervical: No cervical adenopathy.    Skin:     General: Skin is warm and dry.      Coloration: Skin is not jaundiced or pale.      Findings: No bruising or rash.   Neurological:      Mental Status: He is alert and oriented to person, place, and time.      Sensory: Sensation is intact.      Motor: Motor function is intact.      Gait: Gait is intact.   Psychiatric:         Mood and Affect: Mood and affect normal.         Behavior: Behavior normal. Behavior is cooperative.         Cognition and Memory: Cognition normal.   Pt's previous physical exam reviewed and updated as indicated.      Assessment & Plan   Diagnoses and all orders for this visit:    1. Primary hypertension (Primary)  -     Comprehensive Metabolic Panel  -     CBC & Differential    2. Refusal of statin medication by patient    3. Mixed hyperlipidemia  -     Comprehensive Metabolic Panel  -     Lipid Panel    4. Thrombocytopenia  -     CBC & Differential    5. Influenza vaccine refused    6. Pneumococcal vaccine refused    7. Chronic pain syndrome  -     methadone (DOLOPHINE) 5 MG tablet; 1 tablet po up to qid.  Dispense: 60 tablet; Refill: 0    8. Depressive disorder    9. 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 Portal hypertension    11. Class 1 obesity due to excess calories with serious comorbidity and body mass index (BMI) of 31.0 to 31.9 in adult    12. Need for hepatitis A immunization  -     Hepatitis A Vaccine Adult IM    13. Screening for prostate cancer  -     PSA Screen    14. Abnormal blood chemistry  -     Hemoglobin A1c    15. Prediabetes  -     Comprehensive Metabolic Panel  -     Hemoglobin A1c    16. Tinea corporis  -     ketoconazole (NIZORAL) 2 % cream; Apply 1 application  topically to the appropriate area as directed Every 12 (Twelve) Hours.  Dispense: 60 g; Refill: 0       HTN improved - cont lisinopril 30 mg daily    HLP - declines statin, Pt advised to eat a heart healthy diet and get regular  aerobic exercise.    F/u with GI as scheduled    Stable chronic pain dx. Continue methadone 10 mg daily.  As part of patient's treatment plan I am prescribing a controlled substance.  The patient has been made aware of the appropriate use of such medications, including potential risk of somnolence, limited ability to drive and/or work safely, and potential for dependence and/or overdose.  It has also been made clear that these medications are for use by this patient only, without concomitant use of alcohol or other substances, unless prescribed.  History and physical exam exhibit continued safe and appropriate use of controlled substance.  CLAUDIO reviewed.  Patient has completed a prescribing agreement detailing terms of continued prescribing of controlled substances, including monitoring CLAUDIO reports, urine drug screening, and pill counts if necessary.  Patient is aware that inappropriate use will result in cessation of prescribing such medications.    Continue zoloft for depression.    Nutrition and activity goals reviewed including: mainly water to drink, limit white flour/processed sugar, higher lean protein, high fiber carbs, regular meals, working toward 150 mins cardio per week. Decreased caloric intake by 500 kcal per day (very high fat/sugar diet currently).    Routine f/u in 3 months, sooner as needed/instructed.  I will contact patient regarding test results and provide instructions regarding any necessary changes in plan of care.  Patient was encouraged to keep me informed of any acute changes, lack of improvement, or any new concerning symptoms.  Pt is aware of reasons to seek emergent care.  Patient voiced understanding of all instructions and denied further questions.    Please note that portions of this note may have been completed with a voice recognition program.

## 2023-09-07 LAB
ALBUMIN SERPL-MCNC: 3.9 G/DL (ref 3.9–4.9)
ALBUMIN/GLOB SERPL: 1.3 {RATIO} (ref 1.2–2.2)
ALP SERPL-CCNC: 77 IU/L (ref 44–121)
ALT SERPL-CCNC: 33 IU/L (ref 0–44)
AST SERPL-CCNC: 25 IU/L (ref 0–40)
BASOPHILS # BLD AUTO: 0 X10E3/UL (ref 0–0.2)
BASOPHILS NFR BLD AUTO: 0 %
BILIRUB SERPL-MCNC: 2 MG/DL (ref 0–1.2)
BUN SERPL-MCNC: 15 MG/DL (ref 8–27)
BUN/CREAT SERPL: 18 (ref 10–24)
CALCIUM SERPL-MCNC: 9 MG/DL (ref 8.6–10.2)
CHLORIDE SERPL-SCNC: 107 MMOL/L (ref 96–106)
CHOLEST SERPL-MCNC: 179 MG/DL (ref 100–199)
CO2 SERPL-SCNC: 25 MMOL/L (ref 20–29)
CREAT SERPL-MCNC: 0.84 MG/DL (ref 0.76–1.27)
EGFRCR SERPLBLD CKD-EPI 2021: 98 ML/MIN/1.73
EOSINOPHIL # BLD AUTO: 0.1 X10E3/UL (ref 0–0.4)
EOSINOPHIL NFR BLD AUTO: 1 %
ERYTHROCYTE [DISTWIDTH] IN BLOOD BY AUTOMATED COUNT: 13.5 % (ref 11.6–15.4)
GLOBULIN SER CALC-MCNC: 2.9 G/DL (ref 1.5–4.5)
GLUCOSE SERPL-MCNC: 112 MG/DL (ref 70–99)
HBA1C MFR BLD: 5.7 % (ref 4.8–5.6)
HCT VFR BLD AUTO: 38.5 % (ref 37.5–51)
HDLC SERPL-MCNC: 60 MG/DL
HGB BLD-MCNC: 13.4 G/DL (ref 13–17.7)
IMM GRANULOCYTES # BLD AUTO: 0 X10E3/UL (ref 0–0.1)
IMM GRANULOCYTES NFR BLD AUTO: 0 %
LDLC SERPL CALC-MCNC: 96 MG/DL (ref 0–99)
LYMPHOCYTES # BLD AUTO: 1.3 X10E3/UL (ref 0.7–3.1)
LYMPHOCYTES NFR BLD AUTO: 16 %
MCH RBC QN AUTO: 34.9 PG (ref 26.6–33)
MCHC RBC AUTO-ENTMCNC: 34.8 G/DL (ref 31.5–35.7)
MCV RBC AUTO: 100 FL (ref 79–97)
MONOCYTES # BLD AUTO: 0.5 X10E3/UL (ref 0.1–0.9)
MONOCYTES NFR BLD AUTO: 6 %
MORPHOLOGY BLD-IMP: ABNORMAL
NEUTROPHILS # BLD AUTO: 6.1 X10E3/UL (ref 1.4–7)
NEUTROPHILS NFR BLD AUTO: 77 %
PLATELET # BLD AUTO: 64 X10E3/UL (ref 150–450)
POTASSIUM SERPL-SCNC: 4.3 MMOL/L (ref 3.5–5.2)
PROT SERPL-MCNC: 6.8 G/DL (ref 6–8.5)
PSA SERPL-MCNC: 0.6 NG/ML (ref 0–4)
RBC # BLD AUTO: 3.84 X10E6/UL (ref 4.14–5.8)
SODIUM SERPL-SCNC: 143 MMOL/L (ref 134–144)
TRIGL SERPL-MCNC: 132 MG/DL (ref 0–149)
VLDLC SERPL CALC-MCNC: 23 MG/DL (ref 5–40)
WBC # BLD AUTO: 8 X10E3/UL (ref 3.4–10.8)

## 2023-10-11 ENCOUNTER — OFFICE VISIT (OUTPATIENT)
Dept: FAMILY MEDICINE CLINIC | Facility: CLINIC | Age: 63
End: 2023-10-11
Payer: COMMERCIAL

## 2023-10-11 ENCOUNTER — TELEPHONE (OUTPATIENT)
Dept: FAMILY MEDICINE CLINIC | Facility: CLINIC | Age: 63
End: 2023-10-11

## 2023-10-11 VITALS
DIASTOLIC BLOOD PRESSURE: 80 MMHG | HEIGHT: 72 IN | BODY MASS INDEX: 31.59 KG/M2 | WEIGHT: 233.2 LBS | HEART RATE: 72 BPM | TEMPERATURE: 98.4 F | OXYGEN SATURATION: 96 % | SYSTOLIC BLOOD PRESSURE: 132 MMHG

## 2023-10-11 DIAGNOSIS — G89.4 CHRONIC PAIN SYNDROME: ICD-10-CM

## 2023-10-11 DIAGNOSIS — H92.01 RIGHT EAR PAIN: ICD-10-CM

## 2023-10-11 DIAGNOSIS — H66.91 RIGHT OTITIS MEDIA, UNSPECIFIED OTITIS MEDIA TYPE: Primary | ICD-10-CM

## 2023-10-11 RX ORDER — METHYLPREDNISOLONE ACETATE 40 MG/ML
40 INJECTION, SUSPENSION INTRA-ARTICULAR; INTRALESIONAL; INTRAMUSCULAR; SOFT TISSUE ONCE
Status: COMPLETED | OUTPATIENT
Start: 2023-10-11 | End: 2023-10-11

## 2023-10-11 RX ORDER — METHADONE HYDROCHLORIDE 5 MG/1
TABLET ORAL
Qty: 60 TABLET | Refills: 0 | Status: SHIPPED | OUTPATIENT
Start: 2023-10-11

## 2023-10-11 RX ORDER — CEFTRIAXONE 1 G/1
1 INJECTION, POWDER, FOR SOLUTION INTRAMUSCULAR; INTRAVENOUS ONCE
Status: COMPLETED | OUTPATIENT
Start: 2023-10-11 | End: 2023-10-11

## 2023-10-11 RX ORDER — AMOXICILLIN AND CLAVULANATE POTASSIUM 875; 125 MG/1; MG/1
1 TABLET, FILM COATED ORAL EVERY 12 HOURS SCHEDULED
Qty: 14 TABLET | Refills: 0 | Status: SHIPPED | OUTPATIENT
Start: 2023-10-11 | End: 2023-10-18

## 2023-10-11 RX ADMIN — METHYLPREDNISOLONE ACETATE 40 MG: 40 INJECTION, SUSPENSION INTRA-ARTICULAR; INTRALESIONAL; INTRAMUSCULAR; SOFT TISSUE at 11:38

## 2023-10-11 RX ADMIN — CEFTRIAXONE 1 G: 1 INJECTION, POWDER, FOR SOLUTION INTRAMUSCULAR; INTRAVENOUS at 11:38

## 2023-10-11 NOTE — TELEPHONE ENCOUNTER
PATIENT SAID TO SEND DR. IVEY A NOTE THAT WHEN SHE HAS AN OPENING THAT SHE WANTS TO BE HER PATIENT ARABELLA HEATON GIRLFRIEND.

## 2023-10-11 NOTE — PROGRESS NOTES
Subjective   Sterling Whaley is a 63 y.o. male.     History of Present Illness  He presents with c/o persistent right ear pain/fullness, drainage from right ear for over 2 weeks. He has been using usual allergy/congestion meds without improvement. S/p T tube placement bilaterally.    Also concerned about possible worsened sleep apnea. Has previous dx but was not able to tolerate CPAP in past. Partner has noticed apneic spells, lous snoring, etc.    Chronic pain due ot severe C/T/L DDD/DJD. Stable dose of methadone for several years. Averaging 10 mg per day. No aberrant behavior. No recent injury. No change in symptoms recently. Denies side effects from medication. Due for routine refill.    The following portions of the patient's history were reviewed and updated as appropriate: allergies, current medications, past family history, past medical history, past social history, past surgical history, and problem list.    Review of Systems   Constitutional:  Positive for fatigue. Negative for fever and unexpected weight change.   HENT:  Positive for congestion, ear discharge, ear pain, hearing loss (chronic), mouth sores (intermittent), postnasal drip and sinus pressure. Negative for drooling, facial swelling, nosebleeds, rhinorrhea, sore throat and trouble swallowing.    Eyes:  Negative for pain, discharge, redness and visual disturbance.   Respiratory:  Negative for cough, shortness of breath and wheezing.    Cardiovascular:  Negative for chest pain, palpitations and leg swelling.   Gastrointestinal:  Positive for nausea. Negative for abdominal pain, blood in stool, diarrhea and vomiting.   Endocrine: Positive for heat intolerance. Negative for polydipsia and polyuria.   Genitourinary:  Negative for dysuria and hematuria.   Musculoskeletal:  Positive for arthralgias, back pain, gait problem, joint swelling, myalgias, neck pain and neck stiffness.   Skin:  Negative for rash and wound.   Neurological:  Positive for  weakness, numbness and headaches. Negative for dizziness, tremors and syncope.   Hematological:  Negative for adenopathy. Does not bruise/bleed easily.   Psychiatric/Behavioral:  Positive for decreased concentration and dysphoric mood (mild). Negative for confusion, hallucinations, sleep disturbance and suicidal ideas. The patient is nervous/anxious.    Pt's previous ROS reviewed and updated as indicated.       Objective   Vitals:    10/11/23 1055   BP: 132/80   Pulse: 72   Temp: 98.4 øF (36.9 øC)   SpO2: 96%     Body mass index is 31.63 kg/mý.      10/11/23  1055   Weight: 106 kg (233 lb 3.2 oz)         Physical Exam  Vitals and nursing note reviewed.   Constitutional:       General: He is not in acute distress.     Appearance: He is well-developed and well-groomed. He is obese. He is not ill-appearing.   HENT:      Head: Atraumatic.      Right Ear: Tenderness present. No drainage. A middle ear effusion is present. A PE tube is present. Tympanic membrane is erythematous.      Left Ear:  No middle ear effusion. A PE tube is present.      Nose: Congestion present. No rhinorrhea.      Right Sinus: Maxillary sinus tenderness present.      Mouth/Throat:      Mouth: Mucous membranes are moist. No oral lesions.      Pharynx: Oropharynx is clear.   Eyes:      General: No scleral icterus.     Conjunctiva/sclera: Conjunctivae normal.      Pupils: Pupils are equal, round, and reactive to light.   Neck:      Thyroid: No thyroid mass.   Cardiovascular:      Rate and Rhythm: Normal rate and regular rhythm.      Pulses: Normal pulses.      Heart sounds: Normal heart sounds.   Pulmonary:      Effort: Pulmonary effort is normal.      Breath sounds: Normal breath sounds.   Musculoskeletal:      Cervical back: Spasms and bony tenderness present. Decreased range of motion.      Lumbar back: Spasms and tenderness present. Decreased range of motion.      Right lower leg: No edema.      Left lower leg: No edema.   Lymphadenopathy:       Cervical: No cervical adenopathy.   Skin:     General: Skin is warm and dry.      Coloration: Skin is not jaundiced or pale.      Findings: No bruising or rash.   Neurological:      Mental Status: He is alert and oriented to person, place, and time.      Sensory: Sensation is intact.      Motor: Motor function is intact.      Gait: Gait is intact.   Psychiatric:         Mood and Affect: Mood and affect normal.         Behavior: Behavior normal. Behavior is cooperative.         Cognition and Memory: Cognition normal.     Pt's previous physical exam reviewed and updated as indicated.      Assessment & Plan   Diagnoses and all orders for this visit:    1. Right otitis media, unspecified otitis media type (Primary)  -     methylPREDNISolone acetate (DEPO-medrol) injection 40 mg  -     cefTRIAXone (ROCEPHIN) injection 1 g    2. Chronic pain syndrome  -     methadone (DOLOPHINE) 5 MG tablet; 1 tablet po up to qid.  Dispense: 60 tablet; Refill: 0    3. Right ear pain  -     methylPREDNISolone acetate (DEPO-medrol) injection 40 mg  -     cefTRIAXone (ROCEPHIN) injection 1 g    Other orders  -     amoxicillin-clavulanate (AUGMENTIN) 875-125 MG per tablet; Take 1 tablet by mouth Every 12 (Twelve) Hours for 7 days.  Dispense: 14 tablet; Refill: 0       Persistent right ear pain, right facial pain, right era drainage suspicious for sinusitis with assoc' otitis. POC tx as above, augmentin.     Multifactorial chronic pain currently stable. Continue current tx plan. Good functional improvement on average 10 mg daily total methadone.  As part of patient's treatment plan I am prescribing a controlled substance.  The patient has been made aware of the appropriate use of such medications, including potential risk of somnolence, limited ability to drive and/or work safely, and potential for dependence and/or overdose.  It has also been made clear that these medications are for use by this patient only, without concomitant use of  alcohol or other substances, unless prescribed.  History and physical exam exhibit continued safe and appropriate use of controlled substance.  CLAUDIO reviewed.  Patient has completed a prescribing agreement detailing terms of continued prescribing of controlled substances, including monitoring CLAUDIO reports, urine drug screening, and pill counts if necessary.  Patient is aware that inappropriate use will result in cessation of prescribing such medications.    F/u per routine, sooner as needed.  Patient was encouraged to keep me informed of any acute changes, lack of improvement, or any new concerning symptoms.  Pt is aware of reasons to seek emergent care.  Patient voiced understanding of all instructions and denied further questions.    Please note that portions of this note may have been completed with a voice recognition program.

## 2023-11-13 DIAGNOSIS — G89.4 CHRONIC PAIN SYNDROME: ICD-10-CM

## 2023-11-14 ENCOUNTER — TELEPHONE (OUTPATIENT)
Dept: FAMILY MEDICINE CLINIC | Facility: CLINIC | Age: 63
End: 2023-11-14
Payer: MEDICARE

## 2023-11-14 DIAGNOSIS — G47.33 OBSTRUCTIVE SLEEP APNEA SYNDROME: Primary | ICD-10-CM

## 2023-11-14 RX ORDER — METHADONE HYDROCHLORIDE 5 MG/1
5 TABLET ORAL 2 TIMES DAILY PRN
Qty: 60 TABLET | Refills: 0 | Status: SHIPPED | OUTPATIENT
Start: 2023-11-14

## 2023-11-14 NOTE — TELEPHONE ENCOUNTER
PATIENT CALLED SAYING HE WAS SUPPOSE TO HAVE A REFERRAL TO DR FUNG'S OFFICE TO GET A SLEEP STUDY DONE. HE SAID HE HAS SPOKEN TO PCP ABOUT THIS, SO SHE SHOULD BE AWARE OF IT. PLEASE ADVISE.

## 2023-11-14 NOTE — TELEPHONE ENCOUNTER
Rx Refill Note  Requested Prescriptions     Pending Prescriptions Disp Refills    methadone (DOLOPHINE) 5 MG tablet 60 tablet 0     Si tablet po up to qid.      Last office visit with prescribing clinician: 10/11/2023   Last telemedicine visit with prescribing clinician: Visit date not found   Next office visit with prescribing clinician: 2023                         Would you like a call back once the refill request has been completed: [] Yes [] No    If the office needs to give you a call back, can they leave a voicemail: [] Yes [] No    Peggy Franco MA  23, 12:47 EST

## 2023-11-30 NOTE — TELEPHONE ENCOUNTER
If he wants, he can come in for apt tomorrow during one of the same day slots. If he doesn't want to come early that's fine, I'll see him MOnday  
Left message for patient to call.   
PATIENT IS STATING THAT THE NEW MEDICATION FOR HIS BLOOD PRESSURE IS CAUSING HAND CRAMPS AND HEADACHES. PATIENT STATES THAT THESE WEIRD SIDE EFFECTS STARTED SINCE TAKING THE NEW MEDICATION. PATIENT STATED THAT HE WAS HAVING CRAMPS IN HIS LEGS BUT THEY HAVE SLOWED DOWN. PATIENT TELEPHONE NUMBER VERIFIED [phone]. PATIENT IS SCHEDULED FOR 09/21 BUT WOULD LIKE TO BE SEEN SOONER IF POSSIBLE.    PHARMACY VERIFIED [id number] - [place], [place] - [address] - [phone]  - [phone] FX         PLEASE ADVISE  
Patient stopped by for UDS, checked /88 today, states all the symptoms he is having started when he started taking the BP medication. Has appointment on Monday, do you want to see him sooner?  
Spoke with patient, states he will just keep his appointment for Monday. Advised patient to call if his symptoms worsen or go to urgent care if after hours, patient verbalized understanding.   
7 Days

## 2023-12-04 DIAGNOSIS — I10 ESSENTIAL HYPERTENSION: ICD-10-CM

## 2023-12-04 RX ORDER — LISINOPRIL 10 MG/1
TABLET ORAL
Qty: 270 TABLET | Refills: 0 | Status: SHIPPED | OUTPATIENT
Start: 2023-12-04

## 2023-12-13 ENCOUNTER — OFFICE VISIT (OUTPATIENT)
Dept: FAMILY MEDICINE CLINIC | Facility: CLINIC | Age: 63
End: 2023-12-13
Payer: MEDICARE

## 2023-12-13 VITALS
TEMPERATURE: 98.4 F | OXYGEN SATURATION: 97 % | WEIGHT: 238 LBS | HEART RATE: 71 BPM | HEIGHT: 72 IN | SYSTOLIC BLOOD PRESSURE: 138 MMHG | BODY MASS INDEX: 32.23 KG/M2 | DIASTOLIC BLOOD PRESSURE: 86 MMHG

## 2023-12-13 DIAGNOSIS — G89.4 CHRONIC PAIN SYNDROME: ICD-10-CM

## 2023-12-13 DIAGNOSIS — G47.33 OBSTRUCTIVE SLEEP APNEA SYNDROME: ICD-10-CM

## 2023-12-13 DIAGNOSIS — F41.1 GAD (GENERALIZED ANXIETY DISORDER): ICD-10-CM

## 2023-12-13 DIAGNOSIS — R10.13 DYSPEPSIA: Primary | ICD-10-CM

## 2023-12-13 PROCEDURE — 99214 OFFICE O/P EST MOD 30 MIN: CPT | Performed by: FAMILY MEDICINE

## 2023-12-13 PROCEDURE — 3079F DIAST BP 80-89 MM HG: CPT | Performed by: FAMILY MEDICINE

## 2023-12-13 PROCEDURE — 3075F SYST BP GE 130 - 139MM HG: CPT | Performed by: FAMILY MEDICINE

## 2023-12-13 PROCEDURE — 1159F MED LIST DOCD IN RCRD: CPT | Performed by: FAMILY MEDICINE

## 2023-12-13 PROCEDURE — 1160F RVW MEDS BY RX/DR IN RCRD: CPT | Performed by: FAMILY MEDICINE

## 2023-12-13 RX ORDER — METHADONE HYDROCHLORIDE 5 MG/1
5 TABLET ORAL 2 TIMES DAILY PRN
Qty: 60 TABLET | Refills: 0 | Status: SHIPPED | OUTPATIENT
Start: 2023-12-13

## 2023-12-13 RX ORDER — BETAMETHASONE DIPROPIONATE 0.05 %
1 OINTMENT (GRAM) TOPICAL DAILY
COMMUNITY
End: 2023-12-13

## 2023-12-13 RX ORDER — SERTRALINE HYDROCHLORIDE 25 MG/1
25 TABLET, FILM COATED ORAL DAILY
Qty: 10 TABLET | Refills: 0 | Status: SHIPPED | OUTPATIENT
Start: 2023-12-13

## 2023-12-13 RX ORDER — OMEPRAZOLE 40 MG/1
40 CAPSULE, DELAYED RELEASE ORAL DAILY
COMMUNITY

## 2023-12-13 NOTE — PROGRESS NOTES
Subjective   Sterling Whaley is a 63 y.o. male.     History of Present Illness  Primary hypertension  Ear Drainage (Patient states his ears are still feeling full and have pressure. Patient states he is taking the nasal spray during the day, but is feeling terrible upon waking as congestion returns. )  Nausea (First thing in the morning. Patient states he was diagnosed with hiatal hernia many years ago. )  Follow-up (Patient would like to ween off of sertraline)  Headache (Patient states he has been having increased headaches. Wife states patient stops breathing at night)    He presents today for routine f/u on several chronic med problems. History reviewed as provided to MA.    Taking lisinopril as rx'd for BP. Denies side effects. BP improved.     He wishes to wean of zoloft due to sexual side effects.    Followed by GI for LEE with cirrhosis, portal HTN. No recent changes to meds. Plts low, stable. No bleeding tendencies. Does c/o nausea, dyspepsia. Has h/o H pylori. No previous test for cure. Symptoms worse at night and early AM. Currently on prilosec.     Has declined statin for HLP.     Chronic pain due ot severe C/T/L DDD/DJD. Stable dose of methadone for several years. Averaging 10 mg per day. No aberrant behavior. No recent injury. No change in symptoms recently. Denies side effects from medication.     Suspected TRUDI. Does not have apt with sleep medicine until February. Wanting testing sooner.    The following portions of the patient's history were reviewed and updated as appropriate: allergies, current medications, past family history, past medical history, past social history, past surgical history, and problem list.    Review of Systems   Constitutional:  Positive for fatigue and unexpected weight change. Negative for fever.   HENT:  Positive for congestion, hearing loss (chronic), mouth sores (intermittent), postnasal drip and sinus pressure. Negative for drooling, facial swelling, nosebleeds,  rhinorrhea, sore throat and trouble swallowing.    Eyes:  Negative for pain, discharge, redness and visual disturbance.   Respiratory:  Negative for cough, shortness of breath and wheezing.    Cardiovascular:  Negative for chest pain, palpitations and leg swelling.   Gastrointestinal:  Positive for nausea. Negative for abdominal pain, blood in stool, diarrhea and vomiting.        As per HPI   Endocrine: Positive for heat intolerance. Negative for polydipsia and polyuria.   Genitourinary:  Negative for dysuria and hematuria.   Musculoskeletal:  Positive for arthralgias, back pain, gait problem, joint swelling, myalgias, neck pain and neck stiffness.   Skin:  Negative for rash and wound.   Neurological:  Positive for weakness, numbness and headaches. Negative for dizziness, tremors and syncope.   Hematological:  Negative for adenopathy. Does not bruise/bleed easily.   Psychiatric/Behavioral:  Positive for decreased concentration and dysphoric mood (mild). Negative for confusion, hallucinations, sleep disturbance and suicidal ideas. The patient is nervous/anxious.    Pt's previous ROS reviewed and updated as indicated.       Objective   Vitals:    12/13/23 1508   BP: 138/86   Pulse: 71   Temp: 98.4 °F (36.9 °C)   SpO2: 97%     Body mass index is 32.28 kg/m².      12/13/23  1508   Weight: 108 kg (238 lb)       Physical Exam  Vitals and nursing note reviewed.   Constitutional:       General: He is not in acute distress.     Appearance: He is well-developed and well-groomed. He is obese. He is not ill-appearing.   HENT:      Head: Atraumatic.      Mouth/Throat:      Mouth: Mucous membranes are moist.   Eyes:      General: No scleral icterus.     Conjunctiva/sclera: Conjunctivae normal.      Pupils: Pupils are equal, round, and reactive to light.   Neck:      Thyroid: No thyroid mass.   Cardiovascular:      Rate and Rhythm: Normal rate and regular rhythm.      Pulses: Normal pulses.      Heart sounds: Normal heart sounds.    Pulmonary:      Effort: Pulmonary effort is normal.      Breath sounds: Normal breath sounds.   Abdominal:      General: Bowel sounds are normal. There is no distension.      Palpations: Abdomen is soft. There is no mass.      Tenderness: There is no abdominal tenderness.   Musculoskeletal:      Cervical back: Spasms and bony tenderness present. Decreased range of motion.      Lumbar back: Spasms and tenderness present. Decreased range of motion.      Right lower leg: No edema.      Left lower leg: No edema.   Lymphadenopathy:      Cervical: No cervical adenopathy.   Skin:     General: Skin is warm and dry.      Coloration: Skin is not jaundiced or pale.      Findings: No bruising or rash.   Neurological:      Mental Status: He is alert and oriented to person, place, and time.      Gait: Gait is intact.   Psychiatric:         Mood and Affect: Mood and affect normal.         Behavior: Behavior normal. Behavior is cooperative.         Cognition and Memory: Cognition normal.     Pt's previous physical exam reviewed and updated as indicated.      Assessment & Plan   Diagnoses and all orders for this visit:    1. Dyspepsia (Primary)  -     H. Pylori Antigen, Stool - Stool, Per Rectum; Future    2. Chronic pain syndrome  -     methadone (DOLOPHINE) 5 MG tablet; Take 1 tablet by mouth 2 (Two) Times a Day As Needed for Severe Pain.  Dispense: 60 tablet; Refill: 0    3. TYSON (generalized anxiety disorder)  -     sertraline (ZOLOFT) 25 MG tablet; Take 1 tablet by mouth Daily.  Dispense: 10 tablet; Refill: 0       Weaning protocol for zoloft reviewed. Already down to 50 mg daily, rx for 25 mg provided for continue weaning.    Stable chronic pain sx. Good clinical benefit. No side effects reported. Continue current tx plan.  As part of patient's treatment plan I am prescribing a controlled substance.  The patient has been made aware of the appropriate use of such medications, including potential risk of somnolence, limited  ability to drive and/or work safely, and potential for dependence and/or overdose.  It has also been made clear that these medications are for use by this patient only, without concomitant use of alcohol or other substances, unless prescribed.  History and physical exam exhibit continued safe and appropriate use of controlled substance.  CLAUDIO reviewed.  Patient has completed a prescribing agreement detailing terms of continued prescribing of controlled substances, including monitoring CLAUDIO reports, urine drug screening, and pill counts if necessary.  Patient is aware that inappropriate use will result in cessation of prescribing such medications.    Continue PPI, H pylori test for cure. Tx as indicated. EGD up to date. On BB for varices assoc'd with cirrhosis.     Routine f/u in 3 months, sooner as needed/instructed.  I will contact patient regarding test results and provide instructions regarding any necessary changes in plan of care.  Patient was encouraged to keep me informed of any acute changes, lack of improvement, or any new concerning symptoms.  Pt is aware of reasons to seek emergent care.  Patient voiced understanding of all instructions and denied further questions.    Please note that portions of this note may have been completed with a voice recognition program.

## 2023-12-21 ENCOUNTER — OFFICE VISIT (OUTPATIENT)
Dept: FAMILY MEDICINE CLINIC | Facility: CLINIC | Age: 63
End: 2023-12-21
Payer: MEDICARE

## 2023-12-21 VITALS
DIASTOLIC BLOOD PRESSURE: 84 MMHG | OXYGEN SATURATION: 97 % | WEIGHT: 240 LBS | HEIGHT: 72 IN | HEART RATE: 69 BPM | SYSTOLIC BLOOD PRESSURE: 136 MMHG | TEMPERATURE: 97.5 F | BODY MASS INDEX: 32.51 KG/M2

## 2023-12-21 DIAGNOSIS — J02.9 ACUTE PHARYNGITIS, UNSPECIFIED ETIOLOGY: ICD-10-CM

## 2023-12-21 DIAGNOSIS — J30.9 ALLERGIC RHINITIS, UNSPECIFIED SEASONALITY, UNSPECIFIED TRIGGER: ICD-10-CM

## 2023-12-21 DIAGNOSIS — Z28.01 IMMUNIZATION NOT CARRIED OUT BECAUSE OF ACUTE ILLNESS: ICD-10-CM

## 2023-12-21 DIAGNOSIS — J06.9 VIRAL UPPER RESPIRATORY TRACT INFECTION: Primary | ICD-10-CM

## 2023-12-21 PROCEDURE — 3075F SYST BP GE 130 - 139MM HG: CPT | Performed by: FAMILY MEDICINE

## 2023-12-21 PROCEDURE — 1160F RVW MEDS BY RX/DR IN RCRD: CPT | Performed by: FAMILY MEDICINE

## 2023-12-21 PROCEDURE — 99213 OFFICE O/P EST LOW 20 MIN: CPT | Performed by: FAMILY MEDICINE

## 2023-12-21 PROCEDURE — 1159F MED LIST DOCD IN RCRD: CPT | Performed by: FAMILY MEDICINE

## 2023-12-21 PROCEDURE — 3079F DIAST BP 80-89 MM HG: CPT | Performed by: FAMILY MEDICINE

## 2023-12-21 RX ORDER — METHYLPREDNISOLONE 4 MG/1
TABLET ORAL
Qty: 21 TABLET | Refills: 0 | Status: SHIPPED | OUTPATIENT
Start: 2023-12-21

## 2023-12-21 NOTE — PROGRESS NOTES
"    Office Note     Name: Sterling Whaley  : 1960   MRN: [id number]     Chief Complaint:  congestion (Patient states symptoms began approximately 2 days ago. ), Sore Throat, Cough (Patient states this is productive. He states he is having yellow/green mucus. ), and Headache    Subjective     History of Present Illness:  Sterling Whaley is a 63 y.o. male who presents today for the above problems that have been ongoing for the last 2 days.  Denies fever, wheezing, shortness of breath, N/V/D.  Denies sick contacts.  Has been utilizing OTC medications without much effect.         I have reviewed the following portions of the patient's history and these were updated and discussed with the patient as appropriate: past family history, past medical history, past social history, past surgical history, and problem list.     Objective     Vital Signs  /84 (BP Location: Left arm, Patient Position: Sitting, Cuff Size: Adult)   Pulse 69   Temp 97.5 °F (36.4 °C) (Temporal)   Ht 182.9 cm (72\") Comment: patient stated  Wt 109 kg (240 lb)   SpO2 97%   BMI 32.55 kg/m²   Estimated body mass index is 32.55 kg/m² as calculated from the following:    Height as of this encounter: 182.9 cm (72\").    Weight as of this encounter: 109 kg (240 lb).    Physical Exam  Vitals reviewed.   Constitutional:       General: He is not in acute distress.     Appearance: Normal appearance. He is not ill-appearing or toxic-appearing.   HENT:      Head: Normocephalic.      Right Ear: Tympanic membrane, ear canal and external ear normal.      Left Ear: Tympanic membrane, ear canal and external ear normal.      Nose: Congestion present.      Mouth/Throat:      Mouth: Mucous membranes are moist.      Pharynx: Posterior oropharyngeal erythema present. No oropharyngeal exudate.      Comments: PND noted  Eyes:      Extraocular Movements: Extraocular movements intact.   Cardiovascular:      Rate and Rhythm: Normal rate and regular " rhythm.      Heart sounds: Normal heart sounds. No murmur heard.  Pulmonary:      Effort: Pulmonary effort is normal. No respiratory distress.      Breath sounds: Normal breath sounds. No stridor. No wheezing, rhonchi or rales.   Chest:      Chest wall: No tenderness.   Musculoskeletal:         General: Normal range of motion.      Cervical back: Normal range of motion and neck supple. No rigidity or tenderness.   Lymphadenopathy:      Cervical: Cervical adenopathy present.   Skin:     General: Skin is warm and dry.   Neurological:      Mental Status: He is alert and oriented to person, place, and time.   Psychiatric:         Mood and Affect: Mood normal.                      Assessment and Plan     Diagnoses and all orders for this visit:    1. Viral upper respiratory tract infection (Primary)  -     methylPREDNISolone (MEDROL) 4 MG dose pack; Take as directed on package instructions.  Dispense: 21 tablet; Refill: 0    2. Allergic rhinitis, unspecified seasonality, unspecified trigger    3. Immunization not carried out because of acute illness    4. Acute pharyngitis, unspecified etiology           Declined POC COVID/influenza test   Demonstrates hemodynamic stability  Sxs likely 2/2 viral infection  Will start short course of oral steroid taper to help with inflammation  Continue fluticasone nasal spray 2 sprays in each nostril daily. May obtain this OTC  Continue oral antihistamine such as fexofenadine, cetirizine, loratadine.  May obtain this OTC  Symptomatic treatment  Increase fluid intake      Discussion Summary:     Discussed plan of care in detail with patient today. Patient was encouraged to keep me informed of any acute changes, lack of improvement, or any new concerning symptoms.  Patient is also aware of reasons to seek emergent care. Patient verbalized understanding and agrees with plan of care.    This visit was billed based on MDM.    Follow Up  Return if symptoms worsen or fail to improve.    At  [facility], we believe that sharing information builds trust and better relationships. You are receiving this note because you recently visited [facility]. It is possible you will see health information before a provider has talked with you about it. This kind of information can be easy to misunderstand. To help you fully understand what it means for your health, we urge you to discuss this note with your provider.    Terrell Randolph MD, MPH  [facility]

## 2023-12-27 ENCOUNTER — OFFICE VISIT (OUTPATIENT)
Dept: FAMILY MEDICINE CLINIC | Facility: CLINIC | Age: 63
End: 2023-12-27
Payer: MEDICARE

## 2023-12-27 VITALS
WEIGHT: 240 LBS | DIASTOLIC BLOOD PRESSURE: 84 MMHG | BODY MASS INDEX: 32.51 KG/M2 | TEMPERATURE: 97.6 F | SYSTOLIC BLOOD PRESSURE: 130 MMHG | OXYGEN SATURATION: 95 % | HEART RATE: 67 BPM | HEIGHT: 72 IN | RESPIRATION RATE: 16 BRPM

## 2023-12-27 DIAGNOSIS — J06.9 ACUTE URI: Primary | ICD-10-CM

## 2023-12-27 DIAGNOSIS — R11.2 NAUSEA AND VOMITING, UNSPECIFIED VOMITING TYPE: ICD-10-CM

## 2023-12-27 RX ORDER — CEFTRIAXONE 1 G/1
1 INJECTION, POWDER, FOR SOLUTION INTRAMUSCULAR; INTRAVENOUS ONCE
Status: COMPLETED | OUTPATIENT
Start: 2023-12-27 | End: 2023-12-27

## 2023-12-27 RX ORDER — ONDANSETRON 4 MG/1
4-8 TABLET, ORALLY DISINTEGRATING ORAL EVERY 8 HOURS PRN
Qty: 30 TABLET | Refills: 1 | Status: SHIPPED | OUTPATIENT
Start: 2023-12-27

## 2023-12-27 RX ORDER — PROMETHAZINE HYDROCHLORIDE 25 MG/1
12.5-25 TABLET ORAL EVERY 6 HOURS PRN
Qty: 20 TABLET | Refills: 1 | Status: SHIPPED | OUTPATIENT
Start: 2023-12-27

## 2023-12-27 RX ADMIN — CEFTRIAXONE 1 G: 1 INJECTION, POWDER, FOR SOLUTION INTRAMUSCULAR; INTRAVENOUS at 16:22

## 2023-12-27 NOTE — PROGRESS NOTES
"                      Established Patient        Chief Complaint:   Chief Complaint   Patient presents with    Cough    Nausea    Fatigue     Pt is coughing up green junk and making him sick to his stomach. Pt was here last week and had some medicine but is not helping.            History of Present Illness:    Sterling Whaley is a 63 y.o. male who presents today for complaints of worsening congestion, cough, flu-like symptoms.     Diagnosed with COVID previously.     Felt better for about 1 day and then started feeling worse again, thick green congestion, vertigo, nausea, vomiting, weakness    Eating, drinking, adequate UOP.     Subjective     The following portions of the patient's history were reviewed and updated as appropriate: allergies, current medications, past family history, past medical history, past social history, past surgical history and problem list.    ALLERGIES  Allergies   Allergen Reactions    Meperidine Unknown - High Severity     claustraphobic       ROS  Review of Systems   Constitutional:  Positive for chills and fatigue. Negative for fever.   HENT:  Positive for congestion and sore throat.    Respiratory:  Positive for cough.    Gastrointestinal:  Positive for diarrhea, nausea and vomiting.   Musculoskeletal:  Positive for myalgias.   Neurological:  Positive for dizziness, weakness and headaches.   Psychiatric/Behavioral:  Positive for sleep disturbance.        Objective     Vital Signs:   /84   Pulse 67   Temp 97.6 °F (36.4 °C)   Resp 16   Ht 182.9 cm (72\")   Wt 109 kg (240 lb)   SpO2 95%   BMI 32.55 kg/m²             Physical Exam   Physical Exam  Vitals and nursing note reviewed.   HENT:      Right Ear: A middle ear effusion is present.      Left Ear: A middle ear effusion is present.      Nose: Mucosal edema and congestion present.      Right Turbinates: Swollen.      Left Turbinates: Swollen.      Mouth/Throat:      Pharynx: Posterior oropharyngeal erythema present. "   Cardiovascular:      Rate and Rhythm: Normal rate and regular rhythm.   Pulmonary:      Effort: Pulmonary effort is normal.      Breath sounds: Normal breath sounds.   Abdominal:      General: Bowel sounds are normal.      Palpations: Abdomen is soft.   Neurological:      Mental Status: He is alert and oriented to person, place, and time.   Psychiatric:         Behavior: Behavior normal.         Assessment and Plan      Assessment/Plan:   Diagnoses and all orders for this visit:    1. Acute URI (Primary)  -     cefTRIAXone (ROCEPHIN) injection 1 g    2. Nausea and vomiting, unspecified vomiting type  -     ondansetron ODT (ZOFRAN-ODT) 4 MG disintegrating tablet; Place 1-2 tablets on the tongue Every 8 (Eight) Hours As Needed for Nausea or Vomiting.  Dispense: 30 tablet; Refill: 1  -     promethazine (PHENERGAN) 25 MG tablet; Take 0.5-1 tablets by mouth Every 6 (Six) Hours As Needed for Nausea or Vomiting.  Dispense: 20 tablet; Refill: 1    Risks, benefits, and potential side effects of current/new medications reviewed with patient.  Patient voiced understanding and wished to proceed with treatment.    Tylenol every 6-8 hours PRN for pain, fever > 100.4.    Patient was encouraged to keep me informed of any acute changes, lack of improvement, or any new concerning symptoms.    Patient voiced understanding of all instructions and denied further questions.      I have reviewed and updated all copied forward information, as appropriate.  I attest to the accuracy and relevance of any unchanged information.      Follow up:  Return if symptoms worsen or fail to improve.     Patient Education:  There are no Patient Instructions on file for this visit.    BARBARA Van  01/01/24  20:23 EST          Please note that portions of this note may have been completed with a voice recognition program.

## 2023-12-29 ENCOUNTER — CLINICAL SUPPORT (OUTPATIENT)
Dept: FAMILY MEDICINE CLINIC | Facility: CLINIC | Age: 63
End: 2023-12-29
Payer: MEDICARE

## 2023-12-29 DIAGNOSIS — J06.9 VIRAL UPPER RESPIRATORY TRACT INFECTION: Primary | ICD-10-CM

## 2023-12-29 PROCEDURE — 96372 THER/PROPH/DIAG INJ SC/IM: CPT | Performed by: FAMILY MEDICINE

## 2023-12-29 RX ORDER — CEFTRIAXONE 1 G/1
1 INJECTION, POWDER, FOR SOLUTION INTRAMUSCULAR; INTRAVENOUS ONCE
Status: COMPLETED | OUTPATIENT
Start: 2023-12-29 | End: 2023-12-29

## 2023-12-29 RX ADMIN — CEFTRIAXONE 1 G: 1 INJECTION, POWDER, FOR SOLUTION INTRAMUSCULAR; INTRAVENOUS at 14:07

## 2024-01-09 ENCOUNTER — TELEPHONE (OUTPATIENT)
Dept: FAMILY MEDICINE CLINIC | Facility: CLINIC | Age: 64
End: 2024-01-09
Payer: MEDICARE

## 2024-01-09 DIAGNOSIS — G47.33 SEVERE OBSTRUCTIVE SLEEP APNEA: Primary | ICD-10-CM

## 2024-01-10 RX ORDER — PROPRANOLOL HYDROCHLORIDE 10 MG/1
10 TABLET ORAL 2 TIMES DAILY
Qty: 60 TABLET | Refills: 5 | Status: SHIPPED | OUTPATIENT
Start: 2024-01-10

## 2024-01-10 NOTE — TELEPHONE ENCOUNTER
PT DOES NOT USE MYCHART REGULARLY    Inform pt his sleep study confirmed severe sleep apnea. I have placed order for him to get set up with CPAP equipment.    He still needs to f/u with sleep medicine as scheduled as they will be managing his sleep apnea long term.  
Pt notified  
No difficulties

## 2024-01-16 DIAGNOSIS — G89.4 CHRONIC PAIN SYNDROME: ICD-10-CM

## 2024-01-16 NOTE — TELEPHONE ENCOUNTER
Caller: Sterling Whaley    Relationship: Self    Best call back number: [phone]     Requested Prescriptions:   Requested Prescriptions     Pending Prescriptions Disp Refills    methadone (DOLOPHINE) 5 MG tablet 60 tablet 0     Sig: Take 1 tablet by mouth 2 (Two) Times a Day As Needed for Severe Pain.        Pharmacy where request should be sent: [facility] - [place], [place] - [facility] [address] - [phone]  - [phone]      Last office visit with prescribing clinician: 12/13/2023   Last telemedicine visit with prescribing clinician: Visit date not found   Next office visit with prescribing clinician: 3/13/2024     Additional details provided by patient: PATIENT HAS LESS THAN 2 DAYS OF THIS MEDICATION        Does the patient have less than a 3 day supply:  [x] Yes  [] No    Would you like a call back once the refill request has been completed: [] Yes [x] No    If the office needs to give you a call back, can they leave a voicemail: [] Yes [x] No    Jayashree May Rep   01/16/24 10:02 EST

## 2024-01-17 RX ORDER — METHADONE HYDROCHLORIDE 5 MG/1
5 TABLET ORAL 2 TIMES DAILY PRN
Qty: 60 TABLET | Refills: 0 | Status: SHIPPED | OUTPATIENT
Start: 2024-01-17

## 2024-01-17 NOTE — TELEPHONE ENCOUNTER
Rx Refill Note  Requested Prescriptions     Pending Prescriptions Disp Refills    methadone (DOLOPHINE) 5 MG tablet 60 tablet 0     Sig: Take 1 tablet by mouth 2 (Two) Times a Day As Needed for Severe Pain.      Last office visit with prescribing clinician: 12/13/2023   Last telemedicine visit with prescribing clinician: Visit date not found   Next office visit with prescribing clinician: 3/13/2024                         Would you like a call back once the refill request has been completed: [] Yes [] No    If the office needs to give you a call back, can they leave a voicemail: [] Yes [] No    Gemini Mae MA  01/17/24, 08:28 EST

## 2024-02-07 ENCOUNTER — PATIENT ROUNDING (BHMG ONLY) (OUTPATIENT)
Dept: PULMONOLOGY | Facility: CLINIC | Age: 64
End: 2024-02-07
Payer: MEDICARE

## 2024-02-07 ENCOUNTER — OFFICE VISIT (OUTPATIENT)
Dept: PULMONOLOGY | Facility: CLINIC | Age: 64
End: 2024-02-07
Payer: MEDICARE

## 2024-02-07 VITALS
SYSTOLIC BLOOD PRESSURE: 142 MMHG | RESPIRATION RATE: 18 BRPM | HEART RATE: 62 BPM | WEIGHT: 245 LBS | DIASTOLIC BLOOD PRESSURE: 82 MMHG | BODY MASS INDEX: 33.18 KG/M2 | OXYGEN SATURATION: 95 % | HEIGHT: 72 IN

## 2024-02-07 DIAGNOSIS — E66.9 OBESITY (BMI 30-39.9): ICD-10-CM

## 2024-02-07 DIAGNOSIS — G47.31 CENTRAL SLEEP APNEA: ICD-10-CM

## 2024-02-07 DIAGNOSIS — F51.5 NIGHTMARES: ICD-10-CM

## 2024-02-07 DIAGNOSIS — G47.19 EXCESSIVE DAYTIME SLEEPINESS: ICD-10-CM

## 2024-02-07 DIAGNOSIS — R06.83 SNORING: ICD-10-CM

## 2024-02-07 DIAGNOSIS — G47.8 SLEEP TALKING: ICD-10-CM

## 2024-02-07 DIAGNOSIS — G47.33 OBSTRUCTIVE SLEEP APNEA: Primary | ICD-10-CM

## 2024-02-07 PROCEDURE — 3077F SYST BP >= 140 MM HG: CPT | Performed by: INTERNAL MEDICINE

## 2024-02-07 PROCEDURE — 3079F DIAST BP 80-89 MM HG: CPT | Performed by: INTERNAL MEDICINE

## 2024-02-07 PROCEDURE — 99204 OFFICE O/P NEW MOD 45 MIN: CPT | Performed by: INTERNAL MEDICINE

## 2024-02-07 NOTE — PROGRESS NOTES
CONSULT NOTE    Requested by:   Annel Armstrong MD Little, Sarah I, MD      Chief Complaint   Patient presents with    Sleeping Problem    Consult       Subjective:  Sterling Whaley is a 63 y.o. male.   Patient came in today for evaluation of possible sleep apnea. Patient says that for the past few years he snores loudly     he has woken up in the middle of the night gasping for breath and sometimes with a choking sensation. Also, the patient's family notes that he has occasional pauses in the breathing.     he feels that he doesn't get restful night sleep and his quality has diminished considerably. he does feel sleepy watching TV.      he is complaining of occasional headaches.     Patient mentions having occasional nights when he has nightmares & when he sleep talks.     Patient suffers from hypertension.          The following portions of the patient's history were reviewed and updated as appropriate: allergies, current medications, past family history, past medical history, past social history, and past surgical history.    Review of Systems   HENT:  Positive for sinus pressure. Negative for sneezing and sore throat.    Respiratory:  Negative for cough, chest tightness, shortness of breath and wheezing.    Cardiovascular:  Negative for palpitations and leg swelling.   Psychiatric/Behavioral:  Positive for sleep disturbance.    All other systems reviewed and are negative.      Past Medical History:   Diagnosis Date    Anxiety     Arthritis     Bilateral carpal tunnel syndrome     Depression     Elevated cholesterol     Esophageal reflux     Fatty liver     Gastritis     H/O pulmonary function tests 09/13/2012    normal    Headache     Hiatal hernia     History of depression     History of meniscal tear     History of renal calculi     HL (hearing loss) Don’t know    Hyperbilirubinemia     [place] syndrome    Hypertension     Kidney stone     Liver disease Two months ago    Low back pain Years     "Obstructive sleep apnea     Plantar fascia rupture     Renal calculi     RLS (restless legs syndrome)     Schatzki's ring     Sinus problem     Sleep apnea     no cpap    Snores     TMJ pain dysfunction syndrome     Visual impairment At 32    Wears glasses        Social History     Tobacco Use    Smoking status: Never     Passive exposure: Never    Smokeless tobacco: Never   Substance Use Topics    Alcohol use: Never         Objective:  Visit Vitals  /82   Pulse 62   Resp 18   Ht 182.9 cm (72\") Comment: pt reported   Wt 111 kg (245 lb)   SpO2 95%   BMI 33.23 kg/m²       Physical Exam  Vitals reviewed.   Constitutional:       Appearance: He is well-developed.   HENT:      Head: Atraumatic.      Mouth/Throat:      Comments: Oropharynx was crowded.  Pulmonary:      Effort: Pulmonary effort is normal. No respiratory distress.   Musculoskeletal:      Comments: Gait was normal.   Neurological:      Mental Status: He is alert and oriented to person, place, and time.           Assessment/Plan:  Diagnoses and all orders for this visit:    1. Obstructive sleep apnea (Primary)  -     Polysomnography 4 or More Parameters With CPAP; Future    2. Snoring  -     Polysomnography 4 or More Parameters With CPAP; Future    3. Excessive daytime sleepiness  -     Polysomnography 4 or More Parameters With CPAP; Future    4. Obesity (BMI 30-39.9)  -     Polysomnography 4 or More Parameters With CPAP; Future    5. Nightmares  -     Polysomnography 4 or More Parameters With CPAP; Future    6. Sleep talking  -     Polysomnography 4 or More Parameters With CPAP; Future    7. Central sleep apnea  -     Polysomnography 4 or More Parameters With CPAP; Future        Return in about 4 months (around 5/30/2024) for SleepONLY/Ariella, Overbook, ....Also 10 mths w/ Dr. Adkins.    DISCUSSION(if any):  Laboratory workup was also reviewed which showed   Lab Results   Component Value Date    CO2 25 09/06/2023     Laboratory workup also showed   Lab " Results   Component Value Date    HGB 13.4 09/06/2023    HGB 12.5 (L) 02/13/2023    HGB 14.0 01/19/2023   ,   Lab Results   Component Value Date    HCT 38.5 09/06/2023    HCT 35.8 (L) 02/13/2023    HCT 39.4 01/19/2023   ,   Lab Results   Component Value Date    TSH 2.300 10/19/2022    TSH 2.200 06/08/2021    TSH 2.610 03/05/2020     Referring physicians office note was also reviewed that did mention sleep apnea    ===========================  ===========================    I was able to review the results of last home sleep study in detail. It was performed in Jan 2024. I informed him that the apnea hypopnea index was 30.6/hour.  Central AHI was reported as 14/hour.    I told the patient that based on the fact that he has been on methadone, home sleep study might not have been the best option but since it has already been performed and confirms significant sleep apnea with a component of central events, he will definitely need to undergo a full night titration study to identify the best possible treatment options available to him.    Vaccination status addressed.    Declines influenza vaccinations.     Declines pneumonia vaccinations.       Dictated utilizing Dragon dictation.    This document was electronically signed by Mayela Adkins MD on 02/07/24 at 11:09 EST

## 2024-02-12 ENCOUNTER — TELEPHONE (OUTPATIENT)
Dept: FAMILY MEDICINE CLINIC | Facility: CLINIC | Age: 64
End: 2024-02-12

## 2024-02-12 NOTE — TELEPHONE ENCOUNTER
Provider: DR IVEY    Caller: GABE METZ    Relationship to Patient: SELF    Pharmacy: [facility]    Phone Number: [phone]     Reason for Call: THE PATIENT CALLED TO REQUEST ALL FUTURE REFILLS BE CALLED INTO [facility].

## 2024-02-14 DIAGNOSIS — G89.4 CHRONIC PAIN SYNDROME: ICD-10-CM

## 2024-02-15 RX ORDER — METHADONE HYDROCHLORIDE 5 MG/1
5 TABLET ORAL 2 TIMES DAILY PRN
Qty: 60 TABLET | Refills: 0 | Status: SHIPPED | OUTPATIENT
Start: 2024-02-15

## 2024-02-19 ENCOUNTER — HOSPITAL ENCOUNTER (OUTPATIENT)
Dept: SLEEP MEDICINE | Facility: HOSPITAL | Age: 64
Discharge: HOME | End: 2024-02-19
Admitting: INTERNAL MEDICINE
Payer: MEDICARE

## 2024-02-19 DIAGNOSIS — G47.33 OBSTRUCTIVE SLEEP APNEA: ICD-10-CM

## 2024-02-19 DIAGNOSIS — G47.31 CENTRAL SLEEP APNEA: ICD-10-CM

## 2024-02-19 DIAGNOSIS — E66.9 OBESITY (BMI 30-39.9): ICD-10-CM

## 2024-02-19 DIAGNOSIS — F51.5 NIGHTMARES: ICD-10-CM

## 2024-02-19 DIAGNOSIS — R06.83 SNORING: ICD-10-CM

## 2024-02-19 DIAGNOSIS — G47.19 EXCESSIVE DAYTIME SLEEPINESS: ICD-10-CM

## 2024-02-19 DIAGNOSIS — G47.8 SLEEP TALKING: ICD-10-CM

## 2024-02-19 PROCEDURE — 95811 POLYSOM 6/>YRS CPAP 4/> PARM: CPT

## 2024-02-20 PROCEDURE — 95811 POLYSOM 6/>YRS CPAP 4/> PARM: CPT | Performed by: INTERNAL MEDICINE

## 2024-02-21 DIAGNOSIS — G47.33 OSA (OBSTRUCTIVE SLEEP APNEA): ICD-10-CM

## 2024-02-21 DIAGNOSIS — G47.31 CSA (CENTRAL SLEEP APNEA): Primary | ICD-10-CM

## 2024-03-05 RX ORDER — OMEPRAZOLE 40 MG/1
40 CAPSULE, DELAYED RELEASE ORAL DAILY
Qty: 90 CAPSULE | Refills: 1 | Status: SHIPPED | OUTPATIENT
Start: 2024-03-05

## 2024-03-13 DIAGNOSIS — I10 ESSENTIAL HYPERTENSION: ICD-10-CM

## 2024-03-13 RX ORDER — LISINOPRIL 10 MG/1
TABLET ORAL
Qty: 270 TABLET | Refills: 0 | Status: SHIPPED | OUTPATIENT
Start: 2024-03-13

## 2024-03-19 DIAGNOSIS — G89.4 CHRONIC PAIN SYNDROME: ICD-10-CM

## 2024-03-19 NOTE — TELEPHONE ENCOUNTER
Caller: Sterling Whaley    Relationship: Self    Best call back number: [phone]    Requested Prescriptions:   Requested Prescriptions     Pending Prescriptions Disp Refills    methadone (DOLOPHINE) 5 MG tablet 60 tablet 0     Sig: Take 1 tablet by mouth 2 (Two) Times a Day As Needed for Severe Pain.        Pharmacy where request should be sent: [facility] [id number] [facility] [address] [facility] AT [facility] [phone] [facility] [phone]      Last office visit with prescribing clinician: 12/13/2023   Last telemedicine visit with prescribing clinician: Visit date not found   Next office visit with prescribing clinician: 3/21/2024     Additional details provided by patient: COMPLETELY OUT    Does the patient have less than a 3 day supply:  [x] Yes  [] No    Would you like a call back once the refill request has been completed: [] Yes [x] No    If the office needs to give you a call back, can they leave a voicemail: [] Yes [x] No    Jayashree Gonzalez Rep   03/19/24 14:15 EDT

## 2024-03-20 RX ORDER — METHADONE HYDROCHLORIDE 5 MG/1
5 TABLET ORAL 2 TIMES DAILY PRN
Qty: 60 TABLET | Refills: 0 | Status: SHIPPED | OUTPATIENT
Start: 2024-03-20

## 2024-03-20 NOTE — TELEPHONE ENCOUNTER
Rx Refill Note  Requested Prescriptions     Pending Prescriptions Disp Refills    methadone (DOLOPHINE) 5 MG tablet 60 tablet 0     Sig: Take 1 tablet by mouth 2 (Two) Times a Day As Needed for Severe Pain.      Last office visit with prescribing clinician: 12/13/2023   Last telemedicine visit with prescribing clinician: Visit date not found   Next office visit with prescribing clinician: 3/21/2024                         Would you like a call back once the refill request has been completed: [] Yes [] No    If the office needs to give you a call back, can they leave a voicemail: [] Yes [] No    Pepper Handley MA  03/20/24, 07:02 EDT

## 2024-03-21 ENCOUNTER — OFFICE VISIT (OUTPATIENT)
Dept: FAMILY MEDICINE CLINIC | Facility: CLINIC | Age: 64
End: 2024-03-21
Payer: MEDICARE

## 2024-03-21 VITALS
HEIGHT: 71 IN | DIASTOLIC BLOOD PRESSURE: 100 MMHG | SYSTOLIC BLOOD PRESSURE: 162 MMHG | WEIGHT: 246.4 LBS | BODY MASS INDEX: 34.5 KG/M2 | HEART RATE: 60 BPM | OXYGEN SATURATION: 98 %

## 2024-03-21 DIAGNOSIS — I10 PRIMARY HYPERTENSION: Primary | ICD-10-CM

## 2024-03-21 DIAGNOSIS — M72.2 PLANTAR FASCIITIS OF RIGHT FOOT: ICD-10-CM

## 2024-03-21 DIAGNOSIS — K74.60 LIVER CIRRHOSIS SECONDARY TO NASH: ICD-10-CM

## 2024-03-21 DIAGNOSIS — K75.81 LIVER CIRRHOSIS SECONDARY TO NASH: ICD-10-CM

## 2024-03-21 DIAGNOSIS — R73.03 PREDIABETES: ICD-10-CM

## 2024-03-21 DIAGNOSIS — G47.33 SEVERE OBSTRUCTIVE SLEEP APNEA: ICD-10-CM

## 2024-03-21 DIAGNOSIS — I10 ESSENTIAL HYPERTENSION: ICD-10-CM

## 2024-03-21 DIAGNOSIS — E78.2 MIXED HYPERLIPIDEMIA: ICD-10-CM

## 2024-03-21 DIAGNOSIS — G89.4 CHRONIC PAIN SYNDROME: ICD-10-CM

## 2024-03-21 RX ORDER — CARVEDILOL PHOSPHATE 10 MG/1
10 CAPSULE, EXTENDED RELEASE ORAL DAILY
Qty: 90 CAPSULE | Refills: 3 | Status: SHIPPED | OUTPATIENT
Start: 2024-03-21

## 2024-03-21 RX ORDER — LISINOPRIL 10 MG/1
30 TABLET ORAL DAILY
Qty: 270 TABLET | Refills: 0 | Status: SHIPPED | OUTPATIENT
Start: 2024-03-21

## 2024-03-21 RX ORDER — FLUTICASONE PROPIONATE 50 MCG
2 SPRAY, SUSPENSION (ML) NASAL DAILY PRN
Qty: 16 G | Refills: 2 | Status: SHIPPED | OUTPATIENT
Start: 2024-03-21

## 2024-03-21 NOTE — PROGRESS NOTES
Subjective   Sterling Whaley is a 63 y.o. male.     History of Present Illness  Here for routine f/u on several chronic med problems.    HTN - BP noted to be high today. Currently on lisinopril and propranolol (due to cirrhosis for esophageal varices prevention).    HLP - not currently on statin as he has declined    Recently found to have severe TRUDI - working on using CPAP, followed by sleep medicine.    preDM, LEE with cirrhosis, obesity - struggling with caloric restriction to lose weight.    Chronic pain due to severe C/T/L DDD/DJD. Has been on stable dose of methadone for several years. Averaging 10 mg per day. No aberrant behavior. No recent injury. No change in symptoms recently. Denies side effects from medication. Interested in trying to wean off.    H/o severe plantar fascitis, severe on left with h/o tear. Worse now on right.    The following portions of the patient's history were reviewed and updated as appropriate: allergies, current medications, past family history, past medical history, past social history, past surgical history, and problem list.    Review of Systems   Constitutional:  Positive for fatigue and unexpected weight change. Negative for fever.   HENT:  Positive for congestion, hearing loss (chronic), mouth sores (intermittent), postnasal drip and sinus pressure. Negative for drooling, facial swelling, nosebleeds, rhinorrhea, sore throat and trouble swallowing.    Eyes:  Negative for pain, discharge, redness and visual disturbance.   Respiratory:  Negative for cough, shortness of breath and wheezing.    Cardiovascular:  Negative for chest pain, palpitations and leg swelling.   Gastrointestinal:  Positive for nausea. Negative for abdominal pain, blood in stool, diarrhea and vomiting.   Endocrine: Positive for heat intolerance. Negative for polydipsia and polyuria.   Genitourinary:  Negative for dysuria and hematuria.   Musculoskeletal:  Positive for arthralgias, back pain, gait  problem, joint swelling, myalgias, neck pain and neck stiffness.   Skin:  Negative for rash and wound.   Neurological:  Positive for weakness, numbness and headaches. Negative for dizziness, tremors and syncope.   Hematological:  Negative for adenopathy. Does not bruise/bleed easily.   Psychiatric/Behavioral:  Positive for decreased concentration and dysphoric mood (mild). Negative for confusion, hallucinations, sleep disturbance and suicidal ideas. The patient is nervous/anxious.    Pt's previous ROS reviewed and updated as indicated.       Objective   Vitals:    03/21/24 0840   BP: 162/100   Pulse: 60   SpO2: 98%     Body mass index is 34.37 kg/m².      03/21/24  0840   Weight: 112 kg (246 lb 6.4 oz)       Physical Exam  Vitals and nursing note reviewed.   Constitutional:       General: He is not in acute distress.     Appearance: He is well-groomed. He is obese. He is not ill-appearing.   HENT:      Head: Atraumatic.      Mouth/Throat:      Mouth: Mucous membranes are moist.   Eyes:      General: Scleral icterus (mild) present.      Conjunctiva/sclera: Conjunctivae normal.      Pupils: Pupils are equal, round, and reactive to light.   Neck:      Thyroid: No thyroid mass.   Cardiovascular:      Rate and Rhythm: Normal rate and regular rhythm.      Pulses: Normal pulses.      Heart sounds: Normal heart sounds.   Pulmonary:      Effort: Pulmonary effort is normal.      Breath sounds: Normal breath sounds.   Abdominal:      General: Bowel sounds are normal. There is no distension.      Palpations: Abdomen is soft. There is no mass.      Tenderness: There is no abdominal tenderness.   Musculoskeletal:      Cervical back: Spasms and bony tenderness present. Decreased range of motion.      Lumbar back: Spasms and tenderness present. Decreased range of motion.      Right lower leg: No edema.      Left lower leg: No edema.   Lymphadenopathy:      Cervical: No cervical adenopathy.   Skin:     General: Skin is warm and dry.       Coloration: Skin is not jaundiced or pale.      Findings: No bruising or rash.   Neurological:      Mental Status: He is alert and oriented to person, place, and time.      Gait: Gait is intact.   Psychiatric:         Mood and Affect: Mood and affect normal.         Behavior: Behavior normal. Behavior is cooperative.         Cognition and Memory: Cognition normal.     Pt's previous physical exam reviewed and updated as indicated.      Assessment & Plan   Diagnoses and all orders for this visit:    1. Primary hypertension (Primary)  -     carvedilol CR (Coreg CR) 10 MG 24 hr capsule; Take 1 capsule by mouth Daily.  Dispense: 90 capsule; Refill: 3    2. Mixed hyperlipidemia    3. Severe obstructive sleep apnea    4. Chronic pain syndrome    5. 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 Essential hypertension  -     lisinopril (PRINIVIL,ZESTRIL) 10 MG tablet; Take 3 tablets by mouth Daily.  Dispense: 270 tablet; Refill: 0    7. Prediabetes  -     Hemoglobin A1c    8. Plantar fasciitis of right foot    Other orders  -     fluticasone (FLONASE) 50 MCG/ACT nasal spray; 2 sprays into the nostril(s) as directed by provider Daily As Needed for Rhinitis.  Dispense: 16 g; Refill: 2       Poorly controlled HTN - continue lisinopril, stop inderal and switch to Coreg CR. Pt advised to eat a heart healthy diet and get regular aerobic exercise.    Assess status of preDM, tx as indicated. May benefit from GLP med for mgnt of BG as well as weight loss for LEE.    Encouraged compliance with CPAP as rec'd by sleep medicine.    Nutrition and activity goals reviewed including: mainly water to drink, limit white flour/processed sugar, higher lean protein, high fiber carbs, regular meals, working toward 150 mins cardio per week, limit kcal to 1800 per day.    Encouraged routine f/u with GI as scheduled for f/u on LEE with cirrhosis.    Conservative mgnt  of plantar fascitis reviewed. If minimal improvement consider referral to ortho/PT.    As he wishes to d/c methadone if possible, we have discussed weaning protocol of decrease by 1/2 dose every 2-4 weeks as tolerated.     Routine f/u in 3 months, sooner as needed/instructed.  I will contact patient regarding test results and provide instructions regarding any necessary changes in plan of care.  Patient was encouraged to keep me informed of any acute changes, lack of improvement, or any new concerning symptoms.  Pt is aware of reasons to seek emergent care.  Patient voiced understanding of all instructions and denied further questions.    Please note that portions of this note may have been completed with a voice recognition program.

## 2024-03-22 LAB
ALBUMIN SERPL-MCNC: 4 G/DL (ref 3.5–5.2)
ALBUMIN/GLOB SERPL: 1.5 G/DL
ALP SERPL-CCNC: 118 U/L (ref 39–117)
ALT SERPL-CCNC: 24 U/L (ref 1–41)
AST SERPL-CCNC: 27 U/L (ref 1–40)
BASOPHILS # BLD AUTO: 0.04 10*3/MM3 (ref 0–0.2)
BASOPHILS NFR BLD AUTO: 0.9 % (ref 0–1.5)
BILIRUB SERPL-MCNC: 2.5 MG/DL (ref 0–1.2)
BUN SERPL-MCNC: 11 MG/DL (ref 8–23)
BUN/CREAT SERPL: 14.1 (ref 7–25)
CALCIUM SERPL-MCNC: 9.1 MG/DL (ref 8.6–10.5)
CHLORIDE SERPL-SCNC: 106 MMOL/L (ref 98–107)
CO2 SERPL-SCNC: 27.4 MMOL/L (ref 22–29)
CREAT SERPL-MCNC: 0.78 MG/DL (ref 0.76–1.27)
EGFRCR SERPLBLD CKD-EPI 2021: 100.2 ML/MIN/1.73
EOSINOPHIL # BLD AUTO: 0.15 10*3/MM3 (ref 0–0.4)
EOSINOPHIL NFR BLD AUTO: 3.5 % (ref 0.3–6.2)
ERYTHROCYTE [DISTWIDTH] IN BLOOD BY AUTOMATED COUNT: 13.6 % (ref 12.3–15.4)
GLOBULIN SER CALC-MCNC: 2.6 GM/DL
GLUCOSE SERPL-MCNC: 112 MG/DL (ref 65–99)
HBA1C MFR BLD: 5.4 % (ref 4.8–5.6)
HCT VFR BLD AUTO: 39.2 % (ref 37.5–51)
HGB BLD-MCNC: 13.5 G/DL (ref 13–17.7)
IMM GRANULOCYTES # BLD AUTO: 0.03 10*3/MM3 (ref 0–0.05)
IMM GRANULOCYTES NFR BLD AUTO: 0.7 % (ref 0–0.5)
INR PPP: 1.22 (ref 0.9–1.1)
LYMPHOCYTES # BLD AUTO: 1.26 10*3/MM3 (ref 0.7–3.1)
LYMPHOCYTES NFR BLD AUTO: 29.8 % (ref 19.6–45.3)
MCH RBC QN AUTO: 35.1 PG (ref 26.6–33)
MCHC RBC AUTO-ENTMCNC: 34.4 G/DL (ref 31.5–35.7)
MCV RBC AUTO: 101.8 FL (ref 79–97)
MONOCYTES # BLD AUTO: 0.36 10*3/MM3 (ref 0.1–0.9)
MONOCYTES NFR BLD AUTO: 8.5 % (ref 5–12)
NEUTROPHILS # BLD AUTO: 2.39 10*3/MM3 (ref 1.7–7)
NEUTROPHILS NFR BLD AUTO: 56.6 % (ref 42.7–76)
PLATELET # BLD AUTO: 60 10*3/MM3 (ref 140–450)
POTASSIUM SERPL-SCNC: 3.9 MMOL/L (ref 3.5–5.2)
PROT SERPL-MCNC: 6.6 G/DL (ref 6–8.5)
PROTHROMBIN TIME: 16 SECONDS (ref 12.3–15.1)
RBC # BLD AUTO: 3.85 10*6/MM3 (ref 4.14–5.8)
SODIUM SERPL-SCNC: 141 MMOL/L (ref 136–145)
WBC # BLD AUTO: 4.23 10*3/MM3 (ref 3.4–10.8)

## 2024-03-24 NOTE — PATIENT INSTRUCTIONS
Plantar Fasciitis With Rehab  The plantar fascia is a fibrous, ligament-like, soft-tissue structure that spans the bottom of the foot. Plantar fasciitis, also called heel spur syndrome, is a condition that causes pain in the foot due to inflammation of the tissue.  SYMPTOMS   Pain and tenderness on the underneath side of the foot.  Pain that worsens with standing or walking.  CAUSES   Plantar fasciitis is caused by irritation and injury to the plantar fascia on the underneath side of the foot. Common mechanisms of injury include:  Direct trauma to bottom of the foot.  Damage to a small nerve that runs under the foot where the main fascia attaches to the heel bone.  Stress placed on the plantar fascia due to bone spurs.  RISK INCREASES WITH:   Activities that place stress on the plantar fascia (running, jumping, pivoting, or cutting).  Poor strength and flexibility.  Improperly fitted shoes.  Tight calf muscles.  Flat feet.  Failure to warm-up properly before activity.  Obesity.  PREVENTION  Warm up and stretch properly before activity.  Allow for adequate recovery between workouts.  Maintain physical fitness:  Strength, flexibility, and endurance.  Cardiovascular fitness.  Maintain a health body weight.  Avoid stress on the plantar fascia.  Wear properly fitted shoes, including arch supports for individuals who have flat feet.  PROGNOSIS   If treated properly, then the symptoms of plantar fasciitis usually resolve without surgery. However, occasionally surgery is necessary.  RELATED COMPLICATIONS   Recurrent symptoms that may result in a chronic condition.  Problems of the lower back that are caused by compensating for the injury, such as limping.  Pain or weakness of the foot during push-off following surgery.  Chronic inflammation, scarring, and partial or complete fascia tear, occurring more often from repeated injections.  TREATMENT   Treatment initially involves the use of ice and medication to help reduce pain  and inflammation. The use of strengthening and stretching exercises may help reduce pain with activity, especially stretches of the Achilles tendon. These exercises may be performed at home or with a therapist. Your caregiver may recommend that you use heel cups of arch supports to help reduce stress on the plantar fascia. Occasionally, corticosteroid injections are given to reduce inflammation. If symptoms persist for greater than 6 months despite non-surgical (conservative), then surgery may be recommended.   MEDICATION   If pain medication is necessary, then nonsteroidal anti-inflammatory medications, such as aspirin and ibuprofen, or other minor pain relievers, such as acetaminophen, are often recommended.  Do not take pain medication within 7 days before surgery.  Prescription pain relievers may be given if deemed necessary by your caregiver. Use only as directed and only as much as you need.  Corticosteroid injections may be given by your caregiver. These injections should be reserved for the most serious cases, because they may only be given a certain number of times.  HEAT AND COLD  Cold treatment (icing) relieves pain and reduces inflammation. Cold treatment should be applied for 10 to 15 minutes every 2 to 3 hours for inflammation and pain and immediately after any activity that aggravates your symptoms. Use ice packs or massage the area with a piece of ice (ice massage).  Heat treatment may be used prior to performing the stretching and strengthening activities prescribed by your caregiver, physical therapist, or . Use a heat pack or soak the injury in warm water.  SEEK IMMEDIATE MEDICAL CARE IF:  Treatment seems to offer no benefit, or the condition worsens.  Any medications produce adverse side effects.  EXERCISES  RANGE OF MOTION (ROM) AND STRETCHING EXERCISES - Plantar Fasciitis (Heel Spur Syndrome)  These exercises may help you when beginning to rehabilitate your injury. Your symptoms  may resolve with or without further involvement from your physician, physical therapist or . While completing these exercises, remember:   Restoring tissue flexibility helps normal motion to return to the joints. This allows healthier, less painful movement and activity.  An effective stretch should be held for at least 30 seconds.  A stretch should never be painful. You should only feel a gentle lengthening or release in the stretched tissue.  RANGE OF MOTION - Toe Extension, Flexion  Sit with your right / left leg crossed over your opposite knee.  Grasp your toes and gently pull them back toward the top of your foot. You should feel a stretch on the bottom of your toes and/or foot.  Hold this stretch for 10 seconds.  Now, gently pull your toes toward the bottom of your foot. You should feel a stretch on the top of your toes and or foot.  Hold this stretch for 10 seconds.  Repeat 5 times. Complete this stretch 3 times per day.   RANGE OF MOTION - Ankle Dorsiflexion, Active Assisted  Remove shoes and sit on a chair that is preferably not on a carpeted surface.  Place right / left foot under knee. Extend your opposite leg for support.  Keeping your heel down, slide your right / left foot back toward the chair until you feel a stretch at your ankle or calf. If you do not feel a stretch, slide your bottom forward to the edge of the chair, while still keeping your heel down.  Hold this stretch for 10 seconds.  Repeat 5 times. Complete this stretch 3 times per day.   STRETCH - Gastroc, Standing  Place hands on wall.  Extend right / left leg, keeping the front knee somewhat bent.  Slightly point your toes inward on your back foot.  Keeping your right / left heel on the floor and your knee straight, shift your weight toward the wall, not allowing your back to arch.  You should feel a gentle stretch in the right / left calf. Hold this position for 10 seconds.  Repeat 5 times. Complete this stretch 3 times  per day.  STRETCH - Soleus, Standing  Place hands on wall.  Extend right / left leg, keeping the other knee somewhat bent.  Slightly point your toes inward on your back foot.  Keep your right / left heel on the floor, bend your back knee, and slightly shift your weight over the back leg so that you feel a gentle stretch deep in your back calf.  Hold this position for 10 seconds.  Repeat 5 times. Complete this stretch 3 times per day.  STRETCH - Gastrocsoleus, Standing   Note: This exercise can place a lot of stress on your foot and ankle. Please complete this exercise only if specifically instructed by your caregiver.   Place the ball of your right / left foot on a step, keeping your other foot firmly on the same step.  Hold on to the wall or a rail for balance.  Slowly lift your other foot, allowing your body weight to press your heel down over the edge of the step.  You should feel a stretch in your right / left calf.  Hold this position for 10 seconds.  Repeat this exercise with a slight bend in your right / left knee.  Repeat 5 times. Complete this stretch 3 times per day.   STRENGTHENING EXERCISES - Plantar Fasciitis (Heel Spur Syndrome)   These exercises may help you when beginning to rehabilitate your injury. They may resolve your symptoms with or without further involvement from your physician, physical therapist or . While completing these exercises, remember:   Muscles can gain both the endurance and the strength needed for everyday activities through controlled exercises.  Complete these exercises as instructed by your physician, physical therapist or . Progress the resistance and repetitions only as guided.  STRENGTH - Towel Curls  Sit in a chair positioned on a non-carpeted surface.  Place your foot on a towel, keeping your heel on the floor.  Pull the towel toward your heel by only curling your toes. Keep your heel on the floor.  If instructed by your physician, physical  therapist or , add ____________________ at the end of the towel.  Repeat 5 times. Complete this exercise 3 times per day.  STRENGTH - Ankle Inversion  Secure one end of a rubber exercise band/tubing to a fixed object (table, pole). Loop the other end around your foot just before your toes.  Place your fists between your knees. This will focus your strengthening at your ankle.  Slowly, pull your big toe up and in, making sure the band/tubing is positioned to resist the entire motion.  Hold this position for 10 seconds.  Have your muscles resist the band/tubing as it slowly pulls your foot back to the starting position.  Repeat 5 times. Complete this exercises 3 times per day.      This information is not intended to replace advice given to you by your health care provider. Make sure you discuss any questions you have with your health care provider.     Document Released: 12/18/2006 Document Revised: 05/03/2016 Document Reviewed: 04/01/2010  Elsevier Interactive Patient Education ©2016 [employer]

## 2024-03-26 ENCOUNTER — TELEPHONE (OUTPATIENT)
Dept: FAMILY MEDICINE CLINIC | Facility: CLINIC | Age: 64
End: 2024-03-26
Payer: MEDICARE

## 2024-03-26 DIAGNOSIS — I10 PRIMARY HYPERTENSION: ICD-10-CM

## 2024-03-26 NOTE — TELEPHONE ENCOUNTER
Caller: Sterling Whaley    Relationship: Self    Best call back number:  [phone] (Mobile)     What was the call regarding:  PATIENT THOUGHT METOPROLOL WAS GOING TO BE  CALLED IN TO THE [facility] FOR HIS BLOOD PRESSURE   HE  SAID THE MEDICATION THAT WAS SENT IN IS DIFFERENT AND IS GOING TO COST $120.00    PLEASE CALL AND ADVISE

## 2024-03-28 RX ORDER — CARVEDILOL 6.25 MG/1
6.25 TABLET ORAL 2 TIMES DAILY WITH MEALS
Qty: 180 TABLET | Refills: 3 | Status: SHIPPED | OUTPATIENT
Start: 2024-03-28

## 2024-03-28 NOTE — TELEPHONE ENCOUNTER
Coreg is preferred for those with cirrhosis. I will send in prescription for the short acting (twice a day rather than once daily) and see if it is any cheaper.

## 2024-04-10 ENCOUNTER — OFFICE VISIT (OUTPATIENT)
Dept: FAMILY MEDICINE CLINIC | Facility: CLINIC | Age: 64
End: 2024-04-10
Payer: MEDICARE

## 2024-04-10 VITALS
HEIGHT: 71 IN | BODY MASS INDEX: 33.68 KG/M2 | SYSTOLIC BLOOD PRESSURE: 156 MMHG | OXYGEN SATURATION: 95 % | DIASTOLIC BLOOD PRESSURE: 100 MMHG | HEART RATE: 56 BPM | WEIGHT: 240.6 LBS

## 2024-04-10 DIAGNOSIS — I10 ESSENTIAL HYPERTENSION: Primary | ICD-10-CM

## 2024-04-10 DIAGNOSIS — B35.4 TINEA CORPORIS: ICD-10-CM

## 2024-04-10 PROCEDURE — 1159F MED LIST DOCD IN RCRD: CPT | Performed by: FAMILY MEDICINE

## 2024-04-10 PROCEDURE — 3077F SYST BP >= 140 MM HG: CPT | Performed by: FAMILY MEDICINE

## 2024-04-10 PROCEDURE — 1160F RVW MEDS BY RX/DR IN RCRD: CPT | Performed by: FAMILY MEDICINE

## 2024-04-10 PROCEDURE — 99214 OFFICE O/P EST MOD 30 MIN: CPT | Performed by: FAMILY MEDICINE

## 2024-04-10 PROCEDURE — 3080F DIAST BP >= 90 MM HG: CPT | Performed by: FAMILY MEDICINE

## 2024-04-10 RX ORDER — LISINOPRIL 40 MG/1
40 TABLET ORAL DAILY
Qty: 90 TABLET | Refills: 3 | Status: SHIPPED | OUTPATIENT
Start: 2024-04-10

## 2024-04-10 RX ORDER — KETOCONAZOLE 20 MG/G
1 CREAM TOPICAL EVERY 12 HOURS SCHEDULED
Qty: 60 G | Refills: 2 | Status: SHIPPED | OUTPATIENT
Start: 2024-04-10

## 2024-04-10 RX ORDER — PROPRANOLOL HYDROCHLORIDE 10 MG/1
10 TABLET ORAL 2 TIMES DAILY
Qty: 180 TABLET | Refills: 3 | Status: SHIPPED | OUTPATIENT
Start: 2024-04-10

## 2024-04-10 NOTE — PROGRESS NOTES
Subjective   Sterling Whaley is a 63 y.o. male.     History of Present Illness  He presents today for f/u on HTN. At last visit, propranolol changed to coreg in hopes of improved BP control (with h/o cirrhosis, varices, etc). He took a few doses of medication and had to d/c due to increasing SOA particularly when lying down at night to sleep using CPAP. Went back to propranolol and symptoms resolved. BP noted to be up again today.    Also c/o recurrent tinea cruris. Used ketoconazole cream in past which works, but symptoms return.    He has been working on losing weight by decreasing caloric intake, avoiding sweets, 2nds, etc. Down 6 lbs.    The following portions of the patient's history were reviewed and updated as appropriate: allergies, current medications, past family history, past medical history, past social history, past surgical history, and problem list.    Review of Systems   Constitutional:  Positive for fatigue. Negative for fever.   HENT:  Positive for congestion, hearing loss (chronic), mouth sores (intermittent), postnasal drip and sinus pressure. Negative for drooling, facial swelling, nosebleeds, rhinorrhea, sore throat and trouble swallowing.    Eyes:  Negative for pain, discharge, redness and visual disturbance.   Respiratory:  Negative for cough, shortness of breath and wheezing.    Cardiovascular:  Negative for chest pain, palpitations and leg swelling.   Gastrointestinal:  Positive for nausea. Negative for abdominal pain, blood in stool, diarrhea and vomiting.   Endocrine: Positive for heat intolerance. Negative for polydipsia and polyuria.   Genitourinary:  Negative for dysuria and hematuria.   Musculoskeletal:  Positive for arthralgias, back pain, gait problem, joint swelling, myalgias, neck pain and neck stiffness.   Skin:  Negative for rash and wound.   Neurological:  Positive for weakness, numbness and headaches. Negative for dizziness, tremors and syncope.   Hematological:   Negative for adenopathy. Does not bruise/bleed easily.   Psychiatric/Behavioral:  Positive for decreased concentration and dysphoric mood (mild). Negative for confusion, hallucinations, sleep disturbance and suicidal ideas. The patient is nervous/anxious.        Objective   Vitals:    04/10/24 1322   BP: 156/100   Pulse: 56   SpO2: 95%     Body mass index is 33.56 kg/m².      04/10/24  1322   Weight: 109 kg (240 lb 9.6 oz)       Physical Exam  Vitals and nursing note reviewed.   Constitutional:       General: He is not in acute distress.     Appearance: He is overweight. He is not ill-appearing.   Cardiovascular:      Rate and Rhythm: Normal rate.      Pulses: Normal pulses.      Heart sounds: Normal heart sounds.   Pulmonary:      Effort: Pulmonary effort is normal.      Breath sounds: Normal breath sounds.   Skin:     General: Skin is warm and dry.      Coloration: Skin is not jaundiced.   Neurological:      Mental Status: He is alert and oriented to person, place, and time.   Psychiatric:         Behavior: Behavior is cooperative.       Lab Results   Component Value Date    GLUCOSE 112 (H) 03/21/2024    BUN 11 03/21/2024    CREATININE 0.78 03/21/2024    EGFRRESULT 100.2 03/21/2024    EGFR 81.2 02/13/2023    BCR 14.1 03/21/2024    K 3.9 03/21/2024    CO2 27.4 03/21/2024    CALCIUM 9.1 03/21/2024    PROTENTOTREF 6.6 03/21/2024    ALBUMIN 4.0 03/21/2024    BILITOT 2.5 (H) 03/21/2024    AST 27 03/21/2024    ALT 24 03/21/2024     Lab Results   Component Value Date    WBC 4.23 03/21/2024    HGB 13.5 03/21/2024    HCT 39.2 03/21/2024    .8 (H) 03/21/2024    PLT 60 (L) 03/21/2024     Lab Results   Component Value Date    INR 1.22 (H) 03/21/2024    INR 1.29 (H) 01/19/2023    INR 1.20 (H) 11/07/2022    PROTIME 16.5 (H) 01/19/2023    PROTIME 15.6 (H) 11/07/2022       Assessment & Plan   Diagnoses and all orders for this visit:    1. Essential hypertension (Primary)  -     lisinopril (PRINIVIL,ZESTRIL) 40 MG tablet;  Take 1 tablet by mouth Daily.  Dispense: 90 tablet; Refill: 3    2. Tinea corporis  -     ketoconazole (NIZORAL) 2 % cream; Apply 1 Application topically to the appropriate area as directed Every 12 (Twelve) Hours.  Dispense: 60 g; Refill: 2    Other orders  -     propranolol (INDERAL) 10 MG tablet; Take 1 tablet by mouth 2 (Two) Times a Day.  Dispense: 180 tablet; Refill: 3       HTN not ideally controlled. Continue propranolol but increase to bid. Increase lisinopril to 40 mg. Pt advised to eat a heart healthy diet and get regular aerobic exercise.    Continue nizoral cream for acute break outs, maintain with OTC antifungal powders.    F/u as scheduled, f/u sooner as needed.  Patient was encouraged to keep me informed of any acute changes, lack of improvement, or any new concerning symptoms.  Pt is aware of reasons to seek emergent care.  Patient voiced understanding of all instructions and denied further questions.    Please note that portions of this note may have been completed with a voice recognition program.

## 2024-05-20 ENCOUNTER — OFFICE VISIT (OUTPATIENT)
Dept: FAMILY MEDICINE CLINIC | Facility: CLINIC | Age: 64
End: 2024-05-20
Payer: MEDICARE

## 2024-05-20 VITALS
BODY MASS INDEX: 33.46 KG/M2 | WEIGHT: 239 LBS | DIASTOLIC BLOOD PRESSURE: 100 MMHG | HEIGHT: 71 IN | HEART RATE: 65 BPM | SYSTOLIC BLOOD PRESSURE: 160 MMHG | OXYGEN SATURATION: 98 %

## 2024-05-20 DIAGNOSIS — H61.23 BILATERAL IMPACTED CERUMEN: ICD-10-CM

## 2024-05-20 DIAGNOSIS — I10 PRIMARY HYPERTENSION: Primary | ICD-10-CM

## 2024-05-20 DIAGNOSIS — M47.812 OSTEOARTHRITIS OF CERVICAL SPINE, UNSPECIFIED SPINAL OSTEOARTHRITIS COMPLICATION STATUS: ICD-10-CM

## 2024-05-20 DIAGNOSIS — R51.9 ACUTE INTRACTABLE HEADACHE, UNSPECIFIED HEADACHE TYPE: ICD-10-CM

## 2024-05-20 DIAGNOSIS — M62.838 MUSCLE SPASM: ICD-10-CM

## 2024-05-20 PROCEDURE — 99214 OFFICE O/P EST MOD 30 MIN: CPT | Performed by: FAMILY MEDICINE

## 2024-05-20 PROCEDURE — 96372 THER/PROPH/DIAG INJ SC/IM: CPT | Performed by: FAMILY MEDICINE

## 2024-05-20 PROCEDURE — 3080F DIAST BP >= 90 MM HG: CPT | Performed by: FAMILY MEDICINE

## 2024-05-20 PROCEDURE — 3077F SYST BP >= 140 MM HG: CPT | Performed by: FAMILY MEDICINE

## 2024-05-20 PROCEDURE — 1125F AMNT PAIN NOTED PAIN PRSNT: CPT | Performed by: FAMILY MEDICINE

## 2024-05-20 RX ORDER — METHYLPREDNISOLONE ACETATE 40 MG/ML
40 INJECTION, SUSPENSION INTRA-ARTICULAR; INTRALESIONAL; INTRAMUSCULAR; SOFT TISSUE ONCE
Status: COMPLETED | OUTPATIENT
Start: 2024-05-20 | End: 2024-05-20

## 2024-05-20 RX ORDER — KETOROLAC TROMETHAMINE 30 MG/ML
30 INJECTION, SOLUTION INTRAMUSCULAR; INTRAVENOUS ONCE
Status: COMPLETED | OUTPATIENT
Start: 2024-05-20 | End: 2024-05-20

## 2024-05-20 RX ORDER — AMLODIPINE BESYLATE 2.5 MG/1
2.5 TABLET ORAL DAILY
Qty: 90 TABLET | Refills: 0 | Status: SHIPPED | OUTPATIENT
Start: 2024-05-20 | End: 2024-05-22 | Stop reason: SDUPTHER

## 2024-05-20 RX ORDER — METHOCARBAMOL 500 MG/1
500 TABLET, FILM COATED ORAL 3 TIMES DAILY PRN
Qty: 90 TABLET | Refills: 1 | Status: SHIPPED | OUTPATIENT
Start: 2024-05-20 | End: 2024-05-22

## 2024-05-20 RX ADMIN — KETOROLAC TROMETHAMINE 30 MG: 30 INJECTION, SOLUTION INTRAMUSCULAR; INTRAVENOUS at 11:37

## 2024-05-20 RX ADMIN — METHYLPREDNISOLONE ACETATE 40 MG: 40 INJECTION, SUSPENSION INTRA-ARTICULAR; INTRALESIONAL; INTRAMUSCULAR; SOFT TISSUE at 11:38

## 2024-05-20 NOTE — PROGRESS NOTES
Subjective   Sterling Whaley is a 64 y.o. male.     History of Present Illness  Here with c/o persistent headaches, sinus issues. BP still running high. Muscle spasm and neck pain keeping him up at night. Weaning off methadone. Some increased anxiety and pain with that.    C/o ear fullness, drainage.     The following portions of the patient's history were reviewed and updated as appropriate: allergies, current medications, past family history, past medical history, past social history, past surgical history, and problem list.    Review of Systems   Constitutional:  Positive for fatigue. Negative for fever and unexpected weight change.   HENT:  Positive for congestion, hearing loss (chronic), mouth sores (intermittent), postnasal drip and sinus pressure. Negative for drooling, facial swelling, nosebleeds, rhinorrhea, sore throat and trouble swallowing.    Eyes:  Positive for visual disturbance. Negative for pain, discharge and redness.   Respiratory:  Negative for cough, shortness of breath and wheezing.    Cardiovascular:  Negative for chest pain, palpitations and leg swelling.   Gastrointestinal:  Positive for nausea. Negative for abdominal pain, blood in stool, diarrhea and vomiting.   Endocrine: Positive for heat intolerance. Negative for polydipsia and polyuria.   Genitourinary:  Negative for dysuria and hematuria.   Musculoskeletal:  Positive for arthralgias, back pain, gait problem, joint swelling, myalgias, neck pain and neck stiffness.   Skin:  Negative for rash and wound.   Neurological:  Positive for weakness, numbness and headaches. Negative for dizziness, tremors and syncope.   Hematological:  Negative for adenopathy. Does not bruise/bleed easily.   Psychiatric/Behavioral:  Positive for decreased concentration, dysphoric mood (mild) and sleep disturbance. Negative for confusion, hallucinations and suicidal ideas. The patient is nervous/anxious.    Pt's previous ROS reviewed and updated as  indicated.       Objective   Vitals:    05/20/24 1013   BP: 160/100   Pulse: 65   SpO2: 98%     Body mass index is 33.33 kg/m².      05/20/24  1013   Weight: 108 kg (239 lb)       Physical Exam  Vitals and nursing note reviewed.   Constitutional:       General: He is not in acute distress.     Appearance: He is well-groomed and overweight. He is not ill-appearing.   HENT:      Head: Atraumatic.      Right Ear: There is impacted cerumen.      Left Ear: There is impacted cerumen.      Nose: Congestion present. No rhinorrhea.      Mouth/Throat:      Mouth: Mucous membranes are moist. No oral lesions.      Pharynx: Oropharynx is clear.   Eyes:      General: Scleral icterus (mild) present.      Conjunctiva/sclera: Conjunctivae normal.      Pupils: Pupils are equal, round, and reactive to light.   Neck:      Thyroid: No thyroid mass.   Cardiovascular:      Rate and Rhythm: Normal rate and regular rhythm.      Pulses: Normal pulses.      Heart sounds: Normal heart sounds.   Pulmonary:      Effort: Pulmonary effort is normal.      Breath sounds: Normal breath sounds.   Musculoskeletal:      Cervical back: Spasms and bony tenderness present. Decreased range of motion.      Lumbar back: Spasms and tenderness present. Decreased range of motion.      Right lower leg: No edema.      Left lower leg: No edema.   Lymphadenopathy:      Cervical: No cervical adenopathy.   Skin:     General: Skin is warm and dry.      Coloration: Skin is not jaundiced or pale.      Findings: No bruising or rash.   Neurological:      Mental Status: He is alert and oriented to person, place, and time.      Gait: Gait is intact.   Psychiatric:         Mood and Affect: Affect normal. Mood is anxious.         Behavior: Behavior normal. Behavior is cooperative.         Cognition and Memory: Cognition normal.     Pt's previous physical exam reviewed and updated as indicated.      Assessment & Plan   Diagnoses and all orders for this visit:    1. Primary  hypertension (Primary)  -     Discontinue: amLODIPine (NORVASC) 2.5 MG tablet; Take 1 tablet by mouth Daily.  Dispense: 90 tablet; Refill: 0    2. Acute intractable headache, unspecified headache type  -     ketorolac (TORADOL) injection 30 mg  -     methylPREDNISolone acetate (DEPO-medrol) injection 40 mg    3. Bilateral impacted cerumen    4. Osteoarthritis of cervical spine, unspecified spinal osteoarthritis complication status  -     ketorolac (TORADOL) injection 30 mg  -     methylPREDNISolone acetate (DEPO-medrol) injection 40 mg    5. Muscle spasm  -     Discontinue: methocarbamol (ROBAXIN) 500 MG tablet; Take 1 tablet by mouth 3 (Three) Times a Day As Needed for Muscle Spasms.  Dispense: 90 tablet; Refill: 1       HTN not ideally controlled. Continue propranolol LA (HTN, portal HTN) and lisinopril. Add low dose norvasc.    POC for headaches as above. No focal neuro findings.    Successful irrigation of ears.    Trial of robaxin qhs for muscle spasm.    F/u as scheduled, f/u sooner as needed.  Patient was encouraged to keep me informed of any acute changes, lack of improvement, or any new concerning symptoms.  Pt is aware of reasons to seek emergent care.  Patient voiced understanding of all instructions and denied further questions.    Please note that portions of this note may have been completed with a voice recognition program.

## 2024-05-22 ENCOUNTER — OFFICE VISIT (OUTPATIENT)
Dept: FAMILY MEDICINE CLINIC | Facility: CLINIC | Age: 64
End: 2024-05-22
Payer: MEDICARE

## 2024-05-22 VITALS
DIASTOLIC BLOOD PRESSURE: 104 MMHG | BODY MASS INDEX: 32.93 KG/M2 | WEIGHT: 235.2 LBS | SYSTOLIC BLOOD PRESSURE: 168 MMHG | HEIGHT: 71 IN | OXYGEN SATURATION: 98 %

## 2024-05-22 DIAGNOSIS — K76.6 PORTAL HYPERTENSION: ICD-10-CM

## 2024-05-22 DIAGNOSIS — F41.8 SITUATIONAL ANXIETY: ICD-10-CM

## 2024-05-22 DIAGNOSIS — R94.31 PROLONGED Q-T INTERVAL ON ECG: ICD-10-CM

## 2024-05-22 DIAGNOSIS — F99 INSOMNIA DUE TO OTHER MENTAL DISORDER: ICD-10-CM

## 2024-05-22 DIAGNOSIS — I10 UNCONTROLLED HYPERTENSION: Primary | ICD-10-CM

## 2024-05-22 DIAGNOSIS — F51.05 INSOMNIA DUE TO OTHER MENTAL DISORDER: ICD-10-CM

## 2024-05-22 DIAGNOSIS — I10 PRIMARY HYPERTENSION: ICD-10-CM

## 2024-05-22 DIAGNOSIS — M62.838 MUSCLE SPASM: ICD-10-CM

## 2024-05-22 PROCEDURE — 3077F SYST BP >= 140 MM HG: CPT | Performed by: FAMILY MEDICINE

## 2024-05-22 PROCEDURE — 93000 ELECTROCARDIOGRAM COMPLETE: CPT | Performed by: FAMILY MEDICINE

## 2024-05-22 PROCEDURE — 3080F DIAST BP >= 90 MM HG: CPT | Performed by: FAMILY MEDICINE

## 2024-05-22 PROCEDURE — 1160F RVW MEDS BY RX/DR IN RCRD: CPT | Performed by: FAMILY MEDICINE

## 2024-05-22 PROCEDURE — 99214 OFFICE O/P EST MOD 30 MIN: CPT | Performed by: FAMILY MEDICINE

## 2024-05-22 PROCEDURE — 1125F AMNT PAIN NOTED PAIN PRSNT: CPT | Performed by: FAMILY MEDICINE

## 2024-05-22 PROCEDURE — 1159F MED LIST DOCD IN RCRD: CPT | Performed by: FAMILY MEDICINE

## 2024-05-22 RX ORDER — AMLODIPINE BESYLATE 5 MG/1
5 TABLET ORAL DAILY
Qty: 90 TABLET | Refills: 0 | Status: SHIPPED | OUTPATIENT
Start: 2024-05-22

## 2024-05-22 RX ORDER — PROPRANOLOL HCL 60 MG
60 CAPSULE, EXTENDED RELEASE 24HR ORAL DAILY
Qty: 90 CAPSULE | Refills: 3 | Status: SHIPPED | OUTPATIENT
Start: 2024-05-22

## 2024-05-22 RX ORDER — DIAZEPAM 5 MG/1
TABLET ORAL
Qty: 30 TABLET | Refills: 0 | Status: SHIPPED | OUTPATIENT
Start: 2024-05-22

## 2024-05-22 RX ORDER — LOSARTAN POTASSIUM 100 MG/1
100 TABLET ORAL DAILY
Qty: 90 TABLET | Refills: 0 | Status: SHIPPED | OUTPATIENT
Start: 2024-05-22

## 2024-05-22 NOTE — PROGRESS NOTES
Subjective   Sterling Whaley is a 64 y.o. male.     History of Present Illness  Here with c/o persistently high BPs, reading differences in R vs L arm. Assoc'd with persistent HA, sinus congestion, postnasal drip.    Recently started on amlodipine in addition to his propranolol and lisinopril. Readings at home still 160s/100. On pronaolol for h/o cirrhosis with portal HTN as well.    Is weaning off of methadone at this time. Previous baseline dose low at max of 10 mg per day.     Also given robaxin for muscle relaxation qhs. Not helping. He c/o very poor sleep, increased anxiety.    The following portions of the patient's history were reviewed and updated as appropriate: allergies, current medications, past family history, past medical history, past social history, past surgical history, and problem list.    Review of Systems   Constitutional:  Positive for fatigue. Negative for fever.   HENT:  Positive for congestion, hearing loss (chronic), mouth sores (intermittent), postnasal drip and sinus pressure. Negative for drooling, facial swelling, nosebleeds, rhinorrhea, sore throat and trouble swallowing.    Eyes:  Positive for visual disturbance (intermittent blurry). Negative for pain, discharge and redness.   Respiratory:  Negative for cough, shortness of breath and wheezing.    Cardiovascular:  Negative for chest pain, palpitations and leg swelling.   Gastrointestinal:  Positive for nausea. Negative for abdominal pain, blood in stool, diarrhea and vomiting.   Endocrine: Positive for heat intolerance. Negative for polydipsia and polyuria.   Genitourinary:  Negative for dysuria and hematuria.   Musculoskeletal:  Positive for arthralgias, back pain, gait problem, joint swelling, myalgias, neck pain and neck stiffness.   Skin:  Negative for rash and wound.   Neurological:  Positive for weakness, numbness and headaches. Negative for dizziness, tremors, syncope, facial asymmetry and speech difficulty.    Hematological:  Negative for adenopathy. Does not bruise/bleed easily.   Psychiatric/Behavioral:  Positive for decreased concentration and dysphoric mood (mild). Negative for confusion, hallucinations, sleep disturbance and suicidal ideas. The patient is nervous/anxious.    Pt's previous ROS reviewed and updated as indicated.       Objective   Vitals:    05/22/24 1051   BP: (!) 168/104   SpO2:      Body mass index is 32.8 kg/m².      05/22/24  1048   Weight: 107 kg (235 lb 3.2 oz)     EQUAL BP's bilaterally    Physical Exam  Vitals and nursing note reviewed.   Constitutional:       General: He is not in acute distress.     Appearance: He is well-groomed. He is obese. He is not ill-appearing.   HENT:      Head: Atraumatic.      Mouth/Throat:      Mouth: Mucous membranes are moist.   Eyes:      General: Scleral icterus (mild) present.      Conjunctiva/sclera: Conjunctivae normal.      Pupils: Pupils are equal, round, and reactive to light.   Cardiovascular:      Rate and Rhythm: Normal rate and regular rhythm.      Pulses: Normal pulses.      Heart sounds: Normal heart sounds.   Pulmonary:      Effort: Pulmonary effort is normal.      Breath sounds: Normal breath sounds.   Musculoskeletal:      Cervical back: Spasms and bony tenderness present.      Lumbar back: Spasms and tenderness present. Decreased range of motion.      Right lower leg: No edema.      Left lower leg: No edema.   Skin:     General: Skin is warm and dry.      Coloration: Skin is not jaundiced or pale.      Findings: No bruising or rash.   Neurological:      Mental Status: He is alert and oriented to person, place, and time.      Gait: Gait is intact.   Psychiatric:         Mood and Affect: Mood is anxious (mildly).         Behavior: Behavior normal. Behavior is cooperative.         Cognition and Memory: Cognition normal.     Pt's previous physical exam reviewed and updated as indicated.        ECG 12 Lead    Date/Time: 5/22/2024 11:17  AM  Performed by: Annel Armstrong MD    Authorized by: Annel Armstrong MD  Comparison: compared with previous ECG from 9/9/2021  Similar to previous ECG  Comparison to previous ECG: Previous QTc was 430  Rhythm: sinus rhythm  Ectopy comments: none  Rate: bradycardic  BPM: 58  Conduction: conduction normal  ST Segments: ST segments normal  T Waves: T waves normal  QRS axis: normal  Other findings: left ventricular hypertrophy and prolonged QTc interval    Clinical impression: abnormal EKG  Comments:   WI int: 195 ms  QRS dur: 90 ms  QTc: 441 ms          Assessment & Plan   Diagnoses and all orders for this visit:    1. Uncontrolled hypertension (Primary)  -     ECG 12 Lead    2. Primary hypertension  -     amLODIPine (NORVASC) 5 MG tablet; Take 1 tablet by mouth Daily.  Dispense: 90 tablet; Refill: 0  -     losartan (Cozaar) 100 MG tablet; Take 1 tablet by mouth Daily.  Dispense: 90 tablet; Refill: 0  -     propranolol LA (Inderal LA) 60 MG 24 hr capsule; Take 1 capsule by mouth Daily.  Dispense: 90 capsule; Refill: 3    3. Portal hypertension  -     propranolol LA (Inderal LA) 60 MG 24 hr capsule; Take 1 capsule by mouth Daily.  Dispense: 90 capsule; Refill: 3    4. Situational anxiety  -     diazePAM (Valium) 5 MG tablet; 1/2 TO 1 PO QHS  Dispense: 30 tablet; Refill: 0    5. Insomnia due to other mental disorder  -     diazePAM (Valium) 5 MG tablet; 1/2 TO 1 PO QHS  Dispense: 30 tablet; Refill: 0    6. Muscle spasm  -     diazePAM (Valium) 5 MG tablet; 1/2 TO 1 PO QHS  Dispense: 30 tablet; Refill: 0    7. Prolonged Q-T interval on ECG       Increase amlodipine to 5 mg daily. D/c lisinopril, switch to losartan 100. Switch propranolol to LA formulation. Advised to not worry about BP home monitoring at this time as anxiety is a large contributing factor.    D/c robaxin, trial of low dose valium for anxiety, muscle spasm, insomnia, etc.  As part of patient's treatment plan I am prescribing a controlled substance.   The patient has been made aware of the appropriate use of such medications, including potential risk of somnolence, limited ability to drive and/or work safely, and potential for dependence and/or overdose.  It has also been made clear that these medications are for use by this patient only, without concomitant use of alcohol or other substances, unless prescribed.  CLAUDIO reviewed.    F/u in 1 month, sooner as needed.  Patient was encouraged to keep me informed of any acute changes, lack of improvement, or any new concerning symptoms.  Pt is aware of reasons to seek emergent care.  Patient voiced understanding of all instructions and denied further questions.    Please note that portions of this note may have been completed with a voice recognition program.

## 2024-06-04 ENCOUNTER — OFFICE VISIT (OUTPATIENT)
Dept: PULMONOLOGY | Facility: CLINIC | Age: 64
End: 2024-06-04
Payer: MEDICARE

## 2024-06-04 ENCOUNTER — TELEPHONE (OUTPATIENT)
Dept: FAMILY MEDICINE CLINIC | Facility: CLINIC | Age: 64
End: 2024-06-04

## 2024-06-04 VITALS
RESPIRATION RATE: 18 BRPM | DIASTOLIC BLOOD PRESSURE: 74 MMHG | HEART RATE: 64 BPM | OXYGEN SATURATION: 95 % | HEIGHT: 71 IN | BODY MASS INDEX: 33.26 KG/M2 | WEIGHT: 237.6 LBS | SYSTOLIC BLOOD PRESSURE: 126 MMHG

## 2024-06-04 DIAGNOSIS — G47.19 EXCESSIVE DAYTIME SLEEPINESS: ICD-10-CM

## 2024-06-04 DIAGNOSIS — G47.33 OSA (OBSTRUCTIVE SLEEP APNEA): Primary | ICD-10-CM

## 2024-06-04 DIAGNOSIS — E66.9 OBESITY (BMI 30-39.9): ICD-10-CM

## 2024-06-04 DIAGNOSIS — G47.31 CSA (CENTRAL SLEEP APNEA): ICD-10-CM

## 2024-06-04 PROCEDURE — 99214 OFFICE O/P EST MOD 30 MIN: CPT | Performed by: NURSE PRACTITIONER

## 2024-06-04 NOTE — PROGRESS NOTES
"Chief Complaint   Patient presents with    Sleeping Problem    Follow-up         Subjective   Sterling Whaley is a 64 y.o. male.   The patient comes in today for follow-up sleep issues.    He had a home sleep study in January ordered by his PCP and was not tolerating CPAP therapy.    On his titration study, he was noted to have central sleep apneas which were resolved at BiPAP 12/8 cm with a back up rate 14. This was ordered. However, the pressure and rate has been changed by DME but there is no order or communication documented stating a change is needed.     The patient states that he was having difficulty tolerating the pressure of BiPAP initially and reached out to the DME company.      He feels he is tolerating the pressure better now and is getting used to the machine/mask.  He does not feel that the mask fits well and feels like he is adjusting the fullface mask a lot.    However, he continues to feel tired during the day.     He states he has chronic back pain and tosses and turns.     He has had some sinus issues and nasal congestion and he was not able to use it for several days.       The following portions of the patient's history were reviewed and updated as appropriate: allergies, current medications, past family history, past medical history, past social history, and past surgical history.      Review of Systems   HENT:  Positive for sneezing. Negative for sinus pressure and sore throat.    Respiratory:  Negative for cough, chest tightness, shortness of breath and wheezing.    Psychiatric/Behavioral:  Positive for sleep disturbance.        Objective   Visit Vitals  /74   Pulse 64   Resp 18   Ht 180.3 cm (70.98\") Comment: pt reported   Wt 108 kg (237 lb 9.6 oz)   SpO2 95%   BMI 33.15 kg/m²       Physical Exam  Vitals reviewed.   Constitutional:       Appearance: He is well-developed.   HENT:      Head: Atraumatic.      Mouth/Throat:      Mouth: Mucous membranes are moist.      Comments: " Crowded oropharynx.   Eyes:      Extraocular Movements: Extraocular movements intact.   Cardiovascular:      Rate and Rhythm: Normal rate and regular rhythm.   Skin:     General: Skin is warm.   Neurological:      Mental Status: He is alert and oriented to person, place, and time.           Assessment & Plan   Diagnoses and all orders for this visit:    1. TRUDI (obstructive sleep apnea) (Primary)  -     BIPAP / CPAP Adjustment  -     BIPAP / CPAP Adjustment    2. Obesity (BMI 30-39.9)    3. Excessive daytime sleepiness    4. CSA (central sleep apnea)  -     BIPAP / CPAP Adjustment           Return for keep appt in december.    DISCUSSION (if any):  Home sleep study reveals severe TRUDI with AHI 30 per hour.     I reviewed his titration study which shows he was started at CPAP 8 cm then increase to CPAP 10 cm and finally changed to BiPAP 12 over 8 cm with a backup rate of 14.  His apnea-hypopnea index was very elevated at CPAP pressure 8 and 10 cm and he was also noted to have central apneas at these pressures.    On BiPAP 12/8 his central apneas resolved.  He was studied at this pressure for 129 minutes of sleep time.    In reviewing his compliance he is nearly compliant at 67%.  Unfortunately he continues to have a residual AHI of 13.3/h.    I have discussed with the patient and his wife that the current pressure is not adequate. I have suggested changing BiPAP to 11/7 cm with back up rate 12 and reassessing the compliance.     I discussed that the patient was having central sleep apneas as well as obstructive sleep apneas and this is why he was tried on BiPAP.  I explained to the patient that at the study pressure of 12/8 with a backup rate of 14 the central apneas completely resolved.          Dictated utilizing Dragon dictation.    This document was electronically signed by BARBARA Thacker June 4, 2024  15:41 EDT

## 2024-06-04 NOTE — TELEPHONE ENCOUNTER
"    Caller: Sterling Whaley \"Balwinder\"    Relationship: Self    Best call back number:  [phone]     Who are you requesting to speak with (clinical staff, provider,  specific staff member): , FRANK ARAUJO    What was the call regarding: PATIENT CALLED UPSET BECAUSE HE HAS RECEIVED TWO BILLS FROM . DATES OF SERVICE ARE 3/21/24 AND 4/10/24.  HIS CARD SHOWS A $0 COPAY FOR PCP.   HOWEVER, WE KEEP BILLING HIM $35 PER EACH OF THESE VISITS.  PATIENT CALLED HIS INSURANCE WHO ADVISED HIM DR IVEY IS LISTED AS A SPECIALIST.   I CALLED OFFICE TO VERIFY IF SHE IS.  SAID NO, THAT SHE'S A FAMILY MEDICINE PCP. AND THAT HE SHOULD CALL BILLING.  I TRIED TO TRANSFER PATIENT TO BILLING BECAUSE HE DIDN'T HAVE A PEN TO WRITE DOWN NUMBER.  I CALLED BILLING AND SPOKE TO GIO. SHE ADVISED  LONI IS LISTED UNDER HER NPI NUMBER AS FAMILY MEDICINE SPECIALIST. SHE STATED SHE CANNOT CHANGE THAT. I ASKED HER WHAT PATIENT IS SUPPOSED TO DO. SHE SAID SHE WOULD SEND A MESSAGE TO HER SUPERVISOR.  I WANTED TO MAKE YOU AWARE OF THIS SITUATION WITH DR IVEY BEING LISTED AS A SPECIALIST. SINCE IT'S A DISSERVICE TO OUR PATIENTS. THEY SHOULDN'T HAVE TO PAY A SPECIALIST FEE TO SEE THEIR PCP.    CAN YOU PLEASE ADVISE THIS PATIENT IF HE DOES OWE THE SPECIALIST FEES? HE STATED HE CAN'T AFFORD THEM.    THANK YOU                "

## 2024-06-24 ENCOUNTER — OFFICE VISIT (OUTPATIENT)
Dept: FAMILY MEDICINE CLINIC | Facility: CLINIC | Age: 64
End: 2024-06-24
Payer: MEDICARE

## 2024-06-24 DIAGNOSIS — Z28.21 PNEUMOCOCCAL VACCINE REFUSED: ICD-10-CM

## 2024-06-24 DIAGNOSIS — E55.9 VITAMIN D DEFICIENCY: ICD-10-CM

## 2024-06-24 DIAGNOSIS — E78.2 MIXED HYPERLIPIDEMIA: ICD-10-CM

## 2024-06-24 DIAGNOSIS — N20.0 RECURRENT KIDNEY STONES: ICD-10-CM

## 2024-06-24 DIAGNOSIS — F41.1 GAD (GENERALIZED ANXIETY DISORDER): ICD-10-CM

## 2024-06-24 DIAGNOSIS — G47.33 SEVERE OBSTRUCTIVE SLEEP APNEA: ICD-10-CM

## 2024-06-24 DIAGNOSIS — R16.1 SPLENOMEGALY: ICD-10-CM

## 2024-06-24 DIAGNOSIS — R73.01 IFG (IMPAIRED FASTING GLUCOSE): ICD-10-CM

## 2024-06-24 DIAGNOSIS — F32.A DEPRESSIVE DISORDER: ICD-10-CM

## 2024-06-24 DIAGNOSIS — K74.60 LIVER CIRRHOSIS SECONDARY TO NASH: ICD-10-CM

## 2024-06-24 DIAGNOSIS — D69.6 THROMBOCYTOPENIA: ICD-10-CM

## 2024-06-24 DIAGNOSIS — I10 PRIMARY HYPERTENSION: ICD-10-CM

## 2024-06-24 DIAGNOSIS — Z53.20 REFUSAL OF STATIN MEDICATION BY PATIENT: ICD-10-CM

## 2024-06-24 DIAGNOSIS — K76.6 PORTAL HYPERTENSION: ICD-10-CM

## 2024-06-24 DIAGNOSIS — K29.30 CHRONIC SUPERFICIAL GASTRITIS WITHOUT BLEEDING: ICD-10-CM

## 2024-06-24 DIAGNOSIS — E53.8 VITAMIN B 12 DEFICIENCY: ICD-10-CM

## 2024-06-24 DIAGNOSIS — M48.02 SPINAL STENOSIS OF CERVICAL REGION: ICD-10-CM

## 2024-06-24 DIAGNOSIS — K44.9 HIATAL HERNIA WITH GERD WITHOUT ESOPHAGITIS: ICD-10-CM

## 2024-06-24 DIAGNOSIS — M48.061 SPINAL STENOSIS OF LUMBAR REGION, UNSPECIFIED WHETHER NEUROGENIC CLAUDICATION PRESENT: ICD-10-CM

## 2024-06-24 DIAGNOSIS — Z00.00 MEDICARE ANNUAL WELLNESS VISIT, SUBSEQUENT: Primary | ICD-10-CM

## 2024-06-24 DIAGNOSIS — K75.81 LIVER CIRRHOSIS SECONDARY TO NASH: ICD-10-CM

## 2024-06-24 DIAGNOSIS — H91.91 HEARING LOSS ASSOCIATED WITH SYNDROME OF RIGHT EAR: ICD-10-CM

## 2024-06-24 DIAGNOSIS — K21.9 HIATAL HERNIA WITH GERD WITHOUT ESOPHAGITIS: ICD-10-CM

## 2024-06-24 DIAGNOSIS — E66.09 CLASS 1 OBESITY DUE TO EXCESS CALORIES WITH SERIOUS COMORBIDITY AND BODY MASS INDEX (BMI) OF 33.0 TO 33.9 IN ADULT: ICD-10-CM

## 2024-06-24 DIAGNOSIS — E80.6 HYPERBILIRUBINEMIA: ICD-10-CM

## 2024-06-24 DIAGNOSIS — K52.9 CHRONIC DIARRHEA: ICD-10-CM

## 2024-06-24 PROCEDURE — 99214 OFFICE O/P EST MOD 30 MIN: CPT | Performed by: FAMILY MEDICINE

## 2024-06-24 PROCEDURE — 1125F AMNT PAIN NOTED PAIN PRSNT: CPT | Performed by: FAMILY MEDICINE

## 2024-06-24 PROCEDURE — 1159F MED LIST DOCD IN RCRD: CPT | Performed by: FAMILY MEDICINE

## 2024-06-24 PROCEDURE — G0439 PPPS, SUBSEQ VISIT: HCPCS | Performed by: FAMILY MEDICINE

## 2024-06-24 PROCEDURE — 3080F DIAST BP >= 90 MM HG: CPT | Performed by: FAMILY MEDICINE

## 2024-06-24 PROCEDURE — 1160F RVW MEDS BY RX/DR IN RCRD: CPT | Performed by: FAMILY MEDICINE

## 2024-06-24 PROCEDURE — 3077F SYST BP >= 140 MM HG: CPT | Performed by: FAMILY MEDICINE

## 2024-06-24 NOTE — PATIENT INSTRUCTIONS
Medicare Wellness  Personal Prevention Plan of Service     Date of Office Visit:    Encounter Provider:  Annel Armstrong MD  Place of Service:  [facility]  Patient Name: Sterling Whaley  :  1960    As part of the Medicare Wellness portion of your visit today, we are providing you with this personalized preventive plan of services (PPPS). This plan is based upon recommendations of the [place] Preventive Services Task Force (USPSTF) and the Advisory Committee on Immunization Practices (ACIP).    This lists the preventive care services that should be considered, and provides dates of when you are due. Items listed as completed are up-to-date and do not require any further intervention.    Health Maintenance   Topic Date Due    Pneumococcal Vaccine 0-64 (1 of 2 - PCV) Never done    TDAP/TD VACCINES (1 - Tdap) Never done    ZOSTER VACCINE (1 of 2) Never done    RSV Vaccine - Adults (1 - 1-dose 60+ series) Never done    COVID-19 Vaccine (3 - -24 season) 2023    ANNUAL WELLNESS VISIT  2024    BMI FOLLOWUP  2024    INFLUENZA VACCINE  2024    LIPID PANEL  2024    COLORECTAL CANCER SCREENING  2025    HEPATITIS C SCREENING  Completed    Hepatitis B  Discontinued       No orders of the defined types were placed in this encounter.      No follow-ups on file.

## 2024-06-24 NOTE — PROGRESS NOTES
The ABCs of the Annual Wellness Visit  Subsequent Medicare Wellness Visit    Subjective    Sterling Whaley is a 64 y.o. male who presents for a Subsequent Medicare Wellness Visit.      The following portions of the patient's history were reviewed and   updated as appropriate: allergies, current medications, past family history, past medical history, past social history, past surgical history, and problem list.    Compared to one year ago, the patient feels his physical   health is worse.    Compared to one year ago, the patient feels his mental   health is worse.    Recent Hospitalizations:  He was not admitted to the hospital during the last year.       Current Medical Providers:  Patient Care Team:  Annel Armstrong MD as PCP - General  Mónica Newell MD as Consulting Physician (Gastroenterology)  Mayela Adkins MD as Consulting Physician (Pulmonary Disease)    Outpatient Medications Prior to Visit   Medication Sig Dispense Refill    amLODIPine (NORVASC) 5 MG tablet Take 1 tablet by mouth Daily. 90 tablet 0    ketoconazole (NIZORAL) 2 % cream Apply 1 Application topically to the appropriate area as directed Every 12 (Twelve) Hours. 60 g 2    losartan (Cozaar) 100 MG tablet Take 1 tablet by mouth Daily. 90 tablet 0    omeprazole (priLOSEC) 40 MG capsule Take 1 capsule by mouth Daily. 90 capsule 1    sertraline (ZOLOFT) 25 MG tablet Take 1 tablet by mouth Daily. 10 tablet 0    diazePAM (Valium) 5 MG tablet 1/2 TO 1 PO QHS 30 tablet 0    fluticasone (FLONASE) 50 MCG/ACT nasal spray 2 sprays into the nostril(s) as directed by provider Daily As Needed for Rhinitis. 16 g 2    levocetirizine (XYZAL) 5 MG tablet Take 1 tablet by mouth Daily As Needed.      promethazine (PHENERGAN) 25 MG tablet Take 0.5-1 tablets by mouth Every 6 (Six) Hours As Needed for Nausea or Vomiting. 20 tablet 1    propranolol LA (Inderal LA) 60 MG 24 hr capsule Take 1 capsule by mouth Daily. 90 capsule 3     No  facility-administered medications prior to visit.       No opioid medication identified on active medication list. I have reviewed chart for other potential  high risk medication/s and harmful drug interactions in the elderly.        Aspirin is not on active medication list.  Aspirin use is not indicated based on review of current medical condition/s. Risk of harm outweighs potential benefits.  .    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    Vitamin D deficiency    IFG (impaired fasting glucose)    Plantar fasciitis, bilateral    Impingement syndrome of left shoulder    TYSON (generalized anxiety disorder)    Depressive disorder    Vitamin B 12 deficiency    ETD (Eustachian tube dysfunction), bilateral    Lateral epicondylitis of both elbows    Hyperbilirubinemia    Hearing loss associated with syndrome of right ear    Chronic serous otitis media, bilateral    Recurrent kidney stones    Refusal of statin medication by patient    Pneumococcal vaccine refused    Chronic superficial gastritis without bleeding    Hiatal hernia with GERD without esophagitis    Splenomegaly    Portal hypertension    Difficulty with CPAP use    Osteoarthritis of lumbar spine     Advance Care Planning   Advance Care Planning     Advance Directive is not on file.  ACP discussion was held with the patient during this visit. Patient does not have an advance directive, information provided.     Objective   "  Vitals:    24 1100   BP: (!) 172/110   Pulse: 61   SpO2: 94%   Weight: 110 kg (242 lb 3.2 oz)   Height: 180.3 cm (71\")     Estimated body mass index is 33.78 kg/m² as calculated from the following:    Height as of this encounter: 180.3 cm (71\").    Weight as of this encounter: 110 kg (242 lb 3.2 oz).    BMI is >= 30 and <35. (Class 1 Obesity). The following options were offered after discussion;: weight loss educational material (shared in after visit summary), exercise counseling/recommendations, and nutrition counseling/recommendations      Does the patient have evidence of cognitive impairment? No          HEALTH RISK ASSESSMENT    Smoking Status:  Social History     Tobacco Use   Smoking Status Never    Passive exposure: Never   Smokeless Tobacco Never     Alcohol Consumption:  Social History     Substance and Sexual Activity   Alcohol Use Never     Fall Risk Screen:    TAMARA Fall Risk Assessment was completed, and patient is at LOW risk for falls.Assessment completed on:2024    Depression Screenin/24/2024    10:58 AM   PHQ-2/PHQ-9 Depression Screening   Little Interest or Pleasure in Doing Things 1-->several days   Feeling Down, Depressed or Hopeless 1-->several days   Trouble Falling or Staying Asleep, or Sleeping Too Much 2-->more than half the days   Feeling Tired or Having Little Energy 2-->more than half the days   Poor Appetite or Overeating 2-->more than half the days   Feeling Bad about Yourself - or that You are a Failure or Have Let Yourself or Your Family Down 1-->several days   Trouble Concentrating on Things, Such as Reading the Newspaper or Watching Television 0-->not at all   Moving or Speaking So Slowly that Other People Could Have Noticed? Or the Opposite - Being So Fidgety 0-->not at all   Thoughts that You Would be Better Off Dead or of Hurting Yourself in Some Way 0-->not at all   PHQ-9: Brief Depression Severity Measure Score 9   If You Checked Off Any Problems, How " Difficult Have These Problems Made It For You to Do Your Work, Take Care of Things at Home, or Get Along with Other People? somewhat difficult       Health Habits and Functional and Cognitive Screenin/24/2024    10:55 AM   Functional & Cognitive Status   Do you have difficulty preparing food and eating? No   Do you have difficulty bathing yourself, getting dressed or grooming yourself? No   Do you have difficulty using the toilet? No   Do you have difficulty moving around from place to place? No   Do you have trouble with steps or getting out of a bed or a chair? No   Current Diet Limited Junk Food   Dental Exam Not up to date   Eye Exam Unknown   Exercise (times per week) 0 times per week   Current Exercises Include No Regular Exercise   Do you need help using the phone?  No   Are you deaf or do you have serious difficulty hearing?  Yes   Do you need help to go to places out of walking distance? No   Do you need help shopping? No   Do you need help preparing meals?  No   Do you need help with housework?  No   Do you need help with laundry? No   Do you need help taking your medications? No   Do you need help managing money? No   Do you ever drive or ride in a car without wearing a seat belt? No   Have you felt unusual stress, anger or loneliness in the last month? Yes   Who do you live with? Spouse   If you need help, do you have trouble finding someone available to you? No   Have you been bothered in the last four weeks by sexual problems? No   Do you have difficulty concentrating, remembering or making decisions? Yes       Age-appropriate Screening Schedule:  Refer to the list below for future screening recommendations based on patient's age, sex and/or medical conditions. Orders for these recommended tests are listed in the plan section. The patient has been provided with a written plan.    Health Maintenance   Topic Date Due    Pneumococcal Vaccine 0-64 (1 of 2 - PCV) Never done    TDAP/TD VACCINES (1 -  Tdap) Never done    ZOSTER VACCINE (1 of 2) Never done    RSV Vaccine - Adults (1 - 1-dose 60+ series) Never done    COVID-19 Vaccine (3 - 2023-24 season) 09/01/2023    INFLUENZA VACCINE  08/01/2024    LIPID PANEL  09/06/2024    COLORECTAL CANCER SCREENING  04/01/2025    ANNUAL WELLNESS VISIT  06/24/2025    BMI FOLLOWUP  06/24/2025    HEPATITIS C SCREENING  Completed    Hepatitis B  Discontinued                  CMS Preventative Services Quick Reference  Risk Factors Identified During Encounter  Chronic Pain: Natural history and expected course discussed. Questions answered.  Depression/Dysphoria: Current medication is effective, no change recommended  Fall Risk-High or Moderate: Discussed Fall Prevention in the home and Information on Fall Prevention Shared in After Visit Summary  Hearing Problem:  previously eval'd  Immunizations Discussed/Encouraged: Tdap, Pneumococcal 23, Prevnar 20 (Pneumococcal 20-valent conjugate), Shingrix, COVID19, and RSV (Respiratory Syncytial Virus)  The above risks/problems have been discussed with the patient.  Pertinent information has been shared with the patient in the After Visit Summary.  An After Visit Summary and PPPS were made available to the patient.    Follow Up:   Next Medicare Wellness visit to be scheduled in 1 year.       Additional E&M Note during same encounter follows:  Patient has multiple medical problems which are significant and separately identifiable that require additional work above and beyond the Medicare Wellness Visit.      Chief Complaint  Medicare Wellness-subsequent    Subjective        HPI  Sterling Whaley is also being seen today for f/u on several chronic med problems. He is unsure what meds he is taking regularly but states he believes he is taking his meds asr x'd. BP high today. Apparently did not take his medication this morning. At home his BP was 130s/70s this morning.     Using his BiPap as rx'd    Having some fatigue, confusion. Frequent  "diarrhea 4-5 times per day. Apparently has not kept f/u with GI clinic for mgnt of cirrhosis due to LEE.    Review of Systems   Constitutional:  Positive for fatigue. Negative for appetite change and fever.   HENT:  Positive for congestion, mouth sores (intermittent) and postnasal drip. Negative for nosebleeds and sore throat.    Eyes:  Negative for visual disturbance.   Respiratory:  Negative for cough, shortness of breath and wheezing.    Cardiovascular:  Negative for chest pain, palpitations and leg swelling.   Gastrointestinal:  Positive for diarrhea and nausea. Negative for abdominal pain, blood in stool and vomiting.   Genitourinary:  Negative for difficulty urinating, dysuria and hematuria.   Musculoskeletal:  Positive for arthralgias, back pain, neck pain and neck stiffness.   Skin:  Negative for rash and wound.   Neurological:  Positive for confusion (mild). Negative for syncope, facial asymmetry and speech difficulty.   Hematological:  Negative for adenopathy. Bruises/bleeds easily.   Psychiatric/Behavioral:  Positive for dysphoric mood. Negative for suicidal ideas. The patient is nervous/anxious.    Pt's previous ROS reviewed and updated as indicated.       Objective   Vital Signs:  BP (!) 172/110   Pulse 61   Ht 180.3 cm (71\")   Wt 110 kg (242 lb 3.2 oz)   SpO2 94%   BMI 33.78 kg/m²     Physical Exam  Vitals and nursing note reviewed.   Constitutional:       General: He is not in acute distress.     Appearance: He is well-groomed. He is obese. He is not ill-appearing.   HENT:      Head: Atraumatic.      Mouth/Throat:      Mouth: Mucous membranes are moist.      Pharynx: Oropharynx is clear.   Eyes:      General: Scleral icterus (mild) present.      Conjunctiva/sclera: Conjunctivae normal.      Pupils: Pupils are equal, round, and reactive to light.   Neck:      Thyroid: No thyroid mass.   Cardiovascular:      Rate and Rhythm: Normal rate and regular rhythm.      Pulses: Normal pulses.      Heart " sounds: Normal heart sounds.   Pulmonary:      Effort: Pulmonary effort is normal.      Breath sounds: Normal breath sounds.   Abdominal:      General: Bowel sounds are normal.      Palpations: Abdomen is soft. There is no mass.      Tenderness: There is no abdominal tenderness.   Musculoskeletal:      Cervical back: Spasms and bony tenderness present.      Lumbar back: Spasms and tenderness present. Decreased range of motion.      Right lower leg: No edema.      Left lower leg: No edema.   Lymphadenopathy:      Cervical: No cervical adenopathy.   Skin:     General: Skin is warm and dry.      Coloration: Skin is not jaundiced or pale.      Findings: No bruising or rash.   Neurological:      Mental Status: He is alert and oriented to person, place, and time.      Gait: Gait is intact.   Psychiatric:         Mood and Affect: Mood is anxious (mildly with irritability).         Behavior: Behavior normal. Behavior is cooperative.         Cognition and Memory: Cognition normal.     Pt's previous physical exam reviewed and updated as indicated.                Lab Results   Component Value Date    TSH 2.300 10/19/2022     Lab Results   Component Value Date    HGBA1C 5.40 03/21/2024            Assessment and Plan   Diagnoses and all orders for this visit:    1. Medicare annual wellness visit, subsequent (Primary)    2. 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 Chronic diarrhea  -     Magnesium  -     Vitamin B12  -     CBC (No Diff)  -     Ambulatory Referral to Gastroenterology    4. Pneumococcal vaccine refused    5. Severe obstructive sleep apnea    6. Spinal stenosis of lumbar region, unspecified whether neurogenic claudication present    7. Spinal stenosis of cervical region    8. Depressive disorder    9. TYSON (generalized anxiety disorder)    10. Recurrent kidney stones    11. Chronic superficial gastritis  without bleeding    12. Hiatal hernia with GERD without esophagitis    13. Hyperbilirubinemia    14. Portal hypertension    15. Splenomegaly    16. Class 1 obesity due to excess calories with serious comorbidity and body mass index (BMI) of 33.0 to 33.9 in adult    17. IFG (impaired fasting glucose)    18. Vitamin B 12 deficiency    19. Vitamin D deficiency    20. Thrombocytopenia    21. Hearing loss associated with syndrome of right ear    22. Mixed hyperlipidemia    23. Primary hypertension    24. Refusal of statin medication by patient      BMI is >= 30 and <35. (Class 1 Obesity). The following options were offered after discussion;: exercise counseling/recommendations and nutrition counseling/recommendations Nutrition and activity goals reviewed including: mainly water to drink, limit white flour/processed sugar, higher lean protein, high fiber carbs, regular meals, working toward 150 mins cardio per week.    Encouraged to bring by medication bottles or list of current medications to update his chart.    Has succesfully weaned completely off methadone.         Follow Up   Return in about 3 months (around 9/24/2024).  F/u sooner as needed/instructed.  I will contact patient regarding test results and provide instructions regarding any necessary changes in plan of care.  Patient was encouraged to keep me informed of any acute changes, lack of improvement, or any new concerning symptoms.  Pt is aware of reasons to seek emergent care.  Patient voiced understanding of all instructions and denied further questions.    Patient was given instructions and counseling regarding his condition or for health maintenance advice. Please see specific information pulled into the AVS if appropriate.     Please note that portions of this note may have been completed with a voice recognition program.

## 2024-06-25 LAB
ALBUMIN SERPL-MCNC: 4.1 G/DL (ref 3.9–4.9)
ALP SERPL-CCNC: 103 IU/L (ref 44–121)
ALT SERPL-CCNC: 28 IU/L (ref 0–44)
AMMONIA PLAS-MCNC: 68 UG/DL (ref 40–200)
AST SERPL-CCNC: 23 IU/L (ref 0–40)
BILIRUB DIRECT SERPL-MCNC: 0.46 MG/DL (ref 0–0.4)
BILIRUB SERPL-MCNC: 2.1 MG/DL (ref 0–1.2)
ERYTHROCYTE [DISTWIDTH] IN BLOOD BY AUTOMATED COUNT: 13.4 % (ref 11.6–15.4)
HCT VFR BLD AUTO: 40.6 % (ref 37.5–51)
HGB BLD-MCNC: 14 G/DL (ref 13–17.7)
MAGNESIUM SERPL-MCNC: 2 MG/DL (ref 1.6–2.3)
MCH RBC QN AUTO: 35 PG (ref 26.6–33)
MCHC RBC AUTO-ENTMCNC: 34.5 G/DL (ref 31.5–35.7)
MCV RBC AUTO: 102 FL (ref 79–97)
MORPHOLOGY BLD-IMP: ABNORMAL
PLATELET # BLD AUTO: 64 X10E3/UL (ref 150–450)
PROT SERPL-MCNC: 7 G/DL (ref 6–8.5)
RBC # BLD AUTO: 4 X10E6/UL (ref 4.14–5.8)
VIT B12 SERPL-MCNC: 441 PG/ML (ref 232–1245)
WBC # BLD AUTO: 5.5 X10E3/UL (ref 3.4–10.8)

## 2024-06-25 RX ORDER — PROPRANOLOL HCL 60 MG
60 CAPSULE, EXTENDED RELEASE 24HR ORAL DAILY
COMMUNITY

## 2024-06-27 VITALS
DIASTOLIC BLOOD PRESSURE: 110 MMHG | BODY MASS INDEX: 33.91 KG/M2 | OXYGEN SATURATION: 94 % | HEIGHT: 71 IN | SYSTOLIC BLOOD PRESSURE: 172 MMHG | HEART RATE: 61 BPM | WEIGHT: 242.2 LBS

## 2024-06-27 PROBLEM — Z28.21 INFLUENZA VACCINE REFUSED: Status: RESOLVED | Noted: 2020-03-07 | Resolved: 2024-06-27

## 2024-07-10 DIAGNOSIS — F41.1 GAD (GENERALIZED ANXIETY DISORDER): ICD-10-CM

## 2024-07-10 RX ORDER — SERTRALINE HYDROCHLORIDE 25 MG/1
25 TABLET, FILM COATED ORAL DAILY
Qty: 10 TABLET | Refills: 0 | Status: SHIPPED | OUTPATIENT
Start: 2024-07-10

## 2024-07-10 NOTE — TELEPHONE ENCOUNTER
"  Caller: Sterling Whaley \"Balwinder\"    Relationship: Self    Best call back number: [phone]     Requested Prescriptions:   Requested Prescriptions     Pending Prescriptions Disp Refills    sertraline (ZOLOFT) 25 MG tablet 10 tablet 0     Sig: Take 1 tablet by mouth Daily.        Pharmacy where request should be sent: [facility] [id number] [address] AT [facility] [phone] [facility] [phone]      Last office visit with prescribing clinician: 6/24/2024   Last telemedicine visit with prescribing clinician: Visit date not found   Next office visit with prescribing clinician: 9/24/2024     Additional details provided by patient: PT HAS 1 1/2 PILLS LEFT.    Does the patient have less than a 3 day supply:  [x] Yes  [] No    Would you like a call back once the refill request has been completed: [] Yes [x] No    If the office needs to give you a call back, can they leave a voicemail: [] Yes [x] No    Jayashree Fields   07/10/24 12:02 EDT   "

## 2024-07-12 ENCOUNTER — TELEPHONE (OUTPATIENT)
Dept: FAMILY MEDICINE CLINIC | Facility: CLINIC | Age: 64
End: 2024-07-12

## 2024-07-12 DIAGNOSIS — F41.1 GAD (GENERALIZED ANXIETY DISORDER): ICD-10-CM

## 2024-07-12 NOTE — TELEPHONE ENCOUNTER
Caller: Arabella Kebede    Relationship: Emergency Contact    Best call back number:     [phone]       Requested Prescriptions:   Requested Prescriptions     Pending Prescriptions Disp Refills    sertraline (ZOLOFT) 25 MG tablet 10 tablet 0     Sig: Take 1 tablet by mouth Daily.        Pharmacy where request should be sent: [facility] [id number] [address] AT [facility] [phone] [facility] [phone] FX     Last office visit with prescribing clinician: 6/24/2024   Last telemedicine visit with prescribing clinician: Visit date not found   Next office visit with prescribing clinician: 9/24/2024     Additional details provided by patient:   PATIENT'S (WIFE) ARABELLA STATED THAT PATIENT IS TAKING MEDICATION AT A 100 MG TABLET ONCE DAILY AND WOULD LIKE FOR THE MEDICATION TO BE CHANGED  MG TABLET AND WOULD LIKE FOR MORE THAN A 10 DAY SUPPLIES TO BE CALLED INTO THE PHARMACY INSTEAD WOULD LIKE FOR A 90 DAY SUPPLIES     Does the patient have less than a 3 day supply:  [x] Yes  [] No    Would you like a call back once the refill request has been completed: [] Yes [x] No    If the office needs to give you a call back, can they leave a voicemail: [] Yes [x] No    Jayashree Russell   07/12/24 11:28 EDT

## 2024-07-16 NOTE — TELEPHONE ENCOUNTER
"Spoke to pt, he stated \"the people he is around\" says not to come off of the medication so he would like to try Zoloft 50mg for awhile and see if that helps him.   "

## 2024-07-16 NOTE — TELEPHONE ENCOUNTER
I believe we discussed him weaning off this medication. He was told to take 1/2 dose, 50 mg, then rx'd 25 mg to assist with weaning.    If he is wishing to continue that is fine. Please clarify what he has actually been taking.

## 2024-07-16 NOTE — TELEPHONE ENCOUNTER
Caller: Natalie Kebede    Relationship: Emergency Contact    Best call back number:  [phone]    Which medication are you concerned about:     sertraline (ZOLOFT) 25 MG tablet       Who prescribed you this medication: DR IVEY    When did you start taking this medication: ONGOING    What are your concerns: PATIENT ADVISED THIS WAS TO  MG AND NOT 25; THE ONE THAT WAS CALLED IN FOR 25 MG AND WAS GIVEN ONLY 10 TABLETS; HE IS REQUESTING 90 DAYS AND ALSO HE IS OUT OF THIS MEDICATION AT  MG DOSAGE    .  [facility] [id number] - [place], [place] - [facility] [address] AT [facility] - [phone] [facility] [phone]  [phone]   ”

## 2024-07-16 NOTE — TELEPHONE ENCOUNTER
Rx Refill Note  Requested Prescriptions     Pending Prescriptions Disp Refills    sertraline (ZOLOFT) 25 MG tablet 10 tablet 0     Sig: Take 1 tablet by mouth Daily.      Last office visit with prescribing clinician: 6/24/2024   Last telemedicine visit with prescribing clinician: Visit date not found   Next office visit with prescribing clinician: 9/24/2024                         Would you like a call back once the refill request has been completed: [] Yes [] No    If the office needs to give you a call back, can they leave a voicemail: [] Yes [] No    Gemini Mae MA  07/16/24, 08:32 EDT

## 2024-08-19 DIAGNOSIS — I10 PRIMARY HYPERTENSION: ICD-10-CM

## 2024-08-19 RX ORDER — OMEPRAZOLE 40 MG/1
40 CAPSULE, DELAYED RELEASE ORAL DAILY
Qty: 90 CAPSULE | Refills: 1 | Status: SHIPPED | OUTPATIENT
Start: 2024-08-19

## 2024-08-19 RX ORDER — LOSARTAN POTASSIUM 100 MG/1
100 TABLET ORAL DAILY
Qty: 90 TABLET | Refills: 0 | Status: SHIPPED | OUTPATIENT
Start: 2024-08-19

## 2024-08-19 RX ORDER — AMLODIPINE BESYLATE 5 MG/1
5 TABLET ORAL DAILY
Qty: 90 TABLET | Refills: 0 | Status: SHIPPED | OUTPATIENT
Start: 2024-08-19

## 2024-08-19 NOTE — TELEPHONE ENCOUNTER
"  Caller: Sterling Whaley \"Balwinder\"    Relationship: Self    Best call back number: [phone]     Requested Prescriptions:   Requested Prescriptions     Pending Prescriptions Disp Refills    omeprazole (priLOSEC) 40 MG capsule 90 capsule 1     Sig: Take 1 capsule by mouth Daily.    amLODIPine (NORVASC) 5 MG tablet 90 tablet 0     Sig: Take 1 tablet by mouth Daily.    losartan (Cozaar) 100 MG tablet 90 tablet 0     Sig: Take 1 tablet by mouth Daily.    POTASSIUM 100 MG    Pharmacy where request should be sent: [facility] [id number] [address] AT [facility] [phone] [facility] [phone] FX     Last office visit with prescribing clinician: 6/24/2024   Last telemedicine visit with prescribing clinician: Visit date not found   Next office visit with prescribing clinician: 8/21/2024     Additional details provided by patient: OUT OF MEDICATION     Does the patient have less than a 3 day supply:  [] Yes  [x] No    Would you like a call back once the refill request has been completed: [] Yes [x] No    If the office needs to give you a call back, can they leave a voicemail: [] Yes [x] No    Jayashree Reyez   08/19/24 09:24 EDT         "

## 2024-08-26 ENCOUNTER — OFFICE VISIT (OUTPATIENT)
Dept: FAMILY MEDICINE CLINIC | Facility: CLINIC | Age: 64
End: 2024-08-26
Payer: MEDICARE

## 2024-08-26 VITALS
SYSTOLIC BLOOD PRESSURE: 136 MMHG | BODY MASS INDEX: 35.56 KG/M2 | WEIGHT: 254 LBS | OXYGEN SATURATION: 97 % | HEIGHT: 71 IN | HEART RATE: 58 BPM | DIASTOLIC BLOOD PRESSURE: 84 MMHG

## 2024-08-26 DIAGNOSIS — K75.81 LIVER CIRRHOSIS SECONDARY TO NASH: ICD-10-CM

## 2024-08-26 DIAGNOSIS — E78.2 MIXED HYPERLIPIDEMIA: ICD-10-CM

## 2024-08-26 DIAGNOSIS — D69.6 THROMBOCYTOPENIA: ICD-10-CM

## 2024-08-26 DIAGNOSIS — K74.60 LIVER CIRRHOSIS SECONDARY TO NASH: ICD-10-CM

## 2024-08-26 DIAGNOSIS — R60.1 GENERALIZED EDEMA: ICD-10-CM

## 2024-08-26 DIAGNOSIS — R63.5 ABNORMAL WEIGHT GAIN: Primary | ICD-10-CM

## 2024-08-26 PROCEDURE — 3079F DIAST BP 80-89 MM HG: CPT | Performed by: FAMILY MEDICINE

## 2024-08-26 PROCEDURE — 3075F SYST BP GE 130 - 139MM HG: CPT | Performed by: FAMILY MEDICINE

## 2024-08-26 PROCEDURE — 1159F MED LIST DOCD IN RCRD: CPT | Performed by: FAMILY MEDICINE

## 2024-08-26 PROCEDURE — 1160F RVW MEDS BY RX/DR IN RCRD: CPT | Performed by: FAMILY MEDICINE

## 2024-08-26 PROCEDURE — 99214 OFFICE O/P EST MOD 30 MIN: CPT | Performed by: FAMILY MEDICINE

## 2024-08-26 PROCEDURE — 1125F AMNT PAIN NOTED PAIN PRSNT: CPT | Performed by: FAMILY MEDICINE

## 2024-08-26 NOTE — PROGRESS NOTES
Subjective   Sterling Whaley is a 64 y.o. male.     History of Present Illness  Here with his wife. He c/o swelling, SOA. Seems to have begun after starting amlodipine last visit.   Leg Swelling  This is a new problem. The current episode started 1 to 4 weeks ago. The problem occurs constantly. The problem has been waxing and waning. Associated symptoms include arthralgias, congestion, fatigue, headaches, joint swelling, myalgias, nausea, neck pain, numbness and weakness. Pertinent negatives include no abdominal pain, chest pain, coughing, fever, rash, sore throat, urinary symptoms or vomiting. Nothing aggravates the symptoms. Treatments tried: lasix of his wife's. The treatment provided no relief.     The following portions of the patient's history were reviewed and updated as appropriate: allergies, current medications, past family history, past medical history, past social history, past surgical history, and problem list.    Review of Systems   Constitutional:  Positive for fatigue and unexpected weight change. Negative for fever.   HENT:  Positive for congestion, hearing loss (chronic), mouth sores (intermittent) and postnasal drip. Negative for drooling, facial swelling, nosebleeds, rhinorrhea, sore throat and trouble swallowing.    Eyes:  Positive for visual disturbance (intermittent blurry). Negative for pain, discharge and redness.   Respiratory:  Negative for cough, shortness of breath and wheezing.    Cardiovascular:  Positive for leg swelling. Negative for chest pain and palpitations.   Gastrointestinal:  Positive for nausea. Negative for abdominal pain, blood in stool, diarrhea and vomiting.   Endocrine: Positive for heat intolerance. Negative for polydipsia and polyuria.   Genitourinary:  Negative for dysuria and hematuria.   Musculoskeletal:  Positive for arthralgias, back pain, gait problem, joint swelling, myalgias, neck pain and neck stiffness.   Skin:  Negative for rash and wound.    Neurological:  Positive for weakness, numbness and headaches. Negative for dizziness, tremors, syncope, facial asymmetry and speech difficulty.   Hematological:  Negative for adenopathy. Does not bruise/bleed easily.   Psychiatric/Behavioral:  Positive for decreased concentration and dysphoric mood (mild). Negative for confusion, hallucinations, sleep disturbance and suicidal ideas. The patient is nervous/anxious.    Pt's previous ROS reviewed and updated as indicated.       Objective   Vitals:    08/26/24 0954   BP: 136/84   Pulse: 58   SpO2: 97%     Body mass index is 35.43 kg/m².      08/26/24  0954   Weight: 115 kg (254 lb)       Physical Exam  Vitals and nursing note reviewed.   Constitutional:       General: He is not in acute distress.     Appearance: He is well-groomed. He is obese. He is not ill-appearing.   HENT:      Head: Atraumatic.      Mouth/Throat:      Mouth: Mucous membranes are moist.   Eyes:      General: Scleral icterus (mild) present.      Conjunctiva/sclera: Conjunctivae normal.      Pupils: Pupils are equal, round, and reactive to light.   Neck:      Thyroid: No thyroid mass.      Vascular: No JVD.   Cardiovascular:      Rate and Rhythm: Regular rhythm. Bradycardia present.      Pulses: Normal pulses.      Heart sounds: Heart sounds are distant.   Pulmonary:      Effort: Pulmonary effort is normal.      Breath sounds: Normal breath sounds.   Abdominal:      General: Bowel sounds are normal.      Palpations: Abdomen is soft. There is no mass.      Tenderness: There is no abdominal tenderness.   Musculoskeletal:      Cervical back: Spasms and bony tenderness present.      Lumbar back: Spasms and tenderness present. Decreased range of motion.      Right lower leg: Edema present.      Left lower leg: Edema present.   Lymphadenopathy:      Cervical: No cervical adenopathy.   Skin:     General: Skin is warm and dry.      Coloration: Skin is not jaundiced or pale.      Findings: No bruising or  rash.   Neurological:      Mental Status: He is alert and oriented to person, place, and time.      Gait: Gait is intact.   Psychiatric:         Mood and Affect: Mood and affect normal.         Behavior: Behavior normal. Behavior is cooperative.         Cognition and Memory: Cognition normal.     Pt's previous physical exam reviewed and updated as indicated.      Assessment & Plan   Diagnoses and all orders for this visit:    1. Abnormal weight gain (Primary)  -     Hepatic Function Panel    2. Generalized edema  -     Hepatic Function Panel  -     Basic Metabolic Panel    3. Thrombocytopenia  -     CBC (No Diff)  -     Hepatic Function Panel    4. Mixed hyperlipidemia  -     Lipid Panel    5. 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 Lab eval as he has comorbid chronic liver disease. If no change in status, d/c amlodipine and replace with additional anti-hypertensive.    F/u as scheduled, f/u sooner as needed/instructed.  I will contact patient regarding test results and provide instructions regarding any necessary changes in plan of care.  Patient was encouraged to keep me informed of any acute changes, lack of improvement, or any new concerning symptoms.  Pt is aware of reasons to seek emergent care.  Patient voiced understanding of all instructions and denied further questions.    Please note that portions of this note may have been completed with a voice recognition program.

## 2024-08-27 LAB
ALBUMIN SERPL-MCNC: 4 G/DL (ref 3.5–5.2)
ALP SERPL-CCNC: 103 U/L (ref 39–117)
ALT SERPL-CCNC: 22 U/L (ref 1–41)
AST SERPL-CCNC: 26 U/L (ref 1–40)
BILIRUB DIRECT SERPL-MCNC: 0.4 MG/DL (ref 0–0.3)
BILIRUB SERPL-MCNC: 1.9 MG/DL (ref 0–1.2)
BUN SERPL-MCNC: 10 MG/DL (ref 8–23)
BUN/CREAT SERPL: 11 (ref 7–25)
CALCIUM SERPL-MCNC: 9 MG/DL (ref 8.6–10.5)
CHLORIDE SERPL-SCNC: 104 MMOL/L (ref 98–107)
CHOLEST SERPL-MCNC: 149 MG/DL (ref 0–200)
CO2 SERPL-SCNC: 27.6 MMOL/L (ref 22–29)
CREAT SERPL-MCNC: 0.91 MG/DL (ref 0.76–1.27)
EGFRCR SERPLBLD CKD-EPI 2021: 94.1 ML/MIN/1.73
ERYTHROCYTE [DISTWIDTH] IN BLOOD BY AUTOMATED COUNT: 13.1 % (ref 12.3–15.4)
GLUCOSE SERPL-MCNC: 143 MG/DL (ref 65–99)
HCT VFR BLD AUTO: 37.3 % (ref 37.5–51)
HDLC SERPL-MCNC: 33 MG/DL (ref 40–60)
HGB BLD-MCNC: 12.9 G/DL (ref 13–17.7)
LDLC SERPL CALC-MCNC: 64 MG/DL (ref 0–100)
MCH RBC QN AUTO: 36 PG (ref 26.6–33)
MCHC RBC AUTO-ENTMCNC: 34.6 G/DL (ref 31.5–35.7)
MCV RBC AUTO: 104.2 FL (ref 79–97)
PLATELET # BLD AUTO: 56 10*3/MM3 (ref 140–450)
POTASSIUM SERPL-SCNC: 3.9 MMOL/L (ref 3.5–5.2)
PROT SERPL-MCNC: 6.6 G/DL (ref 6–8.5)
RBC # BLD AUTO: 3.58 10*6/MM3 (ref 4.14–5.8)
SODIUM SERPL-SCNC: 142 MMOL/L (ref 136–145)
TRIGL SERPL-MCNC: 331 MG/DL (ref 0–150)
VLDLC SERPL CALC-MCNC: 52 MG/DL (ref 5–40)
WBC # BLD AUTO: 4.23 10*3/MM3 (ref 3.4–10.8)

## 2024-08-28 DIAGNOSIS — I10 PRIMARY HYPERTENSION: Primary | ICD-10-CM

## 2024-08-28 RX ORDER — CHLORTHALIDONE 25 MG/1
25 TABLET ORAL DAILY
Qty: 90 TABLET | Refills: 3 | Status: SHIPPED | OUTPATIENT
Start: 2024-08-28

## 2024-09-22 NOTE — TELEPHONE ENCOUNTER
LM for pt to call to discuss XR completion.  If pt returns call, please warm transfer to speak with me.  
SW pt. Sts that he intends to complete XR in the next few weeks.  I will revisit completion in 3 weeks.  
2 seconds or less

## 2024-11-11 ENCOUNTER — OFFICE VISIT (OUTPATIENT)
Dept: GASTROENTEROLOGY | Facility: CLINIC | Age: 64
End: 2024-11-11
Payer: MEDICARE

## 2024-11-11 VITALS
HEART RATE: 66 BPM | HEIGHT: 71 IN | DIASTOLIC BLOOD PRESSURE: 77 MMHG | BODY MASS INDEX: 34.86 KG/M2 | WEIGHT: 249 LBS | SYSTOLIC BLOOD PRESSURE: 132 MMHG | TEMPERATURE: 97.3 F

## 2024-11-11 DIAGNOSIS — I85.10 SECONDARY ESOPHAGEAL VARICES WITHOUT BLEEDING: ICD-10-CM

## 2024-11-11 DIAGNOSIS — K74.60 LIVER CIRRHOSIS SECONDARY TO NASH: Primary | ICD-10-CM

## 2024-11-11 DIAGNOSIS — K75.81 LIVER CIRRHOSIS SECONDARY TO NASH: Primary | ICD-10-CM

## 2024-11-11 DIAGNOSIS — K21.9 GASTROESOPHAGEAL REFLUX DISEASE WITHOUT ESOPHAGITIS: ICD-10-CM

## 2024-11-11 DIAGNOSIS — Z12.11 ENCOUNTER FOR SCREENING FOR MALIGNANT NEOPLASM OF COLON: ICD-10-CM

## 2024-11-11 PROCEDURE — 99214 OFFICE O/P EST MOD 30 MIN: CPT | Performed by: INTERNAL MEDICINE

## 2024-11-11 PROCEDURE — 1159F MED LIST DOCD IN RCRD: CPT | Performed by: INTERNAL MEDICINE

## 2024-11-11 PROCEDURE — 1160F RVW MEDS BY RX/DR IN RCRD: CPT | Performed by: INTERNAL MEDICINE

## 2024-11-11 PROCEDURE — 3078F DIAST BP <80 MM HG: CPT | Performed by: INTERNAL MEDICINE

## 2024-11-11 PROCEDURE — 3075F SYST BP GE 130 - 139MM HG: CPT | Performed by: INTERNAL MEDICINE

## 2024-11-11 RX ORDER — AMLODIPINE BESYLATE 10 MG/1
10 TABLET ORAL DAILY
COMMUNITY

## 2024-11-11 RX ORDER — SODIUM, POTASSIUM,MAG SULFATES 17.5-3.13G
2 SOLUTION, RECONSTITUTED, ORAL ORAL ONCE
Qty: 354 ML | Refills: 0 | Status: SHIPPED | OUTPATIENT
Start: 2024-11-11 | End: 2024-11-11

## 2024-11-11 NOTE — PROGRESS NOTES
Follow Up Note     Date: 2024   Patient Name: Sterling Whaley  MRN: [id number]  : 1960     Referring Physician: Annel Armstrong MD    Chief Complaint:    Chief Complaint   Patient presents with    Cirrhosis     LEE      Abd lymphadenopathy       Interval History:   2024  Sterling Whaley is a 64 y.o. male who is here today for follow up for cirrhosis.  Unfortunately patient gained 10 to 12 pounds recently.  He does get occasional loose stools pain on what he eats otherwise mostly will have a regular bowel movement.  Reflux symptoms well-controlled with the Prilosec 40 mg p.o. daily dose.     2022  Sterling Whaley is a 62 y.o. male who is here today to establish care with Gastroenterology for evaluation of his cirrhosis. Patient had a CT abdomen pelvis done in 2022 for further evaluation of his elevated liver enzymes which revealed signs of cirrhosis with splenomegaly without any ascites or liver lesions.  He also had ultrasound done year ago in 2021 which revealed again fatty liver disease with significant splenomegaly suggesting portal hypertension.  Patient states that he had a fatty liver disease for many years and had a elevated liver enzymes for a while.  He denies any alcohol consumption.  No family stop any cirrhosis.  No confusional episodes.  No constipation.     He has a abdominal discomfort or feeling of nervousness whenever he gets tensed up or stressed.  He will have occasional nausea associated with the symptoms.  His bowel movements have been regular.  He is on methadone for many years for his joint pains but as per patient it did not not cause any constipation.     Patient has been taking PPI for intermittent epigastric pain but denies any reflux symptoms as such.  Denies any dysphagia.  No blood in the stool his weight has been stable.  No prior history of any anemia.  His last EGD was in  and colonoscopy was over 10 years ago.  He does  not know anything about the family.  He states that he lost about 25 pounds over years  Subjective      Past Medical History:   Past Medical History:   Diagnosis Date    Anxiety     Arthritis     Bilateral carpal tunnel syndrome     Depression     Elevated cholesterol     Esophageal reflux     Fatty liver     Gastritis     H/O pulmonary function tests 09/13/2012    normal    Headache     Hiatal hernia     History of depression     History of meniscal tear     History of renal calculi     HL (hearing loss) Don’t know    Hyperbilirubinemia     Rosedale syndrome    Hypertension     Kidney stone     Liver disease Two months ago    Low back pain Years    Obstructive sleep apnea     Plantar fascia rupture     Renal calculi     RLS (restless legs syndrome)     Schatzki's ring     Sinus problem     Sleep apnea     Bi-pap    Snores     TMJ pain dysfunction syndrome     Visual impairment At 32    Wears glasses      Past Surgical History:   Past Surgical History:   Procedure Laterality Date    COLONOSCOPY      ENDOSCOPY      ENDOSCOPY W/ BANDING N/A 11/09/2022    Procedure: ESOPHAGOGASTRODUODENOSCOPY WITH BIOPSY;  Surgeon: Mónica Newell MD;  Location: [facility] ENDOSCOPY;  Service: Gastroenterology;  Laterality: N/A;    ESOPHAGOSCOPY / EGD  2003    [facility]    FOOT SURGERY Left     KNEE ARTHROSCOPY Right     medial meniscus repair    MEDIAL COLLATERAL LIGAMENT REPAIR, KNEE Left     URETEROSCOPY LASER LITHOTRIPSY WITH STENT INSERTION Right 2/24/2023    Procedure: URETEROSCOPY LASER LITHOTRIPSY WITH STENT INSERTION;  Surgeon: Moses Purvis MD;  Location: [facility] OR;  Service: Urology;  Laterality: Right;       Family History:   Family History   Problem Relation Age of Onset    Arthritis Mother     Colon cancer Neg Hx        Social History:   Social History     Socioeconomic History    Marital status:    Tobacco Use    Smoking status: Never     Passive exposure: Never    Smokeless tobacco: Never   Vaping Use     "Vaping status: Never Used   Substance and Sexual Activity    Alcohol use: Never    Drug use: Never    Sexual activity: Defer     Birth control/protection: Surgical       Medications:     Current Outpatient Medications:     amLODIPine (NORVASC) 10 MG tablet, Take 1 tablet by mouth Daily., Disp: , Rfl:     Cyanocobalamin (VITAMIN B12 PO), Take  by mouth Daily., Disp: , Rfl:     losartan (Cozaar) 100 MG tablet, Take 1 tablet by mouth Daily., Disp: 90 tablet, Rfl: 0    Naproxen Sodium (ALEVE PO), Take  by mouth., Disp: , Rfl:     omeprazole (priLOSEC) 40 MG capsule, Take 1 capsule by mouth Daily., Disp: 90 capsule, Rfl: 1    propranolol LA (INDERAL LA) 60 MG 24 hr capsule, Take 1 capsule by mouth Daily., Disp: , Rfl:     sertraline (ZOLOFT) 50 MG tablet, Take 1 tablet by mouth Daily., Disp: 90 tablet, Rfl: 1    Allergies:   Allergies   Allergen Reactions    Meperidine Unknown - High Severity     claustraphobic       Review of Systems:   Review of Systems   Constitutional:  Positive for fatigue. Negative for appetite change, fever and unexpected weight loss.   HENT:  Negative for trouble swallowing.    Gastrointestinal:  Positive for diarrhea and GERD. Negative for abdominal distention, abdominal pain, anal bleeding, blood in stool, constipation, nausea, rectal pain, vomiting and indigestion.       The following portions of the patient's history were reviewed and updated as appropriate: allergies, current medications, past family history, past medical history, past social history, past surgical history and problem list.    Objective     Physical Exam:  Vital Signs:   Vitals:    11/11/24 1549   BP: 132/77   Pulse: 66   Temp: 97.3 °F (36.3 °C)   TempSrc: Infrared   Weight: 113 kg (249 lb)  Comment: with clothing/shoes   Height: 180.3 cm (71\")  Comment: per EPIC       Physical Exam  Vitals and nursing note reviewed.   Constitutional:       Appearance: He is well-developed. He is obese.   HENT:      Head: Normocephalic and " atraumatic.      Right Ear: External ear normal.      Left Ear: External ear normal.   Eyes:      Conjunctiva/sclera: Conjunctivae normal.   Neck:      Thyroid: No thyromegaly.      Trachea: No tracheal deviation.   Cardiovascular:      Rate and Rhythm: Normal rate and regular rhythm.      Heart sounds: No murmur heard.  Pulmonary:      Effort: Pulmonary effort is normal. No respiratory distress.      Breath sounds: Normal breath sounds.   Abdominal:      General: Bowel sounds are normal. There is no distension.      Palpations: Abdomen is soft. There is no mass.      Tenderness: There is no abdominal tenderness.      Hernia: No hernia is present.   Musculoskeletal:         General: Normal range of motion.      Cervical back: Normal range of motion.   Skin:     General: Skin is warm and dry.   Neurological:      Mental Status: He is alert and oriented to person, place, and time.      Cranial Nerves: No cranial nerve deficit.      Sensory: No sensory deficit.   Psychiatric:         Behavior: Behavior normal.         Thought Content: Thought content normal.         Judgment: Judgment normal.         Results Review:   I reviewed the patient's new clinical results.    Office Visit on 08/26/2024   Component Date Value Ref Range Status    WBC 08/26/2024 4.23  3.40 - 10.80 10*3/mm3 Final    RBC 08/26/2024 3.58 (L)  4.14 - 5.80 10*6/mm3 Final    Hemoglobin 08/26/2024 12.9 (L)  13.0 - 17.7 g/dL Final    Hematocrit 08/26/2024 37.3 (L)  37.5 - 51.0 % Final    MCV 08/26/2024 104.2 (H)  79.0 - 97.0 fL Final    MCH 08/26/2024 36.0 (H)  26.6 - 33.0 pg Final    MCHC 08/26/2024 34.6  31.5 - 35.7 g/dL Final    RDW 08/26/2024 13.1  12.3 - 15.4 % Final    Platelets 08/26/2024 56 (L)  140 - 450 10*3/mm3 Final    Total Protein 08/26/2024 6.6  6.0 - 8.5 g/dL Final    Albumin 08/26/2024 4.0  3.5 - 5.2 g/dL Final    Total Bilirubin 08/26/2024 1.9 (H)  0.0 - 1.2 mg/dL Final    Bilirubin, Direct 08/26/2024 0.4 (H)  0.0 - 0.3 mg/dL Final     Alkaline Phosphatase 08/26/2024 103  39 - 117 U/L Final    AST (SGOT) 08/26/2024 26  1 - 40 U/L Final    ALT (SGPT) 08/26/2024 22  1 - 41 U/L Final    Glucose 08/26/2024 143 (H)  65 - 99 mg/dL Final    BUN 08/26/2024 10  8 - 23 mg/dL Final    Creatinine 08/26/2024 0.91  0.76 - 1.27 mg/dL Final    EGFR Result 08/26/2024 94.1  >60.0 mL/min/1.73 Final    Comment: GFR Normal >60  Chronic Kidney Disease <60  Kidney Failure <15      BUN/Creatinine Ratio 08/26/2024 11.0  7.0 - 25.0 Final    Sodium 08/26/2024 142  136 - 145 mmol/L Final    Potassium 08/26/2024 3.9  3.5 - 5.2 mmol/L Final    Chloride 08/26/2024 104  98 - 107 mmol/L Final    Total CO2 08/26/2024 27.6  22.0 - 29.0 mmol/L Final    Calcium 08/26/2024 9.0  8.6 - 10.5 mg/dL Final    Total Cholesterol 08/26/2024 149  0 - 200 mg/dL Final    Comment: Cholesterol Reference Ranges  (U.S. Department of Health and Human Services ATP III  Classifications)  Desirable          <200 mg/dL  Borderline High    200-239 mg/dL  High Risk          >240 mg/dL  Triglyceride Reference Ranges  (U.S. Department of Health and Human Services ATP III  Classifications)  Normal           <150 mg/dL  Borderline High  150-199 mg/dL  High             200-499 mg/dL  Very High        >500 mg/dL  HDL Reference Ranges  (U.S. Department of Health and Human Services ATP III  Classifications)  Low     <40 mg/dl (major risk factor for CHD)  High    >60 mg/dl ('negative' risk factor for CHD)  LDL Reference Ranges  (U.S. Department of Health and Human Services ATP III  Classifications)  Optimal          <100 mg/dL  Near Optimal     100-129 mg/dL  Borderline High  130-159 mg/dL  High             160-189 mg/dL  Very High        >189 mg/dL      Triglycerides 08/26/2024 331 (H)  0 - 150 mg/dL Final    HDL Cholesterol 08/26/2024 33 (L)  40 - 60 mg/dL Final    VLDL Cholesterol Daniele 08/26/2024 52 (H)  5 - 40 mg/dL Final    LDL Chol Calc (NIH) 08/26/2024 64  0 - 100 mg/dL Final      No radiology results for the  last 90 days.    EGD; 11/9/2022  - Normal oropharynx.  - Z-line regular, 40 cm from the incisors.  - Grade II esophageal varices.  - Portal hypertensive gastropathy.  - Erythematous mucosa in the posterior wall of the stomach, antrum and prepyloric region of the stomach.  Biopsied.  - Normal duodenal bulb, first portion of the duodenum, second portion of the duodenum and third portion of the. Duodenum     Path; HP -ve    Assessment / Plan      1.  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  Grade 2 secondary esophageal varices (11/2022)  3.  Mild portal hypertensive gastropathy  4.  Gastroesophageal reflux disease without esophagitis  11/11/2024  Overall patient is doing well.  Takes PPI only as needed.  History CT scan abdomen pelvis done with contrast on 01/16/2023 revealed right hydronephrosis with a stone and was subsequently seen by urology.  Gallbladder with gallstones.  Pancreas was unremarkable.  His last lab work was on 8/26/2024 with a T. bili of 1.9 AST 26 ALT 22 alkaline phosphatase 103 albumin 4.  Hemoglobin was 12.9 WBC 4.2 platelet count of 56,000.    Continue Protonix 40 mg p.o. as needed on demand  Low-salt diet  Patient currently on Inderal 60 mg p.o. daily; heart rate 55-65.  Ultrasound liver for HCC surveillance  EGD for esophageal variceal surveillance.  If no change with the variceal size we will follow-up with medical surveillance with Inderal    The indications, technique, alternatives and potential risk and complications were discussed with the patient including but not limited to bleeding, bowel perforations, missing lesions and anesthetic complications. The patient understands and wishes to proceed with the procedure and has given their verbal consent. Written patient education information was given to the patient.   Hepatitis A vaccine with booster at PCPs office or the health department  The patient will call if they have further questions  before procedure.      1/16/2023  Liver workup;He is immune to hepatitis B but not immune to hepatitis A.  CESAR, anti-smooth muscle antibody, antimitochondrial antibody negative.  A1 AT and ceruloplasmin level normal.  Iron studies negative for hemochromatosis.His CT abdomen pelvis done in April 2022 also revealed peripancreatic adenopathy along with cirrhosis findings..  This could be related to cirrhosis however pancreatic pathology need to be ruled out. EGD done on 11/9/2022 revealed grade 2 esophageal varices with portal hypertensive gastropathy.  Repeat EGD in November 2024 for further esophageal variceal surveillance     11/7/2022  Patient had a nonalcoholic fatty liver disease for many years.  He had fluctuating elevated liver enzymes on record at least for 8 years.  He is nonalcoholic.  Recent CT abdomen pelvis done in April 2022 revealed signs of cirrhosis without any ascites or liver lesions.  Hep C antibody was negative.  His prior CESAR and hep B serology was negative.     5. Encounter for screening for malignant neoplasm of colon  His last colonoscopy over 10 years ago details unknown.  Details unknown.  Patient did not keep with appointment last year.  Will schedule colonoscopy    The indications, technique, alternatives and potential risk and complications were discussed with the patient including but not limited to bleeding, bowel perforations, missing lesions and anesthetic complications. The patient understands and wishes to proceed with the procedure and has given their verbal consent. Written patient education information was given to the patient.   The patient will call if they have further questions before procedure.      Prior history  6.  Asymptomatic gallstones      Follow Up:   No follow-ups on file.    Mónica Newell MD  [facility]  11/11/2024  15:52 EST     Please note that portions of this note may have been completed with a voice recognition program.

## 2024-11-11 NOTE — H&P (VIEW-ONLY)
Follow Up Note     Date: 2024   Patient Name: Sterling Whaley  MRN: [id number]  : 1960     Referring Physician: Annel Armstrong MD    Chief Complaint:    Chief Complaint   Patient presents with    Cirrhosis     LEE      Abd lymphadenopathy       Interval History:   2024  Sterling Whaley is a 64 y.o. male who is here today for follow up for cirrhosis.  Unfortunately patient gained 10 to 12 pounds recently.  He does get occasional loose stools pain on what he eats otherwise mostly will have a regular bowel movement.  Reflux symptoms well-controlled with the Prilosec 40 mg p.o. daily dose.     2022  Sterling Whaley is a 62 y.o. male who is here today to establish care with Gastroenterology for evaluation of his cirrhosis. Patient had a CT abdomen pelvis done in 2022 for further evaluation of his elevated liver enzymes which revealed signs of cirrhosis with splenomegaly without any ascites or liver lesions.  He also had ultrasound done year ago in 2021 which revealed again fatty liver disease with significant splenomegaly suggesting portal hypertension.  Patient states that he had a fatty liver disease for many years and had a elevated liver enzymes for a while.  He denies any alcohol consumption.  No family stop any cirrhosis.  No confusional episodes.  No constipation.     He has a abdominal discomfort or feeling of nervousness whenever he gets tensed up or stressed.  He will have occasional nausea associated with the symptoms.  His bowel movements have been regular.  He is on methadone for many years for his joint pains but as per patient it did not not cause any constipation.     Patient has been taking PPI for intermittent epigastric pain but denies any reflux symptoms as such.  Denies any dysphagia.  No blood in the stool his weight has been stable.  No prior history of any anemia.  His last EGD was in  and colonoscopy was over 10 years ago.  He does  not know anything about the family.  He states that he lost about 25 pounds over years  Subjective      Past Medical History:   Past Medical History:   Diagnosis Date    Anxiety     Arthritis     Bilateral carpal tunnel syndrome     Depression     Elevated cholesterol     Esophageal reflux     Fatty liver     Gastritis     H/O pulmonary function tests 09/13/2012    normal    Headache     Hiatal hernia     History of depression     History of meniscal tear     History of renal calculi     HL (hearing loss) Don’t know    Hyperbilirubinemia     [place] syndrome    Hypertension     Kidney stone     Liver disease Two months ago    Low back pain Years    Obstructive sleep apnea     Plantar fascia rupture     Renal calculi     RLS (restless legs syndrome)     Schatzki's ring     Sinus problem     Sleep apnea     Bi-pap    Snores     TMJ pain dysfunction syndrome     Visual impairment At 32    Wears glasses      Past Surgical History:   Past Surgical History:   Procedure Laterality Date    COLONOSCOPY      ENDOSCOPY      ENDOSCOPY W/ BANDING N/A 11/09/2022    Procedure: ESOPHAGOGASTRODUODENOSCOPY WITH BIOPSY;  Surgeon: Mónica Newell MD;  Location: [facility] ENDOSCOPY;  Service: Gastroenterology;  Laterality: N/A;    ESOPHAGOSCOPY / EGD  2003    [place]    FOOT SURGERY Left     KNEE ARTHROSCOPY Right     medial meniscus repair    MEDIAL COLLATERAL LIGAMENT REPAIR, KNEE Left     URETEROSCOPY LASER LITHOTRIPSY WITH STENT INSERTION Right 2/24/2023    Procedure: URETEROSCOPY LASER LITHOTRIPSY WITH STENT INSERTION;  Surgeon: Moses Purvis MD;  Location: [facility] OR;  Service: Urology;  Laterality: Right;       Family History:   Family History   Problem Relation Age of Onset    Arthritis Mother     Colon cancer Neg Hx        Social History:   Social History     Socioeconomic History    Marital status:    Tobacco Use    Smoking status: Never     Passive exposure: Never    Smokeless tobacco: Never   Vaping Use     "Vaping status: Never Used   Substance and Sexual Activity    Alcohol use: Never    Drug use: Never    Sexual activity: Defer     Birth control/protection: Surgical       Medications:     Current Outpatient Medications:     amLODIPine (NORVASC) 10 MG tablet, Take 1 tablet by mouth Daily., Disp: , Rfl:     Cyanocobalamin (VITAMIN B12 PO), Take  by mouth Daily., Disp: , Rfl:     losartan (Cozaar) 100 MG tablet, Take 1 tablet by mouth Daily., Disp: 90 tablet, Rfl: 0    Naproxen Sodium (ALEVE PO), Take  by mouth., Disp: , Rfl:     omeprazole (priLOSEC) 40 MG capsule, Take 1 capsule by mouth Daily., Disp: 90 capsule, Rfl: 1    propranolol LA (INDERAL LA) 60 MG 24 hr capsule, Take 1 capsule by mouth Daily., Disp: , Rfl:     sertraline (ZOLOFT) 50 MG tablet, Take 1 tablet by mouth Daily., Disp: 90 tablet, Rfl: 1    Allergies:   Allergies   Allergen Reactions    Meperidine Unknown - High Severity     claustraphobic       Review of Systems:   Review of Systems   Constitutional:  Positive for fatigue. Negative for appetite change, fever and unexpected weight loss.   HENT:  Negative for trouble swallowing.    Gastrointestinal:  Positive for diarrhea and GERD. Negative for abdominal distention, abdominal pain, anal bleeding, blood in stool, constipation, nausea, rectal pain, vomiting and indigestion.       The following portions of the patient's history were reviewed and updated as appropriate: allergies, current medications, past family history, past medical history, past social history, past surgical history and problem list.    Objective     Physical Exam:  Vital Signs:   Vitals:    11/11/24 1549   BP: 132/77   Pulse: 66   Temp: 97.3 °F (36.3 °C)   TempSrc: Infrared   Weight: 113 kg (249 lb)  Comment: with clothing/shoes   Height: 180.3 cm (71\")  Comment: per EPIC       Physical Exam  Vitals and nursing note reviewed.   Constitutional:       Appearance: He is well-developed. He is obese.   HENT:      Head: Normocephalic and " atraumatic.      Right Ear: External ear normal.      Left Ear: External ear normal.   Eyes:      Conjunctiva/sclera: Conjunctivae normal.   Neck:      Thyroid: No thyromegaly.      Trachea: No tracheal deviation.   Cardiovascular:      Rate and Rhythm: Normal rate and regular rhythm.      Heart sounds: No murmur heard.  Pulmonary:      Effort: Pulmonary effort is normal. No respiratory distress.      Breath sounds: Normal breath sounds.   Abdominal:      General: Bowel sounds are normal. There is no distension.      Palpations: Abdomen is soft. There is no mass.      Tenderness: There is no abdominal tenderness.      Hernia: No hernia is present.   Musculoskeletal:         General: Normal range of motion.      Cervical back: Normal range of motion.   Skin:     General: Skin is warm and dry.   Neurological:      Mental Status: He is alert and oriented to person, place, and time.      Cranial Nerves: No cranial nerve deficit.      Sensory: No sensory deficit.   Psychiatric:         Behavior: Behavior normal.         Thought Content: Thought content normal.         Judgment: Judgment normal.         Results Review:   I reviewed the patient's new clinical results.    Office Visit on 08/26/2024   Component Date Value Ref Range Status    WBC 08/26/2024 4.23  3.40 - 10.80 10*3/mm3 Final    RBC 08/26/2024 3.58 (L)  4.14 - 5.80 10*6/mm3 Final    Hemoglobin 08/26/2024 12.9 (L)  13.0 - 17.7 g/dL Final    Hematocrit 08/26/2024 37.3 (L)  37.5 - 51.0 % Final    MCV 08/26/2024 104.2 (H)  79.0 - 97.0 fL Final    MCH 08/26/2024 36.0 (H)  26.6 - 33.0 pg Final    MCHC 08/26/2024 34.6  31.5 - 35.7 g/dL Final    RDW 08/26/2024 13.1  12.3 - 15.4 % Final    Platelets 08/26/2024 56 (L)  140 - 450 10*3/mm3 Final    Total Protein 08/26/2024 6.6  6.0 - 8.5 g/dL Final    Albumin 08/26/2024 4.0  3.5 - 5.2 g/dL Final    Total Bilirubin 08/26/2024 1.9 (H)  0.0 - 1.2 mg/dL Final    Bilirubin, Direct 08/26/2024 0.4 (H)  0.0 - 0.3 mg/dL Final     Alkaline Phosphatase 08/26/2024 103  39 - 117 U/L Final    AST (SGOT) 08/26/2024 26  1 - 40 U/L Final    ALT (SGPT) 08/26/2024 22  1 - 41 U/L Final    Glucose 08/26/2024 143 (H)  65 - 99 mg/dL Final    BUN 08/26/2024 10  8 - 23 mg/dL Final    Creatinine 08/26/2024 0.91  0.76 - 1.27 mg/dL Final    EGFR Result 08/26/2024 94.1  >60.0 mL/min/1.73 Final    Comment: GFR Normal >60  Chronic Kidney Disease <60  Kidney Failure <15      BUN/Creatinine Ratio 08/26/2024 11.0  7.0 - 25.0 Final    Sodium 08/26/2024 142  136 - 145 mmol/L Final    Potassium 08/26/2024 3.9  3.5 - 5.2 mmol/L Final    Chloride 08/26/2024 104  98 - 107 mmol/L Final    Total CO2 08/26/2024 27.6  22.0 - 29.0 mmol/L Final    Calcium 08/26/2024 9.0  8.6 - 10.5 mg/dL Final    Total Cholesterol 08/26/2024 149  0 - 200 mg/dL Final    Comment: Cholesterol Reference Ranges  (U.S. Department of Health and Human Services ATP III  Classifications)  Desirable          <200 mg/dL  Borderline High    200-239 mg/dL  High Risk          >240 mg/dL  Triglyceride Reference Ranges  (U.S. Department of Health and Human Services ATP III  Classifications)  Normal           <150 mg/dL  Borderline High  150-199 mg/dL  High             200-499 mg/dL  Very High        >500 mg/dL  HDL Reference Ranges  (U.S. Department of Health and Human Services ATP III  Classifications)  Low     <40 mg/dl (major risk factor for CHD)  High    >60 mg/dl ('negative' risk factor for CHD)  LDL Reference Ranges  (U.S. Department of Health and Human Services ATP III  Classifications)  Optimal          <100 mg/dL  Near Optimal     100-129 mg/dL  Borderline High  130-159 mg/dL  High             160-189 mg/dL  Very High        >189 mg/dL      Triglycerides 08/26/2024 331 (H)  0 - 150 mg/dL Final    HDL Cholesterol 08/26/2024 33 (L)  40 - 60 mg/dL Final    VLDL Cholesterol Daniele 08/26/2024 52 (H)  5 - 40 mg/dL Final    LDL Chol Calc (NIH) 08/26/2024 64  0 - 100 mg/dL Final      No radiology results for the  last 90 days.    EGD; 11/9/2022  - Normal oropharynx.  - Z-line regular, 40 cm from the incisors.  - Grade II esophageal varices.  - Portal hypertensive gastropathy.  - Erythematous mucosa in the posterior wall of the stomach, antrum and prepyloric region of the stomach.  Biopsied.  - Normal duodenal bulb, first portion of the duodenum, second portion of the duodenum and third portion of the. Duodenum     Path; HP -ve    Assessment / Plan      1.  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  Grade 2 secondary esophageal varices (11/2022)  3.  Mild portal hypertensive gastropathy  4.  Gastroesophageal reflux disease without esophagitis  11/11/2024  Overall patient is doing well.  Takes PPI only as needed.  History CT scan abdomen pelvis done with contrast on 01/16/2023 revealed right hydronephrosis with a stone and was subsequently seen by urology.  Gallbladder with gallstones.  Pancreas was unremarkable.  His last lab work was on 8/26/2024 with a T. bili of 1.9 AST 26 ALT 22 alkaline phosphatase 103 albumin 4.  Hemoglobin was 12.9 WBC 4.2 platelet count of 56,000.    Continue Protonix 40 mg p.o. as needed on demand  Low-salt diet  Patient currently on Inderal 60 mg p.o. daily; heart rate 55-65.  Ultrasound liver for HCC surveillance  EGD for esophageal variceal surveillance.  If no change with the variceal size we will follow-up with medical surveillance with Inderal    The indications, technique, alternatives and potential risk and complications were discussed with the patient including but not limited to bleeding, bowel perforations, missing lesions and anesthetic complications. The patient understands and wishes to proceed with the procedure and has given their verbal consent. Written patient education information was given to the patient.   Hepatitis A vaccine with booster at PCPs office or the health department  The patient will call if they have further questions  before procedure.      1/16/2023  Liver workup;He is immune to hepatitis B but not immune to hepatitis A.  CESAR, anti-smooth muscle antibody, antimitochondrial antibody negative.  A1 AT and ceruloplasmin level normal.  Iron studies negative for hemochromatosis.His CT abdomen pelvis done in April 2022 also revealed peripancreatic adenopathy along with cirrhosis findings..  This could be related to cirrhosis however pancreatic pathology need to be ruled out. EGD done on 11/9/2022 revealed grade 2 esophageal varices with portal hypertensive gastropathy.  Repeat EGD in November 2024 for further esophageal variceal surveillance     11/7/2022  Patient had a nonalcoholic fatty liver disease for many years.  He had fluctuating elevated liver enzymes on record at least for 8 years.  He is nonalcoholic.  Recent CT abdomen pelvis done in April 2022 revealed signs of cirrhosis without any ascites or liver lesions.  Hep C antibody was negative.  His prior CESAR and hep B serology was negative.     5. Encounter for screening for malignant neoplasm of colon  His last colonoscopy over 10 years ago details unknown.  Details unknown.  Patient did not keep with appointment last year.  Will schedule colonoscopy    The indications, technique, alternatives and potential risk and complications were discussed with the patient including but not limited to bleeding, bowel perforations, missing lesions and anesthetic complications. The patient understands and wishes to proceed with the procedure and has given their verbal consent. Written patient education information was given to the patient.   The patient will call if they have further questions before procedure.      Prior history  6.  Asymptomatic gallstones      Follow Up:   No follow-ups on file.    Mónica Newell MD  [facility]  11/11/2024  15:52 EST     Please note that portions of this note may have been completed with a voice recognition program.

## 2024-11-12 PROBLEM — Z12.11 SPECIAL SCREENING FOR MALIGNANT NEOPLASMS, COLON: Status: ACTIVE | Noted: 2024-11-11

## 2024-11-18 DIAGNOSIS — I10 PRIMARY HYPERTENSION: ICD-10-CM

## 2024-11-18 NOTE — TELEPHONE ENCOUNTER
"Caller: Sterling Whaley \"Balwinder\"    Relationship: Self    Best call back number: [phone]     Requested Prescriptions:   Requested Prescriptions     Pending Prescriptions Disp Refills    losartan (Cozaar) 100 MG tablet 90 tablet 0     Sig: Take 1 tablet by mouth Daily.        Pharmacy where request should be sent: [facility] [id number] [address] AT [facility] [phone] [facility] [phone]      Last office visit with prescribing clinician: 8/26/2024   Last telemedicine visit with prescribing clinician: Visit date not found   Next office visit with prescribing clinician: Visit date not found     Additional details provided by patient: PATIENT HAS 2 DAYS LEFT.     Does the patient have less than a 3 day supply:  [x] Yes  [] No    Would you like a call back once the refill request has been completed: [] Yes [x] No    If the office needs to give you a call back, can they leave a voicemail: [] Yes [x] No    Cadance Dunaway, RegSched Rep   11/18/24 10:15 EST           "

## 2024-11-19 DIAGNOSIS — Z12.11 ENCOUNTER FOR SCREENING FOR MALIGNANT NEOPLASM OF COLON: Primary | ICD-10-CM

## 2024-11-19 RX ORDER — LOSARTAN POTASSIUM 100 MG/1
100 TABLET ORAL DAILY
Qty: 90 TABLET | Refills: 0 | Status: SHIPPED | OUTPATIENT
Start: 2024-11-19

## 2024-11-19 RX ORDER — BISACODYL 5 MG/1
20 TABLET, DELAYED RELEASE ORAL ONCE
Qty: 4 TABLET | Refills: 0 | Status: SHIPPED | OUTPATIENT
Start: 2024-11-19 | End: 2024-11-19

## 2024-11-20 ENCOUNTER — ANESTHESIA EVENT (OUTPATIENT)
Dept: GASTROENTEROLOGY | Facility: HOSPITAL | Age: 64
End: 2024-11-20
Payer: MEDICARE

## 2024-11-20 NOTE — ANESTHESIA PREPROCEDURE EVALUATION
Anesthesia Evaluation     Patient summary reviewed and Nursing notes reviewed   no history of anesthetic complications:   NPO Solid Status: > 8 hours  NPO Liquid Status: > 8 hours           Airway   Mallampati: II  TM distance: >3 FB  Neck ROM: full  Possible difficult intubation  Dental - normal exam     Pulmonary    (+) ,sleep apnea on CPAP, decreased breath sounds  (-) not a smoker  Cardiovascular - normal exam  Exercise tolerance: good (4-7 METS)    ECG reviewed  Patient on routine beta blocker    (+) hypertension well controlled, hyperlipidemia    ROS comment: 9/9/21 ekg - sinus rhythm    Neuro/Psych  (+) headaches, numbness, psychiatric history Anxiety and Depression  GI/Hepatic/Renal/Endo    (+) obesity, morbid obesity, hiatal hernia, GERD, hepatitis (sandoval), liver disease fatty liver disease cirrhosis, renal disease- stones    Musculoskeletal     (+) back pain, chronic pain, neck stiffness  Abdominal   (+) obese   Substance History      OB/GYN          Other   arthritis, blood dyscrasia thrombocytopenia,                   Anesthesia Plan    ASA 3     MAC     (Risks and benefits discussed including risk of aspiration, recall and dental damage. Discussed risks with pt., pt increased  intraop and postop risks for cv, resp, and neuro events,All patient questions answered.    Will continue with plan of care.)  intravenous induction     Anesthetic plan, risks, benefits, and alternatives have been provided, discussed and informed consent has been obtained with: patient.  Pre-procedure education provided      CODE STATUS:

## 2024-11-21 ENCOUNTER — HOSPITAL ENCOUNTER (OUTPATIENT)
Facility: HOSPITAL | Age: 64
Setting detail: HOSPITAL OUTPATIENT SURGERY
Discharge: HOME | End: 2024-11-21
Attending: INTERNAL MEDICINE | Admitting: INTERNAL MEDICINE
Payer: MEDICARE

## 2024-11-21 ENCOUNTER — ANESTHESIA (OUTPATIENT)
Dept: GASTROENTEROLOGY | Facility: HOSPITAL | Age: 64
End: 2024-11-21
Payer: MEDICARE

## 2024-11-21 VITALS
DIASTOLIC BLOOD PRESSURE: 83 MMHG | TEMPERATURE: 98.3 F | HEART RATE: 62 BPM | OXYGEN SATURATION: 95 % | RESPIRATION RATE: 18 BRPM | SYSTOLIC BLOOD PRESSURE: 118 MMHG

## 2024-11-21 DIAGNOSIS — K75.81 LIVER CIRRHOSIS SECONDARY TO NASH: ICD-10-CM

## 2024-11-21 DIAGNOSIS — Z12.11 ENCOUNTER FOR SCREENING FOR MALIGNANT NEOPLASM OF COLON: ICD-10-CM

## 2024-11-21 DIAGNOSIS — K74.60 LIVER CIRRHOSIS SECONDARY TO NASH: ICD-10-CM

## 2024-11-21 DIAGNOSIS — Z12.11 SPECIAL SCREENING FOR MALIGNANT NEOPLASMS, COLON: ICD-10-CM

## 2024-11-21 PROCEDURE — 25010000002 FENTANYL CITRATE (PF) 50 MCG/ML SOLUTION: Performed by: NURSE ANESTHETIST, CERTIFIED REGISTERED

## 2024-11-21 PROCEDURE — 43239 EGD BIOPSY SINGLE/MULTIPLE: CPT | Performed by: INTERNAL MEDICINE

## 2024-11-21 PROCEDURE — 45385 COLONOSCOPY W/LESION REMOVAL: CPT | Performed by: INTERNAL MEDICINE

## 2024-11-21 PROCEDURE — 25010000002 MIDAZOLAM PER 1MG: Performed by: NURSE ANESTHETIST, CERTIFIED REGISTERED

## 2024-11-21 PROCEDURE — 25010000002 PROPOFOL 10 MG/ML EMULSION: Performed by: NURSE ANESTHETIST, CERTIFIED REGISTERED

## 2024-11-21 PROCEDURE — 25010000002 LIDOCAINE (CARDIAC): Performed by: NURSE ANESTHETIST, CERTIFIED REGISTERED

## 2024-11-21 RX ORDER — MIDAZOLAM HYDROCHLORIDE 2 MG/2ML
INJECTION, SOLUTION INTRAMUSCULAR; INTRAVENOUS AS NEEDED
Status: DISCONTINUED | OUTPATIENT
Start: 2024-11-21 | End: 2024-11-21 | Stop reason: SURG

## 2024-11-21 RX ORDER — SIMETHICONE 40MG/0.6ML
SUSPENSION, DROPS(FINAL DOSAGE FORM)(ML) ORAL AS NEEDED
Status: DISCONTINUED | OUTPATIENT
Start: 2024-11-21 | End: 2024-11-21 | Stop reason: HOSPADM

## 2024-11-21 RX ORDER — KETAMINE HCL IN NACL, ISO-OSM 100MG/10ML
SYRINGE (ML) INJECTION AS NEEDED
Status: DISCONTINUED | OUTPATIENT
Start: 2024-11-21 | End: 2024-11-21 | Stop reason: SURG

## 2024-11-21 RX ORDER — PROPOFOL 10 MG/ML
VIAL (ML) INTRAVENOUS AS NEEDED
Status: DISCONTINUED | OUTPATIENT
Start: 2024-11-21 | End: 2024-11-21 | Stop reason: SURG

## 2024-11-21 RX ORDER — FENTANYL CITRATE 50 UG/ML
INJECTION, SOLUTION INTRAMUSCULAR; INTRAVENOUS AS NEEDED
Status: DISCONTINUED | OUTPATIENT
Start: 2024-11-21 | End: 2024-11-21 | Stop reason: SURG

## 2024-11-21 RX ADMIN — LIDOCAINE HYDROCHLORIDE 40 MG: 20 INJECTION, SOLUTION INTRAVENOUS at 08:56

## 2024-11-21 RX ADMIN — PROPOFOL 30 MG: 10 INJECTION, EMULSION INTRAVENOUS at 09:03

## 2024-11-21 RX ADMIN — PROPOFOL 10 MG: 10 INJECTION, EMULSION INTRAVENOUS at 09:30

## 2024-11-21 RX ADMIN — MIDAZOLAM HYDROCHLORIDE 2 MG: 1 INJECTION, SOLUTION INTRAMUSCULAR; INTRAVENOUS at 08:54

## 2024-11-21 RX ADMIN — PROPOFOL 40 MG: 10 INJECTION, EMULSION INTRAVENOUS at 08:59

## 2024-11-21 RX ADMIN — PROPOFOL 30 MG: 10 INJECTION, EMULSION INTRAVENOUS at 09:25

## 2024-11-21 RX ADMIN — PROPOFOL 30 MG: 10 INJECTION, EMULSION INTRAVENOUS at 09:15

## 2024-11-21 RX ADMIN — Medication 25 MG: at 09:06

## 2024-11-21 RX ADMIN — PROPOFOL 30 MG: 10 INJECTION, EMULSION INTRAVENOUS at 09:20

## 2024-11-21 RX ADMIN — Medication 25 MG: at 08:55

## 2024-11-21 RX ADMIN — FENTANYL CITRATE 50 MCG: 50 INJECTION, SOLUTION INTRAMUSCULAR; INTRAVENOUS at 08:55

## 2024-11-21 RX ADMIN — PROPOFOL 30 MG: 10 INJECTION, EMULSION INTRAVENOUS at 09:10

## 2024-11-21 NOTE — ANESTHESIA POSTPROCEDURE EVALUATION
Patient: Sterling Whaley    Procedure Summary       Date: 11/21/24 Room / Location: [facility] ENDOSCOPY 2 / [facility] ENDOSCOPY    Anesthesia Start: 0853 Anesthesia Stop:     Procedures:       Esophagogastroduodenoscopy with biopsy (Esophagus)      COLONOSCOPY with polypectomy (Anus) Diagnosis:       Liver cirrhosis secondary to LEE      Special screening for malignant neoplasms, colon      (Liver cirrhosis secondary to LEE [K75.81, K74.60][V76.51])    Surgeons: Mónica Newell MD Provider: Beny Workman CRNA    Anesthesia Type: MAC ASA Status: 3            Anesthesia Type: MAC    Vitals  No vitals data found for the desired time range.          Post Anesthesia Care and Evaluation    Patient location during evaluation: PHASE II  Patient participation: complete - patient participated  Level of consciousness: awake  Pain score: 0  Pain management: adequate    Airway patency: patent  Anesthetic complications: No anesthetic complications  PONV Status: none  Cardiovascular status: acceptable  Respiratory status: acceptable, nasal cannula and spontaneous ventilation  Hydration status: acceptable    Comments: vsss resp spont, reflexes intact, responsive, report given to pacu nurseSee R.N. note for postop vital signs.

## 2024-11-21 NOTE — DISCHARGE INSTRUCTIONS
- Discharge patient to home (ambulatory).   - Low sodium diet.   - Continue present medications.   - Will continue variceal surveillance with non selective beta blockers  - Await pathology results.   - Repeat colonoscopy in 3 - 5 years for surveillance.   - Return to GI office in 8 weeks.     Rest today  No pushing,pulling,tugging,heavy lifting, or strenuous activity   No major decision making,driving,or drinking alcoholic beverages for 24 hours due to the sedation you received  Always use good hand hygiene/washing technique  No driving on pain medication.    To assist you in voiding:  Drink plenty of fluids  Listen to running water while attempting to void.    If you are unable to urinate and you have an uncomfortable urge to void or it has been   6 hours since you were discharged, return to the Emergency Room.

## 2024-11-22 LAB — REF LAB TEST METHOD: NORMAL

## 2024-12-04 ENCOUNTER — OFFICE VISIT (OUTPATIENT)
Dept: PULMONOLOGY | Facility: CLINIC | Age: 64
End: 2024-12-04
Payer: MEDICARE

## 2024-12-04 VITALS
HEIGHT: 71 IN | OXYGEN SATURATION: 98 % | HEART RATE: 68 BPM | RESPIRATION RATE: 18 BRPM | DIASTOLIC BLOOD PRESSURE: 80 MMHG | SYSTOLIC BLOOD PRESSURE: 122 MMHG | BODY MASS INDEX: 35.03 KG/M2 | WEIGHT: 250.2 LBS

## 2024-12-04 DIAGNOSIS — G47.33 OSA (OBSTRUCTIVE SLEEP APNEA): Primary | ICD-10-CM

## 2024-12-04 DIAGNOSIS — Z72.821 POOR SLEEP HYGIENE: ICD-10-CM

## 2024-12-04 DIAGNOSIS — E66.811 OBESITY, CLASS I, BMI 30.0-34.9 (SEE ACTUAL BMI): ICD-10-CM

## 2024-12-04 DIAGNOSIS — J30.89 OTHER ALLERGIC RHINITIS: ICD-10-CM

## 2024-12-04 DIAGNOSIS — G47.31 CENTRAL SLEEP APNEA: ICD-10-CM

## 2024-12-04 PROCEDURE — 3074F SYST BP LT 130 MM HG: CPT | Performed by: INTERNAL MEDICINE

## 2024-12-04 PROCEDURE — 3079F DIAST BP 80-89 MM HG: CPT | Performed by: INTERNAL MEDICINE

## 2024-12-04 PROCEDURE — 99214 OFFICE O/P EST MOD 30 MIN: CPT | Performed by: INTERNAL MEDICINE

## 2024-12-04 RX ORDER — AZELASTINE 1 MG/ML
1 SPRAY, METERED NASAL 2 TIMES DAILY PRN
Qty: 1 EACH | Refills: 5 | Status: SHIPPED | OUTPATIENT
Start: 2024-12-04

## 2024-12-04 NOTE — PROGRESS NOTES
"  Chief Complaint   Patient presents with    Sleeping Problem    Follow-up         Subjective   Sterling Whaley is a 64 y.o. male.   Patient comes in today to follow-up on obstructive and central sleep apnea.    The patient says that he has been using his BiPAP on a regular basis and although he does feel somewhat improved compared to before, he continues to struggle with the device \"synchronizing with the breathing\".    He feels that sometimes the device tries to push a rate when he is trying to exhale.    Most nights he does go to bed around 10 PM but some nights it could be 1 AM or so.    Patient says that he has not been using his nasal sprays on a regular basis and is noticing worsening nasal congestion as well as occasional runny nose.      The following portions of the patient's history were reviewed and updated as appropriate: allergies, current medications, past family history, past medical history, past social history, and past surgical history.    Review of Systems   HENT:  Positive for sinus pressure. Negative for sneezing and sore throat.    Respiratory:  Negative for cough, chest tightness, shortness of breath and wheezing.    Psychiatric/Behavioral:  Negative for sleep disturbance.        Objective   Visit Vitals  /80   Pulse 68   Resp 18   Ht 180.3 cm (70.98\") Comment: pt reported   Wt 113 kg (250 lb 3.2 oz)   SpO2 98%   BMI 34.91 kg/m²       BMI Readings from Last 8 Encounters:   12/04/24 34.91 kg/m²   11/11/24 34.73 kg/m²   08/26/24 35.43 kg/m²   06/24/24 33.78 kg/m²   06/04/24 33.15 kg/m²   05/22/24 32.80 kg/m²   05/20/24 33.33 kg/m²   04/10/24 33.56 kg/m²       Physical Exam  Vitals reviewed.   Constitutional:       Appearance: He is well-developed.   HENT:      Head: Atraumatic.      Mouth/Throat:      Mouth: Mucous membranes are moist.      Comments: Crowded oropharynx.   Eyes:      Extraocular Movements: Extraocular movements intact.   Cardiovascular:      Rate and Rhythm: Normal " rate and regular rhythm.   Skin:     General: Skin is warm.   Neurological:      Mental Status: He is alert and oriented to person, place, and time.           Assessment & Plan   Diagnoses and all orders for this visit:    1. TRUDI (obstructive sleep apnea) (Primary)    2. Central sleep apnea    3. Obesity, Class I, BMI 30.0-34.9 (see actual BMI)    4. Other allergic rhinitis    5. Poor sleep hygiene    Other orders  -     azelastine (ASTELIN) 0.1 % nasal spray; Administer 1 spray into the nostril(s) as directed by provider 2 (Two) Times a Day As Needed for Rhinitis or Allergies. Use in each nostril as directed  Dispense: 1 each; Refill: 5           Return in about 6 months (around 5/29/2025) for SleepONLY/[place].    DISCUSSION (if any):  Sleep study performed in December 2023  AHI was 30/ hour.     Latest PAP device provided in February/March 2024.  [employer]: [employer]     Current PAP settings: 12/8  Current mask type: Air Touch F20 FFM.    I told the patient that his symptoms are consistent with poorly controlled sleep apnea.    I told the patient and his family member that central sleep apnea events have clearly improved/resolved and he will definitely need backup respiratory rate set on the device.    Due to some of his issues that could be due to difficulty tolerating the current BiPAP settings, I have decided to empirically change the settings to 12/8 with back up rate of 8/min. Verbal order given to Silke at [facility].  She was asked to send written orders to verify these and implement them as soon as possible.    I also asked for him to be given new mask and setup, as needed.     Patient was advised to continue using PAP for at least 4 hours every night.    Patient was advised to call this office with any issues.    Patient was advised to start using nasal spray on a regular basis, especially since his symptoms are consistent with likely poorly controlled allergic rhinitis.    Dictated utilizing Dragon  dictation.    This document was electronically signed by Mayela Adkins MD on 12/04/24 at 11:55 EST

## 2025-01-13 DIAGNOSIS — F41.1 GAD (GENERALIZED ANXIETY DISORDER): ICD-10-CM

## 2025-01-13 DIAGNOSIS — M62.838 MUSCLE SPASM: ICD-10-CM

## 2025-01-13 RX ORDER — METHOCARBAMOL 500 MG/1
500 TABLET, FILM COATED ORAL 3 TIMES DAILY PRN
Qty: 90 TABLET | Refills: 1 | OUTPATIENT
Start: 2025-01-13

## 2025-01-15 ENCOUNTER — TELEPHONE (OUTPATIENT)
Dept: FAMILY MEDICINE CLINIC | Facility: CLINIC | Age: 65
End: 2025-01-15
Payer: MEDICARE

## 2025-01-15 DIAGNOSIS — B35.4 TINEA CORPORIS: Primary | ICD-10-CM

## 2025-01-15 NOTE — TELEPHONE ENCOUNTER
PATIENT ASKED ABOUT GETTING A DERMATOLOGY REFERRAL FOR THE RASH HE CAN'T GET RID OF. HE SAID HE DOESN'T HAVE A PARTICULAR PLACE. HE SPOKE WITH PCP ABOUT IT TODAY WHEN HE WAS BACK THERE WITH SOMEONE ELSE.

## 2025-01-16 NOTE — TELEPHONE ENCOUNTER
Spoke w pt. Notified that referral had been faxed today to [facility] for scheduling review and that I would call him as soon as I receive appointment information from their office.

## 2025-01-20 NOTE — TELEPHONE ENCOUNTER
Contacted patient and notified him of Dermatology appointment details. Patient requested that I also send him a [employer] message with the appointment information. [employer] message sent.

## 2025-02-13 DIAGNOSIS — I10 PRIMARY HYPERTENSION: ICD-10-CM

## 2025-02-13 RX ORDER — LOSARTAN POTASSIUM 100 MG/1
100 TABLET ORAL DAILY
Qty: 90 TABLET | Refills: 0 | Status: SHIPPED | OUTPATIENT
Start: 2025-02-13

## 2025-02-26 ENCOUNTER — OFFICE VISIT (OUTPATIENT)
Dept: FAMILY MEDICINE CLINIC | Facility: CLINIC | Age: 65
End: 2025-02-26
Payer: MEDICARE

## 2025-02-26 VITALS
DIASTOLIC BLOOD PRESSURE: 90 MMHG | HEIGHT: 71 IN | HEART RATE: 62 BPM | OXYGEN SATURATION: 97 % | SYSTOLIC BLOOD PRESSURE: 130 MMHG | BODY MASS INDEX: 36.29 KG/M2 | WEIGHT: 259.25 LBS

## 2025-02-26 DIAGNOSIS — M54.50 CHRONIC RIGHT-SIDED LOW BACK PAIN WITHOUT SCIATICA: ICD-10-CM

## 2025-02-26 DIAGNOSIS — E66.01 CLASS 2 SEVERE OBESITY DUE TO EXCESS CALORIES WITH SERIOUS COMORBIDITY AND BODY MASS INDEX (BMI) OF 36.0 TO 36.9 IN ADULT: ICD-10-CM

## 2025-02-26 DIAGNOSIS — M25.551 RIGHT HIP PAIN: Primary | ICD-10-CM

## 2025-02-26 DIAGNOSIS — E66.812 CLASS 2 SEVERE OBESITY DUE TO EXCESS CALORIES WITH SERIOUS COMORBIDITY AND BODY MASS INDEX (BMI) OF 36.0 TO 36.9 IN ADULT: ICD-10-CM

## 2025-02-26 DIAGNOSIS — G89.29 CHRONIC RIGHT-SIDED LOW BACK PAIN WITHOUT SCIATICA: ICD-10-CM

## 2025-02-26 RX ORDER — CHLORTHALIDONE 25 MG/1
TABLET ORAL
COMMUNITY
Start: 2025-02-17

## 2025-02-26 RX ORDER — PROPRANOLOL HYDROCHLORIDE 10 MG/1
TABLET ORAL
COMMUNITY
Start: 2025-02-13

## 2025-02-26 RX ORDER — METHYLPREDNISOLONE ACETATE 80 MG/ML
80 INJECTION, SUSPENSION INTRA-ARTICULAR; INTRALESIONAL; INTRAMUSCULAR; SOFT TISSUE ONCE
Status: COMPLETED | OUTPATIENT
Start: 2025-02-26 | End: 2025-02-26

## 2025-02-26 RX ORDER — KETOCONAZOLE 20 MG/G
CREAM TOPICAL
COMMUNITY
Start: 2025-02-10

## 2025-02-26 RX ORDER — METHOCARBAMOL 500 MG/1
500 TABLET, FILM COATED ORAL 3 TIMES DAILY PRN
Qty: 90 TABLET | Refills: 1 | Status: SHIPPED | OUTPATIENT
Start: 2025-02-26

## 2025-02-26 RX ADMIN — METHYLPREDNISOLONE ACETATE 80 MG: 80 INJECTION, SUSPENSION INTRA-ARTICULAR; INTRALESIONAL; INTRAMUSCULAR; SOFT TISSUE at 16:03

## 2025-02-26 NOTE — PROGRESS NOTES
"Subjective   Sterling Whaley is a 64 y.o. male.     History of Present Illness  63 yo male with known L-DDD/DJD presenting with c/o right sided LBP radiating to right hip/anterior thigh x 1 week. No better with conservative tx, chiropractic care, etc. No known injury or change in activity although wife states he \"lifts more than he should\". Some mild paresthesia in area. No weakness.    Xray 6/2023- L4-L5 disc dz, grade 1-2  CT L spine 2023- L5-S1, severe pedro NFS, right worse  MRI L spine 2023 - same    Up 10 lbs from last visit.    The following portions of the patient's history were reviewed and updated as appropriate: allergies, current medications, past family history, past medical history, past social history, past surgical history, and problem list.    Review of Systems   Constitutional:  Positive for fatigue and unexpected weight change. Negative for fever.   HENT:  Positive for congestion, hearing loss (chronic), mouth sores (intermittent) and postnasal drip. Negative for drooling, facial swelling, nosebleeds, rhinorrhea, sore throat and trouble swallowing.    Eyes:  Positive for visual disturbance (intermittent blurry). Negative for pain, discharge and redness.   Respiratory:  Negative for cough, shortness of breath and wheezing.    Cardiovascular:  Positive for leg swelling. Negative for chest pain and palpitations.   Gastrointestinal:  Positive for nausea. Negative for abdominal pain, blood in stool, diarrhea and vomiting.   Endocrine: Positive for heat intolerance. Negative for polydipsia and polyuria.   Genitourinary:  Negative for dysuria and hematuria.   Musculoskeletal:  Positive for arthralgias, back pain, gait problem, joint swelling, myalgias, neck pain and neck stiffness.   Skin:  Negative for rash and wound.   Neurological:  Positive for weakness, numbness and headaches. Negative for dizziness, tremors, syncope, facial asymmetry and speech difficulty.   Hematological:  Negative for " adenopathy. Does not bruise/bleed easily.   Psychiatric/Behavioral:  Positive for decreased concentration and dysphoric mood (mild). Negative for confusion, hallucinations, sleep disturbance and suicidal ideas. The patient is nervous/anxious.    Pt's previous physical exam reviewed and updated as indicated.      Objective   Vitals:    02/26/25 1401   BP: 130/90   Pulse: 62   SpO2: 97%     Body mass index is 36.17 kg/m².      02/26/25  1401   Weight: 118 kg (259 lb 4 oz)           Physical Exam  Vitals and nursing note reviewed.   Constitutional:       General: He is not in acute distress.     Appearance: He is well-groomed. He is obese. He is not ill-appearing.   HENT:      Head: Atraumatic.      Mouth/Throat:      Mouth: Mucous membranes are moist.   Eyes:      General: Scleral icterus (mild) present.      Conjunctiva/sclera: Conjunctivae normal.      Pupils: Pupils are equal, round, and reactive to light.   Cardiovascular:      Rate and Rhythm: Regular rhythm. Bradycardia present.      Pulses: Normal pulses.      Heart sounds: Heart sounds are distant.   Pulmonary:      Effort: Pulmonary effort is normal.      Breath sounds: Normal breath sounds.   Musculoskeletal:      Cervical back: Deformity (loss of normal lordosis), spasms and bony tenderness present.      Thoracic back: Deformity (increase kyphosis) present. No bony tenderness.      Lumbar back: Spasms, tenderness (diffuse soft tissue but worse on right) and bony tenderness (L3-S1, right SI) present. Decreased range of motion (mildly). Negative right straight leg raise test and negative left straight leg raise test.      Right hip: Tenderness (lateral) present. Decreased range of motion (mildly in flexion). Normal strength.      Right lower leg: Edema (mild) present.      Left lower leg: Edema (mild) present.   Skin:     General: Skin is warm and dry.      Coloration: Skin is not jaundiced or pale.      Findings: No bruising or rash.   Neurological:       Mental Status: He is alert and oriented to person, place, and time.      Sensory: Sensation is intact.      Motor: Motor function is intact.      Gait: Gait is intact.   Psychiatric:         Behavior: Behavior normal. Behavior is cooperative.         Cognition and Memory: Cognition normal.     Pt's previous physical exam reviewed and updated as indicated.      Assessment & Plan   Diagnoses and all orders for this visit:    1. Right hip pain (Primary)  -     methylPREDNISolone acetate (DEPO-medrol) injection 80 mg    2. Chronic right-sided low back pain without sciatica  -     methylPREDNISolone acetate (DEPO-medrol) injection 80 mg  -     methocarbamol (ROBAXIN) 500 MG tablet; Take 1 tablet by mouth 3 (Three) Times a Day As Needed for Muscle Spasms.  Dispense: 90 tablet; Refill: 1    3. Class 2 severe obesity due to excess calories with serious comorbidity and body mass index (BMI) of 36.0 to 36.9 in adult       Right hip pain likely multifactorial - element of trochanteric bursitis but the majority of his pain is likely due to lumbar radiculopathy. No neuro red flags. I have reviewed risks/benefits and potential side effects of various treatment options. Pt voices understanding and wishes to proceed with trial of oral CS, muscle relaxant. Avoid NSAIDs.     Slow persistent weight gain due to excess calories and sedentary lifestyle. Advised on importance of weight loss in setting of chronic back pain. Nutrition and activity goals reviewed including: mainly water to drink, limit white flour/processed sugar, higher lean protein, high fiber carbs, regular meals, working toward 150 mins cardio per week. Less than 1800 kcal per day.    F/u per routine for AMW, f/u sooner as needed.  Patient was encouraged to keep me informed of any acute changes, lack of improvement, or any new concerning symptoms.  Pt is aware of reasons to seek emergent care.  Patient voiced understanding of all instructions and denied further  questions.    Please note that portions of this note may have been completed with a voice recognition program.

## 2025-03-26 ENCOUNTER — OFFICE VISIT (OUTPATIENT)
Dept: FAMILY MEDICINE CLINIC | Facility: CLINIC | Age: 65
End: 2025-03-26
Payer: MEDICARE

## 2025-03-26 VITALS
OXYGEN SATURATION: 95 % | WEIGHT: 250 LBS | RESPIRATION RATE: 16 BRPM | BODY MASS INDEX: 35 KG/M2 | SYSTOLIC BLOOD PRESSURE: 122 MMHG | HEIGHT: 71 IN | DIASTOLIC BLOOD PRESSURE: 78 MMHG | HEART RATE: 82 BPM

## 2025-03-26 DIAGNOSIS — M54.50 CHRONIC RIGHT-SIDED LOW BACK PAIN WITHOUT SCIATICA: ICD-10-CM

## 2025-03-26 DIAGNOSIS — G89.29 CHRONIC RIGHT-SIDED LOW BACK PAIN WITHOUT SCIATICA: ICD-10-CM

## 2025-03-26 DIAGNOSIS — M25.551 RIGHT HIP PAIN: Primary | ICD-10-CM

## 2025-03-26 RX ORDER — HYDROCODONE BITARTRATE AND ACETAMINOPHEN 7.5; 325 MG/1; MG/1
1 TABLET ORAL EVERY 8 HOURS PRN
Qty: 20 TABLET | Refills: 0 | Status: SHIPPED | OUTPATIENT
Start: 2025-03-26

## 2025-03-26 RX ORDER — METHYLPREDNISOLONE 4 MG/1
TABLET ORAL
Qty: 21 TABLET | Refills: 0 | Status: SHIPPED | OUTPATIENT
Start: 2025-03-26

## 2025-03-26 NOTE — PROGRESS NOTES
Subjective     Chief Complaint:    Chief Complaint   Patient presents with    Back Pain       History of Present Illness:   Worsening back pain, chronic, right side, low back, right side sciatica, saw PCP last month and meds did not help, used to take methadone in the past, does not want to do that,   Robaxin does not help   Has been to pain management without much success   Notes hip pain    Review of Systems  Gen- No fevers, chills  CV- No chest pain, palpitations  Resp- No cough, dyspnea  GI- No N/V/D, abd pain  Neuro-No dizziness, headaches      I have reviewed and/or updated the patient's past medical, surgical, family, social history and problem list as appropriate.     Medications:    Current Outpatient Medications:     amLODIPine (NORVASC) 10 MG tablet, Take 1 tablet by mouth Daily., Disp: , Rfl:     azelastine (ASTELIN) 0.1 % nasal spray, Administer 1 spray into the nostril(s) as directed by provider 2 (Two) Times a Day As Needed for Rhinitis or Allergies. Use in each nostril as directed, Disp: 1 each, Rfl: 5    chlorthalidone (HYGROTON) 25 MG tablet, , Disp: , Rfl:     Cyanocobalamin (VITAMIN B12 PO), Take  by mouth Daily., Disp: , Rfl:     ketoconazole (NIZORAL) 2 % cream, , Disp: , Rfl:     losartan (COZAAR) 100 MG tablet, TAKE 1 TABLET BY MOUTH DAILY, Disp: 90 tablet, Rfl: 0    methocarbamol (ROBAXIN) 500 MG tablet, Take 1 tablet by mouth 3 (Three) Times a Day As Needed for Muscle Spasms., Disp: 90 tablet, Rfl: 1    Naproxen Sodium (ALEVE PO), Take  by mouth., Disp: , Rfl:     omeprazole (priLOSEC) 40 MG capsule, Take 1 capsule by mouth Daily., Disp: 90 capsule, Rfl: 1    propranolol (INDERAL) 10 MG tablet, , Disp: , Rfl:     sertraline (ZOLOFT) 50 MG tablet, TAKE 1 TABLET BY MOUTH DAILY, Disp: 90 tablet, Rfl: 1    HYDROcodone-acetaminophen (NORCO) 7.5-325 MG per tablet, Take 1 tablet by mouth Every 8 (Eight) Hours As Needed for Severe Pain., Disp: 20 tablet, Rfl: 0    methylPREDNISolone (MEDROL)  "4 MG dose pack, Take as directed on package instructions., Disp: 21 tablet, Rfl: 0    Allergies:  Allergies   Allergen Reactions    Meperidine Unknown - High Severity     claustraphobic       Objective     Vital Signs:   Vitals:    03/26/25 0844   BP: 122/78   Pulse: 82   Resp: 16   SpO2: 95%   Weight: 113 kg (250 lb)   Height: 180.3 cm (70.98\")     Body mass index is 34.89 kg/m².    Physical Exam:    Physical Exam  Vitals and nursing note reviewed.   Constitutional:       Appearance: He is well-developed.   HENT:      Head: Normocephalic and atraumatic.   Eyes:      Pupils: Pupils are equal, round, and reactive to light.   Cardiovascular:      Rate and Rhythm: Normal rate and regular rhythm.      Heart sounds: Normal heart sounds.   Pulmonary:      Effort: Pulmonary effort is normal.      Breath sounds: Normal breath sounds.   Abdominal:      General: Bowel sounds are normal. There is no distension.      Palpations: Abdomen is soft.      Tenderness: There is no abdominal tenderness.   Musculoskeletal:      Cervical back: Neck supple.      Lumbar back: Spasms and tenderness present. Negative right straight leg raise test.      Right hip: No tenderness. Normal range of motion.   Skin:     General: Skin is warm and dry.   Neurological:      General: No focal deficit present.      Mental Status: He is alert and oriented to person, place, and time.   Psychiatric:         Mood and Affect: Mood normal.         Behavior: Behavior normal.         Assessment / Plan     Assessment/Plan:   Problem List Items Addressed This Visit    None  Visit Diagnoses         Right hip pain    -  Primary    Relevant Medications    methylPREDNISolone (MEDROL) 4 MG dose pack    HYDROcodone-acetaminophen (NORCO) 7.5-325 MG per tablet    Other Relevant Orders    XR Hip With or Without Pelvis 2 - 3 View Right      Chronic right-sided low back pain without sciatica        Relevant Medications    methylPREDNISolone (MEDROL) 4 MG dose pack    " HYDROcodone-acetaminophen (NORCO) 7.5-325 MG per tablet          -- medrol, short course of norco (OK'd with PCP), needs hip imaging as well   Controlled Substance  Prescription  As part of this patient's treatment plan I am prescribing controlled substances. The patient has been made aware of appropriate use of such medications, including potential risk of somnolence, limited ability to drive and /or work safely, and potential for dependence or overdose. It has also been made clear that these medications are for use by this patient only, without concomitant use of alcohol or other substances unless prescribed. Chao reviewed and appropriate     Discussed plan of care in detail with pt today; pt verb understanding and agrees.    Follow up:  As needed    Electronically signed by BARBARA Cabral   03/26/2025 09:12 EDT      Please note that portions of this note were completed with a voice recognition program.

## 2025-04-02 ENCOUNTER — OFFICE VISIT (OUTPATIENT)
Dept: FAMILY MEDICINE CLINIC | Facility: CLINIC | Age: 65
End: 2025-04-02
Payer: MEDICARE

## 2025-04-02 VITALS
HEIGHT: 72 IN | DIASTOLIC BLOOD PRESSURE: 98 MMHG | WEIGHT: 249.6 LBS | OXYGEN SATURATION: 98 % | HEART RATE: 72 BPM | BODY MASS INDEX: 33.81 KG/M2 | SYSTOLIC BLOOD PRESSURE: 152 MMHG

## 2025-04-02 DIAGNOSIS — M54.16 LUMBAR RADICULOPATHY: Primary | ICD-10-CM

## 2025-04-02 DIAGNOSIS — M25.551 RIGHT HIP PAIN: ICD-10-CM

## 2025-04-02 DIAGNOSIS — M79.604 RIGHT LEG PAIN: ICD-10-CM

## 2025-04-02 DIAGNOSIS — R29.898 RIGHT LEG WEAKNESS: ICD-10-CM

## 2025-04-02 PROCEDURE — 1125F AMNT PAIN NOTED PAIN PRSNT: CPT | Performed by: FAMILY MEDICINE

## 2025-04-02 PROCEDURE — 99213 OFFICE O/P EST LOW 20 MIN: CPT | Performed by: FAMILY MEDICINE

## 2025-04-02 PROCEDURE — 1159F MED LIST DOCD IN RCRD: CPT | Performed by: FAMILY MEDICINE

## 2025-04-02 PROCEDURE — 3080F DIAST BP >= 90 MM HG: CPT | Performed by: FAMILY MEDICINE

## 2025-04-02 PROCEDURE — 1160F RVW MEDS BY RX/DR IN RCRD: CPT | Performed by: FAMILY MEDICINE

## 2025-04-02 PROCEDURE — 3077F SYST BP >= 140 MM HG: CPT | Performed by: FAMILY MEDICINE

## 2025-04-02 RX ORDER — OXYCODONE AND ACETAMINOPHEN 5; 325 MG/1; MG/1
1 TABLET ORAL EVERY 8 HOURS PRN
Qty: 9 TABLET | Refills: 0 | Status: SHIPPED | OUTPATIENT
Start: 2025-04-02

## 2025-04-02 RX ORDER — AMITRIPTYLINE HYDROCHLORIDE 10 MG/1
TABLET ORAL
Qty: 60 TABLET | Refills: 0 | Status: SHIPPED | OUTPATIENT
Start: 2025-04-02

## 2025-04-02 NOTE — PROGRESS NOTES
Subjective   Sterling Whaley is a 64 y.o. male.     History of Present Illness  Here for f//u on right hip pain, right lower back pain, right leg pain. Not yet had xray of hip. Having weakness right leg, trouble walking, trouble sleeping. Known L-DDD/DJD. Has been referred to PT    The following portions of the patient's history were reviewed and updated as appropriate: allergies, current medications, past family history, past medical history, past social history, past surgical history, and problem list.    Review of Systems   Constitutional:  Positive for fatigue. Negative for fever.   HENT:  Positive for congestion, hearing loss (chronic), mouth sores (intermittent) and postnasal drip. Negative for drooling, facial swelling, nosebleeds, rhinorrhea, sore throat and trouble swallowing.    Eyes:  Positive for visual disturbance (intermittent blurry). Negative for pain, discharge and redness.   Respiratory:  Negative for cough, shortness of breath and wheezing.    Cardiovascular:  Positive for leg swelling. Negative for chest pain and palpitations.   Gastrointestinal:  Positive for nausea. Negative for abdominal pain, blood in stool, diarrhea and vomiting.   Endocrine: Positive for heat intolerance. Negative for polydipsia and polyuria.   Genitourinary:  Negative for dysuria and hematuria.   Musculoskeletal:  Positive for arthralgias, back pain, gait problem, joint swelling, myalgias, neck pain and neck stiffness.   Skin:  Negative for rash and wound.   Neurological:  Positive for weakness, numbness and headaches. Negative for dizziness, tremors, syncope, facial asymmetry and speech difficulty.   Hematological:  Negative for adenopathy. Does not bruise/bleed easily.   Psychiatric/Behavioral:  Positive for decreased concentration and dysphoric mood (mild). Negative for confusion, hallucinations, sleep disturbance and suicidal ideas. The patient is nervous/anxious.    Pt's previous ROS reviewed and updated as  indicated.       Objective   Vitals:    04/02/25 0948   BP: 152/98   Pulse: 72   SpO2: 98%     Body mass index is 34.33 kg/m².      04/02/25 0948   Weight: 113 kg (249 lb 9.6 oz)       Physical Exam  Vitals and nursing note reviewed.   Constitutional:       General: He is not in acute distress.     Appearance: He is well-groomed. He is obese. He is not ill-appearing.   HENT:      Head: Atraumatic.      Mouth/Throat:      Mouth: Mucous membranes are moist.   Eyes:      General: Scleral icterus (mild) present.      Conjunctiva/sclera: Conjunctivae normal.      Pupils: Pupils are equal, round, and reactive to light.   Neck:      Thyroid: No thyroid mass.   Cardiovascular:      Rate and Rhythm: Normal rate and regular rhythm.      Pulses: Normal pulses.      Heart sounds: Normal heart sounds.   Pulmonary:      Effort: Pulmonary effort is normal.      Breath sounds: Normal breath sounds.   Musculoskeletal:      Cervical back: Spasms and bony tenderness present.      Lumbar back: Spasms and tenderness present. Decreased range of motion.      Right lower leg: No edema.      Left lower leg: No edema.   Skin:     General: Skin is warm and dry.   Neurological:      Mental Status: He is alert and oriented to person, place, and time.      Gait: Gait is intact.   Psychiatric:         Mood and Affect: Mood is anxious (mildly with irritability).         Behavior: Behavior normal. Behavior is cooperative.         Cognition and Memory: Cognition normal.     Pt's previous physical exam reviewed and updated as indicated.      Assessment & Plan   Diagnoses and all orders for this visit:    1. Lumbar radiculopathy (Primary)  -     oxyCODONE-acetaminophen (Percocet) 5-325 MG per tablet; Take 1 tablet by mouth Every 8 (Eight) Hours As Needed for Severe Pain.  Dispense: 9 tablet; Refill: 0  -     MRI Lumbar Spine Without Contrast; Future    2. Right hip pain  -     XR Hip With or Without Pelvis 2 - 3 View Right  -     MRI Lumbar Spine  Without Contrast; Future    3. Right leg pain  -     MRI Lumbar Spine Without Contrast; Future    4. Right leg weakness  -     MRI Lumbar Spine Without Contrast; Future    Other orders  -     amitriptyline (ELAVIL) 10 MG tablet; 1-2 po qhs prn pain, sleep problems  Dispense: 60 tablet; Refill: 0       F/u per routine, f/u sooner as needed/instructed.  I will contact patient regarding test results and provide instructions regarding any necessary changes in plan of care.  Patient was encouraged to keep me informed of any acute changes, lack of improvement, or any new concerning symptoms.  Pt is aware of reasons to seek emergent care.  Patient voiced understanding of all instructions and denied further questions.  As part of patient's treatment plan I am prescribing a controlled substance.  The patient has been made aware of the appropriate use of such medications, including potential risk of somnolence, limited ability to drive and/or work safely, and potential for dependence and/or overdose.  It has also been made clear that these medications are for use by this patient only, without concomitant use of alcohol or other substances, unless prescribed.History and physical exam exhibit continued safe and appropriate use of controlled substance.  CLAUDIO reviewed.    Please note that portions of this note may have been completed with a voice recognition program.

## 2025-04-08 ENCOUNTER — TELEPHONE (OUTPATIENT)
Dept: FAMILY MEDICINE CLINIC | Facility: CLINIC | Age: 65
End: 2025-04-08
Payer: MEDICARE

## 2025-04-08 DIAGNOSIS — M54.16 LUMBAR RADICULOPATHY: ICD-10-CM

## 2025-04-08 NOTE — TELEPHONE ENCOUNTER
"  Caller: Sterling Whaley [address] \"Balwinder\"    Relationship: Self    Best call back number: [phone]     What medication are you requesting: SOMETHING STRONGER FOR PAIN     What are your current symptoms: PAIN     How long have you been experiencing symptoms:     Have you had these symptoms before:    [x] Yes  [] No    Have you been treated for these symptoms before:   [x] Yes  [] No    If a prescription is needed, what is your preferred pharmacy and phone number: [facility] [place] - [address]  - [phone]  - [phone] FX     Additional notes: PATIENT SAID THE oxyCODONE-acetaminophen (Percocet) 5-325 MG per tablet  IS NOT STRONG ENOUGH AND CAN'T EVEN TELL HE'S TAKING THEM FOR THE PAIN. WANTS SOMETHING ELSE. PLEASE CALL TO DISCUSS   "

## 2025-04-09 RX ORDER — OXYCODONE AND ACETAMINOPHEN 7.5; 325 MG/1; MG/1
1 TABLET ORAL EVERY 8 HOURS PRN
Qty: 9 TABLET | Refills: 0 | Status: SHIPPED | OUTPATIENT
Start: 2025-04-09

## 2025-04-09 NOTE — TELEPHONE ENCOUNTER
"Name: Sterling Whaley Way \"Balwinder\"      Relationship: Self        HUB PROVIDED THE RELAY MESSAGE FROM THE OFFICE      PATIENT: VOICED UNDERSTANDING AND HAS NO FURTHER QUESTIONS AT THIS TIME    ADDITIONAL INFORMATION:   "

## 2025-04-09 NOTE — TELEPHONE ENCOUNTER
Attending Attestation: 
  77y/o AAM, no known PMHx (doesn't have PCP) who presetns with asymptomatic HTN. Checks BP daily at [facility] and writes it down. BP always ~905 sytolic, sometimes up to 170's. Today, it was 180 so he came to ED for eval. He has no signs of end organ damage. On ROS, reports occasional heartburn but not associated with any activity. None today. Vitals today /87, rest of vitals normal. Exam normal, mentating well.  
 
-No labs/imaging per ACEP Guidelines for asymptomatic HTN 
-Will start on HCTZ 12.5mg PO daily and give 2wk supply 
-Will give some contact info for PCP's in area and encouraged pt to f/u. -Told him that over the long term, he will have organ damage from high bp such as heart attack, stroke, kidney failure, etc.  
-Told to come back to ED w/any symtpoms or concerns. 
-See excellent resident note for further details. Jimmie Toribio MD 
EM-IM Physician Patient has been notified.

## 2025-04-16 DIAGNOSIS — M54.16 LUMBAR RADICULOPATHY: ICD-10-CM

## 2025-04-16 RX ORDER — OXYCODONE AND ACETAMINOPHEN 7.5; 325 MG/1; MG/1
1 TABLET ORAL EVERY 8 HOURS PRN
Qty: 9 TABLET | Refills: 0 | Status: SHIPPED | OUTPATIENT
Start: 2025-04-16

## 2025-04-16 NOTE — TELEPHONE ENCOUNTER
"    Caller: Whaley Sterling [address] \"Balwinder\"    Relationship: Self    Best call back number: [phone]       Requested Prescriptions:   Requested Prescriptions     Pending Prescriptions Disp Refills    oxyCODONE-acetaminophen (Percocet) 7.5-325 MG per tablet 9 tablet 0     Sig: Take 1 tablet by mouth Every 8 (Eight) Hours As Needed for Severe Pain.        Pharmacy where request should be sent: [facility] - [place] - [address] - [phone]  - [phone] FX     Last office visit with prescribing clinician: 4/2/2025   Last telemedicine visit with prescribing clinician: Visit date not found   Next office visit with prescribing clinician: 6/26/2025     Additional details provided by patient: OUT AFTER TODAY.    Does the patient have less than a 3 day supply:  [x] Yes  [] No    Would you like a call back once the refill request has been completed: [] Yes [] No    If the office needs to give you a call back, can they leave a voicemail: [] Yes [x] No    Jayashree Blum Rep   04/16/25 11:11 EDT             "

## 2025-04-16 NOTE — TELEPHONE ENCOUNTER
Rx Refill Note  Requested Prescriptions     Pending Prescriptions Disp Refills    oxyCODONE-acetaminophen (Percocet) 7.5-325 MG per tablet 9 tablet 0     Sig: Take 1 tablet by mouth Every 8 (Eight) Hours As Needed for Severe Pain.      Last office visit with prescribing clinician: 4/2/2025   Last telemedicine visit with prescribing clinician: Visit date not found   Next office visit with prescribing clinician: 6/26/2025     Ok to fill?        Argelia Mak MA  04/16/25, 11:24 EDT

## 2025-04-21 ENCOUNTER — TELEPHONE (OUTPATIENT)
Dept: FAMILY MEDICINE CLINIC | Facility: CLINIC | Age: 65
End: 2025-04-21

## 2025-04-21 DIAGNOSIS — M54.16 LUMBAR RADICULOPATHY: Primary | ICD-10-CM

## 2025-04-21 NOTE — TELEPHONE ENCOUNTER
"  Caller: Sterling Whaley [address] \"Balwinder\"    Relationship: Self    Best call back number: [phone]     Requested Prescriptions:   GABAPINTEN 300MG        Pharmacy where request should be sent: [facility] - [address] - [phone] PH - [phone] FX     Last office visit with prescribing clinician: 4/2/2025   Last telemedicine visit with prescribing clinician: Visit date not found   Next office visit with prescribing clinician: 6/26/2025     Additional details provided by patient: WAS GIVEN THIS BEFORE AND IT IS HELPING WITH PAIN MORE THAN OXYCODONE\. WAS GIVEN THESE BEFORE AND HAD A FEW LEFT, TRIED THEM AND IT HELPS MORE THAN CURRENT MEDICATION. PLEASE CALL IF ISSUES FILLING OR IF APPOINTMENT NEEDED. NOT TAKING THE OXYCODONE       Does the patient have less than a 3 day supply:  [x] Yes  [] No    Would you like a call back once the refill request has been completed: [] Yes [x] No    If the office needs to give you a call back, can they leave a voicemail: [] Yes [x] No    Jayashree Ho Rep   04/21/25 15:08 EDT     "

## 2025-04-22 RX ORDER — GABAPENTIN 300 MG/1
300 CAPSULE ORAL 3 TIMES DAILY PRN
Qty: 30 CAPSULE | Refills: 0 | Status: SHIPPED | OUTPATIENT
Start: 2025-04-22 | End: 2025-05-08 | Stop reason: SDUPTHER

## 2025-04-30 ENCOUNTER — TELEPHONE (OUTPATIENT)
Dept: FAMILY MEDICINE CLINIC | Facility: CLINIC | Age: 65
End: 2025-04-30
Payer: MEDICARE

## 2025-04-30 DIAGNOSIS — M48.062 SPINAL STENOSIS OF LUMBAR REGION WITH NEUROGENIC CLAUDICATION: Primary | ICD-10-CM

## 2025-04-30 DIAGNOSIS — M43.16 SPONDYLOLISTHESIS AT L4-L5 LEVEL: ICD-10-CM

## 2025-04-30 NOTE — TELEPHONE ENCOUNTER
Caller: Natalie Kebede    Relationship: Emergency Contact    Best call back number: [phone]    What specialty or service is being requested: NEUROSURGERY    What is the provider, practice or medical service name:     What is the office location:  [facility]    What is the office phone number:  [phone]    Any additional details:

## 2025-05-08 ENCOUNTER — OFFICE VISIT (OUTPATIENT)
Dept: FAMILY MEDICINE CLINIC | Facility: CLINIC | Age: 65
End: 2025-05-08
Payer: MEDICARE

## 2025-05-08 VITALS
HEART RATE: 69 BPM | HEIGHT: 72 IN | OXYGEN SATURATION: 98 % | WEIGHT: 251.6 LBS | SYSTOLIC BLOOD PRESSURE: 126 MMHG | BODY MASS INDEX: 34.08 KG/M2 | DIASTOLIC BLOOD PRESSURE: 84 MMHG

## 2025-05-08 DIAGNOSIS — M54.16 LUMBAR RADICULOPATHY: ICD-10-CM

## 2025-05-08 DIAGNOSIS — F41.1 GAD (GENERALIZED ANXIETY DISORDER): ICD-10-CM

## 2025-05-08 DIAGNOSIS — R39.9 URINARY SYMPTOM OR SIGN: Primary | ICD-10-CM

## 2025-05-08 LAB
BILIRUB BLD-MCNC: NEGATIVE MG/DL
CLARITY, POC: CLEAR
COLOR UR: YELLOW
EXPIRATION DATE: NORMAL
GLUCOSE UR STRIP-MCNC: NEGATIVE MG/DL
KETONES UR QL: NEGATIVE
LEUKOCYTE EST, POC: NEGATIVE
Lab: NORMAL
NITRITE UR-MCNC: NEGATIVE MG/ML
PH UR: 6 [PH] (ref 5–8)
PROT UR STRIP-MCNC: NEGATIVE MG/DL
RBC # UR STRIP: NEGATIVE /UL
SP GR UR: 1.02 (ref 1–1.03)
UROBILINOGEN UR QL: NORMAL

## 2025-05-08 RX ORDER — GABAPENTIN 300 MG/1
300 CAPSULE ORAL 3 TIMES DAILY PRN
Qty: 30 CAPSULE | Refills: 0 | Status: SHIPPED | OUTPATIENT
Start: 2025-05-08

## 2025-05-08 RX ORDER — OXYCODONE AND ACETAMINOPHEN 7.5; 325 MG/1; MG/1
1 TABLET ORAL EVERY 8 HOURS PRN
Qty: 9 TABLET | Refills: 0 | Status: SHIPPED | OUTPATIENT
Start: 2025-05-08

## 2025-05-08 RX ORDER — OMEPRAZOLE 40 MG/1
40 CAPSULE, DELAYED RELEASE ORAL DAILY
Qty: 90 CAPSULE | Refills: 1 | Status: SHIPPED | OUTPATIENT
Start: 2025-05-08

## 2025-05-08 NOTE — PROGRESS NOTES
Subjective   Sterling Whaley is a 65 y.o. male.     History of Present Illness  Would like to review his recent L spine MRI. Having persistent right lower back pain, right hip/groin, right thigh pain to knee. Worse in evening. Weakness in right leg. Has upcoming apt with neurosurgery. Using percocet very rarely for most severe pain. Also using elavil, gabapentin.    C/o urinary frequency, some urgency. No incontinence.    On zoloft for TYSON. Generally stable.    The following portions of the patient's history were reviewed and updated as appropriate: allergies, current medications, past family history, past medical history, past social history, past surgical history, and problem list.    Review of Systems   Constitutional:  Positive for fatigue. Negative for fever.   HENT:  Positive for congestion, hearing loss (chronic), mouth sores (intermittent) and postnasal drip. Negative for drooling, facial swelling, nosebleeds, rhinorrhea, sore throat and trouble swallowing.    Eyes:  Positive for visual disturbance (intermittent blurry). Negative for pain, discharge and redness.   Respiratory:  Negative for cough, shortness of breath and wheezing.    Cardiovascular:  Positive for leg swelling. Negative for chest pain and palpitations.   Gastrointestinal:  Positive for nausea. Negative for abdominal pain, blood in stool, diarrhea and vomiting.   Endocrine: Positive for heat intolerance. Negative for polydipsia and polyuria.   Genitourinary:  Negative for dysuria and hematuria.   Musculoskeletal:  Positive for arthralgias, back pain, gait problem, joint swelling, myalgias, neck pain and neck stiffness.   Skin:  Negative for rash and wound.   Neurological:  Positive for weakness, numbness and headaches. Negative for dizziness, tremors, syncope, facial asymmetry and speech difficulty.   Hematological:  Negative for adenopathy. Does not bruise/bleed easily.   Psychiatric/Behavioral:  Positive for decreased concentration and  dysphoric mood (mild). Negative for confusion, hallucinations, sleep disturbance and suicidal ideas. The patient is nervous/anxious.    Pt's previous ROS reviewed and updated as indicated.       Objective   Vitals:    05/08/25 0921   BP: 126/84   Pulse: 69   SpO2: 98%     Body mass index is 34.6 kg/m².      05/08/25  0921   Weight: 114 kg (251 lb 9.6 oz)       Physical Exam  Vitals and nursing note reviewed.   Constitutional:       General: He is not in acute distress.     Appearance: He is well-groomed. He is obese. He is not ill-appearing.   HENT:      Head: Atraumatic.      Mouth/Throat:      Mouth: Mucous membranes are moist.   Eyes:      General: Scleral icterus (mild) present.      Conjunctiva/sclera: Conjunctivae normal.      Pupils: Pupils are equal, round, and reactive to light.   Cardiovascular:      Rate and Rhythm: Normal rate and regular rhythm.      Pulses: Normal pulses.      Heart sounds: Normal heart sounds.   Pulmonary:      Effort: Pulmonary effort is normal.      Breath sounds: Normal breath sounds.   Musculoskeletal:      Cervical back: Spasms and bony tenderness present.      Lumbar back: Spasms, tenderness and bony tenderness present. Decreased range of motion. Positive right straight leg raise test (mildly).      Right lower leg: No edema.      Left lower leg: No edema.   Skin:     General: Skin is warm and dry.      Coloration: Skin is not pale.      Findings: No bruising or rash.   Neurological:      Mental Status: He is alert and oriented to person, place, and time.      Sensory: Sensation is intact.      Motor: Weakness (RLE) present.      Gait: Gait is intact.   Psychiatric:         Mood and Affect: Mood is anxious (mildly).         Behavior: Behavior normal. Behavior is cooperative.         Cognition and Memory: Cognition normal.       Brief Urine Lab Results  (Last result in the past 365 days)        Color   Clarity   Blood   Leuk Est   Nitrite   Protein   CREAT   Urine HCG         05/08/25 0954 Yellow   Clear   Negative   Negative   Negative   Negative                 XR Hip With or Without Pelvis 2 - 3 View Right  Result Date: 4/2/2025  No acute fracture.         Images were reviewed, interpreted, and dictated by Dr. Mariella Moreno MD Transcribed by Mee Newell PA-C.  This report was signed and finalized on 4/2/2025 2:08 PM by Mariella Moreno MD.      L [facility] MRI from [facility] reviewed on chart with patient.    Assessment & Plan   Diagnoses and all orders for this visit:    1. Urinary symptom or sign (Primary)  -     POC Urinalysis Dipstick, Automated    2. TYSON (generalized anxiety disorder)  -     sertraline (ZOLOFT) 50 MG tablet; Take 1 tablet by mouth Daily.  Dispense: 90 tablet; Refill: 1    3. Lumbar radiculopathy  -     gabapentin (NEURONTIN) 300 MG capsule; Take 1 capsule by mouth 3 (Three) Times a Day As Needed (pain).  Dispense: 30 capsule; Refill: 0  -     oxyCODONE-acetaminophen (Percocet) 7.5-325 MG per tablet; Take 1 tablet by mouth Every 8 (Eight) Hours As Needed for Severe Pain.  Dispense: 9 tablet; Refill: 0    Other orders  -     omeprazole (priLOSEC) 40 MG capsule; Take 1 capsule by mouth Daily.  Dispense: 90 capsule; Refill: 1       F/u per routine, f/u sooner as needed.  I will contact patient regarding test results and provide instructions regarding any necessary changes in plan of care.  Patient was encouraged to keep me informed of any acute changes, lack of improvement, or any new concerning symptoms.  Pt is aware of reasons to seek emergent care.  As part of patient's treatment plan I am prescribing a controlled substance.  The patient has been made aware of the appropriate use of such medications, including potential risk of somnolence, limited ability to drive and/or work safely, and potential for dependence and/or overdose.  It has also been made clear that these medications are for use by this patient only, without concomitant use of alcohol or other  substances, unless prescribed.  History and physical exam exhibit continued safe and appropriate use of controlled substance.  CLAUDIO reviewed.  Patient has completed a prescribing agreement detailing terms of continued prescribing of controlled substances, including monitoring CLAUDIO reports, urine drug screening, and pill counts if necessary.  Patient is aware that inappropriate use will result in cessation of prescribing such medications.    Please note that portions of this note may have been completed with a voice recognition program.

## 2025-05-21 DIAGNOSIS — I10 PRIMARY HYPERTENSION: ICD-10-CM

## 2025-05-21 RX ORDER — LOSARTAN POTASSIUM 100 MG/1
100 TABLET ORAL DAILY
Qty: 90 TABLET | Refills: 0 | Status: SHIPPED | OUTPATIENT
Start: 2025-05-21

## 2025-05-21 NOTE — TELEPHONE ENCOUNTER
"    Caller: Sterling Whaley [address] \"Balwinder\"    Relationship: Self    Best call back number: [phone]     Requested Prescriptions:   Requested Prescriptions     Pending Prescriptions Disp Refills    losartan (COZAAR) 100 MG tablet 90 tablet 0     Sig: Take 1 tablet by mouth Daily.        Pharmacy where request should be sent: [facility] [address] - [phone]  - [phone] FX     Last office visit with prescribing clinician: 5/8/2025   Last telemedicine visit with prescribing clinician: Visit date not found   Next office visit with prescribing clinician: 6/26/2025     Does the patient have less than a 3 day supply:  [x] Yes  [] No    Would you like a call back once the refill request has been completed: [] Yes [x] No    If the office needs to give you a call back, can they leave a voicemail: [] Yes [x] No    Jayashree Russell Rep   05/21/25 10:41 EDT           "

## 2025-06-04 DIAGNOSIS — M54.16 LUMBAR RADICULOPATHY: ICD-10-CM

## 2025-06-04 NOTE — TELEPHONE ENCOUNTER
Rx Refill Note  Requested Prescriptions     Pending Prescriptions Disp Refills    gabapentin (NEURONTIN) 300 MG capsule 30 capsule 0     Sig: Take 1 capsule by mouth 3 (Three) Times a Day As Needed (pain).      Last office visit with prescribing clinician: 5/8/2025   Last telemedicine visit with prescribing clinician: Visit date not found   Next office visit with prescribing clinician: 6/26/2025                         Would you like a call back once the refill request has been completed: [] Yes [] No    If the office needs to give you a call back, can they leave a voicemail: [] Yes [] No    Gemini Mae MA  06/04/25, 14:46 EDT

## 2025-06-04 NOTE — TELEPHONE ENCOUNTER
"    Caller: Sterling Whaley [address] \"Balwinder\"    Relationship: Self    Best call back number: [phone]    Requested Prescriptions:   Requested Prescriptions     Pending Prescriptions Disp Refills    gabapentin (NEURONTIN) 300 MG capsule 30 capsule 0     Sig: Take 1 capsule by mouth 3 (Three) Times a Day As Needed (pain).        Pharm[facility] where [facility] be sent: [facility] - [place] [address] - [phone]  - [phone] FX     Last office visit with prescribing clinician: 5/8/2025   Last telemedicine visit with prescribing clinician: Visit date not found   Next office visit with prescribing clinician: 6/26/2025         Does the patient have less than a 3 day supply:  [x] Yes  [] No  Jayashree Wright   06/04/25 11:13 EDT         "

## 2025-06-05 RX ORDER — GABAPENTIN 300 MG/1
300 CAPSULE ORAL 3 TIMES DAILY PRN
Qty: 90 CAPSULE | Refills: 0 | Status: SHIPPED | OUTPATIENT
Start: 2025-06-05

## 2025-06-26 ENCOUNTER — OFFICE VISIT (OUTPATIENT)
Dept: FAMILY MEDICINE CLINIC | Facility: CLINIC | Age: 65
End: 2025-06-26
Payer: MEDICARE

## 2025-06-26 VITALS
SYSTOLIC BLOOD PRESSURE: 136 MMHG | OXYGEN SATURATION: 97 % | BODY MASS INDEX: 34.4 KG/M2 | DIASTOLIC BLOOD PRESSURE: 90 MMHG | HEIGHT: 72 IN | HEART RATE: 66 BPM | WEIGHT: 254 LBS

## 2025-06-26 DIAGNOSIS — R16.1 SPLENOMEGALY: ICD-10-CM

## 2025-06-26 DIAGNOSIS — Z12.5 SCREENING FOR PROSTATE CANCER: ICD-10-CM

## 2025-06-26 DIAGNOSIS — G89.29 CHRONIC RIGHT SHOULDER PAIN: ICD-10-CM

## 2025-06-26 DIAGNOSIS — E80.6 HYPERBILIRUBINEMIA: ICD-10-CM

## 2025-06-26 DIAGNOSIS — R79.9 ABNORMAL BLOOD CHEMISTRY: ICD-10-CM

## 2025-06-26 DIAGNOSIS — Z53.20 REFUSAL OF STATIN MEDICATION BY PATIENT: ICD-10-CM

## 2025-06-26 DIAGNOSIS — Z51.81 MEDICATION MONITORING ENCOUNTER: ICD-10-CM

## 2025-06-26 DIAGNOSIS — E66.811 CLASS 1 OBESITY DUE TO EXCESS CALORIES WITH SERIOUS COMORBIDITY AND BODY MASS INDEX (BMI) OF 34.0 TO 34.9 IN ADULT: ICD-10-CM

## 2025-06-26 DIAGNOSIS — M54.16 LUMBAR RADICULOPATHY: ICD-10-CM

## 2025-06-26 DIAGNOSIS — G89.4 CHRONIC PAIN SYNDROME: ICD-10-CM

## 2025-06-26 DIAGNOSIS — F41.9 ANXIETY DISORDER, UNSPECIFIED TYPE: ICD-10-CM

## 2025-06-26 DIAGNOSIS — Z78.9 DIFFICULTY WITH CPAP USE: ICD-10-CM

## 2025-06-26 DIAGNOSIS — G47.33 SEVERE OBSTRUCTIVE SLEEP APNEA: ICD-10-CM

## 2025-06-26 DIAGNOSIS — E78.2 MIXED HYPERLIPIDEMIA: ICD-10-CM

## 2025-06-26 DIAGNOSIS — F32.A DEPRESSIVE DISORDER: ICD-10-CM

## 2025-06-26 DIAGNOSIS — R73.01 IFG (IMPAIRED FASTING GLUCOSE): ICD-10-CM

## 2025-06-26 DIAGNOSIS — D69.6 THROMBOCYTOPENIA: ICD-10-CM

## 2025-06-26 DIAGNOSIS — K29.30 CHRONIC SUPERFICIAL GASTRITIS WITHOUT BLEEDING: ICD-10-CM

## 2025-06-26 DIAGNOSIS — K75.81 LIVER CIRRHOSIS SECONDARY TO NASH: ICD-10-CM

## 2025-06-26 DIAGNOSIS — K74.60 LIVER CIRRHOSIS SECONDARY TO NASH: ICD-10-CM

## 2025-06-26 DIAGNOSIS — Z51.81 THERAPEUTIC DRUG MONITORING: ICD-10-CM

## 2025-06-26 DIAGNOSIS — M25.511 CHRONIC RIGHT SHOULDER PAIN: ICD-10-CM

## 2025-06-26 DIAGNOSIS — F41.1 GAD (GENERALIZED ANXIETY DISORDER): ICD-10-CM

## 2025-06-26 DIAGNOSIS — Z00.00 MEDICARE ANNUAL WELLNESS VISIT, SUBSEQUENT: Primary | ICD-10-CM

## 2025-06-26 DIAGNOSIS — K76.6 PORTAL HYPERTENSION: ICD-10-CM

## 2025-06-26 DIAGNOSIS — K44.9 HIATAL HERNIA WITH GERD WITHOUT ESOPHAGITIS: ICD-10-CM

## 2025-06-26 DIAGNOSIS — E66.09 CLASS 1 OBESITY DUE TO EXCESS CALORIES WITH SERIOUS COMORBIDITY AND BODY MASS INDEX (BMI) OF 34.0 TO 34.9 IN ADULT: ICD-10-CM

## 2025-06-26 DIAGNOSIS — Z00.00 ENCOUNTER FOR PREVENTIVE HEALTH EXAMINATION: ICD-10-CM

## 2025-06-26 DIAGNOSIS — Z28.21 PNEUMOCOCCAL VACCINE REFUSED: ICD-10-CM

## 2025-06-26 DIAGNOSIS — K21.9 HIATAL HERNIA WITH GERD WITHOUT ESOPHAGITIS: ICD-10-CM

## 2025-06-26 DIAGNOSIS — N20.0 RECURRENT KIDNEY STONES: ICD-10-CM

## 2025-06-26 DIAGNOSIS — I10 PRIMARY HYPERTENSION: ICD-10-CM

## 2025-06-26 DIAGNOSIS — H65.23 CHRONIC SEROUS OTITIS MEDIA, BILATERAL: ICD-10-CM

## 2025-06-26 PROBLEM — Z12.11 SPECIAL SCREENING FOR MALIGNANT NEOPLASMS, COLON: Status: RESOLVED | Noted: 2024-11-11 | Resolved: 2025-06-26

## 2025-06-26 RX ORDER — OXYCODONE AND ACETAMINOPHEN 7.5; 325 MG/1; MG/1
1 TABLET ORAL EVERY 8 HOURS PRN
Qty: 9 TABLET | Refills: 0 | Status: SHIPPED | OUTPATIENT
Start: 2025-06-26

## 2025-06-26 RX ORDER — LIDOCAINE HYDROCHLORIDE 10 MG/ML
8 INJECTION, SOLUTION INFILTRATION; PERINEURAL ONCE
Status: COMPLETED | OUTPATIENT
Start: 2025-06-26 | End: 2025-06-26

## 2025-06-26 RX ORDER — OXYCODONE AND ACETAMINOPHEN 7.5; 325 MG/1; MG/1
1 TABLET ORAL EVERY 8 HOURS PRN
Qty: 9 TABLET | Refills: 0 | Status: CANCELLED | OUTPATIENT
Start: 2025-06-26

## 2025-06-26 RX ORDER — HYDROXYZINE HYDROCHLORIDE 10 MG/1
10 TABLET, FILM COATED ORAL EVERY 8 HOURS PRN
Qty: 90 TABLET | Refills: 1 | Status: SHIPPED | OUTPATIENT
Start: 2025-06-26

## 2025-06-26 RX ORDER — TRIAMCINOLONE ACETONIDE 40 MG/ML
40 INJECTION, SUSPENSION INTRA-ARTICULAR; INTRAMUSCULAR ONCE
Status: COMPLETED | OUTPATIENT
Start: 2025-06-26 | End: 2025-06-26

## 2025-06-26 RX ORDER — GABAPENTIN 300 MG/1
300 CAPSULE ORAL 3 TIMES DAILY PRN
Qty: 90 CAPSULE | Refills: 2 | Status: SHIPPED | OUTPATIENT
Start: 2025-06-26

## 2025-06-26 RX ADMIN — TRIAMCINOLONE ACETONIDE 40 MG: 40 INJECTION, SUSPENSION INTRA-ARTICULAR; INTRAMUSCULAR at 14:46

## 2025-06-26 RX ADMIN — LIDOCAINE HYDROCHLORIDE 8 ML: 10 INJECTION, SOLUTION INFILTRATION; PERINEURAL at 14:46

## 2025-06-26 NOTE — ASSESSMENT & PLAN NOTE
Multifactorial as detailed above.  Follow-up with neurosurgery as scheduled.  No aberrant behavior.  Good clinical benefit from current treatment plan.  As part of patient's treatment plan I am prescribing a controlled substance.  The patient has been made aware of the appropriate use of such medications, including potential risk of somnolence, limited ability to drive and/or work safely, and potential for dependence and/or overdose.  It has also been made clear that these medications are for use by this patient only, without concomitant use of alcohol or other substances, unless prescribed.  History and physical exam exhibit continued safe and appropriate use of controlled substance.  CLAUDIO reviewed.  Patient has completed a prescribing agreement detailing terms of continued prescribing of controlled substances, including monitoring CLAUDIO reports, urine drug screening, and pill counts if necessary.  Patient is aware that inappropriate use will result in cessation of prescribing such medications.

## 2025-06-26 NOTE — ASSESSMENT & PLAN NOTE
Patient's depression is a recurrent episode that is mild without psychosis. 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    Plan:   Wean off zoloft. Start vistaril prn as above    Followup in 6 months.

## 2025-06-26 NOTE — PATIENT INSTRUCTIONS
Medicare Wellness  Personal Prevention Plan of Service     Date of Office Visit:    Encounter Provider:  Annel Armstrong MD  Place of Service:  [facility]  Patient Name: Sterling Whaley  :  1960    As part of the Medicare Wellness portion of your visit today, we are providing you with this personalized preventive plan of services (PPPS). This plan is based upon recommendations of the [place] Preventive Services Task Force (USPSTF) and the Advisory Committee on Immunization Practices (ACIP).    This lists the preventive care services that should be considered, and provides dates of when you are due. Items listed as completed are up-to-date and do not require any further intervention.    Health Maintenance   Topic Date Due    TDAP/TD VACCINES (1 - Tdap) Never done    COVID-19 Vaccine (3 -  season) 2026 (Originally 2024)    Pneumococcal Vaccine 50+ (1 of 2 - PCV) 2026 (Originally 1979)    ZOSTER VACCINE (1 of 2) 2026 (Originally 2010)    INFLUENZA VACCINE  2025    LIPID PANEL  2025    ANNUAL WELLNESS VISIT  2026    COLORECTAL CANCER SCREENING  2034    HEPATITIS C SCREENING  Completed    Hepatitis B  Discontinued       Orders Placed This Encounter   Procedures    Comprehensive Metabolic Panel     Release to patient:   Routine Release [[id number]]    Lipid Panel     Release to patient:   Routine Release [[id number]]    TSH Rfx On Abnormal To Free T4     Release to patient:   Routine Release [[id number]]    Hemoglobin A1c     Release to patient:   Routine Release [[id number]]    Protime-INR     Release to patient:   Routine Release [[id number]]    Bilirubin, Total & Direct     Release to patient:   Routine Release [[id number]]    PSA Screen     Release to patient:   Routine Release [[id number]]    CBC & Differential     Manual Differential:   No     Release to patient:   Routine Release [[id number]]        Return in about 6 months (around 12/26/2025).

## 2025-06-26 NOTE — ASSESSMENT & PLAN NOTE
Patient's (Body mass index is 34.93 kg/m².) indicates that they are obese (BMI >30) with health conditions that include obstructive sleep apnea, hypertension, impaired fasting glucose, dyslipidemias, GERD, osteoarthritis, and hepatic steatosis . Weight is unchanged. BMI  is above average; BMI management plan is completed. We discussed low calorie, low carb based diet program, portion control, increasing exercise, joining a fitness center or start home based exercise program, Weight Watchers or other Commercial based weight reduction program, management of depression/anxiety/stress to control compensatory eating, and an toya-based approach such as Teros Pal or Lose It.

## 2025-06-26 NOTE — ASSESSMENT & PLAN NOTE
Orders:    CBC & Differential    Comprehensive Metabolic Panel    TSH Rfx On Abnormal To Free T4

## 2025-06-26 NOTE — ASSESSMENT & PLAN NOTE
Orders:    CBC & Differential    Comprehensive Metabolic Panel    Protime-INR    Bilirubin, Total & Direct

## 2025-06-26 NOTE — PROGRESS NOTES
Subjective   The ABCs of the Annual Wellness Visit  Medicare Wellness Visit      Sterling Whaley is a 65 y.o. patient who presents for a Medicare Wellness Visit.      The following portions of the patient's history were reviewed and   updated as appropriate: allergies, current medications, past family history, past medical history, past social history, past surgical history, and problem list.    Compared to one year ago, the patient's physical   health is worse.  Compared to one year ago, the patient's mental   health is worse.    Recent Hospitalizations:  He was not admitted to the hospital during the last year.     Current Medical Providers:  Patient Care Team:  Annel Armstrong MD as PCP - General  Mónica Newell MD as Consulting Physician (Gastroenterology)  Mayela Adkins MD as Consulting Physician (Pulmonary Disease)    Outpatient Medications Prior to Visit   Medication Sig Dispense Refill    amitriptyline (ELAVIL) 10 MG tablet 1-2 po qhs prn pain, sleep problems 60 tablet 0    amLODIPine (NORVASC) 10 MG tablet Take 1 tablet by mouth Daily.      azelastine (ASTELIN) 0.1 % nasal spray Administer 1 spray into the nostril(s) as directed by provider 2 (Two) Times a Day As Needed for Rhinitis or Allergies. Use in each nostril as directed 1 each 5    ketoconazole (NIZORAL) 2 % cream       losartan (COZAAR) 100 MG tablet Take 1 tablet by mouth Daily. 90 tablet 0    omeprazole (priLOSEC) 40 MG capsule Take 1 capsule by mouth Daily. 90 capsule 1    propranolol (INDERAL) 10 MG tablet       gabapentin (NEURONTIN) 300 MG capsule Take 1 capsule by mouth 3 (Three) Times a Day As Needed (pain). 90 capsule 0    oxyCODONE-acetaminophen (Percocet) 7.5-325 MG per tablet Take 1 tablet by mouth Every 8 (Eight) Hours As Needed for Severe Pain. 9 tablet 0    sertraline (ZOLOFT) 50 MG tablet Take 1 tablet by mouth Daily. 90 tablet 1    chlorthalidone (HYGROTON) 25 MG tablet        No facility-administered medications  prior to visit.     Opioid medication/s are on active medication list.  and I have evaluated his active treatment plan and pain score trends (see table).  Vitals:    06/26/25 1114   PainSc: 5    PainLoc: Back     I have reviewed the chart for potential of high risk medication and harmful drug interactions in the elderly.        Aspirin is not on active medication list.  Aspirin use is not indicated based on review of current medical condition/s. Risk of harm outweighs potential benefits.  .    Patient Active Problem List   Diagnosis    Osteoarthritis of cervical spine    Spinal stenosis of cervical region    Chronic pain syndrome    Mixed hyperlipidemia    Primary hypertension    Degeneration of intervertebral disc of lumbar region    Spinal stenosis of lumbar region    Class 1 obesity due to excess calories with serious comorbidity and body mass index (BMI) of 34.0 to 34.9 in adult    Severe obstructive sleep apnea    Arthralgia of multiple joints    Dyssomnia    Degeneration of intervertebral disc of thoracic region    Thrombocytopenia    Liver cirrhosis secondary to LEE    Vitamin D deficiency    IFG (impaired fasting glucose)    Plantar fasciitis, bilateral    Impingement syndrome of left shoulder    TYSON (generalized anxiety disorder)    Depressive disorder    Vitamin B 12 deficiency    ETD (Eustachian tube dysfunction), bilateral    Lateral epicondylitis of both elbows    Hyperbilirubinemia    Hearing loss associated with syndrome of right ear    Chronic serous otitis media, bilateral    Recurrent kidney stones    Refusal of statin medication by patient    Pneumococcal vaccine refused    Chronic superficial gastritis without bleeding    Hiatal hernia with GERD without esophagitis    Splenomegaly    Portal hypertension    Difficulty with CPAP use    Osteoarthritis of lumbar spine     Advance Care Planning Advance Directive is not on file.  ACP discussion was held with the patient during this visit. Patient does  "not have an advance directive, information provided.            Objective   Vitals:    25 1114   BP: 136/90   Pulse: 66   SpO2: 97%   Weight: 115 kg (254 lb)   Height: 181.6 cm (71.5\")   PainSc: 5    PainLoc: Back       Estimated body mass index is 34.93 kg/m² as calculated from the following:    Height as of this encounter: 181.6 cm (71.5\").    Weight as of this encounter: 115 kg (254 lb).    BMI is >= 30 and <35. (Class 1 Obesity). The following options were offered after discussion;: exercise counseling/recommendations and nutrition counseling/recommendations           Does the patient have evidence of cognitive impairment? No                                                                                                Health  Risk Assessment    Smoking Status:  Social History     Tobacco Use   Smoking Status Never    Passive exposure: Never   Smokeless Tobacco Never     Alcohol Consumption:  Social History     Substance and Sexual Activity   Alcohol Use Never       Fall Risk Screen  STEADI Fall Risk Assessment was completed, and patient is at LOW risk for falls.Assessment completed on:2025    Depression Screening   Little interest or pleasure in doing things? Not at all   Feeling down, depressed, or hopeless? Several days   PHQ-2 Total Score 1      Health Habits and Functional and Cognitive Screenin/26/2025    11:15 AM   Functional & Cognitive Status   Do you have difficulty preparing food and eating? No   Do you have difficulty bathing yourself, getting dressed or grooming yourself? No   Do you have difficulty using the toilet? No   Do you have difficulty moving around from place to place? Yes   Do you have trouble with steps or getting out of a bed or a chair? Yes   Current Diet Limited Junk Food   Dental Exam Up to date   Eye Exam Up to date   Exercise (times per week) 0 times per week   Current Exercises Include No Regular Exercise   Do you need help using the phone?  No   Are you deaf " or do you have serious difficulty hearing?  Yes   Do you need help to go to places out of walking distance? No   Do you need help shopping? No   Do you need help preparing meals?  No   Do you need help with housework?  No   Do you need help with laundry? No   Do you need help taking your medications? No   Do you need help managing money? No   Do you ever drive or ride in a car without wearing a seat belt? No   Have you felt unusual fatigue (could be tiredness), stress, anger or loneliness in the last month? Yes   Who do you live with? Alone   If you need help, do you have trouble finding someone available to you? Yes   Have you been bothered in the last four weeks by sexual problems? No   Do you have difficulty concentrating, remembering or making decisions? Yes           Age-appropriate Screening Schedule:  Refer to the list below for future screening recommendations based on patient's age, sex and/or medical conditions. Orders for these recommended tests are listed in the plan section. The patient has been provided with a written plan.    Health Maintenance List  Health Maintenance   Topic Date Due    TDAP/TD VACCINES (1 - Tdap) Never done    COVID-19 Vaccine (3 - 2024-25 season) 02/01/2026 (Originally 9/1/2024)    Pneumococcal Vaccine 50+ (1 of 2 - PCV) 02/26/2026 (Originally 4/29/1979)    ZOSTER VACCINE (1 of 2) 06/26/2026 (Originally 4/29/2010)    INFLUENZA VACCINE  07/01/2025    LIPID PANEL  08/26/2025    ANNUAL WELLNESS VISIT  06/26/2026    COLORECTAL CANCER SCREENING  11/21/2034    HEPATITIS C SCREENING  Completed    Hepatitis B  Discontinued                                                                                                                                                CMS Preventative Services Quick Reference  Risk Factors Identified During Encounter  Chronic Pain: Natural history and expected course discussed. Questions answered.  Depression/Dysphoria: medication adjustments per A/P  Fall  Risk-High or Moderate: Discussed Fall Prevention in the home and Information on Fall Prevention Shared in After Visit Summary  Hearing Problem: previous ENT eval  Immunizations Discussed/Encouraged: Tdap, Influenza, Pneumococcal 23, Prevnar 20 (Pneumococcal 20-valent conjugate), Vaxneuvance (Pneumococcal 15-valent conjugate), Shingrix, COVID19, and RSV (Respiratory Syncytial Virus)  Inactivity/Sedentary: Patient was advised to exercise at least 150 minutes a week per CDC recommendations.  Dental Screening Recommended  Vision Screening Recommended    The above risks/problems have been discussed with the patient.  Pertinent information has been shared with the patient in the After Visit Summary.  An After Visit Summary and PPPS were made available to the patient.    Follow Up:   Next Medicare Wellness visit to be scheduled in 1 year.         Additional E&M Note during same encounter follows:  Patient has additional, significant, and separately identifiable condition(s)/problem(s) that require work above and beyond the Medicare Wellness Visit     Chief Complaint  Medicare Wellness-subsequent    Subjective   HPI  Balwinder is also being seen today for an annual adult preventative physical exam.  and Balwinder is also being seen today for additional medical problem/s.    HTN - Currently on losartan, norvasc, chlorthalidone and propranolol (due to cirrhosis for esophageal varices prevention).     HLP - not currently on statin as he has declined     Has severe TRUDI - working on using CPAP, followed by sleep medicine.     preDM, LEE with cirrhosis, obesity - struggling with caloric restriction to lose weight. Followed by GI.     Chronic pain due to severe C/T/L DDD/DJD. Currently on gabapentin daily with percocet rarely for worst breakthrough pain. Recent eval for worsening lumbar radiculopathy affecting RLE.  No recent injury. Denies side effects from medication. No aberrant behavior.     Chronic depressive disorder with  "associated anxiety.  Poor medication tolerance.  Feels that Zoloft is making him tired during the day.  Has weaned down to 1/2 tablet daily.  Has questions regarding restarting low-dose hydroxyzine as needed which she has used in the past.  Denies SI    Pt's previous HPI reviewed and updated as indicated.       Review of Systems   Constitutional:  Positive for fatigue. Negative for appetite change and fever.   HENT:  Positive for congestion, hearing loss (chronic, stable), mouth sores (intermittent) and postnasal drip. Negative for nosebleeds, sore throat and trouble swallowing.    Eyes:  Negative for visual disturbance.   Respiratory:  Negative for cough, shortness of breath and wheezing.    Cardiovascular:  Negative for chest pain, palpitations and leg swelling.   Gastrointestinal:  Positive for diarrhea and nausea. Negative for abdominal pain, blood in stool and vomiting.   Genitourinary:  Negative for difficulty urinating, discharge, dysuria, genital sores, hematuria, scrotal swelling and testicular pain.   Musculoskeletal:  Positive for arthralgias, back pain, neck pain and neck stiffness.   Skin:  Negative for rash and wound.   Neurological:  Negative for syncope, facial asymmetry, speech difficulty and confusion.   Hematological:  Negative for adenopathy. Bruises/bleeds easily.   Psychiatric/Behavioral:  Positive for dysphoric mood. Negative for suicidal ideas. The patient is nervous/anxious.    Pt's previous ROS reviewed and updated as indicated.                Objective   Vital Signs:  /90   Pulse 66   Ht 181.6 cm (71.5\")   Wt 115 kg (254 lb)   SpO2 97%   BMI 34.93 kg/m²   Physical Exam  Vitals and nursing note reviewed.   Constitutional:       General: He is not in acute distress.     Appearance: He is well-groomed and overweight. He is not ill-appearing.   HENT:      Head: Atraumatic.      Right Ear: Ear canal normal. Tympanic membrane is retracted.      Left Ear: Ear canal normal. Tympanic " membrane is retracted.      Nose: Nose normal.      Mouth/Throat:      Mouth: Mucous membranes are moist. No oral lesions.      Pharynx: Oropharynx is clear.   Eyes:      General: No scleral icterus.     Conjunctiva/sclera: Conjunctivae normal.      Pupils: Pupils are equal, round, and reactive to light.   Neck:      Thyroid: No thyroid mass.      Vascular: Normal carotid pulses. No carotid bruit.   Cardiovascular:      Rate and Rhythm: Normal rate and regular rhythm.      Pulses: Normal pulses.      Heart sounds: Normal heart sounds.   Pulmonary:      Effort: Pulmonary effort is normal.      Breath sounds: Normal breath sounds.   Abdominal:      General: Bowel sounds are normal. There is no distension.      Palpations: Abdomen is soft. There is no fluid wave or mass.      Tenderness: There is no abdominal tenderness.   Musculoskeletal:      Right shoulder: Tenderness (subacromial) present. Decreased range of motion (mildly, + impingement). Normal strength. Normal pulse.      Cervical back: Spasms and bony tenderness present.      Lumbar back: Spasms and tenderness present. Decreased range of motion.      Right lower leg: Edema (mild) present.      Left lower leg: No edema (mild).   Lymphadenopathy:      Cervical: No cervical adenopathy.   Skin:     General: Skin is warm and dry.      Coloration: Skin is not jaundiced or pale.      Findings: No bruising or rash.   Neurological:      Mental Status: He is alert and oriented to person, place, and time.      Sensory: Sensation is intact.      Motor: Motor function is intact.      Gait: Gait is intact.   Psychiatric:         Mood and Affect: Mood and affect normal.         Speech: Speech normal.         Behavior: Behavior normal. Behavior is cooperative.         Thought Content: Thought content normal.         Cognition and Memory: Cognition normal.         Judgment: Judgment normal.     Pt's previous physical exam reviewed and updated as indicated.                  Assessment and Plan Additional age appropriate preventative wellness advice topics were discussed during today's preventative wellness exam(some topics already addressed during AWV portion of the note above):    Physical Activity: Advised cardiovascular activity 150 minutes per week as tolerated. (example brisk walk for 30 minutes, 5 days a week).     Nutrition: Discussed nutrition plan with patient. Information shared in after visit summary. Goal is for a well balanced diet to enhance overall health.     Healthy Weight: Discussed current and goal BMI with patient. Steps to attain this goal discussed. Information shared in after visit summary.     Injury Prevention Discussion:  Information shared in after visit summary.           Lumbar radiculopathy    Orders:    oxyCODONE-acetaminophen (Percocet) 7.5-325 MG per tablet; Take 1 tablet by mouth Every 8 (Eight) Hours As Needed for Severe Pain.    gabapentin (NEURONTIN) 300 MG capsule; Take 1 capsule by mouth 3 (Three) Times a Day As Needed (pain).    Medicare annual wellness visit, subsequent         Mixed hyperlipidemia       Orders:    Lipid Panel    Primary hypertension      Orders:    CBC & Differential    Comprehensive Metabolic Panel    TSH Rfx On Abnormal To Free T4    Thrombocytopenia    Orders:    CBC & Differential    Portal hypertension    Orders:    Comprehensive Metabolic Panel    Liver cirrhosis secondary to LEE    Orders:    CBC & Differential    Comprehensive Metabolic Panel    Protime-INR    Bilirubin, Total & Direct    Hyperbilirubinemia    Orders:    Comprehensive Metabolic Panel    Bilirubin, Total & Direct    Medication monitoring encounter    Orders:    CBC & Differential    Comprehensive Metabolic Panel    Anxiety disorder, unspecified type  Psychological condition is stable.  Medication changes per orders.  Psychological condition  will be reassessed in 6 months.    Orders:    TSH Rfx On Abnormal To Free T4    hydrOXYzine (ATARAX) 10 MG  tablet; Take 1 tablet by mouth Every 8 (Eight) Hours As Needed for Anxiety.    Abnormal blood chemistry    Orders:    Hemoglobin A1c    Screening for prostate cancer    Orders:    PSA Screen    Encounter for preventive health examination  Age appropriate preventive care reviewed including cancer screenings, safety measures, mental health concerns, supplements, prevention of CV disease and DM, etc. Handout provided.         Therapeutic drug monitoring    Orders:    Compliance Drug Analysis, Ur - Urine, Clean Catch    Chronic right shoulder pain  Wrist/benefits manage side effects.  Treatment options reviewed.  He voiced understanding wish to proceed with therapeutic injection for which she has benefited in the past.  See procedure note below.  Orders:    triamcinolone acetonide (KENALOG-40) injection 40 mg    lidocaine (XYLOCAINE) 1 % injection 8 mL    Severe obstructive sleep apnea  Encouraged CPAP compliance.       Difficulty with CPAP use         Chronic pain syndrome  Multifactorial as detailed above.  Follow-up with neurosurgery as scheduled.  No aberrant behavior.  Good clinical benefit from current treatment plan.  As part of patient's treatment plan I am prescribing a controlled substance.  The patient has been made aware of the appropriate use of such medications, including potential risk of somnolence, limited ability to drive and/or work safely, and potential for dependence and/or overdose.  It has also been made clear that these medications are for use by this patient only, without concomitant use of alcohol or other substances, unless prescribed.  History and physical exam exhibit continued safe and appropriate use of controlled substance.  CLAUDIO reviewed.  Patient has completed a prescribing agreement detailing terms of continued prescribing of controlled substances, including monitoring CLAUDIO reports, urine drug screening, and pill counts if necessary.  Patient is aware that inappropriate use will  result in cessation of prescribing such medications.         TYSON (generalized anxiety disorder)  Psychological condition is stable.  Continue current treatment regimen.  Psychological condition  will be reassessed in 6 months.         Depressive disorder  Patient's depression is a recurrent episode that is mild without psychosis. 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    Plan:   Wean off zoloft. Start vistaril prn as above    Followup in 6 months.          Pneumococcal vaccine refused         Recurrent kidney stones  Renal condition is stable.  Continue current treatment regimen.  Renal condition will be reassessed in 6 months.         Splenomegaly         Hiatal hernia with GERD without esophagitis         Chronic superficial gastritis without bleeding         IFG (impaired fasting glucose)         Class 1 obesity due to excess calories with serious comorbidity and body mass index (BMI) of 34.0 to 34.9 in adult  Patient's (Body mass index is 34.93 kg/m².) indicates that they are obese (BMI >30) with health conditions that include obstructive sleep apnea, hypertension, impaired fasting glucose, dyslipidemias, GERD, osteoarthritis, and hepatic steatosis . Weight is unchanged. BMI  is above average; BMI management plan is completed. We discussed low calorie, low carb based diet program, portion control, increasing exercise, joining a fitness center or start home based exercise program, Weight Watchers or other Commercial based weight reduction program, management of depression/anxiety/stress to control compensatory eating, and an toya-based approach such as Power Content Pal or Lose It.          Chronic serous otitis media, bilateral         Refusal of statin medication by patient              PROCEDURE NOTE  Informed consent obtained.  Time out protocol performed prior to procedure    Arthrocentesis/Joint injection  Date/Time: 6/26/25 at 1205 pm  Performed by: Annel Armstrong MD  Authorized by: Annel Armstrong  MD   Indications: pain and diagnostic evaluation   Body area: shoulder  Joint: right shoulder  Local anesthesia used: no   Anesthesia:  Local anesthesia used: no   Sedation:  Patient sedated: no   Preparation: Patient was prepped and draped in the usual sterile fashion.  Needle size: 21 G (1.5 inch)  Ultrasound guidance: no  Approach: posterior  Aspirate amount: 0 mL  Patient tolerance: patient tolerated the procedure well with no immediate complications  Comments: Medications used include:   Lidocaine 1% without epi total of 8 ml  Kenalog 40 mg  1/2 volume to AC joint, 1/2 volume to subacromial area  Single skin entry      Post procedure care reviewed.  Pt voiced understanding and denied further questions.         Follow Up   Return in about 6 months (around 12/26/2025).  Follow-up sooner as needed/instructed.  I will contact patient regarding test results and provide instructions regarding any necessary changes in plan of care.  Patient was encouraged to keep me informed of any acute changes, lack of improvement, or any new concerning symptoms.  Pt is aware of reasons to seek emergent care.  Patient voiced understanding of all instructions and denied further questions.    Patient was given instructions and counseling regarding his condition or for health maintenance advice. Please see specific information pulled into the AVS if appropriate.    Please note that portions of this note may have been completed with a voice recognition program.

## 2025-06-27 LAB
ALBUMIN SERPL-MCNC: 4.1 G/DL (ref 3.5–5.2)
ALBUMIN/GLOB SERPL: 1.3 G/DL
ALP SERPL-CCNC: 92 U/L (ref 39–117)
ALT SERPL-CCNC: 25 U/L (ref 1–41)
AST SERPL-CCNC: 25 U/L (ref 1–40)
BASOPHILS # BLD AUTO: ABNORMAL 10*3/UL
BASOPHILS # BLD MANUAL: 0.04 10*3/MM3 (ref 0–0.2)
BASOPHILS NFR BLD MANUAL: 1 % (ref 0–1.5)
BILIRUB DIRECT SERPL-MCNC: 0.6 MG/DL (ref 0–0.3)
BILIRUB INDIRECT SERPL-MCNC: 2.3 MG/DL
BILIRUB SERPL-MCNC: 2.9 MG/DL (ref 0–1.2)
BUN SERPL-MCNC: 20 MG/DL (ref 8–23)
BUN/CREAT SERPL: 20 (ref 7–25)
CALCIUM SERPL-MCNC: 9.5 MG/DL (ref 8.6–10.5)
CHLORIDE SERPL-SCNC: 102 MMOL/L (ref 98–107)
CHOLEST SERPL-MCNC: 191 MG/DL (ref 0–200)
CO2 SERPL-SCNC: 25 MMOL/L (ref 22–29)
CREAT SERPL-MCNC: 1 MG/DL (ref 0.76–1.27)
DIFFERENTIAL COMMENT: ABNORMAL
EGFRCR SERPLBLD CKD-EPI 2021: 83.5 ML/MIN/1.73
EOSINOPHIL # BLD AUTO: ABNORMAL 10*3/UL
EOSINOPHIL # BLD MANUAL: 0.13 10*3/MM3 (ref 0–0.4)
EOSINOPHIL NFR BLD AUTO: ABNORMAL %
EOSINOPHIL NFR BLD MANUAL: 3.1 % (ref 0.3–6.2)
ERYTHROCYTE [DISTWIDTH] IN BLOOD BY AUTOMATED COUNT: 13.5 % (ref 12.3–15.4)
GLOBULIN SER CALC-MCNC: 3.1 GM/DL
GLUCOSE SERPL-MCNC: 271 MG/DL (ref 65–99)
HBA1C MFR BLD: 7.6 % (ref 4.8–5.6)
HCT VFR BLD AUTO: 37 % (ref 37.5–51)
HDLC SERPL-MCNC: 31 MG/DL (ref 40–60)
HGB BLD-MCNC: 12.9 G/DL (ref 13–17.7)
INR PPP: 1.19 (ref 0.9–1.1)
LDLC SERPL CALC-MCNC: 84 MG/DL (ref 0–100)
LYMPHOCYTES # BLD AUTO: ABNORMAL 10*3/UL
LYMPHOCYTES # BLD MANUAL: 0.76 10*3/MM3 (ref 0.7–3.1)
LYMPHOCYTES NFR BLD AUTO: ABNORMAL %
LYMPHOCYTES NFR BLD MANUAL: 18.6 % (ref 19.6–45.3)
MCH RBC QN AUTO: 36.9 PG (ref 26.6–33)
MCHC RBC AUTO-ENTMCNC: 34.9 G/DL (ref 31.5–35.7)
MCV RBC AUTO: 105.7 FL (ref 79–97)
MONOCYTES # BLD MANUAL: 0.34 10*3/MM3 (ref 0.1–0.9)
MONOCYTES NFR BLD AUTO: ABNORMAL %
MONOCYTES NFR BLD MANUAL: 8.2 % (ref 5–12)
NEUTROPHILS # BLD MANUAL: 2.84 10*3/MM3 (ref 1.7–7)
NEUTROPHILS NFR BLD AUTO: ABNORMAL %
NEUTROPHILS NFR BLD MANUAL: 69.1 % (ref 42.7–76)
PLATELET # BLD AUTO: 61 10*3/MM3 (ref 140–450)
PLATELET BLD QL SMEAR: ABNORMAL
POTASSIUM SERPL-SCNC: 3.5 MMOL/L (ref 3.5–5.2)
PROT SERPL-MCNC: 7.2 G/DL (ref 6–8.5)
PROTHROMBIN TIME: 16 SECONDS (ref 12.3–15.1)
PSA SERPL-MCNC: 0.76 NG/ML (ref 0–4)
RBC # BLD AUTO: 3.5 10*6/MM3 (ref 4.14–5.8)
RBC MORPH BLD: ABNORMAL
SODIUM SERPL-SCNC: 139 MMOL/L (ref 136–145)
TRIGL SERPL-MCNC: 472 MG/DL (ref 0–150)
TSH SERPL DL<=0.005 MIU/L-ACNC: 1.64 UIU/ML (ref 0.27–4.2)
VLDLC SERPL CALC-MCNC: 76 MG/DL (ref 5–40)
WBC # BLD AUTO: 4.11 10*3/MM3 (ref 3.4–10.8)

## 2025-07-03 LAB — DRUGS UR: NORMAL

## 2025-07-16 DIAGNOSIS — M54.16 LUMBAR RADICULOPATHY: ICD-10-CM

## 2025-07-16 RX ORDER — OXYCODONE AND ACETAMINOPHEN 7.5; 325 MG/1; MG/1
1 TABLET ORAL EVERY 8 HOURS PRN
Qty: 9 TABLET | Refills: 0 | Status: SHIPPED | OUTPATIENT
Start: 2025-07-16

## 2025-07-16 NOTE — TELEPHONE ENCOUNTER
Rx Refill Note  Requested Prescriptions     Pending Prescriptions Disp Refills    oxyCODONE-acetaminophen (Percocet) 7.5-325 MG per tablet 9 tablet 0     Sig: Take 1 tablet by mouth Every 8 (Eight) Hours As Needed for Severe Pain.      Last office visit with prescribing clinician: 6/26/2025   Last telemedicine visit with prescribing clinician: Visit date not found   Next office visit with prescribing clinician: 12/22/2025                         Would you like a call back once the refill request has been completed: [] Yes [] No    If the office needs to give you a call back, can they leave a voicemail: [] Yes [] No    Gemini Mae MA  07/16/25, 11:39 EDT

## 2025-08-08 DIAGNOSIS — M54.16 LUMBAR RADICULOPATHY: ICD-10-CM

## 2025-08-08 RX ORDER — OXYCODONE AND ACETAMINOPHEN 7.5; 325 MG/1; MG/1
1 TABLET ORAL EVERY 8 HOURS PRN
Qty: 9 TABLET | Refills: 0 | Status: SHIPPED | OUTPATIENT
Start: 2025-08-08

## 2025-08-25 ENCOUNTER — OFFICE VISIT (OUTPATIENT)
Dept: FAMILY MEDICINE CLINIC | Facility: CLINIC | Age: 65
End: 2025-08-25
Payer: MEDICARE

## 2025-08-25 VITALS
HEIGHT: 72 IN | HEART RATE: 96 BPM | SYSTOLIC BLOOD PRESSURE: 128 MMHG | DIASTOLIC BLOOD PRESSURE: 82 MMHG | BODY MASS INDEX: 34.13 KG/M2 | TEMPERATURE: 98.5 F | WEIGHT: 252 LBS | OXYGEN SATURATION: 97 %

## 2025-08-25 DIAGNOSIS — M54.16 LUMBAR RADICULOPATHY: ICD-10-CM

## 2025-08-25 DIAGNOSIS — E66.811 CLASS 1 OBESITY DUE TO EXCESS CALORIES WITH SERIOUS COMORBIDITY AND BODY MASS INDEX (BMI) OF 34.0 TO 34.9 IN ADULT: ICD-10-CM

## 2025-08-25 DIAGNOSIS — K74.60 LIVER CIRRHOSIS SECONDARY TO NASH: ICD-10-CM

## 2025-08-25 DIAGNOSIS — Z53.20 REFUSAL OF STATIN MEDICATION BY PATIENT: ICD-10-CM

## 2025-08-25 DIAGNOSIS — F41.8 DEPRESSION WITH ANXIETY: ICD-10-CM

## 2025-08-25 DIAGNOSIS — G89.4 CHRONIC PAIN SYNDROME: Primary | ICD-10-CM

## 2025-08-25 DIAGNOSIS — E11.65 TYPE 2 DIABETES MELLITUS WITH HYPERGLYCEMIA, WITHOUT LONG-TERM CURRENT USE OF INSULIN: ICD-10-CM

## 2025-08-25 DIAGNOSIS — Z28.21 PNEUMOCOCCAL VACCINE REFUSED: ICD-10-CM

## 2025-08-25 DIAGNOSIS — K75.81 LIVER CIRRHOSIS SECONDARY TO NASH: ICD-10-CM

## 2025-08-25 DIAGNOSIS — E66.09 CLASS 1 OBESITY DUE TO EXCESS CALORIES WITH SERIOUS COMORBIDITY AND BODY MASS INDEX (BMI) OF 34.0 TO 34.9 IN ADULT: ICD-10-CM

## 2025-08-25 RX ORDER — BUPROPION HYDROCHLORIDE 150 MG/1
150 TABLET ORAL DAILY
Qty: 30 TABLET | Refills: 2 | Status: SHIPPED | OUTPATIENT
Start: 2025-08-25

## 2025-08-25 RX ORDER — TIRZEPATIDE 2.5 MG/.5ML
2.5 INJECTION, SOLUTION SUBCUTANEOUS WEEKLY
Qty: 2 ML | Refills: 1 | Status: SHIPPED | OUTPATIENT
Start: 2025-08-25

## 2025-08-25 RX ORDER — AVOBENZONE, HOMOSALATE, OCTISALATE, OCTOCRYLENE 30; 40; 45; 26 MG/ML; MG/ML; MG/ML; MG/ML
1 CREAM TOPICAL DAILY
Qty: 100 EACH | Refills: 0 | Status: SHIPPED | OUTPATIENT
Start: 2025-08-25

## 2025-08-25 RX ORDER — OXYCODONE AND ACETAMINOPHEN 7.5; 325 MG/1; MG/1
TABLET ORAL
Qty: 30 TABLET | Refills: 0 | Status: SHIPPED | OUTPATIENT
Start: 2025-08-25

## 2025-08-25 RX ORDER — BLOOD-GLUCOSE METER
1 KIT MISCELLANEOUS AS NEEDED
Qty: 1 EACH | Refills: 0 | Status: SHIPPED | OUTPATIENT
Start: 2025-08-25

## 2025-08-26 ENCOUNTER — PRIOR AUTHORIZATION (OUTPATIENT)
Dept: FAMILY MEDICINE CLINIC | Facility: CLINIC | Age: 65
End: 2025-08-26
Payer: MEDICARE

## 2025-08-26 DIAGNOSIS — I10 PRIMARY HYPERTENSION: ICD-10-CM

## 2025-08-26 RX ORDER — LOSARTAN POTASSIUM 100 MG/1
100 TABLET ORAL DAILY
Qty: 90 TABLET | Refills: 0 | Status: SHIPPED | OUTPATIENT
Start: 2025-08-26

## 2025-08-27 RX ORDER — AMITRIPTYLINE HYDROCHLORIDE 10 MG/1
TABLET ORAL
Qty: 60 TABLET | Refills: 0 | Status: SHIPPED | OUTPATIENT
Start: 2025-08-27

## (undated) DEVICE — PATIENT RETURN ELECTRODE, SINGLE-USE, CONTACT QUALITY MONITORING, ADULT, WITH 15 FT (4.5 M) CORD. FOR PATIENTS WEIGHING OVER 33LBS. (15KG): Brand: MEGADYNE

## (undated) DEVICE — DUAL LUMEN URETERAL CATHETER

## (undated) DEVICE — CONMED SCOPE SAVER BITE BLOCK, 20X27 MM: Brand: SCOPE SAVER

## (undated) DEVICE — ADAPT/Y SCPE GATEWAY ADVNTGE

## (undated) DEVICE — ENDOSCOPY PORT CONNECTOR FOR OLYMPUS® SCOPES: Brand: ERBE

## (undated) DEVICE — CATHETER,URETHRAL,REDRUBBER,STRL,18FR: Brand: MEDLINE

## (undated) DEVICE — FRCP BX RADJAW4 NDL 2.8 240 STD OG

## (undated) DEVICE — STRIP,CLOSURE,WOUND,MEDI-STRIP,1/2X4: Brand: MEDLINE

## (undated) DEVICE — NITINOL WIRE WITH HYDROPHILIC TIP: Brand: SENSOR

## (undated) DEVICE — Device

## (undated) DEVICE — HYBRID CO2 TUBING/CAP SET FOR OLYMPUS® SCOPES & CO2 SOURCE: Brand: ERBE

## (undated) DEVICE — RICH CYSTO: Brand: MEDLINE INDUSTRIES, INC.

## (undated) DEVICE — ADHS LIQ MASTISOL 2/3ML

## (undated) DEVICE — LUBE JELLY PK/2.75GM STRL BX/144

## (undated) DEVICE — VLV SXN AIR/H2O ORCAPOD3 1P/U STRL

## (undated) DEVICE — SUCTION CANISTER, 1500CC, RIGID: Brand: DEROYAL

## (undated) DEVICE — SINGLE-USE POLYPECTOMY SNARE: Brand: CAPTIVATOR II

## (undated) DEVICE — GLV SURG SENSICARE LT W/ALOE PF LF 7 STRL

## (undated) DEVICE — SOL IRR NACL 0.9PCT 3000ML

## (undated) DEVICE — HYBRID TUBING/CAP SET FOR OLYMPUS® SCOPES: Brand: ERBE

## (undated) DEVICE — GW AMPLTZ SUPRSTF PTFE JB .035 7X145CM

## (undated) DEVICE — FIBR LASR HOLMIUM SLIMLINE SIS EZ 365U

## (undated) DEVICE — SLV SCD CALF HEMOFORCE DVT THERP REPROC MD

## (undated) DEVICE — ST URO THERMEDX/FLUIDSMART IRR/FLD/WARM PNT/PRESS/300MMHG

## (undated) DEVICE — QUICK CATCH IN-LINE SUCTION POLYP TRAP IS USED FOR SUCTION RETRIEVAL OF ENDOSCOPICALLY REMOVED POLYPS.

## (undated) DEVICE — DRSNG SURESITE123 2.4X2.8IN